# Patient Record
Sex: MALE | Race: WHITE | NOT HISPANIC OR LATINO | Employment: OTHER | URBAN - METROPOLITAN AREA
[De-identification: names, ages, dates, MRNs, and addresses within clinical notes are randomized per-mention and may not be internally consistent; named-entity substitution may affect disease eponyms.]

---

## 2017-04-27 DIAGNOSIS — E03.9 HYPOTHYROIDISM, UNSPECIFIED TYPE: ICD-10-CM

## 2017-04-27 DIAGNOSIS — E11.21 TYPE 2 DIABETES MELLITUS WITH DIABETIC NEPHROPATHY (H): ICD-10-CM

## 2017-04-27 LAB
ALBUMIN SERPL-MCNC: 3.7 G/DL (ref 3.4–5)
ALP SERPL-CCNC: 87 U/L (ref 40–150)
ALT SERPL W P-5'-P-CCNC: 36 U/L (ref 0–70)
ANION GAP SERPL CALCULATED.3IONS-SCNC: 10 MMOL/L (ref 3–14)
AST SERPL W P-5'-P-CCNC: 27 U/L (ref 0–45)
BILIRUB SERPL-MCNC: 0.7 MG/DL (ref 0.2–1.3)
BUN SERPL-MCNC: 18 MG/DL (ref 7–30)
CALCIUM SERPL-MCNC: 9 MG/DL (ref 8.5–10.1)
CHLORIDE SERPL-SCNC: 106 MMOL/L (ref 94–109)
CHOLEST SERPL-MCNC: 134 MG/DL
CO2 SERPL-SCNC: 23 MMOL/L (ref 20–32)
CREAT SERPL-MCNC: 1.06 MG/DL (ref 0.66–1.25)
GFR SERPL CREATININE-BSD FRML MDRD: 68 ML/MIN/1.7M2
GLUCOSE SERPL-MCNC: 192 MG/DL (ref 70–99)
HBA1C MFR BLD: 7.1 % (ref 4.3–6)
HDLC SERPL-MCNC: 60 MG/DL
LDLC SERPL CALC-MCNC: 43 MG/DL
NONHDLC SERPL-MCNC: 74 MG/DL
POTASSIUM SERPL-SCNC: 4.2 MMOL/L (ref 3.4–5.3)
PROT SERPL-MCNC: 7.5 G/DL (ref 6.8–8.8)
SODIUM SERPL-SCNC: 139 MMOL/L (ref 133–144)
TRIGL SERPL-MCNC: 156 MG/DL
TSH SERPL DL<=0.005 MIU/L-ACNC: 2.9 MU/L (ref 0.4–4)

## 2017-04-27 PROCEDURE — 80061 LIPID PANEL: CPT | Performed by: FAMILY MEDICINE

## 2017-04-27 PROCEDURE — 36415 COLL VENOUS BLD VENIPUNCTURE: CPT | Performed by: FAMILY MEDICINE

## 2017-04-27 PROCEDURE — 80053 COMPREHEN METABOLIC PANEL: CPT | Performed by: FAMILY MEDICINE

## 2017-04-27 PROCEDURE — 83036 HEMOGLOBIN GLYCOSYLATED A1C: CPT | Performed by: FAMILY MEDICINE

## 2017-04-27 PROCEDURE — 84443 ASSAY THYROID STIM HORMONE: CPT | Performed by: FAMILY MEDICINE

## 2017-05-10 ENCOUNTER — TELEPHONE (OUTPATIENT)
Dept: INTERNAL MEDICINE | Facility: CLINIC | Age: 77
End: 2017-05-10

## 2017-05-10 NOTE — TELEPHONE ENCOUNTER
Reason for Call:  Request for results:    Name of test or procedure: lab--A1C    Date of test of procedure: 4/27/17    Location of the test or procedure: [facility]    OK to leave the result message on voice mail or with a family member? YES    Phone number Patient can be reached at:  Home number on file [phone] (home)    Additional comments:     Call taken on 5/10/2017 at 11:21 AM by MATTHIAS CUMMINGS

## 2017-05-15 ENCOUNTER — OFFICE VISIT (OUTPATIENT)
Dept: INTERNAL MEDICINE | Facility: CLINIC | Age: 77
End: 2017-05-15
Payer: MEDICARE

## 2017-05-15 VITALS
WEIGHT: 177 LBS | TEMPERATURE: 99 F | HEIGHT: 67 IN | BODY MASS INDEX: 27.78 KG/M2 | HEART RATE: 80 BPM | DIASTOLIC BLOOD PRESSURE: 68 MMHG | SYSTOLIC BLOOD PRESSURE: 124 MMHG | OXYGEN SATURATION: 96 %

## 2017-05-15 DIAGNOSIS — E11.21 TYPE 2 DIABETES MELLITUS WITH DIABETIC NEPHROPATHY, WITHOUT LONG-TERM CURRENT USE OF INSULIN (H): Primary | ICD-10-CM

## 2017-05-15 DIAGNOSIS — M79.672 BILATERAL FOOT PAIN: ICD-10-CM

## 2017-05-15 DIAGNOSIS — E03.9 HYPOTHYROIDISM, UNSPECIFIED TYPE: ICD-10-CM

## 2017-05-15 DIAGNOSIS — H26.9 CATARACT: ICD-10-CM

## 2017-05-15 DIAGNOSIS — M79.671 BILATERAL FOOT PAIN: ICD-10-CM

## 2017-05-15 DIAGNOSIS — K21.9 GASTROESOPHAGEAL REFLUX DISEASE WITHOUT ESOPHAGITIS: ICD-10-CM

## 2017-05-15 DIAGNOSIS — E78.5 HYPERLIPIDEMIA LDL GOAL <100: ICD-10-CM

## 2017-05-15 PROCEDURE — 99214 OFFICE O/P EST MOD 30 MIN: CPT | Performed by: INTERNAL MEDICINE

## 2017-05-15 RX ORDER — GLIMEPIRIDE 2 MG/1
TABLET ORAL
Qty: 225 TABLET | Refills: 3 | Status: SHIPPED | OUTPATIENT
Start: 2017-05-15 | End: 2017-06-22

## 2017-05-15 RX ORDER — LEVOTHYROXINE SODIUM 75 UG/1
75 TABLET ORAL DAILY
Qty: 90 TABLET | Refills: 3 | Status: SHIPPED | OUTPATIENT
Start: 2017-05-15 | End: 2018-04-25

## 2017-05-15 RX ORDER — ROSUVASTATIN CALCIUM 40 MG/1
20 TABLET, COATED ORAL AT BEDTIME
Qty: 45 TABLET | Refills: 3 | Status: SHIPPED | OUTPATIENT
Start: 2017-05-15 | End: 2017-06-22

## 2017-05-15 NOTE — NURSING NOTE
"Chief Complaint   Patient presents with     Diabetes     Recheck Medication       Initial /68 (BP Location: Left arm, Patient Position: Chair, Cuff Size: Adult Regular)  Pulse 80  Temp 99  F (37.2  C) (Oral)  Ht 5' 7\" (1.702 m)  Wt 177 lb (80.3 kg)  SpO2 96%  BMI 27.72 kg/m2 Estimated body mass index is 27.72 kg/(m^2) as calculated from the following:    Height as of this encounter: 5' 7\" (1.702 m).    Weight as of this encounter: 177 lb (80.3 kg).  Medication Reconciliation: complete    "

## 2017-05-15 NOTE — MR AVS SNAPSHOT
After Visit Summary   5/15/2017    Prakash Cramer    MRN: [id number]           Patient Information     Date Of Birth          1940        Visit Information        Provider Department      5/15/2017 1:30 PM Zhou Meraz MD [facility]        Today's Diagnoses     Bilateral foot pain    -  1    Hypothyroidism, unspecified type        Hyperlipidemia LDL goal <100        Type 2 diabetes mellitus with diabetic nephropathy, without long-term current use of insulin (H)        Gastroesophageal reflux disease without esophagitis          Care Instructions    Change the PM Metformin and Glimepiride doses to suppertime  Continue other medications  [facility]. They will call to schedule  Nonfasting blood and urine lab in ear/y/mid November  See me   In the 30 days before  Cataract surgery for preop             Follow-ups after your visit        Additional Services     ORTHOTICS REFERRAL       **This referral order prints off in the [facility]  (Orthotics & Prosthetics) Central Scheduling Office**    The [facility] Central Scheduling Staff will contact the patient to schedule appointments.     Central Scheduling Contact Information: [phone] ([place])    Orthotics: [facility]    Please be aware that coverage of these services is subject to the terms and limitations of your health insurance plan.  Call member services at your health plan with any benefit or coverage questions.      Please bring the following to your appointment:    >>   Any x-rays, CTs or MRIs which have been performed.  Contact the facility where they were done to arrange for  prior to your scheduled appointment.    >>   List of current medications   >>   This referral request   >>   Any documents/labs given to you for this referral                  Future tests that were ordered for you today     Open Future Orders        Priority Expected Expires Ordered     "Hemoglobin A1c Routine 2017 3/11/2018 5/15/2017            Who to contact     If you have questions or need follow up information about today's clinic visit or your schedule please contact [facility] directly at [phone].  Normal or non-critical lab and imaging results will be communicated to you by MyChart, letter or phone within 4 business days after the clinic has received the results. If you do not hear from us within 7 days, please contact the clinic through MyChart or phone. If you have a critical or abnormal lab result, we will notify you by phone as soon as possible.  Submit refill requests through [email or web address] or call your pharmacy and they will forward the refill request to us. Please allow 3 business days for your refill to be completed.          Additional Information About Your Visit        MyChart Information     [email or web address] lets you send messages to your doctor, view your test results, renew your prescriptions, schedule appointments and more. To sign up, go to [email or web address] . Click on \"Log in\" on the left side of the screen, which will take you to the Welcome page. Then click on \"Sign up Now\" on the right side of the page.     You will be asked to enter the access code listed below, as well as some personal information. Please follow the directions to create your username and password.     Your access code is: [email or web address]  Expires: 2017  2:18 PM     Your access code will  in 90 days. If you need help or a new code, please call your [facility] clinic or [phone].        Care EveryWhere ID     This is your Care EveryWhere ID. This could be used by other organizations to access your [facility] medical records  [id number]        Your Vitals Were     Pulse Temperature Height Pulse Oximetry BMI (Body Mass Index)       80 99  F (37.2  C) (Oral) 5' 7\" (1.702 m) 96% 27.72 kg/m2        Blood Pressure from Last 3 Encounters:   05/15/17 124/68   16 130/72 "   06/21/16 126/62    Weight from Last 3 Encounters:   05/15/17 177 lb (80.3 kg)   12/01/16 173 lb (78.5 kg)   06/21/16 171 lb 9.6 oz (77.8 kg)              We Performed the Following     ORTHOTICS REFERRAL          Today's Medication Changes          These changes are accurate as of: 5/15/17  2:18 PM.  If you have any questions, ask your nurse or doctor.               These medicines have changed or have updated prescriptions.        Dose/Directions    glimepiride 2 MG tablet   Commonly known as:  AMARYL   This may have changed:    - how much to take  - how to take this  - when to take this  - additional instructions   Used for:  Type 2 diabetes mellitus with diabetic nephropathy, without long-term current use of insulin (H)   Changed by:  Zhou Meraz MD        1.5 tabs in AM with breakfast and 1 tab in OPM with supper   Quantity:  225 tablet   Refills:  3            Where to get your medicines      These medications were sent to [facility] [address] [place], [place] - [address]  [address] [facility] [address]     Phone:  [phone]     glimepiride 2 MG tablet    levothyroxine 75 MCG tablet    omeprazole 20 MG CR capsule    rosuvastatin 40 MG tablet         Some of these will need a paper prescription and others can be bought over the counter.  Ask your nurse if you have questions.     Bring a paper prescription for each of these medications     sitagliptin 100 MG tablet                Primary Care Provider Office Phone # Fax #    Zhou Meraz [phone]       [facility] [address] [facility] [address]        Thank you!     Thank you for choosing [facility]  for your care. Our goal is always to provide you with excellent care. Hearing back from our patients is one way we can continue to improve our services. Please take a few minutes to complete the written survey that you may receive in the mail after your visit with us.  Thank you!             Your Updated Medication List - Protect others around you: Learn how to safely use, store and throw away your medicines at www.disposemymeds.org.          This list is accurate as of: 5/15/17  2:18 PM.  Always use your most recent med list.                   Brand Name Dispense Instructions for use    amLODIPine 10 MG tablet    NORVASC    90 tablet    Take 1 tablet (10 mg) by mouth daily       * blood glucose monitoring lancets     1 Box    Use to test blood sugar 1 time daily or as directed.       * blood glucose monitoring lancets     1 Box    Use to test blood sugar 1 times daily or as directed.       * blood glucose monitoring meter device kit     1 kit    Use to test blood sugars 1 time daily or as directed.       * Blood Glucose Monitoring Suppl Ami     1 each    1 Device daily Smartview Meter       * blood glucose monitoring test strip    ACCU-CHEK COMPACT DRUM    102 strip    Use to test blood sugars 1 time daily or as directed.       * blood glucose monitoring test strip    ACCU-CHEK SMARTVIEW    100 strip    Use to test blood sugar 1 times daily or as directed.       ferrous gluconate 324 (38 FE) MG tablet    FERGON    90 tablet    Take 1 tablet (324 mg) by mouth daily (with breakfast)       gabapentin 100 MG capsule    NEURONTIN    270 capsule    Take 1 capsule (100 mg) by mouth 3 times daily       glimepiride 2 MG tablet    AMARYL    225 tablet    1.5 tabs in AM with breakfast and 1 tab in OPM with supper       levothyroxine 75 MCG tablet    SYNTHROID/LEVOTHROID    90 tablet    Take 1 tablet (75 mcg) by mouth daily       metFORMIN 1000 MG tablet    GLUCOPHAGE    180 tablet    Take 1 tablet (1,000 mg) by mouth 2 times daily (with meals)       omeprazole 20 MG CR capsule    priLOSEC    90 capsule    Take 1 capsule (20 mg) by mouth every morning TAKE 30'-60' BEFORE 1ST MEAL DAILY       rosuvastatin 40 MG tablet    CRESTOR    45 tablet    Take 0.5 tablets (20 mg) by mouth At Bedtime        sitagliptin 100 MG tablet    JANUVIA    90 tablet    Take 1 tablet (100 mg) by mouth daily       * Notice:  This list has 6 medication(s) that are the same as other medications prescribed for you. Read the directions carefully, and ask your doctor or other care provider to review them with you.

## 2017-05-15 NOTE — PATIENT INSTRUCTIONS
Change the PM Metformin and Glimepiride doses to suppertime  Continue other medications  [facility]erral [facility]. They will call to schedule  Nonfasting blood and urine lab in ear/y/mid November  See me   In the 30 days before  Cataract surgery for preop

## 2017-05-15 NOTE — PROGRESS NOTES
SUBJECTIVE:                                                    Prakash Cramer is a 77 year old male who presents to clinic today for the following health issues:    Diabetes Follow-up    Patient is checking blood sugars: once daily.  Results are as follows:         am - 134-158 fasting. No PM sugars checked    Diabetic concerns: elevated reading, and  Soreness of the bottoms of the feet     Symptoms of hypoglycemia (low blood sugar): none     Paresthesias (numbness or burning in feet) or sores: No, but bottoms of the feet have been sore     Date of last diabetic eye exam: about 2 months ago     Hyperlipidemia Follow-Up      Rate your low fat/cholesterol diet?: not monitoring fat    Taking statin?  Yes, no muscle aches from statin    Other lipid medications/supplements?:  none     Hypothyroidism Follow-up      Since last visit, patient describes the following symptoms: Weight stable, no hair loss, no skin changes, no constipation, no loose stools       Amount of exercise or physical activity: 2-3 days/week for an average of 45-60 minutes    Problems taking medications regularly: No    Medication side effects: none    Diet: regular (no restrictions)    Pt's past medical history, family history, habits, medications and allergies were reviewed with the patient today.  See snap shot for  HCM status. Most recent lab results reviewed with pt. Problem list and histories reviewed & adjusted, as indicated.  Additional history as below:    Now taking Glimepiride 2mg tab, 1.5 tabs in AM and 1 tab  At bedtime. No low sugar episodes. Has been active with exercise. Denies CP, SOB, abdominal pain, polyuria, polydipsia,  extremity numbness/parasthesias or skin problems. Has right cataract and will have surgery in July.  Diet had been worse in [place] but improved now back in [place].  Had been using treadmill. Weight up slightly.  History of foot arch pain that has been well-controlled with use of orthotics. Patient's current  "orthotics are wearing out and needs new ones. Previously custom-made. At risk for foot skin issues without them, especially with DM hx. GERD controlled with PPI. Has flares off of med. Rare caffeine    Lab Results   Component Value Date     04/27/2017     Lab Results   Component Value Date    A1C 7.1 04/27/2017     Lab Results   Component Value Date    CHOL 134 04/27/2017     Lab Results   Component Value Date    LDL 43 04/27/2017     Lab Results   Component Value Date    HDL 60 04/27/2017     Lab Results   Component Value Date    TRIG 156 04/27/2017     Lab Results   Component Value Date    CR 1.06 04/27/2017     Lab Results   Component Value Date    ALT 36 04/27/2017     Lab Results   Component Value Date    AST 27 04/27/2017     Lab Results   Component Value Date    MICROL 38 02/06/2015     Lab Results   Component Value Date    TSH 2.90 04/27/2017            Additional ROS:   Constitutional, HEENT, Cardiovascular, Pulmonary, GI and , Neuro, MSK and Psych review of systems/symptoms are otherwise negative or unchanged from previous, except as noted above.      OBJECTIVE:  /68 (BP Location: Left arm, Patient Position: Chair, Cuff Size: Adult Regular)  Pulse 80  Temp 99  F (37.2  C) (Oral)  Ht 5' 7\" (1.702 m)  Wt 177 lb (80.3 kg)  SpO2 96%  BMI 27.72 kg/m2   Estimated body mass index is 27.72 kg/(m^2) as calculated from the following:    Height as of this encounter: 5' 7\" (1.702 m).    Weight as of this encounter: 177 lb (80.3 kg).  Eye: PERRL, EOMI. Right cataract  HENT: ear canals and TM's normal and nose and mouth without ulcers or lesions   Neck: no adenopathy. Thyroid normal to palpation. No bruits  Pulm: Lungs clear to auscultation   CV: Regular rates and rhythm  GI: Soft, nontender, Normal active bowel sounds, No hepatosplenomegaly or masses palpable  Ext: Peripheral pulses intact. No edema. Higher arches bilaterally. No calluses  Neuro: Normal strength and tone, sensory exam grossly " normal    Assessment/Plan: (See plan discussion below for further details)  1. Type 2 diabetes mellitus with diabetic nephropathy, without long-term current use of insulin (H)  Controlled overall. Continue current meds with timing of PM doses adjusted per plan discussion below. Repeat labs 6 mos  - sitagliptin (JANUVIA) 100 MG tablet; Take 1 tablet (100 mg) by mouth daily  Dispense: 90 tablet; Refill: 3  - glimepiride (AMARYL) 2 MG tablet; 1.5 tabs in AM with breakfast and 1 tab in OPM with supper  Dispense: 225 tablet; Refill: 3  - Hemoglobin A1c; Future    2. Hypothyroidism, unspecified type  controlled  - levothyroxine (SYNTHROID/LEVOTHROID) 75 MCG tablet; Take 1 tablet (75 mcg) by mouth daily  Dispense: 90 tablet; Refill: 3    3. Hyperlipidemia LDL goal <100  controlled  - rosuvastatin (CRESTOR) 40 MG tablet; Take 0.5 tablets (20 mg) by mouth At Bedtime  Dispense: 45 tablet; Refill: 3    4. Bilateral foot pain  Needs new orthotics  - ORTHOTICS REFERRAL    5. Gastroesophageal reflux disease without esophagitis  controlled  - omeprazole (PRILOSEC) 20 MG CR capsule; Take 1 capsule (20 mg) by mouth every morning TAKE 30'-60' BEFORE 1ST MEAL DAILY  Dispense: 90 capsule; Refill: 3    6. Cataract  Future surgery as above    Plan discussion:  Change the PM Metformin and Glimepiride doses to suppertime rather than bedtime  Continue other medications  Referral to  Orthotics. They will call to schedule  Nonfasting blood and urine lab in eary/mid November  See me   in the 30 days before  Cataract surgery for preop       Zhou Meraz MD  Internal Medicine Department  [facility]

## 2017-05-22 ENCOUNTER — MEDICAL CORRESPONDENCE (OUTPATIENT)
Dept: HEALTH INFORMATION MANAGEMENT | Facility: CLINIC | Age: 77
End: 2017-05-22

## 2017-05-23 PROCEDURE — 99207 C MD CERTIFICATION HHA PATIENT: CPT | Performed by: INTERNAL MEDICINE

## 2017-06-17 ENCOUNTER — HEALTH MAINTENANCE LETTER (OUTPATIENT)
Age: 77
End: 2017-06-17

## 2017-06-22 ENCOUNTER — OFFICE VISIT (OUTPATIENT)
Dept: INTERNAL MEDICINE | Facility: CLINIC | Age: 77
End: 2017-06-22
Payer: MEDICARE

## 2017-06-22 VITALS
DIASTOLIC BLOOD PRESSURE: 84 MMHG | TEMPERATURE: 98.8 F | HEART RATE: 85 BPM | WEIGHT: 176 LBS | BODY MASS INDEX: 27.57 KG/M2 | OXYGEN SATURATION: 97 % | SYSTOLIC BLOOD PRESSURE: 128 MMHG

## 2017-06-22 DIAGNOSIS — D64.9 ANEMIA, UNSPECIFIED TYPE: ICD-10-CM

## 2017-06-22 DIAGNOSIS — E11.21 TYPE 2 DIABETES MELLITUS WITH DIABETIC NEPHROPATHY, WITHOUT LONG-TERM CURRENT USE OF INSULIN (H): ICD-10-CM

## 2017-06-22 DIAGNOSIS — H26.9 CATARACT OF RIGHT EYE, UNSPECIFIED CATARACT TYPE: ICD-10-CM

## 2017-06-22 DIAGNOSIS — Z01.818 PREOP GENERAL PHYSICAL EXAM: Primary | ICD-10-CM

## 2017-06-22 DIAGNOSIS — E78.5 HYPERLIPIDEMIA LDL GOAL <100: ICD-10-CM

## 2017-06-22 DIAGNOSIS — R19.7 DIARRHEA, UNSPECIFIED TYPE: ICD-10-CM

## 2017-06-22 PROCEDURE — 99215 OFFICE O/P EST HI 40 MIN: CPT | Performed by: INTERNAL MEDICINE

## 2017-06-22 PROCEDURE — 99207 C FOOT EXAM  NO CHARGE: CPT | Performed by: INTERNAL MEDICINE

## 2017-06-22 RX ORDER — ROSUVASTATIN CALCIUM 40 MG/1
20 TABLET, COATED ORAL AT BEDTIME
Qty: 45 TABLET | Refills: 3 | Status: SHIPPED | OUTPATIENT
Start: 2017-06-22 | End: 2018-07-09

## 2017-06-22 RX ORDER — GLIMEPIRIDE 2 MG/1
TABLET ORAL
Qty: 225 TABLET | Refills: 3 | Status: SHIPPED | OUTPATIENT
Start: 2017-06-22 | End: 2018-04-24

## 2017-06-22 NOTE — NURSING NOTE
"Chief Complaint   Patient presents with     Pre-Op Exam       Initial /84  Pulse 85  Temp 98.8  F (37.1  C) (Oral)  Wt 176 lb (79.8 kg)  SpO2 97%  BMI 27.57 kg/m2 Estimated body mass index is 27.57 kg/(m^2) as calculated from the following:    Height as of 5/15/17: 5' 7\" (1.702 m).    Weight as of this encounter: 176 lb (79.8 kg).  Medication Reconciliation: complete    "

## 2017-06-22 NOTE — PATIENT INSTRUCTIONS
Stop Metformin to see if stools normalize  If stools still loose by Monday, then obtain stool samples and return them to the lab  If stool normalizes off Metformin alone, then OK to skip stool studies and update MD via  nurseline [phone] next week  Nothing to eat/drink after midnight prior to surgery except take the following meds with a small sip water the AM of surgery:  Amlodipine, Levothyroxine, Gabapentin  Resume usual medications later that day when back home and eating

## 2017-06-22 NOTE — MR AVS SNAPSHOT
After Visit Summary   6/22/2017    Prakash Cramer    MRN: [id number]           Patient Information     Date Of Birth          1940        Visit Information        Provider Department      6/22/2017 10:00 AM Zhou Meraz MD [facility]        Today's Diagnoses     Preop general physical exam    -  1    Cataract        Type 2 diabetes mellitus with diabetic nephropathy, without long-term current use of insulin (H)        Hyperlipidemia LDL goal <100        Diarrhea, unspecified type          Care Instructions    Stop Metformin to see if stools normalize  If stools still loose by Monday, then obtain stool samples and return them to the lab  If stool normalizes off Metformin alone, then OK to skip stool studies and update MD via  nurseline [phone] next week  Nothing to eat/drink after midnight prior to surgery except take the following meds with a small sip water the AM of surgery:  Amlodipine, Levothyroxine, Gabapentin  Resume usual medications later that day when back home and eating          Follow-ups after your visit        Future tests that were ordered for you today     Open Future Orders        Priority Expected Expires Ordered    Enteric Bacteria and Virus Panel by CHRA Stool Routine  6/22/2018 6/22/2017    Clostridium difficile toxin B PCR Routine  6/22/2018 6/22/2017            Who to contact     If you have questions or need follow up information about today's clinic visit or your schedule please contact [facility] directly at [phone].  Normal or non-critical lab and imaging results will be communicated to you by MyChart, letter or phone within 4 business days after the clinic has received the results. If you do not hear from us within 7 days, please contact the clinic through Intiohart or phone. If you have a critical or abnormal lab result, we will notify you by phone as soon as possible.  Submit refill requests through Welcut or  "call your pharmacy and they will forward the refill request to us. Please allow 3 business days for your refill to be completed.          Additional Information About Your Visit        MyChart Information     Violethart lets you send messages to your doctor, view your test results, renew your prescriptions, schedule appointments and more. To sign up, go to [email or web address] . Click on \"Log in\" on the left side of the screen, which will take you to the Welcome page. Then click on \"Sign up Now\" on the right side of the page.     You will be asked to enter the access code listed below, as well as some personal information. Please follow the directions to create your username and password.     Your access code is: [email or web address]  Expires: 2017  2:18 PM     Your access code will  in 90 days. If you need help or a new code, please call your [facility] clinic or [phone].        Care EveryWhere ID     This is your Care EveryWhere ID. This could be used by other organizations to access your [facility] medical records  [id number]        Your Vitals Were     Pulse Temperature Pulse Oximetry BMI (Body Mass Index)          85 98.8  F (37.1  C) (Oral) 97% 27.57 kg/m2         Blood Pressure from Last 3 Encounters:   17 128/84   05/15/17 124/68   16 130/72    Weight from Last 3 Encounters:   17 176 lb (79.8 kg)   05/15/17 177 lb (80.3 kg)   16 173 lb (78.5 kg)              We Performed the Following     EYE EXAM (SIMPLE-NONBILLABLE)     FOOT EXAM          Where to get your medicines      These medications were sent to [facility] [address] [place], [place] [address] [facility] [address]  [address] [facility] [address]     Phone:  [phone]     rosuvastatin 40 MG tablet         Some of these will need a paper prescription and others can be bought over the counter.  Ask your nurse if you have questions.     Bring a paper prescription for each of these medications     glimepiride 2 MG " tablet          Primary Care Provider Office Phone # Fax #    Zhou Meraz [phone]       [facility] [address] [facility] [address]        Equal Access to Services     ARASH GRACE : Ubaldo proctor godwino Sonattyali, waaxda luqadaha, qaybta kaalmada adeegyada, ying avalosn annette lopez layanira ovalle. So [facility] [phone].    ATENCIÓN: Si habla español, tiene a stroud disposición servicios gratuitos de asistencia lingüística. Llame al [phone].    We comply with applicable federal civil rights laws and [place] laws. We do not discriminate on the basis of race, color, national origin, age, disability sex, sexual orientation or gender identity.            Thank you!     Thank you for choosing [facility]  for your care. Our goal is always to provide you with excellent care. Hearing back from our patients is one way we can continue to improve our services. Please take a few minutes to complete the written survey that you may receive in the mail after your visit with us. Thank you!             Your Updated Medication List - Protect others around you: Learn how to safely use, store and throw away your medicines at www.disposemymeds.org.          This list is accurate as of: 6/22/17 10:49 AM.  Always use your most recent med list.                   Brand Name Dispense Instructions for use Diagnosis    amLODIPine 10 MG tablet    NORVASC    90 tablet    Take 1 tablet (10 mg) by mouth daily    Essential hypertension       aspirin 81 MG tablet     30 tablet    Take by mouth daily    Type 2 diabetes mellitus with diabetic nephropathy, without long-term current use of insulin (H)       * blood glucose monitoring lancets     1 Box    Use to test blood sugar 1 time daily or as directed.    Type 2 diabetes mellitus with diabetic nephropathy, without long-term current use of insulin (H)       * blood glucose monitoring lancets     1 Box    Use to test blood sugar 1 times  daily or as directed.    Type 2 diabetes mellitus with diabetic nephropathy, without long-term current use of insulin (H)       * blood glucose monitoring meter device kit     1 kit    Use to test blood sugars 1 time daily or as directed.    Type 2 diabetes mellitus with diabetic nephropathy, without long-term current use of insulin (H)       * Blood Glucose Monitoring Suppl Ami     1 each    1 Device daily Smartview Meter    Type 2 diabetes mellitus with diabetic nephropathy, without long-term current use of insulin (H)       * blood glucose monitoring test strip    ACCU-CHEK COMPACT DRUM    102 strip    Use to test blood sugars 1 time daily or as directed.    Type 2 diabetes mellitus with diabetic nephropathy, without long-term current use of insulin (H)       * blood glucose monitoring test strip    ACCU-CHEK SMARTVIEW    100 strip    Use to test blood sugar 1 times daily or as directed.    Type 2 diabetes mellitus with diabetic nephropathy, without long-term current use of insulin (H)       ferrous gluconate 324 (38 FE) MG tablet    FERGON    90 tablet    Take 1 tablet (324 mg) by mouth daily (with breakfast)    Iron deficiency       gabapentin 100 MG capsule    NEURONTIN    270 capsule    Take 1 capsule (100 mg) by mouth 3 times daily    Status post total right knee replacement       glimepiride 2 MG tablet    AMARYL    225 tablet    1.5 tabs in AM with breakfast and 1 tab in OPM with supper    Type 2 diabetes mellitus with diabetic nephropathy, without long-term current use of insulin (H)       levothyroxine 75 MCG tablet    SYNTHROID/LEVOTHROID    90 tablet    Take 1 tablet (75 mcg) by mouth daily    Hypothyroidism, unspecified type       metFORMIN 1000 MG tablet    GLUCOPHAGE    180 tablet    Take 1 tablet (1,000 mg) by mouth 2 times daily (with meals)    Type 2 diabetes mellitus with diabetic nephropathy, without long-term current use of insulin (H)       omeprazole 20 MG CR capsule    priLOSEC    90  capsule    Take 1 capsule (20 mg) by mouth every morning TAKE 30'-60' BEFORE 1ST MEAL DAILY    Gastroesophageal reflux disease without esophagitis       rosuvastatin 40 MG tablet    CRESTOR    45 tablet    Take 0.5 tablets (20 mg) by mouth At Bedtime    Hyperlipidemia LDL goal <100       sitagliptin 100 MG tablet    JANUVIA    90 tablet    Take 1 tablet (100 mg) by mouth daily    Type 2 diabetes mellitus with diabetic nephropathy, without long-term current use of insulin (H)       * Notice:  This list has 6 medication(s) that are the same as other medications prescribed for you. Read the directions carefully, and ask your doctor or other care provider to review them with you.

## 2017-06-22 NOTE — PROGRESS NOTES
[address] [id number] [phone]  Dept: [phone]    PRE-OP EVALUATION:  Today's date: 2017    Prakash Cramer (: 1940) presents for pre-operative evaluation assessment as requested by Dr. Pearson.  He requires evaluation and anesthesia risk assessment prior to undergoing surgery/procedure for treatment of right cataract  Proposed procedure: Removal of Cataract     Date of Surgery/ Procedure: 2017   Time of Surgery/ Procedure: 10 am   Hospital/Surgical Facility: [facility] in [place]  Fax number for surgical facility: [phone]  Primary Physician: Zhou Meraz  Type of Anesthesia Anticipated:  Local    Patient has a Health Care Directive or Living Will:  YES     1. NO - Do you have a history of heart attack, stroke, stent, bypass or surgery on an artery in the head, neck, heart or legs?  2. NO - Do you ever have any pain or discomfort in your chest?  3. NO - Do you have a history of  Heart Failure?  4. NO - Are you troubled by shortness of breath when: walking on the level, up a slight hill or at night?  5. NO - Do you currently have a cold, bronchitis or other respiratory infection?  6. NO - Do you have a cough, shortness of breath or wheezing?  7. NO - Do you sometimes get pains in the calves of your legs when you walk?  8. NO - Do you or anyone in your family have previous history of blood clots?  9. NO - Do you or does anyone in your family have a serious bleeding problem such as prolonged bleeding following surgeries or cuts?  10. NO - Have you ever had problems with anemia or been told to take iron pills?  11. NO - Have you had any abnormal blood loss such as black, tarry or bloody stools, or abnormal vaginal bleeding?  12. NO - Have you ever had a blood transfusion?  13. NO - Have you or any of your relatives ever had problems with anesthesia?  14. NO - Do you have sleep apnea, excessive snoring or daytime  drowsiness?  15. NO - Do you have any prosthetic heart valves?  16. NO - Do you have prosthetic joints?  17. NO - Is there any chance that you may be pregnant?      HPI:                                                      Brief HPI related to upcoming procedure:  Hx clouding vision right side with cataract formation. Left side OK. Presents for clearance to undergo surgical correction. See below for other medical issues. Hx diabetes mellitus. Sugars 150s in AM and  160s in PM. Most recent A1C from April 2017 showed good control with A1C 7.1.  Walking for exercise about 1 hour 5x/week. Occ biking or using rowing machine also. No  chest pain or shortness of breath with those activities         MEDICAL HISTORY:                                                      Patient Active Problem List    Diagnosis Date Noted     Type 2 diabetes mellitus with diabetic nephropathy, without long-term current use of insulin (H) 11/09/2016     Priority: Medium     Status post total right knee replacement 03/14/2016     Priority: Medium     Hypothyroidism 12/19/2015     Priority: Medium     RBBB      Priority: Medium     Proteinuria      Priority: Medium     BPH (benign prostatic hypertrophy)      Priority: Medium     ACP (advance care planning) 02/06/2013     Priority: Medium     Advance Care Planning:   Followup facilitation and documentation of choices:  Prakash Cramer presented for follow-up session to update his present document completed 2010. Patient states this document only appointed health care agents and did not specify his wishes.   Patient states his wife recently passed away after 19 years of total care following a brain injury.  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   Patient states his wife was institutionalized for many years of her past 19 years. .  Previous ACP discussions reviewed and questions answered. At this time he has updated a previous HCD to  reflect current values, goals, and choices. Health Care Agent aware of changes and understands choices. Patient will provide copies for his 3 health care agents.  Documents completed at this visit: HCD. HCD notarized.  Validation form completed and sent with copy of document to be scanned. Confirmed/documented designated decision maker(s). See permanent comments of demographics in clinical tab. Confirmed current code status reflects current choices. View document(s) and details by clicking on code status.    Added by Jami Gonzalez on 3/6/2013    .Patient states has Advance Directive and will bring in a copy to clinic. 2/6/2013            Esophageal reflux 03/11/2005     Priority: Medium     Essential hypertension 03/11/2005     Priority: Medium     Problem list name updated by automated process. Provider to review       Diaphragmatic hernia 03/11/2005     Priority: Medium     Problem list name updated by automated process. Provider to review       Impotence of organic origin 03/11/2005     Priority: Medium     Hyperlipidemia LDL goal <100 03/11/2005     Priority: Medium      Past Medical History:   Diagnosis Date     BPH (benign prostatic hypertrophy)     failed  Flomax     Cellulitis and abscess of leg, except foot 2004     Contact dermatitis and other eczema, due to unspecified cause      Diaphragmatic hernia without mention of obstruction or gangrene     hiatal hernia     Esophageal reflux      Hyperlipidemia LDL goal <100 3/11/2005     Hypothyroidism      Impotence of organic origin      Meningococcal encephalitis      Proteinuria      RBBB      Type 2 diabetes mellitus with diabetic nephropathy  (goal A1C<7) 10/24/2015     Unspecified essential hypertension      Past Surgical History:   Procedure Laterality Date     ARTHROPLASTY KNEE Left 3/18/2015    Procedure: ARTHROPLASTY KNEE;  Surgeon: Abebe Schroeder MD;  Location: [facility] OR     ARTHROPLASTY KNEE Right 3/14/2016    Procedure: ARTHROPLASTY KNEE;  Surgeon:  Abebe Schroeder MD;  Location: [facility] OR     C NONSPECIFIC PROCEDURE  10/02    left knee meniscus tear repair     HC LAPAROSCOPY, SURGICAL; CHOLECYSTECTOMY  1998    Cholecystectomy, Laparoscopic     HC REMOVE TONSILS/ADENOIDS,13+ Y/O  1963    T & A 13+y.o.     Current Outpatient Prescriptions   Medication Sig Dispense Refill     glimepiride (AMARYL) 2 MG tablet 1.5 tabs in AM with breakfast and 1 tab in OPM with supper 225 tablet 3     rosuvastatin (CRESTOR) 40 MG tablet Take 0.5 tablets (20 mg) by mouth At Bedtime 45 tablet 3     aspirin 81 MG tablet Take by mouth daily 30 tablet      levothyroxine (SYNTHROID/LEVOTHROID) 75 MCG tablet Take 1 tablet (75 mcg) by mouth daily 90 tablet 3     sitagliptin (JANUVIA) 100 MG tablet Take 1 tablet (100 mg) by mouth daily 90 tablet 3     omeprazole (PRILOSEC) 20 MG CR capsule Take 1 capsule (20 mg) by mouth every morning TAKE 30'-60' BEFORE 1ST MEAL DAILY 90 capsule 3     blood glucose monitoring (ACCU-CHEK SMARTVIEW) test strip Use to test blood sugar 1 times daily or as directed. 100 strip 3     Blood Glucose Monitoring Suppl MAGDALENO 1 Device daily Smartview Meter 1 each 0     blood glucose monitoring (ACCU-CHEK FASTCLIX) lancets Use to test blood sugar 1 times daily or as directed. 1 Box 11     blood glucose monitoring (ACCU-CHEK COMPACT CARE KIT) meter device kit Use to test blood sugars 1 time daily or as directed. 1 kit 0     blood glucose monitoring (ACCU-CHEK COMPACT DRUM) test strip Use to test blood sugars 1 time daily or as directed. 102 strip 11     blood glucose monitoring (ACCU-CHEK MULTICLIX) lancets Use to test blood sugar 1 time daily or as directed. 1 Box 11     amLODIPine (NORVASC) 10 MG tablet Take 1 tablet (10 mg) by mouth daily 90 tablet 3     ferrous gluconate (FERGON) 324 (38 FE) MG tablet Take 1 tablet (324 mg) by mouth daily (with breakfast) 90 tablet 3     gabapentin (NEURONTIN) 100 MG capsule Take 1 capsule (100 mg) by mouth 3 times daily 270 capsule 3      metFORMIN (GLUCOPHAGE) 1000 MG tablet Take 1 tablet (1,000 mg) by mouth 2 times daily (with meals) 180 tablet 3     [DISCONTINUED] rosuvastatin (CRESTOR) 40 MG tablet Take 0.5 tablets (20 mg) by mouth At Bedtime 45 tablet 3     [DISCONTINUED] glimepiride (AMARYL) 2 MG tablet 1.5 tabs in AM with breakfast and 1 tab in OPM with supper 225 tablet 3     OTC products: None, except as noted above    Allergies   Allergen Reactions     Atorvastatin Calcium      myalgias     Diovan [Valsartan]      increase k+     Mold Other (See Comments)     Eyes itchy,red and dry     Penicillins      Seasonal Allergies      Eyes itch      Latex Allergy: NO    Social History   Substance Use Topics     Smoking status: Never Smoker     Smokeless tobacco: Never Used     Alcohol use Yes      Comment: beer 4-5 a month     History   Drug Use No       REVIEW OF SYSTEMS:                                                    C: NEGATIVE for fever, chills, change in weight  I: NEGATIVE for worrisome rashes, moles or lesions  E:  See HPI  re: right eye  E/M: NEGATIVE for ear, mouth and throat problems  R: NEGATIVE for significant cough or SOB  CV: NEGATIVE for chest pain, palpitations or peripheral edema  GI: NEGATIVE for nausea, abdominal pain, heartburn (with PPI). Having loose stools for 6-8 weeks. No change diet.  Had been in [place] for the winter. No other travel.  Had  Unknown abx 1 dose 6 weeks ago when had dental cleaning. No blood.  HAs 2-4 BMs/day and using Immodium with some benefit  : NEGATIVE for frequency, dysuria, or hematuria  M: NEGATIVE for significant arthralgias or myalgia. Hx previous TKAs  N: NEGATIVE for weakness, dizziness or paresthesias  E:  DM well controlled overall. ON statin and thyroid replacement. Recent labs at goal  H: NEGATIVE for bleeding problems  P: NEGATIVE for changes in mood or affect    EXAM:                                                    /84  Pulse 85  Temp 98.8  F (37.1  C) (Oral)  Wt 176  lb (79.8 kg)  SpO2 97%  BMI 27.57 kg/m2  Eye: PERRL, EOMI. Right cataract  HENT: ear canals and TM's normal and nose and mouth without ulcers or lesions   Neck: no adenopathy. Thyroid normal to palpation. No bruits  Pulm: Lungs clear to auscultation   CV: Regular rates and rhythm  GI: Soft, nontender, Normal active bowel sounds, No hepatosplenomegaly or masses palpable  Ext: Peripheral pulses intact. No edema.  Neuro: Normal strength and tone, sensory exam grossly normal      DIAGNOSTICS:                                                    No labs or EKG required for low risk surgery (cataract)    Recent Labs   Lab Test  04/27/17   0853  11/23/16   0909  03/17/16   0736  03/16/16   0617  03/15/16   0636  03/14/16   0850   02/09/16   0909   03/18/15   0557   HGB   --    --    --   11.7*  11.5*  13.4   < >   --    < >  14.6   PLT   --    --   222   --    --   248   < >   --    < >  225   INR   --    --    --    --    --   0.92   --    --    --   0.88   NA  139   --    --    --    --    --    --   138   < >  139   POTASSIUM  4.2   --   4.6   --    --   4.4   --   4.5   < >  4.2   CR  1.06   --   1.15   --    --   1.11   --   1.12   < >  1.02   A1C  7.1*  7.3*   --    --    --    --    --   7.4*   < >   --     < > = values in this interval not displayed.        IMPRESSION:                                                    Reason for surgery/procedure: Right cataract  Diagnosis/reason for consult:  Diabetes (coontrolled), recent loose stools (etiology undetermined at this time),  Hyperlipidemia (controlled), hypertension (controlled),  Hypothyroidism (normal TSH April 2017), hx anemia 2016 after  TKA - no lab follow-up ever done to assess resolution    The proposed surgical procedure is considered LOW risk.    REVISED CARDIAC RISK INDEX  The patient has the following serious cardiovascular risks for perioperative complications such as (MI, PE, VFib and 3  AV Block):  No serious cardiac risks  INTERPRETATION: 0 risks:  Class I (very low risk - 0.4% complication rate)    The patient has the following additional risks for perioperative complications:  No identified additional risks      RECOMMENDATIONS:                                                      APPROVAL GIVEN to proceed with proposed procedure, without further diagnostic evaluation    Stop Metformin to see if stools normalize  If stools still loose by Monday, then obtain stool samples and return them to the lab  If stool normalizes off Metformin alone, then OK to skip stool studies and update MD via  nurseline [phone] next week  Nothing to eat/drink after midnight prior to surgery except take the following meds with a small sip water the AM of surgery:  Amlodipine, Levothyroxine, Gabapentin  Resume usual medications later that day when back home and eating  Labs as ordered    Signed Electronically by: Zhou Meraz MD    Copy of this evaluation report is provided to requesting physician.    [facility] Preop Guidelines

## 2017-06-25 PROBLEM — D64.9 ANEMIA, UNSPECIFIED TYPE: Status: ACTIVE | Noted: 2017-06-25

## 2017-06-26 ENCOUNTER — TELEPHONE (OUTPATIENT)
Dept: INTERNAL MEDICINE | Facility: CLINIC | Age: 77
End: 2017-06-26

## 2017-06-26 DIAGNOSIS — E11.21 TYPE 2 DIABETES MELLITUS WITH DIABETIC NEPHROPATHY, WITHOUT LONG-TERM CURRENT USE OF INSULIN (H): ICD-10-CM

## 2017-06-26 NOTE — TELEPHONE ENCOUNTER
Glad loose stools better. No need for stool labs.  Only generic option would be to increase Glimepiride dosage  pt currently taking. Would first like to see though if pt can tolerate lower dose of Metformin without loose stools. Pt to do the followin. Remain off of Metformin completely between  and [facility] surgery mid July.  2. After surgery, pt to start Metformin 1000mg tab, 1/2 tab at supper once a day for 2 weeks. Then if no recurrent loose stools, increase Metformin  Dose to 1/2 tab twice a day (breakfast and supper.  3. Pt to update clinic nurseline in mid August if stools doing OK with Metformn 500mg twice a day or earlier if not tolerating restart of Metformin and restarting to have loose stools with Metformin use

## 2017-06-26 NOTE — LETTER
[facility]  [address] [address]  [phone]      June 26, 2017      Prakash Cramer  [address] [address] [place] [address]        Dear Prakash,    Below are the instructions that Dr. Meraz wants you to follow regarding the Metformin:    1. Remain off of Metformin completely between nw and cataarct surgery mid July.  2. After surgery, start Metformin 1000mg tab, 1/2 tab at supper once a day for 2 weeks. Then if no recurrent loose stools, increase Metformin  Dose to 1/2 tab twice a day (breakfast and supper.)  3. Pt to update clinic nurseline in mid August if stools doing OK with Metformin 500mg twice a day. Earlier if not tolerating restart of Metformin and restarting to have loose stools with Metformin use    Sincerely,    BELEN Rashid MD

## 2017-06-26 NOTE — TELEPHONE ENCOUNTER
Pt was seen 6/22 and taken off metformin due to loose stools. He is calling today to inform Dr. Meraz that he is no longer having diarrhea. Stools are normal. He is wondering if you would like to start him back on metformin? If want to start a new RX? If so, he would like it to be generic, and not a needle.

## 2017-06-26 NOTE — TELEPHONE ENCOUNTER
The patient has been notified of this information and all questions answered. Letter sent as reference.  Dora Renner CMA

## 2017-08-09 ENCOUNTER — TELEPHONE (OUTPATIENT)
Dept: INTERNAL MEDICINE | Facility: CLINIC | Age: 77
End: 2017-08-09

## 2017-08-09 NOTE — TELEPHONE ENCOUNTER
"\"hip discomfort\" issue not discussed at recent appts. Will not consider sleep medication without further evaluation of sleep patterns, why having the problem, etc. Also if hip issues are potentially the underlying cause, then seems more appropriate to address the hip discomfort issue first. Pt to be seen in clinic  "

## 2017-08-09 NOTE — TELEPHONE ENCOUNTER
Reason for Call:  Other call back and prescription    Detailed comments: wants to know if he can get a mild sleeping medication due to his hip discomfort    Phone Number Patient can be reached at: Home number on file [phone] (home)    Best Time: any    Can we leave a detailed message on this number? YES    Call taken on 8/9/2017 at 1:13 PM by Joe Crouch

## 2017-08-10 NOTE — TELEPHONE ENCOUNTER
Call placed to patient, he states he went into [facility] Monday, and received an injection into left hip for bursitis which has given some relief. States he is still having difficulty sleeping, and unsure when he will be able to come in to see MD. States he will be leaving town tomorrow night.  He also wanted to let MD know that with the change in his dosage of Metformin, his loose stools resolved. His BS have increased with ranges of 175-180.  States he is still currently taking Metformin 500mg in AM and 500mg in PM.

## 2017-11-09 DIAGNOSIS — E11.21 TYPE 2 DIABETES MELLITUS WITH DIABETIC NEPHROPATHY, WITHOUT LONG-TERM CURRENT USE OF INSULIN (H): ICD-10-CM

## 2017-11-09 NOTE — TELEPHONE ENCOUNTER
Prescription approved per [facility] Refill Protocol.  Pt has upcoming appt for lab and ov

## 2017-11-13 DIAGNOSIS — D64.9 ANEMIA, UNSPECIFIED TYPE: ICD-10-CM

## 2017-11-13 DIAGNOSIS — E11.21 TYPE 2 DIABETES MELLITUS WITH DIABETIC NEPHROPATHY (H): ICD-10-CM

## 2017-11-13 DIAGNOSIS — E11.21 TYPE 2 DIABETES MELLITUS WITH DIABETIC NEPHROPATHY, WITHOUT LONG-TERM CURRENT USE OF INSULIN (H): ICD-10-CM

## 2017-11-13 DIAGNOSIS — E03.9 HYPOTHYROIDISM, UNSPECIFIED TYPE: ICD-10-CM

## 2017-11-13 LAB
CREAT UR-MCNC: 132 MG/DL
ERYTHROCYTE [DISTWIDTH] IN BLOOD BY AUTOMATED COUNT: 15.2 % (ref 10–15)
HBA1C MFR BLD: 7.5 % (ref 4.3–6)
HCT VFR BLD AUTO: 41.8 % (ref 40–53)
HGB BLD-MCNC: 13.8 G/DL (ref 13.3–17.7)
MCH RBC QN AUTO: 29.7 PG (ref 26.5–33)
MCHC RBC AUTO-ENTMCNC: 33 G/DL (ref 31.5–36.5)
MCV RBC AUTO: 90 FL (ref 78–100)
MICROALBUMIN UR-MCNC: 27 MG/L
MICROALBUMIN/CREAT UR: 20.53 MG/G CR (ref 0–17)
PLATELET # BLD AUTO: 239 10E9/L (ref 150–450)
RBC # BLD AUTO: 4.65 10E12/L (ref 4.4–5.9)
WBC # BLD AUTO: 7.2 10E9/L (ref 4–11)

## 2017-11-13 PROCEDURE — 82043 UR ALBUMIN QUANTITATIVE: CPT | Performed by: INTERNAL MEDICINE

## 2017-11-13 PROCEDURE — 36415 COLL VENOUS BLD VENIPUNCTURE: CPT | Performed by: INTERNAL MEDICINE

## 2017-11-13 PROCEDURE — 85027 COMPLETE CBC AUTOMATED: CPT | Performed by: INTERNAL MEDICINE

## 2017-11-13 PROCEDURE — 83036 HEMOGLOBIN GLYCOSYLATED A1C: CPT | Performed by: INTERNAL MEDICINE

## 2017-12-04 ENCOUNTER — OFFICE VISIT (OUTPATIENT)
Dept: INTERNAL MEDICINE | Facility: CLINIC | Age: 77
End: 2017-12-04
Payer: MEDICARE

## 2017-12-04 VITALS
TEMPERATURE: 98.8 F | WEIGHT: 170 LBS | DIASTOLIC BLOOD PRESSURE: 68 MMHG | BODY MASS INDEX: 26.63 KG/M2 | SYSTOLIC BLOOD PRESSURE: 124 MMHG | HEART RATE: 90 BPM | OXYGEN SATURATION: 96 %

## 2017-12-04 DIAGNOSIS — E11.21 TYPE 2 DIABETES MELLITUS WITH DIABETIC NEPHROPATHY, WITHOUT LONG-TERM CURRENT USE OF INSULIN (H): ICD-10-CM

## 2017-12-04 DIAGNOSIS — Z12.11 SPECIAL SCREENING FOR MALIGNANT NEOPLASMS, COLON: ICD-10-CM

## 2017-12-04 DIAGNOSIS — Z00.00 MEDICARE ANNUAL WELLNESS VISIT, INITIAL: Primary | ICD-10-CM

## 2017-12-04 PROCEDURE — G0439 PPPS, SUBSEQ VISIT: HCPCS | Performed by: INTERNAL MEDICINE

## 2017-12-04 RX ORDER — LANCETS
EACH MISCELLANEOUS
Qty: 102 EACH | Refills: 3 | Status: SHIPPED | OUTPATIENT
Start: 2017-12-04 | End: 2018-04-30

## 2017-12-04 NOTE — PROGRESS NOTES
SUBJECTIVE:   Prakash Cramer is a 77 year old male who presents for Preventive Visit.      Are you in the first 12 months of your Medicare Part B coverage?  No    Healthy Habits:    Do you get at least three servings of calcium containing foods daily (dairy, green leafy vegetables, etc.)? yes    Amount of exercise or daily activities, outside of work: 4 to 5 day(s) per week    Problems taking medications regularly No    Medication side effects: No    Have you had an eye exam in the past two years? yes    Do you see a dentist twice per year? yes    Do you have sleep apnea, excessive snoring or daytime drowsiness?yes    COGNITIVE SCREEN  1) Repeat 3 items (Banana, Sunrise, Chair)    2) Clock draw: ABNORMAL  No numbers on clock but hands correct  3) 3 item recall: Recalls 3 objects  Results: ABNORMAL clock (mild), 3 items recalled: PROBABLE COGNITIVE IMPAIRMENT, **INFORM PROVIDER**    Mini-CogTM Copyright S Edgar. Licensed by the author for use in [facility]; reprinted with permission (leonides@[email or web address]). All rights reserved.        Reviewed and updated as needed this visit by clinical staff         Reviewed and updated as needed this visit by Provider      Social History   Substance Use Topics     Smoking status: Never Smoker     Smokeless tobacco: Never Used     Alcohol use Yes      Comment: beer 4-5 a month       The patient does not drink >3 drinks per day nor >7 drinks per week.     Today's PHQ-2 Score:   PHQ-2 ( 1999 [employer]) 6/22/2017 5/15/2017   Q1: Little interest or pleasure in doing things 0 0   Q2: Feeling down, depressed or hopeless 0 0   PHQ-2 Score 0 0         Do you feel safe in your environment - Yes    Do you have a Health Care Directive?: Yes: Advance Directive has been received and scanned.    Current providers sharing in care for this patient include: Patient Care Team:  Zhou Meraz MD as PCP - General (Internal Medicine)      Hearing impairment: No    Ability to successfully perform  activities of daily living: Yes, no assistance needed     Fall risk:  Fallen 2 or more times in the past year?: No  Any fall with injury in the past year?: No      Home safety:  none identified      The following health maintenance items are reviewed in Epic and correct as of today:Health Maintenance   Topic Date Due     COLONOSCOPY Q5 YR  08/19/2011     MEDICARE ANNUAL WELLNESS VISIT  02/06/2014     INFLUENZA VACCINE (SYSTEM ASSIGNED)  09/01/2017     ADVANCE DIRECTIVE PLANNING Q5 YRS  03/06/2018     TSH Q1 YEAR  04/27/2018     ALT Q1 YR  04/27/2018     CREATININE Q1 YEAR  04/27/2018     LIPID MONITORING Q1 YEAR  04/27/2018     A1C Q6 MO  05/13/2018     FOOT EXAM Q1 YEAR  06/22/2018     EYE EXAM Q1 YEAR  06/22/2018     FALL RISK ASSESSMENT  06/22/2018     MICROALBUMIN Q1 YEAR  11/13/2018     TSH W/ FREE T4 REFLEX Q2 YEAR  04/27/2019     TETANUS IMMUNIZATION (SYSTEM ASSIGNED)  02/06/2023     PNEUMOCOCCAL  Completed     Labs reviewed in EPIC        ROS:  C: NEGATIVE for fever, chills. Weight down 6 pounds  I: NEGATIVE for worrisome rashes, moles or lesions  E: NEGATIVE for vision changes or irritation. Eye exam September 2017  E/M: NEGATIVE for ear, mouth and throat problems  R: NEGATIVE for significant cough or SOB  CV: NEGATIVE for chest pain, palpitations or peripheral edema  GI: NEGATIVE for nausea, abdominal pain,  or change in bowel habits. Prior diarrhea with Metformin resolved and back to taking full 1000mg BID. Rare GERD that responds to TUMS. Also on  Prilosec. 2 cup tea/day   : NEGATIVE for frequency, dysuria, or hematuria. Nocturia x2 and right back to sleep.  Urine protein elevated but improved. ED not bothersome  M: NEGATIVE for significant arthralgias or myalgia that limit activity  N: NEGATIVE for weakness, dizziness or paresthesias  E: NEGATIVE for temperature intolerance. Hx DM. A1C up some to 7.5. Not checking sugars at home  H: NEGATIVE for bleeding problems  P: NEGATIVE for changes in mood or  "affect    OBJECTIVE:   /68  Pulse 90  Temp 98.8  F (37.1  C) (Oral)  Wt 170 lb (77.1 kg)  SpO2 96%  BMI 26.63 kg/m2 Estimated body mass index is 27.57 kg/(m^2) as calculated from the following:    Height as of 5/15/17: 5' 7\" (1.702 m).    Weight as of 6/22/17: 176 lb (79.8 kg).  EXAM:   General appearance - healthy, alert, no distress  Skin - No rashes or lesions.  Head - normocephalic, atraumatic  Eyes - BATSHEVA, EOMI, fundi exam with nondilated pupils negative.  Ears - External ears normal. Canals clear. TM's normal.  Nose/Sinuses - Nares normal. Septum midline. Mucosa normal. No drainage or sinus tenderness.  Oropharynx - No erythema, no adenopathy, no exudates.  Neck - Supple without adenopathy or thyromegaly. No bruits.  Lungs - Clear to auscultation without wheezes/rhonchi.  Heart - Regular rate and rhythm without murmurs, clicks, or gallops.  Nodes - No supraclavicular, axillary, or inguinal adenopathy palpable.  Abdomen - Abdomen soft, non-tender. BS normal. No masses or hepatosplenomegaly palpable. No bruits.  Extremities -No cyanosis, clubbing or edema.    Musculoskeletal - Spine ROM normal. Muscular strength intact.   Peripheral pulses - radial=4/4, femoral=4/4, posterior tibial=4/4, dorsalis pedis=4/4,  Neuro - Gait normal. Reflexes normal and symmetric. Sensation grossly WNL.  Genital - Normal-appearing male external genitalia. No scrotal masses or inguinal hernia palpable.   Rectal - Guaic negative stool. Normal tone. Prostate 1 plus in size to palpation. No rectal masses or prostate nodularity palpable      ASSESSMENT / PLAN:   1. Medicare annual wellness visit, initial   Pt declines colonoscopy. Other HCM UTD. Will check FIT test as below.  Memory status OK    2. Type 2 diabetes mellitus with diabetic nephropathy, without long-term current use of insulin (H)  A1C mildly worsened. Will improve diet and continue same meds. Repeat labs in Spring 2018. If still elevated, will adjusty Glimepiride " "dosing  - metFORMIN (GLUCOPHAGE) 1000 MG tablet; 1 tab twice a day  Dispense: 180 tablet; Refill: 3  - blood glucose monitoring (ACCU-CHEK SMARTVIEW) test strip; Use to test blood sugar 1 times daily or as directed.  Dispense: 100 strip; Refill: 3  - blood glucose monitoring (ACCU-CHEK FASTCLIX) lancets; Use to test blood sugar 1 times daily or as directed.  Dispense: 102 each; Refill: 3  - sitagliptin (JANUVIA) 100 MG tablet; Take 1 tablet (100 mg) by mouth daily  Dispense: 90 tablet; Refill: 3  - Hemoglobin A1c; Future  - Basic metabolic panel; Future    3. Special screening for malignant neoplasms, colon  - Fecal colorectal cancer screen (FIT); Future      End of Life Planning:  Patient currently has an advanced directive: Yes.  Practitioner is supportive of decision.    COUNSELING:  Reviewed preventive health counseling, as reflected in patient instructions        Estimated body mass index is 27.57 kg/(m^2) as calculated from the following:    Height as of 5/15/17: 5' 7\" (1.702 m).    Weight as of 6/22/17: 176 lb (79.8 kg).     reports that he has never smoked. He has never used smokeless tobacco.        Appropriate preventive services were discussed with this patient, including applicable screening as appropriate for cardiovascular disease, diabetes, osteopenia/osteoporosis, and glaucoma.  As appropriate for age/gender, discussed screening for colorectal cancer, prostate cancer, breast cancer, and cervical cancer. Checklist reviewing preventive services available has been given to the patient.    Reviewed patients plan of care and provided an AVS. The Basic Care Plan (routine screening as documented in Health Maintenance) for Prakash meets the Care Plan requirement. This Care Plan has been established and reviewed with the Patient.    Counseling Resources:  ATP IV Guidelines  Pooled Cohorts Equation Calculator  Breast Cancer Risk Calculator  FRAX Risk Assessment  ICSI Preventive Guidelines  Dietary Guidelines for " Americans, 2010  USDA's MyPlate  ASA Prophylaxis  Lung CA Screening    PLAN:  Continue current meds  Call  [phone] or use [facility] to schedule a future lab appointment  non-fasting in April when back in [place] (blood and kit for stool test)  See me 1 week after labs in April. Check blood sugars twice a day (before breakfast and bedtime) for 4 days prior to the appointment with me and bring the results to the appointment.  Reduce caffeine in diet to help with heartburn           Zhou Meraz MD  [facility]

## 2017-12-04 NOTE — PATIENT INSTRUCTIONS
Continue current meds  Call  [phone] or use [employer] to schedule a future lab appointment  non-fasting in April when back in [place] (blood and kit for stool test)  See me 1 week after labs in April. Check blood sugars twice a day (before breakfast and bedtime) for 4 days prior to the appointment with me and bring the results to the appointment.  Reduce caffeine in diet to help with heartburn

## 2017-12-04 NOTE — NURSING NOTE
"Chief Complaint   Patient presents with     Physical       Initial /84  Pulse 90  Temp 98.8  F (37.1  C) (Oral)  Wt 170 lb (77.1 kg)  SpO2 96%  BMI 26.63 kg/m2 Estimated body mass index is 26.63 kg/(m^2) as calculated from the following:    Height as of 5/15/17: 5' 7\" (1.702 m).    Weight as of this encounter: 170 lb (77.1 kg).  Medication Reconciliation: complete  "

## 2017-12-04 NOTE — MR AVS SNAPSHOT
After Visit Summary   12/4/2017    Prakash Cramer    MRN: [id number]           Patient Information     Date Of Birth          1940        Visit Information        Provider Department      12/4/2017 9:30 AM Zhou Meraz MD [facility]        Today's Diagnoses     Medicare annual wellness visit, initial    -  1    Type 2 diabetes mellitus with diabetic nephropathy, without long-term current use of insulin (H)        Special screening for malignant neoplasms, colon          Care Instructions    Continue current meds  Call  [phone] or use [employer] to schedule a future lab appointment  non-fasting in April when back in [place] (blood and kit for stool test)  See me 1 week after labs in April. Check blood sugars twice a day (before breakfast and bedtime) for 4 days prior to the appointment with me and bring the results to the appointment.          Follow-ups after your visit        Future tests that were ordered for you today     Open Future Orders        Priority Expected Expires Ordered    Fecal colorectal cancer screen (FIT) Routine 3/4/2018 12/4/2018 12/4/2017    Hemoglobin A1c Routine 3/4/2018 12/4/2018 12/4/2017    Basic metabolic panel Routine 3/4/2018 12/4/2018 12/4/2017            Who to contact     If you have questions or need follow up information about today's clinic visit or your schedule please contact [facility] directly at [phone].  Normal or non-critical lab and imaging results will be communicated to you by MyChart, letter or phone within 4 business days after the clinic has received the results. If you do not hear from us within 7 days, please contact the clinic through MyChart or phone. If you have a critical or abnormal lab result, we will notify you by phone as soon as possible.  Submit refill requests through [employer] or call your pharmacy and they will forward the refill request to us. Please allow 3 business days for your  "refill to be completed.          Additional Information About Your Visit        [employer] Information     [employer] lets you send messages to your doctor, view your test results, renew your prescriptions, schedule appointments and more. To sign up, go to [email or web address] . Click on \"Log in\" on the left side of the screen, which will take you to the Welcome page. Then click on \"Sign up Now\" on the right side of the page.     You will be asked to enter the access code listed below, as well as some personal information. Please follow the directions to create your username and password.     Your access code is: [email or web address]  Expires: 3/4/2018 10:14 AM     Your access code will  in 90 days. If you need help or a new code, please call your [place] clinic or [phone].        Care EveryWhere ID     This is your Care EveryWhere ID. This could be used by other organizations to access your [place] medical records  [id number]        Your Vitals Were     Pulse Temperature Pulse Oximetry BMI (Body Mass Index)          90 98.8  F (37.1  C) (Oral) 96% 26.63 kg/m2         Blood Pressure from Last 3 Encounters:   17 124/68   17 128/84   05/15/17 124/68    Weight from Last 3 Encounters:   17 170 lb (77.1 kg)   17 176 lb (79.8 kg)   05/15/17 177 lb (80.3 kg)                 Today's Medication Changes          These changes are accurate as of: 17 10:14 AM.  If you have any questions, ask your nurse or doctor.               These medicines have changed or have updated prescriptions.        Dose/Directions    Blood Glucose Monitoring Suppl Ami   This may have changed:  Another medication with the same name was removed. Continue taking this medication, and follow the directions you see here.   Used for:  Type 2 diabetes mellitus with diabetic nephropathy, without long-term current use of insulin (H)   Changed by:  Zhou Meraz MD        Dose:  1 Device   1 Device daily Smartview Meter "   Quantity:  1 each   Refills:  0       blood glucose monitoring test strip   Commonly known as:  ACCU-CHEK SMARTVIEW   This may have changed:  Another medication with the same name was removed. Continue taking this medication, and follow the directions you see here.   Used for:  Type 2 diabetes mellitus with diabetic nephropathy, without long-term current use of insulin (H)   Changed by:  Zhou Meraz MD        Use to test blood sugar 1 times daily or as directed.   Quantity:  100 strip   Refills:  3       metFORMIN 1000 MG tablet   Commonly known as:  GLUCOPHAGE   This may have changed:  additional instructions   Used for:  Type 2 diabetes mellitus with diabetic nephropathy, without long-term current use of insulin (H)   Changed by:  Zhou Meraz MD        1 tab twice a day   Quantity:  180 tablet   Refills:  3            Where to get your medicines      These medications were sent to [facility] [address] [place] [address] [address]  [address] [facility] [address]     Phone:  [phone]     blood glucose monitoring lancets    blood glucose monitoring test strip    metFORMIN 1000 MG tablet         Some of these will need a paper prescription and others can be bought over the counter.  Ask your nurse if you have questions.     Bring a paper prescription for each of these medications     sitagliptin 100 MG tablet                Primary Care Provider Office Phone # Fax #    Zhou Meraz [phone]       [address] [facility] [address]        Equal Access to Services     ARASH GRACE AH: Hadashley persaud Sofara, waaxda luqadaha, qaybta kaalmada bryan, ying bartlett . So [facility] [phone].    ATENCIÓN: Si habla español, tiene a stroud disposición servicios gratuitos de asistencia lingüística. Llame al [phone].    We comply with applicable federal civil rights laws and [place] laws. We do not discriminate on the basis of race,  color, national origin, age, disability, sex, sexual orientation, or gender identity.            Thank you!     Thank you for choosing [facility]  for your care. Our goal is always to provide you with excellent care. Hearing back from our patients is one way we can continue to improve our services. Please take a few minutes to complete the written survey that you may receive in the mail after your visit with us. Thank you!             Your Updated Medication List - Protect others around you: Learn how to safely use, store and throw away your medicines at www.disposemymeds.org.          This list is accurate as of: 12/4/17 10:14 AM.  Always use your most recent med list.                   Brand Name Dispense Instructions for use Diagnosis    amLODIPine 10 MG tablet    NORVASC    90 tablet    Take 1 tablet (10 mg) by mouth daily    Essential hypertension       aspirin 81 MG tablet     30 tablet    Take by mouth daily    Type 2 diabetes mellitus with diabetic nephropathy, without long-term current use of insulin (H)       * blood glucose monitoring lancets     1 Box    Use to test blood sugar 1 time daily or as directed.    Type 2 diabetes mellitus with diabetic nephropathy, without long-term current use of insulin (H)       * blood glucose monitoring lancets     102 each    Use to test blood sugar 1 times daily or as directed.    Type 2 diabetes mellitus with diabetic nephropathy, without long-term current use of insulin (H)       Blood Glucose Monitoring Suppl Ami     1 each    1 Device daily Smartview Meter    Type 2 diabetes mellitus with diabetic nephropathy, without long-term current use of insulin (H)       blood glucose monitoring test strip    ACCU-CHEK SMARTVIEW    100 strip    Use to test blood sugar 1 times daily or as directed.    Type 2 diabetes mellitus with diabetic nephropathy, without long-term current use of insulin (H)       glimepiride 2 MG tablet    AMARYL    225 tablet     1.5 tabs in AM with breakfast and 1 tab in OPM with supper    Type 2 diabetes mellitus with diabetic nephropathy, without long-term current use of insulin (H)       levothyroxine 75 MCG tablet    SYNTHROID/LEVOTHROID    90 tablet    Take 1 tablet (75 mcg) by mouth daily    Hypothyroidism, unspecified type       metFORMIN 1000 MG tablet    GLUCOPHAGE    180 tablet    1 tab twice a day    Type 2 diabetes mellitus with diabetic nephropathy, without long-term current use of insulin (H)       omeprazole 20 MG CR capsule    priLOSEC    90 capsule    Take 1 capsule (20 mg) by mouth every morning TAKE 30'-60' BEFORE 1ST MEAL DAILY    Gastroesophageal reflux disease without esophagitis       rosuvastatin 40 MG tablet    CRESTOR    45 tablet    Take 0.5 tablets (20 mg) by mouth At Bedtime    Hyperlipidemia LDL goal <100       sitagliptin 100 MG tablet    JANUVIA    90 tablet    Take 1 tablet (100 mg) by mouth daily    Type 2 diabetes mellitus with diabetic nephropathy, without long-term current use of insulin (H)       * Notice:  This list has 2 medication(s) that are the same as other medications prescribed for you. Read the directions carefully, and ask your doctor or other care provider to review them with you.

## 2018-01-26 DIAGNOSIS — I10 ESSENTIAL HYPERTENSION: ICD-10-CM

## 2018-01-26 NOTE — TELEPHONE ENCOUNTER
Requested Prescriptions   Pending Prescriptions Disp Refills     amLODIPine (NORVASC) 10 MG tablet 90 tablet 3     Sig: Take 1 tablet (10 mg) by mouth daily    There is no refill protocol information for this order      Last Written Prescription Date:  12/01/16  Last Fill Quantity: 90,  # refills: 3   Last Office Visit with [facility] provider:  12/04/17   Future Office Visit:   0

## 2018-01-30 RX ORDER — AMLODIPINE BESYLATE 10 MG/1
10 TABLET ORAL DAILY
Qty: 90 TABLET | Refills: 3 | Status: SHIPPED | OUTPATIENT
Start: 2018-01-30 | End: 2018-12-19

## 2018-03-26 ENCOUNTER — TRANSFERRED RECORDS (OUTPATIENT)
Dept: HEALTH INFORMATION MANAGEMENT | Facility: CLINIC | Age: 78
End: 2018-03-26

## 2018-03-27 ENCOUNTER — TRANSFERRED RECORDS (OUTPATIENT)
Dept: HEALTH INFORMATION MANAGEMENT | Facility: CLINIC | Age: 78
End: 2018-03-27

## 2018-03-28 ENCOUNTER — TRANSFERRED RECORDS (OUTPATIENT)
Dept: HEALTH INFORMATION MANAGEMENT | Facility: CLINIC | Age: 78
End: 2018-03-28

## 2018-03-30 ENCOUNTER — TELEPHONE (OUTPATIENT)
Dept: INTERNAL MEDICINE | Facility: CLINIC | Age: 78
End: 2018-03-30

## 2018-03-30 NOTE — TELEPHONE ENCOUNTER
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 Pt had low plt, hgb , elevated wbc . See documentation from hospital stay . Pt had a bone marrow biopsy and peripheral blood smear and these were negative . Thought may have been a viral reason for abnormal labs but bone marrow biopsy was negative no acute janny load . Pt was not feeling well for 1 week and this brought him to ER . 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 Received Lupron.Abi Mack RN  Multiple records incoming from [place] [facility] and [facility] .

## 2018-04-01 PROBLEM — D61.9 APLASTIC ANEMIA (H): Status: ACTIVE | Noted: 2018-03-26

## 2018-04-01 NOTE — TELEPHONE ENCOUNTER
Noted, ER, H&P and initial Hem Onc consult note received. No discharge summary. No f/u appt made so assume pt still in [place]. Will await further info with discharge summary, additional Hem Onc notes/plan

## 2018-04-04 ENCOUNTER — TRANSFERRED RECORDS (OUTPATIENT)
Dept: HEALTH INFORMATION MANAGEMENT | Facility: CLINIC | Age: 78
End: 2018-04-04

## 2018-04-05 ENCOUNTER — TRANSFERRED RECORDS (OUTPATIENT)
Dept: HEALTH INFORMATION MANAGEMENT | Facility: CLINIC | Age: 78
End: 2018-04-05

## 2018-04-13 ENCOUNTER — TRANSFERRED RECORDS (OUTPATIENT)
Dept: HEALTH INFORMATION MANAGEMENT | Facility: CLINIC | Age: 78
End: 2018-04-13

## 2018-04-16 ENCOUNTER — TRANSFERRED RECORDS (OUTPATIENT)
Dept: HEALTH INFORMATION MANAGEMENT | Facility: CLINIC | Age: 78
End: 2018-04-16

## 2018-04-18 ENCOUNTER — ONCOLOGY VISIT (OUTPATIENT)
Dept: ONCOLOGY | Facility: CLINIC | Age: 78
End: 2018-04-18
Payer: MEDICARE

## 2018-04-18 ENCOUNTER — CARE COORDINATION (OUTPATIENT)
Dept: ONCOLOGY | Facility: CLINIC | Age: 78
End: 2018-04-18

## 2018-04-18 VITALS
TEMPERATURE: 98 F | BODY MASS INDEX: 26.06 KG/M2 | WEIGHT: 166 LBS | HEART RATE: 102 BPM | HEIGHT: 67 IN | SYSTOLIC BLOOD PRESSURE: 130 MMHG | OXYGEN SATURATION: 98 % | DIASTOLIC BLOOD PRESSURE: 77 MMHG

## 2018-04-18 DIAGNOSIS — E53.8 VITAMIN B12 DEFICIENCY (NON ANEMIC): ICD-10-CM

## 2018-04-18 DIAGNOSIS — D61.818 PANCYTOPENIA (H): Primary | ICD-10-CM

## 2018-04-18 LAB
ALBUMIN SERPL-MCNC: 3.6 G/DL (ref 3.4–5)
ALP SERPL-CCNC: 101 U/L (ref 40–150)
ALT SERPL W P-5'-P-CCNC: 34 U/L (ref 0–70)
ANION GAP SERPL CALCULATED.3IONS-SCNC: 8 MMOL/L (ref 3–14)
ANISOCYTOSIS BLD QL SMEAR: ABNORMAL
AST SERPL W P-5'-P-CCNC: 21 U/L (ref 0–45)
BILIRUB SERPL-MCNC: 0.8 MG/DL (ref 0.2–1.3)
BUN SERPL-MCNC: 22 MG/DL (ref 7–30)
CALCIUM SERPL-MCNC: 8.7 MG/DL (ref 8.5–10.1)
CHLORIDE SERPL-SCNC: 101 MMOL/L (ref 94–109)
CO2 SERPL-SCNC: 27 MMOL/L (ref 20–32)
CREAT SERPL-MCNC: 1.09 MG/DL (ref 0.66–1.25)
DAT POLY-SP REAG RBC QL: NORMAL
DIFFERENTIAL METHOD BLD: ABNORMAL
EOSINOPHIL # BLD AUTO: 0 10E9/L (ref 0–0.7)
EOSINOPHIL NFR BLD AUTO: 1 %
ERYTHROCYTE [DISTWIDTH] IN BLOOD BY AUTOMATED COUNT: 18.8 % (ref 10–15)
GFR SERPL CREATININE-BSD FRML MDRD: 66 ML/MIN/1.7M2
GLUCOSE SERPL-MCNC: 215 MG/DL (ref 70–99)
HCT VFR BLD AUTO: 30 % (ref 40–53)
HGB BLD-MCNC: 10.1 G/DL (ref 13.3–17.7)
LDH SERPL L TO P-CCNC: 182 U/L (ref 85–227)
LYMPHOCYTES # BLD AUTO: 1.6 10E9/L (ref 0.8–5.3)
LYMPHOCYTES NFR BLD AUTO: 60 %
MCH RBC QN AUTO: 32.5 PG (ref 26.5–33)
MCHC RBC AUTO-ENTMCNC: 33.7 G/DL (ref 31.5–36.5)
MCV RBC AUTO: 97 FL (ref 78–100)
MONOCYTES # BLD AUTO: 0.3 10E9/L (ref 0–1.3)
MONOCYTES NFR BLD AUTO: 11 %
NEUTROPHILS # BLD AUTO: 0.8 10E9/L (ref 1.6–8.3)
NEUTROPHILS NFR BLD AUTO: 28 %
PLATELET # BLD AUTO: 21 10E9/L (ref 150–450)
PLATELET # BLD EST: ABNORMAL 10*3/UL
POLYCHROMASIA BLD QL SMEAR: ABNORMAL
POTASSIUM SERPL-SCNC: 4.5 MMOL/L (ref 3.4–5.3)
PROT SERPL-MCNC: 7.7 G/DL (ref 6.8–8.8)
RBC # BLD AUTO: 3.11 10E12/L (ref 4.4–5.9)
RETICS # AUTO: 81.2 10E9/L (ref 25–95)
RETICS/RBC NFR AUTO: 2.6 % (ref 0.5–2)
SODIUM SERPL-SCNC: 136 MMOL/L (ref 133–144)
VIT B12 SERPL-MCNC: 443 PG/ML (ref 193–986)
WBC # BLD AUTO: 2.7 10E9/L (ref 4–11)

## 2018-04-18 PROCEDURE — 85025 COMPLETE CBC W/AUTO DIFF WBC: CPT | Performed by: INTERNAL MEDICINE

## 2018-04-18 PROCEDURE — 80053 COMPREHEN METABOLIC PANEL: CPT | Performed by: INTERNAL MEDICINE

## 2018-04-18 PROCEDURE — 83010 ASSAY OF HAPTOGLOBIN QUANT: CPT | Performed by: INTERNAL MEDICINE

## 2018-04-18 PROCEDURE — 88185 FLOWCYTOMETRY/TC ADD-ON: CPT | Performed by: INTERNAL MEDICINE

## 2018-04-18 PROCEDURE — 99000 SPECIMEN HANDLING OFFICE-LAB: CPT | Performed by: INTERNAL MEDICINE

## 2018-04-18 PROCEDURE — 40001003 ZZHCL STATISTIC FLOW INT 2-8 ABY TC 88187: Performed by: INTERNAL MEDICINE

## 2018-04-18 PROCEDURE — 83615 LACTATE (LD) (LDH) ENZYME: CPT | Performed by: INTERNAL MEDICINE

## 2018-04-18 PROCEDURE — 82668 ASSAY OF ERYTHROPOIETIN: CPT | Mod: 90 | Performed by: INTERNAL MEDICINE

## 2018-04-18 PROCEDURE — 82607 VITAMIN B-12: CPT | Performed by: INTERNAL MEDICINE

## 2018-04-18 PROCEDURE — 88184 FLOWCYTOMETRY/ TC 1 MARKER: CPT | Performed by: INTERNAL MEDICINE

## 2018-04-18 PROCEDURE — 86880 COOMBS TEST DIRECT: CPT | Performed by: INTERNAL MEDICINE

## 2018-04-18 PROCEDURE — 85045 AUTOMATED RETICULOCYTE COUNT: CPT | Performed by: INTERNAL MEDICINE

## 2018-04-18 PROCEDURE — 99205 OFFICE O/P NEW HI 60 MIN: CPT | Performed by: INTERNAL MEDICINE

## 2018-04-18 PROCEDURE — 36415 COLL VENOUS BLD VENIPUNCTURE: CPT | Performed by: INTERNAL MEDICINE

## 2018-04-18 ASSESSMENT — PAIN SCALES - GENERAL: PAINLEVEL: NO PAIN (0)

## 2018-04-18 NOTE — LETTER
4/18/2018         RE: Prakash Cramer  [address]  [facility] [address]        Dear Colleague,    Thank you for referring your patient, Prakash Cramer, to the [facility]. Please see a copy of my visit note below.    Hematology initial visit:  Date on this visit: 4/18/2018    Prakash Cramer  is referred by Dr.Gamini GUILLERMO Porter for a hematology consultation. He requires evaluation for new diagnosis of Pancytopenia    Primary Physician: Zhou Meraz     History Of Present Illness:  Mr. Cramer is a 78 year old male who was found to have pancytopenia around the end of March 2018 when he presented with a white blood cell count of 1.5 and hemoglobin of 7.8 and platelets of 4. He was admitted to the hospital in [place]. There was no bruising or petechiae or evidence of bleeding. He was feeling fatigued and at that point he was also noticed to have C diff infection and was started on p.o. vancomycin. He was given Neupogen ×1 and packed red distant transfusion ×2 and platelet transfusion ×1. He was also given vitamin B12 injections and I believe it was 3 injections which she was given during the hospitalization. He had a bone marrow biopsy done as part of his workup which showed mildly hypocellular bone marrow with 10-40% cellularity with erythroid hyperplasia and mild dyserythropoiesis. Reactive plasmacytosis and maturing trilineage hematopoiesis. There was no significant dysplasia in the granulocytes or megakaryocytes. Parvovirus immunohistochemistry was negative. These findings were deemed consistent with either evolving aplastic anemia or drug/toxin effect or vitamin/nutritional deficiency or infectious or viral and hypocellular MDS. I believe patient for MDS was done but I do not see the results of that. Flow cytometry was negative. Comprehensive metabolic panel was unremarkable.   His vitamin B12 level was less than 159. Antiparietal cell antibody and anti-intrinsic factor antibody were  negative. Iron studies were unremarkable. CMV IgM was negative. WENDY screen was negative. Rheumatoid factor and anti-CCP were negative. SPEP was negative.  After thorough investigation it was found that potentially glimepiride (sulfonylurea) which he was taking could be the culprit for pancytopenia so he was taken off it.  Upon discharge from the hospital on 3/31/2018 his white blood cell count was 2.4 and hemoglobin was 7.8 and platelet 12. On 4/5/2018 the WBC count was 2.9 with ANC of 1.2 and hemoglobin 7.9 and platelets 75.  On 4/13/2018 white blood cell count was 3.3, ANC 1.3, hemoglobin 7 and platelets 13. At that point he was given 2 units of packed red blood cells and 1 unit of platelets  Most recently on 4/16/2018 white blood cell count was 3.2, ANC 1.1, hemoglobin 10 and platelets 20.    He tells me that prior to start of all of this he was feeling very tired and had some diarrhea. The tiredness was going on for 3 weeks or so. Now he has completed the oral vancomycin course. His diarrhea has resolved. His energy is better although still not back to his baseline. He denies any other infections or fevers. He denies any bleeding but tells me that he was noticing some petechiae prior to getting diagnosed. He denied any other amount of gum bleeding or melena or hematochezia hematuria or any other bleeding. He tells me that he noticed easy bruising though. Denies B symptoms. No new swellings. No trouble breathing. No nausea or vomiting. Otherwise is able to eat well. Denies any neuropathy. He is able to walk normally. He tells me that he has not been eating red meat for a long time but he eats white meet including chicken and fish and eats lots of fruits and vegetables.        ROS:  A comprehensive ROS was otherwise neg      Past Medical/Surgical History:  Past Medical History:   Diagnosis Date     Aplastic anemia (H) 03/26/2018    thought secondary to reaction to Septra     BPH (benign prostatic hypertrophy)      failed  Flomax     C. difficile diarrhea 03/26/2018    treated with Flagyl     Cellulitis and abscess of leg, except foot 2004     Contact dermatitis and other eczema, due to unspecified cause      Diaphragmatic hernia without mention of obstruction or gangrene     hiatal hernia     Esophageal reflux      Hyperlipidemia LDL goal <100 3/11/2005     Hypothyroidism      Impotence of organic origin      Meningococcal encephalitis      Proteinuria      RBBB      Type 2 diabetes mellitus with diabetic nephropathy  (goal A1C<7) 10/24/2015     Unspecified essential hypertension      Past Surgical History:   Procedure Laterality Date     ARTHROPLASTY KNEE Left 3/18/2015    Procedure: ARTHROPLASTY KNEE;  Surgeon: Abebe Schroeder MD;  Location: [facility] OR     ARTHROPLASTY KNEE Right 3/14/2016    Procedure: ARTHROPLASTY KNEE;  Surgeon: Abebe Schroeder MD;  Location: [facility] OR     C NONSPECIFIC PROCEDURE  10/02    left knee meniscus tear repair     HC LAPAROSCOPY, SURGICAL; CHOLECYSTECTOMY  1998    Cholecystectomy, Laparoscopic     HC REMOVE TONSILS/ADENOIDS,13+ Y/O  1963    T & A 13+y.o.     Allergies:  Allergies as of 04/18/2018 - Review Complete 04/18/2018   Allergen Reaction Noted     Atorvastatin calcium  01/30/2008     Diovan [valsartan]  01/24/2011     Mold Other (See Comments) 07/14/2011     Penicillins  09/07/2011     Seasonal allergies  06/25/2007    glimepiride possibly causing pancytopenia  Current Medications:  Current Outpatient Prescriptions   Medication Sig Dispense Refill     amLODIPine (NORVASC) 10 MG tablet Take 1 tablet (10 mg) by mouth daily 90 tablet 3     aspirin 81 MG tablet Take by mouth daily 30 tablet      blood glucose monitoring (ACCU-CHEK FASTCLIX) lancets Use to test blood sugar 1 times daily or as directed. 102 each 3     blood glucose monitoring (ACCU-CHEK MULTICLIX) lancets Use to test blood sugar 1 time daily or as directed. 1 Box 11     blood glucose monitoring (ACCU-CHEK SMARTVIEW) test strip Use  "to test blood sugar 1 times daily or as directed. 100 strip 3     Blood Glucose Monitoring Suppl MAGDALENO 1 Device daily Smartview Meter 1 each 0     Cyanocobalamin (B-12) 1000 MCG TBCR Take 1,000 mcg by mouth daily 100 tablet 11     levothyroxine (SYNTHROID/LEVOTHROID) 75 MCG tablet Take 1 tablet (75 mcg) by mouth daily 90 tablet 3     metFORMIN (GLUCOPHAGE) 1000 MG tablet 1 tab twice a day 180 tablet 3     omeprazole (PRILOSEC) 20 MG CR capsule Take 1 capsule (20 mg) by mouth every morning TAKE 30'-60' BEFORE 1ST MEAL DAILY 90 capsule 3     rosuvastatin (CRESTOR) 40 MG tablet Take 0.5 tablets (20 mg) by mouth At Bedtime 45 tablet 3     sitagliptin (JANUVIA) 100 MG tablet Take 1 tablet (100 mg) by mouth daily 90 tablet 3     glimepiride (AMARYL) 2 MG tablet 1.5 tabs in AM with breakfast and 1 tab in OPM with supper (Patient not taking: Reported on 4/18/2018) 225 tablet 3      Family History:  Family History   Problem Relation Age of Onset     Family History Negative Mother      d:age 89 of \"old age\"     GASTROINTESTINAL DISEASE Father      d:age 79 liver failure after taking excessive amounts of Tylenol over 5-6 yrs     Neurologic Disorder Brother      b:1932  hx cerebral aneurysm age 59     No family history of bleeding or clotting disorder. Maternal uncle had throat cancer.  Social History:  Social History     Social History     Marital status:      Spouse name: N/A     Number of children: 2     Years of education: N/A     Occupational History     teacher [employer]     Social History Main Topics     Smoking status: Never Smoker     Smokeless tobacco: Never Used     Alcohol use Yes      Comment: beer 4-5 a month     Drug use: No     Sexual activity: Yes     Partners: Female     Other Topics Concern     Special Diet Yes     diabetic diet      Exercise No     nothing regular since bilateral knee surgeries      Social History Narrative    denies any smoking. No alcohol. He usually lives in [place] and " "spends winter in [place]. He lives alone.  Physical Exam:  /77  Pulse 102  Temp 98  F (36.7  C)  Ht 1.702 m (5' 7\")  Wt 75.3 kg (166 lb)  SpO2 98%  BMI 26 kg/m2  CONSTITUTIONAL: no acute distress  EYES: PERRLA, mild palor no icterus.   ENT/MOUTH: no mouth lesions. Oropharynx normal  CVS: s1s2 no m r g .   RESPIRATORY: clear to auscultation b/l  GI: soft non tender no hepatosplenomegaly  NEURO: AAOX3  Grossly non focal neuro exam  INTEGUMENT: no obvious rashes  LYMPHATIC: no palpable cervical, supraclavicular, axillary or inguinal LAD  MUSCULOSKELETAL: Unremarkable. No bony tenderness.   EXTREMITIES: no edema  PSYCH: Mentation, mood and affect are normal. Decision making capacity is intact      Laboratory/Imaging Studies    I reviewed the outside records as well    ASSESSMENT/PLAN:      Pancytopenia. At this time the cause is not entirely known but it could be possibly toxic reaction to the drug glimepiride. Otherwise his bone marrow biopsy was nondiagnostic. Peripheral blood and bone marrow Flow cytometry were negative as per report although I was not able to see the report myself. Fish for MDS panel was pending but he did not have morphologic features to diagnose MDS.    At this time I would like his outside pathology to be reviewed here at the [facility] and we would also get more records in terms of FISH analysis and flow cytometry.  I would repeat blood work today and will check KIMBERLEE, flow cytometry for [facility]. Check LDH and repeat comprehensive metabolic panel.    He has vitamin B12 deficiency but antiparietal cell antibody and anti-and expect antibody were negative. He was given vitamin B12 shots in the hospital. I will repeat vitamin B12 levels and I told him to start taking oral vitamin B12 1000  g daily and I gave him prescription for that. If he is interested vitamin B12 deficient then I will give him vitamin B12 injections every week.    I will check labs every 2 weeks. I will " transfuse him for hemoglobin less than 7 or platelets less than 10 if there is no bleeding. We discussed that with time if his numbers do not continue to show improvement then we will need to repeat a bone marrow biopsy    I told him to continue holding aspirin. Once his platelets are in a safe range and he can resume that    We discussed that if he develops infections or if he starts to have any abnormal bleeding then he should let us know right away.  .  I dori see him back in a couple of weeks    All questions answered and he is agreeable and comfortable with the plan     Terra Salazar      Again, thank you for allowing me to participate in the care of your patient.        Sincerely,        Terra Salazar MD

## 2018-04-18 NOTE — NURSING NOTE
"Oncology Rooming Note    April 18, 2018 10:39 AM   Prakash Cramer is a 78 year old male who presents for:    Chief Complaint   Patient presents with     Oncology Clinic Visit     aplastic anemia     Initial Vitals: /77  Pulse 102  Temp 98  F (36.7  C)  Ht 1.702 m (5' 7\")  Wt 75.3 kg (166 lb)  SpO2 98%  BMI 26 kg/m2 Estimated body mass index is 26 kg/(m^2) as calculated from the following:    Height as of this encounter: 1.702 m (5' 7\").    Weight as of this encounter: 75.3 kg (166 lb). Body surface area is 1.89 meters squared.  No Pain (0) Comment: Data Unavailable   No LMP for male patient.  Allergies reviewed: Yes  Medications reviewed: Yes    Medications: Medication refills not needed today.  Pharmacy name entered into [facility]:    [facility] - [place], [place] - [address]  [facility] - [place], [place] - [address]        5 minutes for nursing intake (face to face time)     Maisha Guadarrama LPN              "

## 2018-04-18 NOTE — MR AVS SNAPSHOT
After Visit Summary   4/18/2018    Prakash Cramer    MRN: [id number]           Patient Information     Date Of Birth          1940        Visit Information        Provider Department      4/18/2018 10:15 AM Terra Salazar MD [facility]        Today's Diagnoses     Pancytopenia (H)    -  1    Vitamin B12 deficiency (non anemic)          Care Instructions    Labs today and then twice weekly    We will transfuse for Hg <7 and platelets <10 if there is no bleeding    Start B12 daily as prescribed    Hold aspirin for now    See me back in 2-3 weeks              Follow-ups after your visit        Your next 10 appointments already scheduled     Apr 23, 2018 10:20 AM CDT   LAB with LAB FIRST FLOOR [facility])    [address] [place] [id number] [phone]           Please do not eat 10-12 hours before your appointment if you are coming in fasting for labs on lipids, cholesterol, or glucose (sugar). This does not apply to pregnant women. Water, hot tea and black coffee (with nothing added) are okay. Do not drink other fluids, diet soda or chew gum.            Apr 26, 2018 10:10 AM CDT   LAB with LAB FIRST FLOOR [facility] ([facility])    [address] [place] [id number] [phone]           Please do not eat 10-12 hours before your appointment if you are coming in fasting for labs on lipids, cholesterol, or glucose (sugar). This does not apply to pregnant women. Water, hot tea and black coffee (with nothing added) are okay. Do not drink other fluids, diet soda or chew gum.            Apr 30, 2018 10:20 AM CDT   LAB with LAB FIRST FLOOR [facility] ([facility])    [address] [address] [phone]           Please do not eat 10-12 hours before your appointment if you are coming in fasting  for labs on lipids, cholesterol, or glucose (sugar). This does not apply to pregnant women. Water, hot tea and black coffee (with nothing added) are okay. Do not drink other fluids, diet soda or chew gum.            May 03, 2018 10:00 AM CDT   LAB with LAB FIRST FLOOR [facility] ([facility])    [address] [place] [address]   [phone]           Please do not eat 10-12 hours before your appointment if you are coming in fasting for labs on lipids, cholesterol, or glucose (sugar). This does not apply to pregnant women. Water, hot tea and black coffee (with nothing added) are okay. Do not drink other fluids, diet soda or chew gum.            May 08, 2018  1:30 PM CDT   LAB with LAB ONC [facility] ([facility])    [address] [place] [address]   [phone]           Please do not eat 10-12 hours before your appointment if you are coming in fasting for labs on lipids, cholesterol, or glucose (sugar). This does not apply to pregnant women. Water, hot tea and black coffee (with nothing added) are okay. Do not drink other fluids, diet soda or chew gum.            May 08, 2018  2:15 PM CDT   [facility] Visit with Terra Slaazar MD   [facility]    [address] [place] [address]   [phone]              Who to contact     If you have questions or need follow up information about today's clinic visit or your schedule please contact [facility] directly at [phone].  Normal or non-critical lab and imaging results will be communicated to you by MyChart, letter or phone within 4 business days after the clinic has received the results. If you do not hear from us within 7 days, please contact the clinic through MyChart or phone. If you have a critical or abnormal lab result, we will notify you by phone as soon as  "possible.  Submit refill requests through [employer] or call your pharmacy and they will forward the refill request to us. Please allow 3 business days for your refill to be completed.          Additional Information About Your Visit        [employer] Information     [employer] is an electronic gateway that provides easy, online access to your medical records. With [employer], you can request a clinic appointment, read your test results, renew a prescription or communicate with your care team.     To sign up for [employer] visit the website at [email or web address]   You will be asked to enter the access code listed below, as well as some personal information. Please follow the directions to create your username and password.     Your access code is: [email or web address]  Expires: 2018 12:07 PM     Your access code will  in 90 days. If you need help or a new code, please contact your [facility] or call [phone] for assistance.        Care EveryWhere ID     This is your Care EveryWhere ID. This could be used by other organizations to access your [place] medical records  [id number]        Your Vitals Were     Pulse Temperature Height Pulse Oximetry BMI (Body Mass Index)       102 98  F (36.7  C) 1.702 m (5' 7\") 98% 26 kg/m2        Blood Pressure from Last 3 Encounters:   18 130/77   17 124/68   17 128/84    Weight from Last 3 Encounters:   18 75.3 kg (166 lb)   17 77.1 kg (170 lb)   17 79.8 kg (176 lb)              We Performed the Following     *CBC with platelets differential     Comprehensive metabolic panel     Direct antiglobulin test     Erythropoietin     Haptoglobin     Lactate Dehydrogenase     Paroxysmal nocturnal hemoglobinuria     Reticulocyte count     Vitamin B12          Today's Medication Changes          These changes are accurate as of 18 12:07 PM.  If you have any questions, ask your nurse or doctor.               Start taking " these medicines.        Dose/Directions    B-12 1000 MCG Tbcr   Used for:  Vitamin B12 deficiency (non anemic), Pancytopenia (H)   Started by:  Terra Salazar MD        Dose:  1000 mcg   Take 1,000 mcg by mouth daily   Quantity:  100 tablet   Refills:  11            Where to get your medicines      These medications were sent to [facility] [place], [place] - [address]  [address] [facility] [address]     Phone:  [phone]     B-12 1000 MCG Tbcr                Primary Care Provider Office Phone # Fax #    Zhou Meraz [phone]       [address] [place] [address]        Equal Access to Services     ARASH GRACE : Hadii aad ku hadasho Soomaali, waaxda luqadaha, qaybta kaalmada adeegyada, waxelio ovalle. So [facility] [phone].    ATENCIÓN: Si habla español, tiene a stroud disposición servicios gratuitos de asistencia lingüística. [facility] [phone].    We comply with applicable federal civil rights laws and [place] laws. We do not discriminate on the basis of race, color, national origin, age, disability, sex, sexual orientation, or gender identity.            Thank you!     Thank you for choosing [facility]  for your care. Our goal is always to provide you with excellent care. Hearing back from our patients is one way we can continue to improve our services. Please take a few minutes to complete the written survey that you may receive in the mail after your visit with us. Thank you!             Your Updated Medication List - Protect others around you: Learn how to safely use, store and throw away your medicines at www.disposemymeds.org.          This list is accurate as of 4/18/18 12:07 PM.  Always use your most recent med list.                   Brand Name Dispense Instructions for use Diagnosis    amLODIPine 10 MG tablet    NORVASC    90 tablet    Take 1 tablet (10 mg) by mouth daily    Essential hypertension        aspirin 81 MG tablet     30 tablet    Take by mouth daily    Type 2 diabetes mellitus with diabetic nephropathy, without long-term current use of insulin (H)       B-12 1000 MCG Tbcr     100 tablet    Take 1,000 mcg by mouth daily    Vitamin B12 deficiency (non anemic), Pancytopenia (H)       * blood glucose monitoring lancets     1 Box    Use to test blood sugar 1 time daily or as directed.    Type 2 diabetes mellitus with diabetic nephropathy, without long-term current use of insulin (H)       * blood glucose monitoring lancets     102 each    Use to test blood sugar 1 times daily or as directed.    Type 2 diabetes mellitus with diabetic nephropathy, without long-term current use of insulin (H)       Blood Glucose Monitoring Suppl Ami     1 each    1 Device daily Smartview Meter    Type 2 diabetes mellitus with diabetic nephropathy, without long-term current use of insulin (H)       blood glucose monitoring test strip    ACCU-CHEK SMARTVIEW    100 strip    Use to test blood sugar 1 times daily or as directed.    Type 2 diabetes mellitus with diabetic nephropathy, without long-term current use of insulin (H)       glimepiride 2 MG tablet    AMARYL    225 tablet    1.5 tabs in AM with breakfast and 1 tab in OPM with supper    Type 2 diabetes mellitus with diabetic nephropathy, without long-term current use of insulin (H)       levothyroxine 75 MCG tablet    SYNTHROID/LEVOTHROID    90 tablet    Take 1 tablet (75 mcg) by mouth daily    Hypothyroidism, unspecified type       metFORMIN 1000 MG tablet    GLUCOPHAGE    180 tablet    1 tab twice a day    Type 2 diabetes mellitus with diabetic nephropathy, without long-term current use of insulin (H)       omeprazole 20 MG CR capsule    priLOSEC    90 capsule    Take 1 capsule (20 mg) by mouth every morning TAKE 30'-60' BEFORE 1ST MEAL DAILY    Gastroesophageal reflux disease without esophagitis       rosuvastatin 40 MG tablet    CRESTOR    45 tablet    Take 0.5  tablets (20 mg) by mouth At Bedtime    Hyperlipidemia LDL goal <100       sitagliptin 100 MG tablet    JANUVIA    90 tablet    Take 1 tablet (100 mg) by mouth daily    Type 2 diabetes mellitus with diabetic nephropathy, without long-term current use of insulin (H)       * Notice:  This list has 2 medication(s) that are the same as other medications prescribed for you. Read the directions carefully, and ask your doctor or other care provider to review them with you.

## 2018-04-18 NOTE — PATIENT INSTRUCTIONS
Labs today and then twice weekly    We will transfuse for Hg <7 and platelets <10 if there is no bleeding    Start B12 daily as prescribed    Hold aspirin for now    See me back in 2-3 weeks

## 2018-04-18 NOTE — PROGRESS NOTES
Met with patient after his consult visit with Dr. Salazar.  He comes in today after spending the winter in [place], [place] with his 2 sons.  Provided patient with my contact information and also Dr. Schuster.  Discussed that patient will need to have labs checked twice a week and base on results, he may need a transfusion.  He has had both platelet and RBC transfusions while in [place].

## 2018-04-18 NOTE — LETTER
4/18/2018        RE: Prakash Cramer  [address]  [place] [address]        Hematology initial visit:  Date on this visit: 4/18/2018    Prakash Cramer  is referred by Dr.Gamini GUILLERMO Porter for a hematology consultation. He requires evaluation for new diagnosis of Pancytopenia    Primary Physician: Zhou Meraz     History Of Present Illness:  Mr. Cramer is a 78 year old male who was found to have pancytopenia around the end of March 2018 when he presented with a white blood cell count of 1.5 and hemoglobin of 7.8 and platelets of 4. He was admitted to the hospital in [place]. There was no bruising or petechiae or evidence of bleeding. He was feeling fatigued and at that point he was also noticed to have C diff infection and was started on p.o. vancomycin. He was given Neupogen ×1 and packed red distant transfusion ×2 and platelet transfusion ×1. He was also given vitamin B12 injections and I believe it was 3 injections which she was given during the hospitalization. He had a bone marrow biopsy done as part of his workup which showed mildly hypocellular bone marrow with 10-40% cellularity with erythroid hyperplasia and mild dyserythropoiesis. Reactive plasmacytosis and maturing trilineage hematopoiesis. There was no significant dysplasia in the granulocytes or megakaryocytes. Parvovirus immunohistochemistry was negative. These findings were deemed consistent with either evolving aplastic anemia or drug/toxin effect or vitamin/nutritional deficiency or infectious or viral and hypocellular MDS. I believe patient for MDS was done but I do not see the results of that. Flow cytometry was negative. Comprehensive metabolic panel was unremarkable.   His vitamin B12 level was less than 159. Antiparietal cell antibody and anti-intrinsic factor antibody were negative. Iron studies were unremarkable. CMV IgM was negative. WENDY screen was negative. Rheumatoid factor and anti-CCP were negative. SPEP was negative.  After  thorough investigation it was found that potentially glimepiride (sulfonylurea) which he was taking could be the culprit for pancytopenia so he was taken off it.  Upon discharge from the hospital on 3/31/2018 his white blood cell count was 2.4 and hemoglobin was 7.8 and platelet 12. On 4/5/2018 the WBC count was 2.9 with ANC of 1.2 and hemoglobin 7.9 and platelets 75.  On 4/13/2018 white blood cell count was 3.3, ANC 1.3, hemoglobin 7 and platelets 13. At that point he was given 2 units of packed red blood cells and 1 unit of platelets  Most recently on 4/16/2018 white blood cell count was 3.2, ANC 1.1, hemoglobin 10 and platelets 20.    He tells me that prior to start of all of this he was feeling very tired and had some diarrhea. The tiredness was going on for 3 weeks or so. Now he has completed the oral vancomycin course. His diarrhea has resolved. His energy is better although still not back to his baseline. He denies any other infections or fevers. He denies any bleeding but tells me that he was noticing some petechiae prior to getting diagnosed. He denied any other amount of gum bleeding or melena or hematochezia hematuria or any other bleeding. He tells me that he noticed easy bruising though. Denies B symptoms. No new swellings. No trouble breathing. No nausea or vomiting. Otherwise is able to eat well. Denies any neuropathy. He is able to walk normally. He tells me that he has not been eating red meat for a long time but he eats white meet including chicken and fish and eats lots of fruits and vegetables.        ROS:  A comprehensive ROS was otherwise neg      Past Medical/Surgical History:  Past Medical History:   Diagnosis Date     Aplastic anemia (H) 03/26/2018    thought secondary to reaction to Septra     BPH (benign prostatic hypertrophy)     failed  Flomax     C. difficile diarrhea 03/26/2018    treated with Flagyl     Cellulitis and abscess of leg, except foot 2004     Contact dermatitis and other  eczema, due to unspecified cause      Diaphragmatic hernia without mention of obstruction or gangrene     hiatal hernia     Esophageal reflux      Hyperlipidemia LDL goal <100 3/11/2005     Hypothyroidism      Impotence of organic origin      Meningococcal encephalitis      Proteinuria      RBBB      Type 2 diabetes mellitus with diabetic nephropathy  (goal A1C<7) 10/24/2015     Unspecified essential hypertension      Past Surgical History:   Procedure Laterality Date     ARTHROPLASTY KNEE Left 3/18/2015    Procedure: ARTHROPLASTY KNEE;  Surgeon: Abebe Schroeder MD;  Location: [facility]     ARTHROPLASTY KNEE Right 3/14/2016    Procedure: ARTHROPLASTY KNEE;  Surgeon: Abebe Schroeder MD;  Location: [facility] OR     C NONSPECIFIC PROCEDURE  10/02    left knee meniscus tear repair     HC LAPAROSCOPY, SURGICAL; CHOLECYSTECTOMY  1998    Cholecystectomy, Laparoscopic     HC REMOVE TONSILS/ADENOIDS,12+ Y/O  1963    T & A 13+y.o.     Allergies:  Allergies as of 04/18/2018 - Review Complete 04/18/2018   Allergen Reaction Noted     Atorvastatin calcium  01/30/2008     Diovan [valsartan]  01/24/2011     Mold Other (See Comments) 07/14/2011     Penicillins  09/07/2011     Seasonal allergies  06/25/2007    glimepiride possibly causing pancytopenia  Current Medications:  Current Outpatient Prescriptions   Medication Sig Dispense Refill     amLODIPine (NORVASC) 10 MG tablet Take 1 tablet (10 mg) by mouth daily 90 tablet 3     aspirin 81 MG tablet Take by mouth daily 30 tablet      blood glucose monitoring (ACCU-CHEK FASTCLIX) lancets Use to test blood sugar 1 times daily or as directed. 102 each 3     blood glucose monitoring (ACCU-CHEK MULTICLIX) lancets Use to test blood sugar 1 time daily or as directed. 1 Box 11     blood glucose monitoring (ACCU-CHEK SMARTVIEW) test strip Use to test blood sugar 1 times daily or as directed. 100 strip 3     Blood Glucose Monitoring Suppl MAGDALENO 1 Device daily Smartview Meter 1 each 0     Cyanocobalamin  "(B-12) 1000 MCG TBCR Take 1,000 mcg by mouth daily 100 tablet 11     levothyroxine (SYNTHROID/LEVOTHROID) 75 MCG tablet Take 1 tablet (75 mcg) by mouth daily 90 tablet 3     metFORMIN (GLUCOPHAGE) 1000 MG tablet 1 tab twice a day 180 tablet 3     omeprazole (PRILOSEC) 20 MG CR capsule Take 1 capsule (20 mg) by mouth every morning TAKE 30'-60' BEFORE 1ST MEAL DAILY 90 capsule 3     rosuvastatin (CRESTOR) 40 MG tablet Take 0.5 tablets (20 mg) by mouth At Bedtime 45 tablet 3     sitagliptin (JANUVIA) 100 MG tablet Take 1 tablet (100 mg) by mouth daily 90 tablet 3     glimepiride (AMARYL) 2 MG tablet 1.5 tabs in AM with breakfast and 1 tab in OPM with supper (Patient not taking: Reported on 4/18/2018) 225 tablet 3      Family History:  Family History   Problem Relation Age of Onset     Family History Negative Mother      d:age 89 of \"old age\"     GASTROINTESTINAL DISEASE Father      d:age 79 liver failure after taking excessive amounts of Tylenol over 5-6 yrs     Neurologic Disorder Brother      b:1932  hx cerebral aneurysm age 59     No family history of bleeding or clotting disorder. Maternal uncle had throat cancer.  Social History:  Social History     Social History     Marital status:      Spouse name: N/A     Number of children: 2     Years of education: N/A     Occupational History     teacher [employer]     Social History Main Topics     Smoking status: Never Smoker     Smokeless tobacco: Never Used     Alcohol use Yes      Comment: beer 4-5 a month     Drug use: No     Sexual activity: Yes     Partners: Female     Other Topics Concern     Special Diet Yes     diabetic diet      Exercise No     nothing regular since bilateral knee surgeries      Social History Narrative    denies any smoking. No alcohol. He usually lives in [place] and spends winter in [place]. He lives alone.  Physical Exam:  /77  Pulse 102  Temp 98  F (36.7  C)  Ht 1.702 m (5' 7\")  Wt 75.3 kg (166 lb)  SpO2 98%  " BMI 26 kg/m2  CONSTITUTIONAL: no acute distress  EYES: PERRLA, mild palor no icterus.   ENT/MOUTH: no mouth lesions. Oropharynx normal  CVS: s1s2 no m r g .   RESPIRATORY: clear to auscultation b/l  GI: soft non tender no hepatosplenomegaly  NEURO: AAOX3  Grossly non focal neuro exam  INTEGUMENT: no obvious rashes  LYMPHATIC: no palpable cervical, supraclavicular, axillary or inguinal LAD  MUSCULOSKELETAL: Unremarkable. No bony tenderness.   EXTREMITIES: no edema  PSYCH: Mentation, mood and affect are normal. Decision making capacity is intact      Laboratory/Imaging Studies    I reviewed the outside records as well    ASSESSMENT/PLAN:      Pancytopenia. At this time the cause is not entirely known but it could be possibly toxic reaction to the drug glimepiride. Otherwise his bone marrow biopsy was nondiagnostic. Peripheral blood and bone marrow Flow cytometry were negative as per report although I was not able to see the report myself. Fish for MDS panel was pending but he did not have morphologic features to diagnose MDS.    At this time I would like his outside pathology to be reviewed here at the [facility] [place] [facility] and we would also get more records in terms of FISH analysis and flow cytometry.  I would repeat blood work today and will check KIMBERLEE, flow cytometry for [facility]. Check LDH and repeat comprehensive metabolic panel.    He has vitamin B12 deficiency but antiparietal cell antibody and anti-and expect antibody were negative. He was given vitamin B12 shots in the hospital. I will repeat vitamin B12 levels and I told him to start taking oral vitamin B12 1000  g daily and I gave him prescription for that. If he is interested vitamin B12 deficient then I will give him vitamin B12 injections every week.    I will check labs every 2 weeks. I will transfuse him for hemoglobin less than 7 or platelets less than 10 if there is no bleeding. We discussed that with time if his numbers do not continue to show  improvement then we will need to repeat a bone marrow biopsy    I told him to continue holding aspirin. Once his platelets are in a safe range and he can resume that    We discussed that if he develops infections or if he starts to have any abnormal bleeding then he should let us know right away.  .  I dori see him back in a couple of weeks    All questions answered and he is agreeable and comfortable with the plan     Terra Salazar        Sincerely,        Terra Salazar MD

## 2018-04-18 NOTE — LETTER
4/18/2018         RE: Prakash Carmer  [address]  [facility] [address]        Dear Colleague,    Thank you for referring your patient, Prakash Cramer, to the [facility]. Please see a copy of my visit note below.    Hematology initial visit:  Date on this visit: 4/18/2018    Prakash Cramer  is referred by Dr.Gamini GUILLERMO Porter for a hematology consultation. He requires evaluation for new diagnosis of Pancytopenia    Primary Physician: Zhou Meraz     History Of Present Illness:  Mr. Cramer is a 78 year old male who was found to have pancytopenia around the end of March 2018 when he presented with a white blood cell count of 1.5 and hemoglobin of 7.8 and platelets of 4. He was admitted to the hospital in [place]. There was no bruising or petechiae or evidence of bleeding. He was feeling fatigued and at that point he was also noticed to have C diff infection and was started on p.o. vancomycin. He was given Neupogen ×1 and packed red distant transfusion ×2 and platelet transfusion ×1. He was also given vitamin B12 injections and I believe it was 3 injections which she was given during the hospitalization. He had a bone marrow biopsy done as part of his workup which showed mildly hypocellular bone marrow with 10-40% cellularity with erythroid hyperplasia and mild dyserythropoiesis. Reactive plasmacytosis and maturing trilineage hematopoiesis. There was no significant dysplasia in the granulocytes or megakaryocytes. Parvovirus immunohistochemistry was negative. These findings were deemed consistent with either evolving aplastic anemia or drug/toxin effect or vitamin/nutritional deficiency or infectious or viral and hypocellular MDS. I believe patient for MDS was done but I do not see the results of that. Flow cytometry was negative. Comprehensive metabolic panel was unremarkable.   His vitamin B12 level was less than 159. Antiparietal cell antibody and anti-intrinsic factor antibody were  negative. Iron studies were unremarkable. CMV IgM was negative. WENDY screen was negative. Rheumatoid factor and anti-CCP were negative. SPEP was negative.  After thorough investigation it was found that potentially glimepiride (sulfonylurea) which he was taking could be the culprit for pancytopenia so he was taken off it.  Upon discharge from the hospital on 3/31/2018 his white blood cell count was 2.4 and hemoglobin was 7.8 and platelet 12. On 4/5/2018 the WBC count was 2.9 with ANC of 1.2 and hemoglobin 7.9 and platelets 75.  On 4/13/2018 white blood cell count was 3.3, ANC 1.3, hemoglobin 7 and platelets 13. At that point he was given 2 units of packed red blood cells and 1 unit of platelets  Most recently on 4/16/2018 white blood cell count was 3.2, ANC 1.1, hemoglobin 10 and platelets 20.    He tells me that prior to start of all of this he was feeling very tired and had some diarrhea. The tiredness was going on for 3 weeks or so. Now he has completed the oral vancomycin course. His diarrhea has resolved. His energy is better although still not back to his baseline. He denies any other infections or fevers. He denies any bleeding but tells me that he was noticing some petechiae prior to getting diagnosed. He denied any other amount of gum bleeding or melena or hematochezia hematuria or any other bleeding. He tells me that he noticed easy bruising though. Denies B symptoms. No new swellings. No trouble breathing. No nausea or vomiting. Otherwise is able to eat well. Denies any neuropathy. He is able to walk normally. He tells me that he has not been eating red meat for a long time but he eats white meet including chicken and fish and eats lots of fruits and vegetables.        ROS:  A comprehensive ROS was otherwise neg      Past Medical/Surgical History:  Past Medical History:   Diagnosis Date     Aplastic anemia (H) 03/26/2018    thought secondary to reaction to Septra     BPH (benign prostatic hypertrophy)      failed  Flomax     C. difficile diarrhea 03/26/2018    treated with Flagyl     Cellulitis and abscess of leg, except foot 2004     Contact dermatitis and other eczema, due to unspecified cause      Diaphragmatic hernia without mention of obstruction or gangrene     hiatal hernia     Esophageal reflux      Hyperlipidemia LDL goal <100 3/11/2005     Hypothyroidism      Impotence of organic origin      Meningococcal encephalitis      Proteinuria      RBBB      Type 2 diabetes mellitus with diabetic nephropathy  (goal A1C<7) 10/24/2015     Unspecified essential hypertension      Past Surgical History:   Procedure Laterality Date     ARTHROPLASTY KNEE Left 3/18/2015    Procedure: ARTHROPLASTY KNEE;  Surgeon: Abebe Schroeder MD;  Location: [facility]     ARTHROPLASTY KNEE Right 3/14/2016    Procedure: ARTHROPLASTY KNEE;  Surgeon: Abebe Schroeder MD;  Location: [facility]     C NONSPECIFIC PROCEDURE  10/02    left knee meniscus tear repair     HC LAPAROSCOPY, SURGICAL; CHOLECYSTECTOMY  1998    Cholecystectomy, Laparoscopic     HC REMOVE TONSILS/ADENOIDS,12+ Y/O  1963    T & A 13+y.o.     Allergies:  Allergies as of 04/18/2018 - Review Complete 04/18/2018   Allergen Reaction Noted     Atorvastatin calcium  01/30/2008     Diovan [valsartan]  01/24/2011     Mold Other (See Comments) 07/14/2011     Penicillins  09/07/2011     Seasonal allergies  06/25/2007    glimepiride possibly causing pancytopenia  Current Medications:  Current Outpatient Prescriptions   Medication Sig Dispense Refill     amLODIPine (NORVASC) 10 MG tablet Take 1 tablet (10 mg) by mouth daily 90 tablet 3     aspirin 81 MG tablet Take by mouth daily 30 tablet      blood glucose monitoring (ACCU-CHEK FASTCLIX) lancets Use to test blood sugar 1 times daily or as directed. 102 each 3     blood glucose monitoring (ACCU-CHEK MULTICLIX) lancets Use to test blood sugar 1 time daily or as directed. 1 Box 11     blood glucose monitoring (ACCU-CHEK SMARTVIEW) test strip Use  "to test blood sugar 1 times daily or as directed. 100 strip 3     Blood Glucose Monitoring Suppl MAGDALENO 1 Device daily Smartview Meter 1 each 0     Cyanocobalamin (B-12) 1000 MCG TBCR Take 1,000 mcg by mouth daily 100 tablet 11     levothyroxine (SYNTHROID/LEVOTHROID) 75 MCG tablet Take 1 tablet (75 mcg) by mouth daily 90 tablet 3     metFORMIN (GLUCOPHAGE) 1000 MG tablet 1 tab twice a day 180 tablet 3     omeprazole (PRILOSEC) 20 MG CR capsule Take 1 capsule (20 mg) by mouth every morning TAKE 30'-60' BEFORE 1ST MEAL DAILY 90 capsule 3     rosuvastatin (CRESTOR) 40 MG tablet Take 0.5 tablets (20 mg) by mouth At Bedtime 45 tablet 3     sitagliptin (JANUVIA) 100 MG tablet Take 1 tablet (100 mg) by mouth daily 90 tablet 3     glimepiride (AMARYL) 2 MG tablet 1.5 tabs in AM with breakfast and 1 tab in OPM with supper (Patient not taking: Reported on 4/18/2018) 225 tablet 3      Family History:  Family History   Problem Relation Age of Onset     Family History Negative Mother      d:age 89 of \"old age\"     GASTROINTESTINAL DISEASE Father      d:age 79 liver failure after taking excessive amounts of Tylenol over 5-6 yrs     Neurologic Disorder Brother      b:1932  hx cerebral aneurysm age 59     No family history of bleeding or clotting disorder. Maternal uncle had throat cancer.  Social History:  Social History     Social History     Marital status:      Spouse name: N/A     Number of children: 2     Years of education: N/A     Occupational History     teacher [employer]     Social History Main Topics     Smoking status: Never Smoker     Smokeless tobacco: Never Used     Alcohol use Yes      Comment: beer 4-5 a month     Drug use: No     Sexual activity: Yes     Partners: Female     Other Topics Concern     Special Diet Yes     diabetic diet      Exercise No     nothing regular since bilateral knee surgeries      Social History Narrative    denies any smoking. No alcohol. He usually lives in [place] and " "spends winter in [place]. He lives alone.  Physical Exam:  /77  Pulse 102  Temp 98  F (36.7  C)  Ht 1.702 m (5' 7\")  Wt 75.3 kg (166 lb)  SpO2 98%  BMI 26 kg/m2  CONSTITUTIONAL: no acute distress  EYES: PERRLA, mild palor no icterus.   ENT/MOUTH: no mouth lesions. Oropharynx normal  CVS: s1s2 no m r g .   RESPIRATORY: clear to auscultation b/l  GI: soft non tender no hepatosplenomegaly  NEURO: AAOX3  Grossly non focal neuro exam  INTEGUMENT: no obvious rashes  LYMPHATIC: no palpable cervical, supraclavicular, axillary or inguinal LAD  MUSCULOSKELETAL: Unremarkable. No bony tenderness.   EXTREMITIES: no edema  PSYCH: Mentation, mood and affect are normal. Decision making capacity is intact      Laboratory/Imaging Studies    I reviewed the outside records as well    ASSESSMENT/PLAN:      Pancytopenia. At this time the cause is not entirely known but it could be possibly toxic reaction to the drug glimepiride. Otherwise his bone marrow biopsy was nondiagnostic. Peripheral blood and bone marrow Flow cytometry were negative as per report although I was not able to see the report myself. Fish for MDS panel was pending but he did not have morphologic features to diagnose MDS.    At this time I would like his outside pathology to be reviewed here at the [facility] and we would also get more records in terms of FISH analysis and flow cytometry.  I would repeat blood work today and will check KIMBERLEE, flow cytometry for [facility]. Check LDH and repeat comprehensive metabolic panel.    He has vitamin B12 deficiency but antiparietal cell antibody and anti-and expect antibody were negative. He was given vitamin B12 shots in the hospital. I will repeat vitamin B12 levels and I told him to start taking oral vitamin B12 1000  g daily and I gave him prescription for that. If he is interested vitamin B12 deficient then I will give him vitamin B12 injections every week.    I will check labs every 2 weeks. I will " transfuse him for hemoglobin less than 7 or platelets less than 10 if there is no bleeding. We discussed that with time if his numbers do not continue to show improvement then we will need to repeat a bone marrow biopsy    I told him to continue holding aspirin. Once his platelets are in a safe range and he can resume that    We discussed that if he develops infections or if he starts to have any abnormal bleeding then he should let us know right away.  .  I dori see him back in a couple of weeks    All questions answered and he is agreeable and comfortable with the plan     Terra Salazar      Again, thank you for allowing me to participate in the care of your patient.        Sincerely,        Terra Salazar MD

## 2018-04-18 NOTE — PROGRESS NOTES
Hematology initial visit:  Date on this visit: 4/18/2018    Prakash Cramer  is referred by Dr.Gamini GUILLERMO Porter for a hematology consultation. He requires evaluation for new diagnosis of Pancytopenia    Primary Physician: Zhou Meraz     History Of Present Illness:  Mr. Cramer is a 78 year old male who was found to have pancytopenia around the end of March 2018 when he presented with a white blood cell count of 1.5 and hemoglobin of 7.8 and platelets of 4. He was admitted to the hospital in [place]. There was no bruising or petechiae or evidence of bleeding. He was feeling fatigued and at that point he was also noticed to have C diff infection and was started on p.o. vancomycin. He was given Neupogen ×1 and packed red distant transfusion ×2 and platelet transfusion ×1. He was also given vitamin B12 injections and I believe it was 3 injections which she was given during the hospitalization. He had a bone marrow biopsy done as part of his workup which showed mildly hypocellular bone marrow with 10-40% cellularity with erythroid hyperplasia and mild dyserythropoiesis. Reactive plasmacytosis and maturing trilineage hematopoiesis. There was no significant dysplasia in the granulocytes or megakaryocytes. Parvovirus immunohistochemistry was negative. These findings were deemed consistent with either evolving aplastic anemia or drug/toxin effect or vitamin/nutritional deficiency or infectious or viral and hypocellular MDS. I believe patient for MDS was done but I do not see the results of that. Flow cytometry was negative. Comprehensive metabolic panel was unremarkable.   His vitamin B12 level was less than 159. Antiparietal cell antibody and anti-intrinsic factor antibody were negative. Iron studies were unremarkable. CMV IgM was negative. WENDY screen was negative. Rheumatoid factor and anti-CCP were negative. SPEP was negative.  After thorough investigation it was found that potentially glimepiride (sulfonylurea) which he  was taking could be the culprit for pancytopenia so he was taken off it.  Upon discharge from the hospital on 3/31/2018 his white blood cell count was 2.4 and hemoglobin was 7.8 and platelet 12. On 4/5/2018 the WBC count was 2.9 with ANC of 1.2 and hemoglobin 7.9 and platelets 75.  On 4/13/2018 white blood cell count was 3.3, ANC 1.3, hemoglobin 7 and platelets 13. At that point he was given 2 units of packed red blood cells and 1 unit of platelets  Most recently on 4/16/2018 white blood cell count was 3.2, ANC 1.1, hemoglobin 10 and platelets 20.    He tells me that prior to start of all of this he was feeling very tired and had some diarrhea. The tiredness was going on for 3 weeks or so. Now he has completed the oral vancomycin course. His diarrhea has resolved. His energy is better although still not back to his baseline. He denies any other infections or fevers. He denies any bleeding but tells me that he was noticing some petechiae prior to getting diagnosed. He denied any other amount of gum bleeding or melena or hematochezia hematuria or any other bleeding. He tells me that he noticed easy bruising though. Denies B symptoms. No new swellings. No trouble breathing. No nausea or vomiting. Otherwise is able to eat well. Denies any neuropathy. He is able to walk normally. He tells me that he has not been eating red meat for a long time but he eats white meet including chicken and fish and eats lots of fruits and vegetables.        ROS:  A comprehensive ROS was otherwise neg      Past Medical/Surgical History:  Past Medical History:   Diagnosis Date     Aplastic anemia (H) 03/26/2018    thought secondary to reaction to Septra     BPH (benign prostatic hypertrophy)     failed  Flomax     C. difficile diarrhea 03/26/2018    treated with Flagyl     Cellulitis and abscess of leg, except foot 2004     Contact dermatitis and other eczema, due to unspecified cause      Diaphragmatic hernia without mention of obstruction  or gangrene     hiatal hernia     Esophageal reflux      Hyperlipidemia LDL goal <100 3/11/2005     Hypothyroidism      Impotence of organic origin      Meningococcal encephalitis      Proteinuria      RBBB      Type 2 diabetes mellitus with diabetic nephropathy  (goal A1C<7) 10/24/2015     Unspecified essential hypertension      Past Surgical History:   Procedure Laterality Date     ARTHROPLASTY KNEE Left 3/18/2015    Procedure: ARTHROPLASTY KNEE;  Surgeon: Abebe Schreoder MD;  Location: [facility] OR     ARTHROPLASTY KNEE Right 3/14/2016    Procedure: ARTHROPLASTY KNEE;  Surgeon: Abebe Schroeder MD;  Location: [facility] OR     C NONSPECIFIC PROCEDURE  10/02    left knee meniscus tear repair     HC LAPAROSCOPY, SURGICAL; CHOLECYSTECTOMY  1998    Cholecystectomy, Laparoscopic     HC REMOVE TONSILS/ADENOIDS,13+ Y/O  1963    T & A 13+y.o.     Allergies:  Allergies as of 04/18/2018 - Review Complete 04/18/2018   Allergen Reaction Noted     Atorvastatin calcium  01/30/2008     Diovan [valsartan]  01/24/2011     Mold Other (See Comments) 07/14/2011     Penicillins  09/07/2011     Seasonal allergies  06/25/2007    glimepiride possibly causing pancytopenia  Current Medications:  Current Outpatient Prescriptions   Medication Sig Dispense Refill     amLODIPine (NORVASC) 10 MG tablet Take 1 tablet (10 mg) by mouth daily 90 tablet 3     aspirin 81 MG tablet Take by mouth daily 30 tablet      blood glucose monitoring (ACCU-CHEK FASTCLIX) lancets Use to test blood sugar 1 times daily or as directed. 102 each 3     blood glucose monitoring (ACCU-CHEK MULTICLIX) lancets Use to test blood sugar 1 time daily or as directed. 1 Box 11     blood glucose monitoring (ACCU-CHEK SMARTVIEW) test strip Use to test blood sugar 1 times daily or as directed. 100 strip 3     Blood Glucose Monitoring Suppl MAGDALENO 1 Device daily Smartview Meter 1 each 0     Cyanocobalamin (B-12) 1000 MCG TBCR Take 1,000 mcg by mouth daily 100 tablet 11     levothyroxine  "(SYNTHROID/LEVOTHROID) 75 MCG tablet Take 1 tablet (75 mcg) by mouth daily 90 tablet 3     metFORMIN (GLUCOPHAGE) 1000 MG tablet 1 tab twice a day 180 tablet 3     omeprazole (PRILOSEC) 20 MG CR capsule Take 1 capsule (20 mg) by mouth every morning TAKE 30'-60' BEFORE 1ST MEAL DAILY 90 capsule 3     rosuvastatin (CRESTOR) 40 MG tablet Take 0.5 tablets (20 mg) by mouth At Bedtime 45 tablet 3     sitagliptin (JANUVIA) 100 MG tablet Take 1 tablet (100 mg) by mouth daily 90 tablet 3     glimepiride (AMARYL) 2 MG tablet 1.5 tabs in AM with breakfast and 1 tab in OPM with supper (Patient not taking: Reported on 4/18/2018) 225 tablet 3      Family History:  Family History   Problem Relation Age of Onset     Family History Negative Mother      d:age 89 of \"old age\"     GASTROINTESTINAL DISEASE Father      d:age 79 liver failure after taking excessive amounts of Tylenol over 5-6 yrs     Neurologic Disorder Brother      b:1932  hx cerebral aneurysm age 59     No family history of bleeding or clotting disorder. Maternal uncle had throat cancer.  Social History:  Social History     Social History     Marital status:      Spouse name: N/A     Number of children: 2     Years of education: N/A     Occupational History     teacher [employer]     Social History Main Topics     Smoking status: Never Smoker     Smokeless tobacco: Never Used     Alcohol use Yes      Comment: beer 4-5 a month     Drug use: No     Sexual activity: Yes     Partners: Female     Other Topics Concern     Special Diet Yes     diabetic diet      Exercise No     nothing regular since bilateral knee surgeries      Social History Narrative    denies any smoking. No alcohol. He usually lives in [place] and spends winter in [place]. He lives alone.  Physical Exam:  /77  Pulse 102  Temp 98  F (36.7  C)  Ht 1.702 m (5' 7\")  Wt 75.3 kg (166 lb)  SpO2 98%  BMI 26 kg/m2  CONSTITUTIONAL: no acute distress  EYES: PERRLA, mild palor no " icterus.   ENT/MOUTH: no mouth lesions. Oropharynx normal  CVS: s1s2 no m r g .   RESPIRATORY: clear to auscultation b/l  GI: soft non tender no hepatosplenomegaly  NEURO: AAOX3  Grossly non focal neuro exam  INTEGUMENT: no obvious rashes  LYMPHATIC: no palpable cervical, supraclavicular, axillary or inguinal LAD  MUSCULOSKELETAL: Unremarkable. No bony tenderness.   EXTREMITIES: no edema  PSYCH: Mentation, mood and affect are normal. Decision making capacity is intact      Laboratory/Imaging Studies    I reviewed the outside records as well    ASSESSMENT/PLAN:      Pancytopenia. At this time the cause is not entirely known but it could be possibly toxic reaction to the drug glimepiride. Otherwise his bone marrow biopsy was nondiagnostic. Peripheral blood and bone marrow Flow cytometry were negative as per report although I was not able to see the report myself. Fish for MDS panel was pending but he did not have morphologic features to diagnose MDS.    At this time I would like his outside pathology to be reviewed here at the [facility] and we would also get more records in terms of FISH analysis and flow cytometry.  I would repeat blood work today and will check KIMBERLEE, flow cytometry for [facility]. Check LDH and repeat comprehensive metabolic panel.    He has vitamin B12 deficiency but antiparietal cell antibody and anti-and expect antibody were negative. He was given vitamin B12 shots in the hospital. I will repeat vitamin B12 levels and I told him to start taking oral vitamin B12 1000  g daily and I gave him prescription for that. If he is interested vitamin B12 deficient then I will give him vitamin B12 injections every week.    I will check labs every 2 weeks. I will transfuse him for hemoglobin less than 7 or platelets less than 10 if there is no bleeding. We discussed that with time if his numbers do not continue to show improvement then we will need to repeat a bone marrow biopsy    I told him to  continue holding aspirin. Once his platelets are in a safe range and he can resume that    We discussed that if he develops infections or if he starts to have any abnormal bleeding then he should let us know right away.  .  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 This finding is most probably constitutional in origin and not acquired; however it it is medically warranted a peripheral blood chromosome analysis couldn't be performed to rule out an acquired abnormality. If it is constitutional genetic counseling would be medically indicated

## 2018-04-19 ENCOUNTER — TELEPHONE (OUTPATIENT)
Dept: INTERNAL MEDICINE | Facility: CLINIC | Age: 78
End: 2018-04-19

## 2018-04-19 LAB — HAPTOGLOB SERPL-MCNC: 80 MG/DL (ref 35–175)

## 2018-04-19 NOTE — TELEPHONE ENCOUNTER
Patient called has questions about his medication he does have appt on 5/7/18 at 4:30pm is there a way he could be seen sooner please call pt asap regarding phone [phone]

## 2018-04-20 LAB
COPATH REPORT: NORMAL
EPO SERPL-ACNC: 241 MU/ML (ref 4–27)

## 2018-04-20 NOTE — TELEPHONE ENCOUNTER
Patient calling back to see if MD will fit him in sooner . Pt was hospitalized in [place] re: low blood counts due to diabetic medication . See notes from [place] that were received. Pt currently seeing Dr. Lane at the [facility] . Pt is receiving blood transfusions to try and bring blood counts back to normal per pt macular degeneration can you work in sooner than May 7 2018 .Abi Mack RN

## 2018-04-23 DIAGNOSIS — D61.818 PANCYTOPENIA (H): ICD-10-CM

## 2018-04-23 DIAGNOSIS — E53.8 VITAMIN B12 DEFICIENCY (NON ANEMIC): ICD-10-CM

## 2018-04-23 DIAGNOSIS — Z12.11 SPECIAL SCREENING FOR MALIGNANT NEOPLASMS, COLON: ICD-10-CM

## 2018-04-23 DIAGNOSIS — E11.21 TYPE 2 DIABETES MELLITUS WITH DIABETIC NEPHROPATHY, WITHOUT LONG-TERM CURRENT USE OF INSULIN (H): ICD-10-CM

## 2018-04-23 LAB
ANION GAP SERPL CALCULATED.3IONS-SCNC: 7 MMOL/L (ref 3–14)
ANISOCYTOSIS BLD QL SMEAR: SLIGHT
BUN SERPL-MCNC: 19 MG/DL (ref 7–30)
CALCIUM SERPL-MCNC: 8.5 MG/DL (ref 8.5–10.1)
CHLORIDE SERPL-SCNC: 102 MMOL/L (ref 94–109)
CO2 SERPL-SCNC: 28 MMOL/L (ref 20–32)
CREAT SERPL-MCNC: 1.01 MG/DL (ref 0.66–1.25)
DIFFERENTIAL METHOD BLD: ABNORMAL
ERYTHROCYTE [DISTWIDTH] IN BLOOD BY AUTOMATED COUNT: 19.5 % (ref 10–15)
GFR SERPL CREATININE-BSD FRML MDRD: 72 ML/MIN/1.7M2
GLUCOSE SERPL-MCNC: 175 MG/DL (ref 70–99)
HBA1C MFR BLD: 6.9 % (ref 0–5.6)
HCT VFR BLD AUTO: 28.1 % (ref 40–53)
HGB BLD-MCNC: 9.5 G/DL (ref 13.3–17.7)
LYMPHOCYTES # BLD AUTO: 1.9 10E9/L (ref 0.8–5.3)
LYMPHOCYTES NFR BLD AUTO: 64 %
MACROCYTES BLD QL SMEAR: PRESENT
MCH RBC QN AUTO: 33.1 PG (ref 26.5–33)
MCHC RBC AUTO-ENTMCNC: 33.8 G/DL (ref 31.5–36.5)
MCV RBC AUTO: 98 FL (ref 78–100)
MONOCYTES # BLD AUTO: 0.2 10E9/L (ref 0–1.3)
MONOCYTES NFR BLD AUTO: 7 %
NEUTROPHILS # BLD AUTO: 0.9 10E9/L (ref 1.6–8.3)
NEUTROPHILS NFR BLD AUTO: 29 %
PLATELET # BLD AUTO: 23 10E9/L (ref 150–450)
PLATELET # BLD EST: ABNORMAL 10*3/UL
POTASSIUM SERPL-SCNC: 4.3 MMOL/L (ref 3.4–5.3)
RBC # BLD AUTO: 2.87 10E12/L (ref 4.4–5.9)
RETICS # AUTO: 78.1 10E9/L (ref 25–95)
RETICS/RBC NFR AUTO: 2.7 % (ref 0.5–2)
SODIUM SERPL-SCNC: 137 MMOL/L (ref 133–144)
WBC # BLD AUTO: 3 10E9/L (ref 4–11)

## 2018-04-23 PROCEDURE — 80048 BASIC METABOLIC PNL TOTAL CA: CPT | Performed by: INTERNAL MEDICINE

## 2018-04-23 PROCEDURE — 83036 HEMOGLOBIN GLYCOSYLATED A1C: CPT | Performed by: INTERNAL MEDICINE

## 2018-04-23 PROCEDURE — 85025 COMPLETE CBC W/AUTO DIFF WBC: CPT | Performed by: INTERNAL MEDICINE

## 2018-04-23 PROCEDURE — 85045 AUTOMATED RETICULOCYTE COUNT: CPT | Performed by: INTERNAL MEDICINE

## 2018-04-23 PROCEDURE — 36415 COLL VENOUS BLD VENIPUNCTURE: CPT | Performed by: INTERNAL MEDICINE

## 2018-04-25 DIAGNOSIS — E03.9 HYPOTHYROIDISM, UNSPECIFIED TYPE: ICD-10-CM

## 2018-04-25 NOTE — TELEPHONE ENCOUNTER
MD out of the clinic the rest of the week. Pancytopenia being managed bu hematology with last visit 1 week ago per chart. Pt to continue instructions per hematology recommendations regarding the low hemoglobin, platelets, white blood cell count  Pt now scheduled to see me 5/7/18. May move up appt to see me in the one of the hospital f/u slots open earlier next week.  [place] notes reviewed and working dx of pancytopenia  questionably being due to use of Glimepiride/ Amaryl.  Reported incidence of that reported side effect is < 1/10,000 patient per package inserts reviewed for that med and pt has been on that medication dating back at least to 2010 with normal CBC  Nov 2017 so would be very atypical but, since not all of hematology work-up completed yet looking to more clearly define cause of this episode, pt to stay off of Glimepiride. Check blood sugars twice a day (before breakfast and bedtime) for 4 days prior to the appointment with me and bring the results to the appointment.  Assist pt in scheduling appt

## 2018-04-26 DIAGNOSIS — E53.8 VITAMIN B12 DEFICIENCY (NON ANEMIC): ICD-10-CM

## 2018-04-26 DIAGNOSIS — E03.9 HYPOTHYROIDISM, UNSPECIFIED TYPE: ICD-10-CM

## 2018-04-26 DIAGNOSIS — D61.818 PANCYTOPENIA (H): ICD-10-CM

## 2018-04-26 LAB
BASOPHILS # BLD AUTO: 0 10E9/L (ref 0–0.2)
BASOPHILS NFR BLD AUTO: 0.3 %
DIFFERENTIAL METHOD BLD: ABNORMAL
EOSINOPHIL # BLD AUTO: 0 10E9/L (ref 0–0.7)
EOSINOPHIL NFR BLD AUTO: 0.8 %
ERYTHROCYTE [DISTWIDTH] IN BLOOD BY AUTOMATED COUNT: 20.4 % (ref 10–15)
HCT VFR BLD AUTO: 27.4 % (ref 40–53)
HGB BLD-MCNC: 9.2 G/DL (ref 13.3–17.7)
IMM GRANULOCYTES # BLD: 0 10E9/L (ref 0–0.4)
IMM GRANULOCYTES NFR BLD: 0.3 %
LYMPHOCYTES # BLD AUTO: 1.5 10E9/L (ref 0.8–5.3)
LYMPHOCYTES NFR BLD AUTO: 40.3 %
MCH RBC QN AUTO: 33.3 PG (ref 26.5–33)
MCHC RBC AUTO-ENTMCNC: 33.6 G/DL (ref 31.5–36.5)
MCV RBC AUTO: 99 FL (ref 78–100)
MONOCYTES # BLD AUTO: 0.3 10E9/L (ref 0–1.3)
MONOCYTES NFR BLD AUTO: 6.8 %
NEUTROPHILS # BLD AUTO: 2 10E9/L (ref 1.6–8.3)
NEUTROPHILS NFR BLD AUTO: 51.5 %
PLATELET # BLD AUTO: 27 10E9/L (ref 150–450)
RBC # BLD AUTO: 2.76 10E12/L (ref 4.4–5.9)
RETICS # AUTO: 82.8 10E9/L (ref 25–95)
RETICS/RBC NFR AUTO: 3 % (ref 0.5–2)
TSH SERPL DL<=0.005 MIU/L-ACNC: 1.82 MU/L (ref 0.4–4)
WBC # BLD AUTO: 3.8 10E9/L (ref 4–11)

## 2018-04-26 PROCEDURE — 82274 ASSAY TEST FOR BLOOD FECAL: CPT | Performed by: INTERNAL MEDICINE

## 2018-04-26 PROCEDURE — 84443 ASSAY THYROID STIM HORMONE: CPT | Performed by: INTERNAL MEDICINE

## 2018-04-26 PROCEDURE — 85045 AUTOMATED RETICULOCYTE COUNT: CPT | Performed by: INTERNAL MEDICINE

## 2018-04-26 PROCEDURE — 36415 COLL VENOUS BLD VENIPUNCTURE: CPT | Performed by: INTERNAL MEDICINE

## 2018-04-26 PROCEDURE — 85025 COMPLETE CBC W/AUTO DIFF WBC: CPT | Performed by: INTERNAL MEDICINE

## 2018-04-26 RX ORDER — LEVOTHYROXINE SODIUM 75 UG/1
TABLET ORAL
Qty: 30 TABLET | Refills: 0 | Status: SHIPPED | OUTPATIENT
Start: 2018-04-26 | End: 2018-06-01

## 2018-04-26 NOTE — TELEPHONE ENCOUNTER
"Requested Prescriptions   Pending Prescriptions Disp Refills     levothyroxine (SYNTHROID/LEVOTHROID) 75 MCG tablet [Pharmacy Med Name: LEVOTHYROXIN 75MCG  TAB]  Last Written Prescription Date:  5/15/2017  Last Fill Quantity: 90,  # refills: 3   Last office visit: 12/4/2017 with prescribing provider:  12/04/2017   Future Office Visit:   Next 5 appointments (look out 90 days)     Apr 30, 2018  2:00 PM CDT   Office Visit with Zhou Meraz MD   [facility] ([facility])    [address] [place] [id number]   [phone]            May 07, 2018  4:30 PM CDT   Office Visit with Zhou Meraz MD   [facility] ([facility])    [address] [place] [id number]   [phone]            May 08, 2018  2:15 PM CDT   Return Visit with Terra Salazar MD   [facility] ([facility])    [address] [place] [id number] [phone]                  90 tablet 3     Sig: TAKE ONE TABLET BY MOUTH ONCE DAILY    Thyroid Protocol Passed    4/25/2018  8:29 PM       Passed - Patient is 12 years or older       Passed - Recent (12 mo) or future (30 days) visit within the authorizing provider's specialty    Patient had office visit in the last 12 months or has a visit in the next 30 days with authorizing provider or within the authorizing provider's specialty.  See \"Patient Info\" tab in inbasket, or \"Choose Columns\" in Meds & Orders section of the refill encounter.           Passed - Normal TSH on file in past 12 months    Recent Labs   Lab Test  04/27/17   0853   TSH  2.90                "

## 2018-04-30 ENCOUNTER — CARE COORDINATION (OUTPATIENT)
Dept: ONCOLOGY | Facility: CLINIC | Age: 78
End: 2018-04-30

## 2018-04-30 ENCOUNTER — OFFICE VISIT (OUTPATIENT)
Dept: INTERNAL MEDICINE | Facility: CLINIC | Age: 78
End: 2018-04-30
Payer: MEDICARE

## 2018-04-30 VITALS
RESPIRATION RATE: 16 BRPM | TEMPERATURE: 98.6 F | HEART RATE: 90 BPM | WEIGHT: 168 LBS | SYSTOLIC BLOOD PRESSURE: 118 MMHG | BODY MASS INDEX: 26.31 KG/M2 | DIASTOLIC BLOOD PRESSURE: 66 MMHG

## 2018-04-30 DIAGNOSIS — E11.21 TYPE 2 DIABETES MELLITUS WITH DIABETIC NEPHROPATHY, WITHOUT LONG-TERM CURRENT USE OF INSULIN (H): ICD-10-CM

## 2018-04-30 DIAGNOSIS — D61.818 PANCYTOPENIA (H): ICD-10-CM

## 2018-04-30 DIAGNOSIS — A04.72 C. DIFFICILE DIARRHEA: ICD-10-CM

## 2018-04-30 DIAGNOSIS — E53.8 VITAMIN B12 DEFICIENCY (NON ANEMIC): ICD-10-CM

## 2018-04-30 DIAGNOSIS — D61.818 PANCYTOPENIA (H): Primary | ICD-10-CM

## 2018-04-30 LAB
BASOPHILS # BLD AUTO: 0 10E9/L (ref 0–0.2)
BASOPHILS NFR BLD AUTO: 0.3 %
DIFFERENTIAL METHOD BLD: ABNORMAL
EOSINOPHIL # BLD AUTO: 0.1 10E9/L (ref 0–0.7)
EOSINOPHIL NFR BLD AUTO: 1.3 %
ERYTHROCYTE [DISTWIDTH] IN BLOOD BY AUTOMATED COUNT: 21.5 % (ref 10–15)
HCT VFR BLD AUTO: 27 % (ref 40–53)
HGB BLD-MCNC: 9.1 G/DL (ref 13.3–17.7)
IMM GRANULOCYTES # BLD: 0 10E9/L (ref 0–0.4)
IMM GRANULOCYTES NFR BLD: 0.3 %
LYMPHOCYTES # BLD AUTO: 1.4 10E9/L (ref 0.8–5.3)
LYMPHOCYTES NFR BLD AUTO: 37.2 %
MCH RBC QN AUTO: 33.8 PG (ref 26.5–33)
MCHC RBC AUTO-ENTMCNC: 33.7 G/DL (ref 31.5–36.5)
MCV RBC AUTO: 100 FL (ref 78–100)
MONOCYTES # BLD AUTO: 0.3 10E9/L (ref 0–1.3)
MONOCYTES NFR BLD AUTO: 7.3 %
NEUTROPHILS # BLD AUTO: 2.1 10E9/L (ref 1.6–8.3)
NEUTROPHILS NFR BLD AUTO: 53.6 %
PLATELET # BLD AUTO: 33 10E9/L (ref 150–450)
RBC # BLD AUTO: 2.69 10E12/L (ref 4.4–5.9)
RETICS # AUTO: 98.5 10E9/L (ref 25–95)
RETICS/RBC NFR AUTO: 3.7 % (ref 0.5–2)
WBC # BLD AUTO: 3.8 10E9/L (ref 4–11)

## 2018-04-30 PROCEDURE — 85025 COMPLETE CBC W/AUTO DIFF WBC: CPT | Performed by: INTERNAL MEDICINE

## 2018-04-30 PROCEDURE — 00000345 ZZHCL STATISTIC REV BONE MARROW OUTSIDE SLIDES TC 88321: Performed by: INTERNAL MEDICINE

## 2018-04-30 PROCEDURE — 85045 AUTOMATED RETICULOCYTE COUNT: CPT | Performed by: INTERNAL MEDICINE

## 2018-04-30 PROCEDURE — 36415 COLL VENOUS BLD VENIPUNCTURE: CPT | Performed by: INTERNAL MEDICINE

## 2018-04-30 PROCEDURE — 99214 OFFICE O/P EST MOD 30 MIN: CPT | Performed by: INTERNAL MEDICINE

## 2018-04-30 RX ORDER — LANCETS
EACH MISCELLANEOUS
Qty: 102 EACH | Refills: 11 | Status: SHIPPED | OUTPATIENT
Start: 2018-04-30 | End: 2020-12-03

## 2018-04-30 ASSESSMENT — PAIN SCALES - GENERAL: PAINLEVEL: NO PAIN (0)

## 2018-04-30 NOTE — PROGRESS NOTES
SUBJECTIVE:   Prakash Cramer is a 78 year old male who presents to clinic today for the following health issues:    Chief Complaint   Patient presents with     Follow Up For     Diabetes and pancytopenia       Pt's past medical history, family history, habits, medications and allergies were reviewed with the patient today.  See snap shot for  HCM status. Most recent lab results reviewed with pt. Problem list and histories reviewed & adjusted, as indicated.  Additional history as below:     Off Glimepiride. Still on metformin and Januvia. Sugars 150 190 in AM and 200-320 in PM  Denies CP,  abdominal pain, polyuria, polydipsia, vision changes, extremity numbness/parasthesias or skin problems.  Mild SOB with anemia. No cough.  Admitted to hospital in March in [place] with pancytopenia. Working dx for now is drug reaction to Glimepiride.   See Hem/Onc note from 4/18/18 for details. CBC being monitored and has f/u with H/O next week. Denies bruising issues. No F/C.  Last colonoscopy 2006. Had discussed option of colonoscopy vs doing FIT given age last Fall prior to pt going to [place] for the winter. Pt has FIT test sample with him today and will turn it into the lab. Denies seeing blood in stools   Had C diff when in [place] also and was treated with Vanco for 2 weeks. No recurrence of diarrhea and stools are formed now      Lab Results   Component Value Date     04/23/2018     Lab Results   Component Value Date    A1C 6.9 04/23/2018     Lab Results   Component Value Date    CHOL 134 04/27/2017     Lab Results   Component Value Date    LDL 43 04/27/2017     Lab Results   Component Value Date    HDL 60 04/27/2017     Lab Results   Component Value Date    TRIG 156 04/27/2017     Lab Results   Component Value Date    CR 1.01 04/23/2018     Lab Results   Component Value Date    ALT 34 04/18/2018     Lab Results   Component Value Date    AST 21 04/18/2018     Lab Results   Component Value Date    MICROL 27  "11/13/2017     Lab Results   Component Value Date    TSH 1.82 04/26/2018       Additional ROS:   Constitutional, HEENT, Cardiovascular, Pulmonary, GI and , Neuro, MSK and Psych review of systems/symptoms are otherwise negative or unchanged from previous, except as noted above.      OBJECTIVE:  /66  Pulse 90  Temp 98.6  F (37  C) (Oral)  Resp 16  Wt 168 lb (76.2 kg)  BMI 26.31 kg/m2   Estimated body mass index is 26.31 kg/(m^2) as calculated from the following:    Height as of 4/18/18: 5' 7\" (1.702 m).    Weight as of this encounter: 168 lb (76.2 kg).  Eye: PERRL, EOMI  HENT: ear canals and TM's normal and nose and mouth without ulcers or lesions. No nose bleed   Neck: no adenopathy. Thyroid normal to palpation. No bruits  Pulm: Lungs clear to auscultation   CV: Regular rates and rhythm  GI: Soft, nontender, Normal active bowel sounds, No hepatosplenomegaly or masses palpable  Ext: Peripheral pulses intact. No edema.  Neuro: Normal strength and tone, sensory exam grossly normal    Assessment/Plan: (See plan discussion below for further details)  1. Type 2 diabetes mellitus with diabetic nephropathy, without long-term current use of insulin (H)  See plan discussion below. Blood sugars elevated off of Glimepiride  - blood glucose monitoring (ACCU-CHEK FASTCLIX) lancets; Use to test blood sugar 1 times daily or as directed.  Dispense: 102 each; Refill: 11  - blood glucose monitoring (ACCU-CHEK SMARTVIEW) test strip; Use to test blood sugar 1 times daily or as directed.  Dispense: 100 strip; Refill: 11  - canagliflozin (INVOKANA) 100 MG tablet; Take 1 tablet (100 mg) by mouth every morning (before breakfast)  Dispense: 30 tablet; Refill: 11  - Hemoglobin A1c; Future  - Basic metabolic panel; Future  - Lipid panel reflex to direct LDL Fasting; Future  - Albumin Random Urine Quantitative with Creat Ratio; Future    2. Pancytopenia (H)  Management per H/O. Off of Sulfonylureas now    3. C. difficile " diarrhea  S/p  Vanco 2 weeks. Stools formed now. Add good bacteria back to GI tract      Plan discussion:  Invokana 100mg tab, 1 tab daily  In AM for diabetes  Nonfasting BMP lab in 3 weeks. Call earlier to [facility] [phone] if increased frequency of urination. Otherwise give update in 4 weeks before running out of medication how sugars are doing. Check AM odd days and bedtime on even days for the 6 days prior to calling  Follow-up with Oncology/hematology  Increased bacteria in colon with yogurt that has active cultures (Greek yogurt, Yoplait)  Call if recurrent diarrhea   Turn in FIT stool test  Repeat fasting diabetic labs in 3 months            Zhou Meraz MD  Internal Medicine Department  [facility]

## 2018-04-30 NOTE — PROGRESS NOTES
Patient stopped into clinic and wanted results of lab results from today and copies from last weeks results.  Discussed platelet count today of 33,000 and Hgb 9.1.  The platelets are going up which is good, hemoglobin slightly down.  He feels lack of energy; he knows he probably would not receive a transfusion unless his Hgb dropped below 8.  He will return on Thursday for a lab draw.

## 2018-04-30 NOTE — PATIENT INSTRUCTIONS
Invokana 100mg tab, 1 tab daily  In AM for diabetes  Nonfasting BMP lab in 3 weeks. Call earlier to [facility] [phone] if increased frequency of urination. Otherwise give update in 4 weeks before running out of medication how sugars are doing. Check AM odd days and bedtime on even days for the 6 days prior to calling  Follow-up with Oncology/hematology  Increased bacteria in colon with yogurt that has active cultures (Greek yogurt, Oralplait)  Call if recurrent diarrhea   Turn in FIT stool test  Repeat fasting diabetic labs in 3 months

## 2018-05-01 DIAGNOSIS — Z12.11 SPECIAL SCREENING FOR MALIGNANT NEOPLASMS, COLON: ICD-10-CM

## 2018-05-01 LAB — HEMOCCULT STL QL IA: POSITIVE

## 2018-05-03 ENCOUNTER — CARE COORDINATION (OUTPATIENT)
Dept: ONCOLOGY | Facility: CLINIC | Age: 78
End: 2018-05-03

## 2018-05-03 DIAGNOSIS — E53.8 VITAMIN B12 DEFICIENCY (NON ANEMIC): ICD-10-CM

## 2018-05-03 DIAGNOSIS — D61.818 PANCYTOPENIA (H): ICD-10-CM

## 2018-05-03 LAB
BASOPHILS # BLD AUTO: 0 10E9/L (ref 0–0.2)
BASOPHILS NFR BLD AUTO: 0.4 %
DIFFERENTIAL METHOD BLD: ABNORMAL
EOSINOPHIL # BLD AUTO: 0 10E9/L (ref 0–0.7)
EOSINOPHIL NFR BLD AUTO: 0.6 %
ERYTHROCYTE [DISTWIDTH] IN BLOOD BY AUTOMATED COUNT: 22.9 % (ref 10–15)
HCT VFR BLD AUTO: 27.2 % (ref 40–53)
HGB BLD-MCNC: 9.2 G/DL (ref 13.3–17.7)
IMM GRANULOCYTES # BLD: 0 10E9/L (ref 0–0.4)
IMM GRANULOCYTES NFR BLD: 0.2 %
LYMPHOCYTES # BLD AUTO: 1.8 10E9/L (ref 0.8–5.3)
LYMPHOCYTES NFR BLD AUTO: 37.1 %
MCH RBC QN AUTO: 34.6 PG (ref 26.5–33)
MCHC RBC AUTO-ENTMCNC: 33.8 G/DL (ref 31.5–36.5)
MCV RBC AUTO: 102 FL (ref 78–100)
MONOCYTES # BLD AUTO: 0.3 10E9/L (ref 0–1.3)
MONOCYTES NFR BLD AUTO: 6.3 %
NEUTROPHILS # BLD AUTO: 2.7 10E9/L (ref 1.6–8.3)
NEUTROPHILS NFR BLD AUTO: 55.4 %
PLATELET # BLD AUTO: 39 10E9/L (ref 150–450)
RBC # BLD AUTO: 2.66 10E12/L (ref 4.4–5.9)
RETICS # AUTO: 109.1 10E9/L (ref 25–95)
RETICS/RBC NFR AUTO: 4.1 % (ref 0.5–2)
WBC # BLD AUTO: 4.8 10E9/L (ref 4–11)

## 2018-05-03 PROCEDURE — 85045 AUTOMATED RETICULOCYTE COUNT: CPT | Performed by: INTERNAL MEDICINE

## 2018-05-03 PROCEDURE — 36415 COLL VENOUS BLD VENIPUNCTURE: CPT | Performed by: INTERNAL MEDICINE

## 2018-05-03 PROCEDURE — 85025 COMPLETE CBC W/AUTO DIFF WBC: CPT | Performed by: INTERNAL MEDICINE

## 2018-05-03 NOTE — PROGRESS NOTES
Per patient request, reviewed recent lab results from today and given copies of recent reports. Platelets are improving.   Patient has follow-up with  next week.  His Hemoglobin is stable at 9.2; he is asking why this is not increasing more.  Advised that this would be best discussed with provider visit.  He agrees.

## 2018-05-04 LAB — COPATH REPORT: NORMAL

## 2018-05-08 ENCOUNTER — ONCOLOGY VISIT (OUTPATIENT)
Dept: ONCOLOGY | Facility: CLINIC | Age: 78
End: 2018-05-08
Payer: MEDICARE

## 2018-05-08 VITALS
SYSTOLIC BLOOD PRESSURE: 118 MMHG | DIASTOLIC BLOOD PRESSURE: 71 MMHG | OXYGEN SATURATION: 98 % | HEART RATE: 90 BPM | BODY MASS INDEX: 25.9 KG/M2 | HEIGHT: 67 IN | TEMPERATURE: 98.9 F | WEIGHT: 165 LBS

## 2018-05-08 DIAGNOSIS — E53.8 VITAMIN B12 DEFICIENCY (NON ANEMIC): ICD-10-CM

## 2018-05-08 DIAGNOSIS — D61.818 PANCYTOPENIA (H): Primary | ICD-10-CM

## 2018-05-08 DIAGNOSIS — Q99.9 CHROMOSOMAL ABNORMALITY: ICD-10-CM

## 2018-05-08 DIAGNOSIS — D61.818 PANCYTOPENIA (H): ICD-10-CM

## 2018-05-08 LAB
BASOPHILS # BLD AUTO: 0 10E9/L (ref 0–0.2)
BASOPHILS NFR BLD AUTO: 0.2 %
DIFFERENTIAL METHOD BLD: ABNORMAL
EOSINOPHIL # BLD AUTO: 0 10E9/L (ref 0–0.7)
EOSINOPHIL NFR BLD AUTO: 0.6 %
ERYTHROCYTE [DISTWIDTH] IN BLOOD BY AUTOMATED COUNT: 23.3 % (ref 10–15)
HCT VFR BLD AUTO: 27.8 % (ref 40–53)
HGB BLD-MCNC: 9.3 G/DL (ref 13.3–17.7)
IMM GRANULOCYTES # BLD: 0 10E9/L (ref 0–0.4)
IMM GRANULOCYTES NFR BLD: 0.2 %
LYMPHOCYTES # BLD AUTO: 2 10E9/L (ref 0.8–5.3)
LYMPHOCYTES NFR BLD AUTO: 39.1 %
MCH RBC QN AUTO: 34.8 PG (ref 26.5–33)
MCHC RBC AUTO-ENTMCNC: 33.5 G/DL (ref 31.5–36.5)
MCV RBC AUTO: 104 FL (ref 78–100)
MONOCYTES # BLD AUTO: 0.3 10E9/L (ref 0–1.3)
MONOCYTES NFR BLD AUTO: 6.1 %
NEUTROPHILS # BLD AUTO: 2.8 10E9/L (ref 1.6–8.3)
NEUTROPHILS NFR BLD AUTO: 53.8 %
PLATELET # BLD AUTO: 49 10E9/L (ref 150–450)
RBC # BLD AUTO: 2.67 10E12/L (ref 4.4–5.9)
RETICS # AUTO: 130 10E9/L (ref 25–95)
RETICS/RBC NFR AUTO: 4.9 % (ref 0.5–2)
WBC # BLD AUTO: 5.1 10E9/L (ref 4–11)

## 2018-05-08 PROCEDURE — 36415 COLL VENOUS BLD VENIPUNCTURE: CPT | Performed by: INTERNAL MEDICINE

## 2018-05-08 PROCEDURE — 99214 OFFICE O/P EST MOD 30 MIN: CPT | Performed by: INTERNAL MEDICINE

## 2018-05-08 PROCEDURE — 85045 AUTOMATED RETICULOCYTE COUNT: CPT | Performed by: INTERNAL MEDICINE

## 2018-05-08 PROCEDURE — 85025 COMPLETE CBC W/AUTO DIFF WBC: CPT | Performed by: INTERNAL MEDICINE

## 2018-05-08 ASSESSMENT — PAIN SCALES - GENERAL: PAINLEVEL: NO PAIN (0)

## 2018-05-08 NOTE — PATIENT INSTRUCTIONS
Labs every 2 weeks for now    Cont B12    Start Folic acid 1mg daily    See me back in 2 months with labs prior

## 2018-05-08 NOTE — LETTER
5/8/2018         RE: Prakash Cramer  [id number]  [facility] [id number]        Dear Colleague,    Thank you for referring your patient, Prakash Cramer, to the [facility]. Please see a copy of my visit note below.    Hematology follow up visit:  Date on this visit: 5/8/2018     CC   Pancytopenia    Primary Physician: Zhou Meraz     History Of Present Illness:    Please see previous note for details. I have copied and updated from prior note.  Mr. Cramer is a 78 year old male who was found to have pancytopenia around the end of March 2018 when he presented with a white blood cell count of 1.5 and hemoglobin of 7.8 and platelets of 4. He was admitted to the hospital in [place]. There was no bruising or petechiae or evidence of bleeding. He was feeling fatigued and at that point he was also noticed to have C diff infection and was started on p.o. vancomycin. He was given Neupogen ×1 and packed red distant transfusion ×2 and platelet transfusion ×1. He was also given vitamin B12 injections and I believe it was 3 injections which she was given during the hospitalization. He had a bone marrow biopsy done as part of his workup which showed mildly hypocellular bone marrow with 10-40% cellularity with erythroid hyperplasia and mild dyserythropoiesis (5% cellularity, moderately decreased for age as per pathology review at the [facility]). Reactive plasmacytosis and maturing trilineage hematopoiesis. There was no significant dysplasia in the granulocytes or megakaryocytes. Parvovirus immunohistochemistry was negative. These findings were deemed consistent with either evolving aplastic anemia or drug/toxin effect or vitamin/nutritional deficiency or infectious or viral and hypocellular MDS.   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 This finding is most probably constitutional in origin and not acquired; however it it is medically warranted a peripheral blood chromosome analysis couldn't be performed to rule out an acquired abnormality. If it is constitutional genetic counseling would be medically indicated    Comprehensive metabolic panel was unremarkable.   His vitamin B12 level was less than 159. Antiparietal cell antibody and anti-intrinsic factor antibody were negative. Iron studies were unremarkable. CMV IgM was negative. WENDY screen was negative. Rheumatoid factor and anti-CCP were negative. SPEP was negative.  After thorough investigation it was found that potentially glimepiride (sulfonylurea) which he was taking could be the culprit for pancytopenia so he was taken off it.  Upon discharge from the hospital on 3/31/2018 his white blood cell count was 2.4 and hemoglobin was 7.8 and platelet 12. On 4/5/2018 the WBC count was 2.9 with ANC of 1.2 and hemoglobin 7.9 and platelets 75.  On 4/13/2018 white blood cell count was 3.3, ANC 1.3, hemoglobin 7 and platelets 13. At that point he was given 2 units of packed red blood cells and 1 unit of platelets  Most recently on 4/16/2018 white blood cell count was 3.2, ANC 1.1, hemoglobin 10 and platelets 20.    When he saw me at it further testing and KIMBERLEE was negative.  Peripheral blood flow cytometry for PNH reveals a very small percentage of red cells and a small percentage (less than 10%) of neutrophils and monocytes with a PNH immunophenotype, the significance of which is not known.  A repeat vitamin B12 level was 443. I told him to start taking vitamin B12 1000  g daily.  We kept him on observation.    He is doing well. He feels more energetic. Denies any bleeding. No infections. He still has some looser stools on average twice a day. Occasionally they are watery. No abdominal pain. No fevers. Denies any difficulty breathing. No pain. No B symptoms. He is  able to eat and drink well. He is taking vitamin B12 supplements without problems.      ROS:  Rest of the comprehensive review of the system was essentially unremarkable.    I reviewed other history in EPIC as below.    Past Medical/Surgical History:  Past Medical History:   Diagnosis Date     Aplastic anemia (H) 03/26/2018    thought secondary to reaction to Septra     BPH (benign prostatic hypertrophy)     failed  Flomax     C. difficile diarrhea 03/26/2018    treated with Flagyl     Cellulitis and abscess of leg, except foot 2004     Contact dermatitis and other eczema, due to unspecified cause      Diaphragmatic hernia without mention of obstruction or gangrene     hiatal hernia     Esophageal reflux      Hyperlipidemia LDL goal <100 3/11/2005     Hypothyroidism      Impotence of organic origin      Meningococcal encephalitis      Proteinuria      RBBB      Type 2 diabetes mellitus with diabetic nephropathy  (goal A1C<7) 10/24/2015     Unspecified essential hypertension      Past Surgical History:   Procedure Laterality Date     ARTHROPLASTY KNEE Left 3/18/2015    Procedure: ARTHROPLASTY KNEE;  Surgeon: Abebe Schroeder MD;  Location: [facility] OR     ARTHROPLASTY KNEE Right 3/14/2016    Procedure: ARTHROPLASTY KNEE;  Surgeon: Abebe Schroeder MD;  Location: [facility] OR     C NONSPECIFIC PROCEDURE  10/02    left knee meniscus tear repair     HC LAPAROSCOPY, SURGICAL; CHOLECYSTECTOMY  1998    Cholecystectomy, Laparoscopic     HC REMOVE TONSILS/ADENOIDS,13+ Y/O  1963    T & A 13+y.o.     Allergies:  Allergies as of 05/08/2018 - Review Complete 05/08/2018   Allergen Reaction Noted     Atorvastatin calcium  01/30/2008     Diovan [valsartan]  01/24/2011     Mold Other (See Comments) 07/14/2011     Penicillins  09/07/2011     Seasonal allergies  06/25/2007    glimepiride possibly causing pancytopenia  Current Medications:  Current Outpatient Prescriptions   Medication Sig Dispense Refill     amLODIPine (NORVASC) 10 MG tablet  "Take 1 tablet (10 mg) by mouth daily 90 tablet 3     aspirin 81 MG tablet Take by mouth daily 30 tablet      blood glucose monitoring (ACCU-CHEK FASTCLIX) lancets Use to test blood sugar 1 times daily or as directed. 102 each 11     blood glucose monitoring (ACCU-CHEK MULTICLIX) lancets Use to test blood sugar 1 time daily or as directed. 1 Box 11     blood glucose monitoring (ACCU-CHEK SMARTVIEW) test strip Use to test blood sugar 1 times daily or as directed. 100 strip 11     Blood Glucose Monitoring Suppl MAGDALENO 1 Device daily Smartview Meter 1 each 0     canagliflozin (INVOKANA) 100 MG tablet Take 1 tablet (100 mg) by mouth every morning (before breakfast) 30 tablet 11     Cyanocobalamin (B-12) 1000 MCG TBCR Take 1,000 mcg by mouth daily 100 tablet 11     levothyroxine (SYNTHROID/LEVOTHROID) 75 MCG tablet TAKE ONE TABLET BY MOUTH ONCE DAILY 30 tablet 0     metFORMIN (GLUCOPHAGE) 1000 MG tablet 1 tab twice a day 180 tablet 3     omeprazole (PRILOSEC) 20 MG CR capsule Take 1 capsule (20 mg) by mouth every morning TAKE 30'-60' BEFORE 1ST MEAL DAILY 90 capsule 3     rosuvastatin (CRESTOR) 40 MG tablet Take 0.5 tablets (20 mg) by mouth At Bedtime 45 tablet 3     sitagliptin (JANUVIA) 100 MG tablet Take 1 tablet (100 mg) by mouth daily 90 tablet 3      Family History:  Family History   Problem Relation Age of Onset     Family History Negative Mother      d:age 89 of \"old age\"     GASTROINTESTINAL DISEASE Father      d:age 79 liver failure after taking excessive amounts of Tylenol over 5-6 yrs     Neurologic Disorder Brother      b:1932  hx cerebral aneurysm age 59     No family history of bleeding or clotting disorder. Maternal uncle had throat cancer.  Social History:  Social History     Social History     Marital status:      Spouse name: N/A     Number of children: 2     Years of education: N/A     Occupational History     teacher [employer]     Social History Main Topics     Smoking status: Never " "Smoker     Smokeless tobacco: Never Used     Alcohol use Yes      Comment: beer 4-5 a month     Drug use: No     Sexual activity: Yes     Partners: Female     Other Topics Concern     Special Diet Yes     diabetic diet      Exercise No     nothing regular since bilateral knee surgeries      Social History Narrative    denies any smoking. No alcohol. He usually lives in [place] and spends winter in [place]. He lives alone.  Physical Exam:  /71  Pulse 90  Temp 98.9  F (37.2  C)  Ht 1.702 m (5' 7\")  Wt 74.8 kg (165 lb)  SpO2 98%  BMI 25.84 kg/m2  CONSTITUTIONAL: No apparent distress  EYES: PERRLA, without pallor or jaundice  ENT/MOUTH: Ears unremarkable. No oral lesions  CVS: s1s2 normal  RESPIRATORY: Chest is clear  GI: Abdomen is benign  NEURO: He is alert and oriented ×3  INTEGUMENT: no concerning his skin rashes   LYMPHATIC: no palpable lymphadenopathy  MUSCULOSKELETAL: Unremarkable. No bony tenderness.   EXTREMITIES: no pedal edema  PSYCH: Mentation, mood and affect are appropriate        Laboratory/Imaging Studies    Results for NEISHA FRANCOIS PRAMOD (MRN [id number]) as of 5/8/2018 14:02   Ref. Range 4/26/2018 09:53 4/30/2018 10:23 5/3/2018 09:45 5/8/2018 13:26   WBC Latest Ref Range: 4.0 - 11.0 10e9/L 3.8 (L) 3.8 (L) 4.8 5.1   Hemoglobin Latest Ref Range: 13.3 - 17.7 g/dL 9.2 (L) 9.1 (L) 9.2 (L) 9.3 (L)   Hematocrit Latest Ref Range: 40.0 - 53.0 % 27.4 (L) 27.0 (L) 27.2 (L) 27.8 (L)   Platelet Count Latest Ref Range: 150 - 450 10e9/L 27 (LL) 33 (LL) 39 (LL) 49 (LL)   RBC Count Latest Ref Range: 4.4 - 5.9 10e12/L 2.76 (L) 2.69 (L) 2.66 (L) 2.67 (L)   MCV Latest Ref Range: 78 - 100 fl 99 100 102 (H) 104 (H)   MCH Latest Ref Range: 26.5 - 33.0 pg 33.3 (H) 33.8 (H) 34.6 (H) 34.8 (H)   MCHC Latest Ref Range: 31.5 - 36.5 g/dL 33.6 33.7 33.8 33.5   RDW Latest Ref Range: 10.0 - 15.0 % 20.4 (H) 21.5 (H) 22.9 (H) 23.3 (H)   Diff Method Unknown Automated Method Automated Method Automated Method Automated Method "   % Neutrophils Latest Units: % 51.5 53.6 55.4 53.8   % Lymphocytes Latest Units: % 40.3 37.2 37.1 39.1   % Monocytes Latest Units: % 6.8 7.3 6.3 6.1   % Eosinophils Latest Units: % 0.8 1.3 0.6 0.6   % Basophils Latest Units: % 0.3 0.3 0.4 0.2   % Immature Granulocytes Latest Units: % 0.3 0.3 0.2 0.2   Absolute Neutrophil Latest Ref Range: 1.6 - 8.3 10e9/L 2.0 2.1 2.7 2.8   Absolute Lymphocytes Latest Ref Range: 0.8 - 5.3 10e9/L 1.5 1.4 1.8 2.0   Absolute Monocytes Latest Ref Range: 0.0 - 1.3 10e9/L 0.3 0.3 0.3 0.3   Absolute Eosinophils Latest Ref Range: 0.0 - 0.7 10e9/L 0.0 0.1 0.0 0.0   Absolute Basophils Latest Ref Range: 0.0 - 0.2 10e9/L 0.0 0.0 0.0 0.0   Abs Immature Granulocytes Latest Ref Range: 0 - 0.4 10e9/L 0.0 0.0 0.0 0.0   % Retic Latest Ref Range: 0.5 - 2.0 % 3.0 (H) 3.7 (H) 4.1 (H) 4.9 (H)   Absolute Retic Latest Ref Range: 25 - 95 10e9/L 82.8 98.5 (H) 109.1 (H) 130.0 (H)       I reviewed the outside records as well  Received from [facility], [place], [place] are 16 stained   slides labeled [id number], collected   03-28-18 now designated [facility]-.  Also received is a copy of the   referring pathologist's report with   patient identifying information.     FINAL DIAGNOSIS:   Bone marrow, posterior iliac crest, decalcified trephine biopsy, aspirate   clot, touch imprints, and aspirate   smears ([id number], 03/28/2018):        - Marrow cellularity of 5% (moderately hypocellular for age) in   trephine core sections provided for   review        - Good evidence of trilineage hematopoietic maturation in core and   clot sections provided for review        - Benign appearing lymphoid aggregates in core and clot sections   provided for review        - Negative immunohistochemical stain for Parvovirus in clot section        - Marrow aspirate smears and touch imprints showing trilineage   hematopoietic maturation with no increase   in blasts        - Increased marrow iron stores with 5% sideroblasts  "and no ringed   sideroblasts.        - Flow cytometric immunophenotyping performed on marrow aspirate   reported by [facility] as   showing, \"No immunophenotypic evidence of acute leukemia or a T-cell   B-cell or Plasma cell neoplasm.\"        - Cytogenetic analysis performed on marrow aspirate reported by   [facility] as showing,   \"46,XY,gonzales(13;14)(q10;q19)?c[20],\" \"most probably constitutional\"        - FISH study performed on marrow aspirate by [facility]   reported as showing, \" Negative FISH   result for cytogenetic abnormalities commonly observed in myeloid   disorders\"        - Blood smear and peripheral blood counts not provided for review        - See Comment     COMMENT:   By separate report ([id number]; 4/18/18), flow cytometric immunophenotyping    performed on blood for this patient   at [facility] laboratory shows a very small percentage of red cells and a   small percentage (less than 10%) of   neutrophils and monocytes with a PNH immunophenotype.       Peripheral blood flow cytometry  SPECIMEN(S):   Blood     INTERPRETATION:   Blood:        Cells with a paroxysmal nocturnal hemoglobinuria (PNH)-type   immunophenotype represent approximately 0.3%   of erythrocytes, 3.4% of monocytes, and 4.4% of neutrophils, see comment.     COMMENT:   Classic paroxysmal nocturnal hemoglobinuria (PNH) is associated with large    PNH-type clones. PNH in the setting   of another bone marrow failure syndrome usually has small PNH-type clones,    less than 10%. Subclinical PNH   usually has rare PNH-type clones, less than 1%. Detection of a small clone    is not equivalent to a diagnosis of   hemolytic PNH.  Final diagnosis requires correlation with clinical,   morphologic, and ancillary findings.   (Fletcher, et al. Blood, 106 (12): 2742-0473, 2005. Fletcher, Hematology,   21-29, 2011.)     RESULTS:   About 0.33% of erythrocytes have decreased expression of CD59 (0.32% type   II, 0.01% type III). " "  About 3.45% of monocytes have decreased expression of CD14 and binding of   fluorescent aerolysin (FLAER).About   4.44% of neutrophils have decreased expression of CD24 and binding of   FLAER.     ASSESSMENT/PLAN:      Pancytopenia. At this time the cause is not entirely known but it could be possibly toxic reaction to the drug glimepiride. Otherwise his bone marrow biopsy was nondiagnostic. Peripheral blood and bone marrow Flow cytometry were negative.  Cytogenetic analysis performed on marrow aspirate showed 46,XY,gonzales(13;14)(q10;q19)?c[20], \"most probably constitutional\"        - FISH study performed on marrow aspirate showed \" Negative FISH result for cytogenetic abnormalities commonly observed in myeloid disorders\"     With time his counts have improved with normalization of the WBC count and platelets have also improved. Hemoglobin is 9.3 without any transfusion for over three and a half weeks.  Reticulocyte is up.    I have also asked him to take folic acid to help build young blood cells    I told him not to shows glimepiride in the future as this is a potential culprit causing his hematological problems and I do not want to risk this again. He completely understands that.    Small PNH clone. Peripheral blood flow cytometry for PNH reveals a very small percentage of red cells and a small percentage (less than 10%) of neutrophils and monocytes with a PNH immunophenotype, the significance of which is not known. Will consider repeating it in the future.     Thrombocytopenia- improved. Denies any significant bleeding. Continue serial CBC checks    He has vitamin B12 deficiency but antiparietal cell antibody and anti-intrinsic factor antibody were negative. He was given vitamin B12 shots in the hospital. Repeat B12 is 443. 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 This is likely constitutional. " I will check peripheral blood chromosome analysis to confirm that. If he is found to have constitutional abnormality then I will refer him to genetics. At this time I do not know the significance of this.    For now I will check his labs every 2 weeks and see him back in a couple of months      All questions answered and he is agreeable and comfortable with the plan     Terra Salazar        Again, thank you for allowing me to participate in the care of your patient.        Sincerely,        Terra Salazar MD

## 2018-05-08 NOTE — MR AVS SNAPSHOT
After Visit Summary   5/8/2018    Prakash Cramer    MRN: [id number]           Patient Information     Date Of Birth          1940        Visit Information        Provider Department      5/8/2018 2:15 PM Terra Salazar MD [facility]        Today's Diagnoses     Pancytopenia (H)    -  1    Chromosomal abnormality           Care Instructions    Labs every 2 weeks for now    Cont B12    Start Folic acid 1mg daily    See me back in 2 months with labs prior              Follow-ups after your visit        Your next 10 appointments already scheduled     May 22, 2018 10:00 AM CDT   LAB with LAB ONC [facility] ([facility])    [address] [place] [id number]   [phone]           Please do not eat 10-12 hours before your appointment if you are coming in fasting for labs on lipids, cholesterol, or glucose (sugar). This does not apply to pregnant women. Water, hot tea and black coffee (with nothing added) are okay. Do not drink other fluids, diet soda or chew gum.            Jun 05, 2018 10:00 AM CDT   LAB with LAB ONC [facility] ([facility])    [address] [place] [id number]   [phone]           Please do not eat 10-12 hours before your appointment if you are coming in fasting for labs on lipids, cholesterol, or glucose (sugar). This does not apply to pregnant women. Water, hot tea and black coffee (with nothing added) are okay. Do not drink other fluids, diet soda or chew gum.            Jun 19, 2018  9:45 AM CDT   LAB with LAB ONC [facility] ([facility])    [address] [place] [id number]   [phone]           Please do not eat 10-12 hours before your appointment if you are coming in fasting for labs on lipids, cholesterol, or glucose (sugar). This does not apply to pregnant women. Water, hot tea and black  coffee (with nothing added) are okay. Do not drink other fluids, diet soda or chew gum.            Jul 03, 2018 10:00 AM CDT   LAB with LAB ONC [facility] ([facility])    [address] [place] [id number]   [phone]           Please do not eat 10-12 hours before your appointment if you are coming in fasting for labs on lipids, cholesterol, or glucose (sugar). This does not apply to pregnant women. Water, hot tea and black coffee (with nothing added) are okay. Do not drink other fluids, diet soda or chew gum.            Jul 17, 2018  2:30 PM CDT   LAB with LAB ONC [facility] ([facility])    [address] [place] [phone]   [phone]           Please do not eat 10-12 hours before your appointment if you are coming in fasting for labs on lipids, cholesterol, or glucose (sugar). This does not apply to pregnant women. Water, hot tea and black coffee (with nothing added) are okay. Do not drink other fluids, diet soda or chew gum.            Jul 17, 2018  3:15 PM CDT   Return Visit with Terra Salazar MD   [facility] ([facility])    [address] [place] [id number] [phone]              Future tests that were ordered for you today     Open Standing Orders        Priority Remaining Interval Expires Ordered    *CBC with platelets differential Routine 4/4 2 weeks 5/8/2019 5/8/2018    Reticulocyte count Routine 4/4 2 weeks 5/8/2019 5/8/2018    Comprehensive metabolic panel Routine 4/4 2 weeks 5/8/2019 5/8/2018    Lactate Dehydrogenase Routine 4/4 2 weeks 5/8/2019 5/8/2018            Who to contact     If you have questions or need follow up information about today's clinic visit or your schedule please contact [facility] directly at [phone].  Normal or non-critical lab and imaging results will be communicated to you by [facility]  "letter or phone within 4 business days after the clinic has received the results. If you do not hear from us within 7 days, please contact the clinic through [facility] or phone. If you have a critical or abnormal lab result, we will notify you by phone as soon as possible.  Submit refill requests through [facility] or call your pharmacy and they will forward the refill request to us. Please allow 3 business days for your refill to be completed.          Additional Information About Your Visit        [facility] Information     [facility] is an electronic gateway that provides easy, online access to your medical records. With [facility], you can request a clinic appointment, read your test results, renew a prescription or communicate with your care team.     To sign up for [facility] visit the website at [email or web address]   You will be asked to enter the access code listed below, as well as some personal information. Please follow the directions to create your username and password.     Your access code is: [email or web address]  Expires: 2018 12:07 PM     Your access code will  in 90 days. If you need help or a new code, please contact your [facility] or call [phone] for assistance.        Care EveryWhere ID     This is your Care EveryWhere ID. This could be used by other organizations to access your [facility] medical records  [id number]        Your Vitals Were     Pulse Temperature Height Pulse Oximetry BMI (Body Mass Index)       90 98.9  F (37.2  C) 1.702 m (5' 7\") 98% 25.84 kg/m2        Blood Pressure from Last 3 Encounters:   18 118/71   18 118/66   18 130/77    Weight from Last 3 Encounters:   18 74.8 kg (165 lb)   18 76.2 kg (168 lb)   18 75.3 kg (166 lb)              We Performed the Following     Limited G-band Chromosome Analysis        Primary Care Provider Office Phone # Fax #    Zhou Meraz [phone]       [address] " [facility] [address]        Equal Access to Services     ARASH GRACE : Hadii aad ku hadnemofabienne Sofara, watristanda luqadaha, qaybta kaalmaying saleem. So [facility] [phone].    ATENCIÓN: Si habla español, tiene a stroud disposición servicios gratuitos de asistencia lingüística. Sebastianame al [phone].    We comply with applicable federal civil rights laws and [place] laws. We do not discriminate on the basis of race, color, national origin, age, disability, sex, sexual orientation, or gender identity.            Thank you!     Thank you for choosing [facility]  for your care. Our goal is always to provide you with excellent care. Hearing back from our patients is one way we can continue to improve our services. Please take a few minutes to complete the written survey that you may receive in the mail after your visit with us. Thank you!             Your Updated Medication List - Protect others around you: Learn how to safely use, store and throw away your medicines at www.disposemymeds.org.          This list is accurate as of 5/8/18  2:54 PM.  Always use your most recent med list.                   Brand Name Dispense Instructions for use Diagnosis    amLODIPine 10 MG tablet    NORVASC    90 tablet    Take 1 tablet (10 mg) by mouth daily    Essential hypertension       aspirin 81 MG tablet     30 tablet    Take by mouth daily    Type 2 diabetes mellitus with diabetic nephropathy, without long-term current use of insulin (H)       B-12 1000 MCG Tbcr     100 tablet    Take 1,000 mcg by mouth daily    Vitamin B12 deficiency (non anemic), Pancytopenia (H)       * blood glucose monitoring lancets     1 Box    Use to test blood sugar 1 time daily or as directed.    Type 2 diabetes mellitus with diabetic nephropathy, without long-term current use of insulin (H)       * blood glucose monitoring lancets     102 each    Use to test blood sugar 1 times daily or as  directed.    Type 2 diabetes mellitus with diabetic nephropathy, without long-term current use of insulin (H)       Blood Glucose Monitoring Suppl Ami     1 each    1 Device daily Smartview Meter    Type 2 diabetes mellitus with diabetic nephropathy, without long-term current use of insulin (H)       blood glucose monitoring test strip    ACCU-CHEK SMARTVIEW    100 strip    Use to test blood sugar 1 times daily or as directed.    Type 2 diabetes mellitus with diabetic nephropathy, without long-term current use of insulin (H)       canagliflozin 100 MG tablet    INVOKANA    30 tablet    Take 1 tablet (100 mg) by mouth every morning (before breakfast)    Type 2 diabetes mellitus with diabetic nephropathy, without long-term current use of insulin (H)       levothyroxine 75 MCG tablet    SYNTHROID/LEVOTHROID    30 tablet    TAKE ONE TABLET BY MOUTH ONCE DAILY    Hypothyroidism, unspecified type       metFORMIN 1000 MG tablet    GLUCOPHAGE    180 tablet    1 tab twice a day    Type 2 diabetes mellitus with diabetic nephropathy, without long-term current use of insulin (H)       omeprazole 20 MG CR capsule    priLOSEC    90 capsule    Take 1 capsule (20 mg) by mouth every morning TAKE 30'-60' BEFORE 1ST MEAL DAILY    Gastroesophageal reflux disease without esophagitis       rosuvastatin 40 MG tablet    CRESTOR    45 tablet    Take 0.5 tablets (20 mg) by mouth At Bedtime    Hyperlipidemia LDL goal <100       sitagliptin 100 MG tablet    JANUVIA    90 tablet    Take 1 tablet (100 mg) by mouth daily    Type 2 diabetes mellitus with diabetic nephropathy, without long-term current use of insulin (H)       * Notice:  This list has 2 medication(s) that are the same as other medications prescribed for you. Read the directions carefully, and ask your doctor or other care provider to review them with you.

## 2018-05-08 NOTE — NURSING NOTE
"Oncology Rooming Note    May 8, 2018 2:07 PM   Prakash Cramer is a 78 year old male who presents for:    Chief Complaint   Patient presents with     Oncology Clinic Visit     follow up     Initial Vitals: /71  Pulse 90  Temp 98.9  F (37.2  C)  Ht 1.702 m (5' 7\")  Wt 74.8 kg (165 lb)  SpO2 98%  BMI 25.84 kg/m2 Estimated body mass index is 25.84 kg/(m^2) as calculated from the following:    Height as of this encounter: 1.702 m (5' 7\").    Weight as of this encounter: 74.8 kg (165 lb). Body surface area is 1.88 meters squared.  No Pain (0) Comment: Data Unavailable   No LMP for male patient.  Allergies reviewed: Yes  Medications reviewed: Yes    Medications: Medication refills not needed today.  Pharmacy name entered into [employer]:    [facility] - [place], [place] - [address].  [facility] - [place], [place] - [address]        5 minutes for nursing intake (face to face time)     Maisha Guadarrama LPN              "

## 2018-05-08 NOTE — PROGRESS NOTES
Hematology follow up visit:  Date on this visit: 5/8/2018     CC   Pancytopenia    Primary Physician: Zhou Meraz     History Of Present Illness:    Please see previous note for details. I have copied and updated from prior note.  Mr. Cramer is a 78 year old male who was found to have pancytopenia around the end of March 2018 when he presented with a white blood cell count of 1.5 and hemoglobin of 7.8 and platelets of 4. He was admitted to the hospital in [place]. There was no bruising or petechiae or evidence of bleeding. He was feeling fatigued and at that point he was also noticed to have C diff infection and was started on p.o. vancomycin. He was given Neupogen ×1 and packed red distant transfusion ×2 and platelet transfusion ×1. He was also given vitamin B12 injections and I believe it was 3 injections which she was given during the hospitalization. He had a bone marrow biopsy done as part of his workup which showed mildly hypocellular bone marrow with 10-40% cellularity with erythroid hyperplasia and mild dyserythropoiesis (5% cellularity, moderately decreased for age as per pathology review at the [facility]). Reactive plasmacytosis and maturing trilineage hematopoiesis. There was no significant dysplasia in the granulocytes or megakaryocytes. Parvovirus immunohistochemistry was negative. These findings were deemed consistent with either evolving aplastic anemia or drug/toxin effect or vitamin/nutritional deficiency or infectious or viral and hypocellular MDS.   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 This finding is most probably constitutional in origin and not acquired; however it it is medically warranted a peripheral blood chromosome analysis couldn't be performed to rule out an acquired  abnormality. If it is constitutional genetic counseling would be medically indicated    Comprehensive metabolic panel was unremarkable.   His vitamin B12 level was less than 159. Antiparietal cell antibody and anti-intrinsic factor antibody were negative. Iron studies were unremarkable. CMV IgM was negative. WENDY screen was negative. Rheumatoid factor and anti-CCP were negative. SPEP was negative.  After thorough investigation it was found that potentially glimepiride (sulfonylurea) which he was taking could be the culprit for pancytopenia so he was taken off it.  Upon discharge from the hospital on 3/31/2018 his white blood cell count was 2.4 and hemoglobin was 7.8 and platelet 12. On 4/5/2018 the WBC count was 2.9 with ANC of 1.2 and hemoglobin 7.9 and platelets 75.  On 4/13/2018 white blood cell count was 3.3, ANC 1.3, hemoglobin 7 and platelets 13. At that point he was given 2 units of packed red blood cells and 1 unit of platelets  Most recently on 4/16/2018 white blood cell count was 3.2, ANC 1.1, hemoglobin 10 and platelets 20.    When he saw me at it further testing and KIMBERLEE was negative.  Peripheral blood flow cytometry for PNH reveals a very small percentage of red cells and a small percentage (less than 10%) of neutrophils and monocytes with a PNH immunophenotype, the significance of which is not known.  A repeat vitamin B12 level was 443. I told him to start taking vitamin B12 1000  g daily.  We kept him on observation.    He is doing well. He feels more energetic. Denies any bleeding. No infections. He still has some looser stools on average twice a day. Occasionally they are watery. No abdominal pain. No fevers. Denies any difficulty breathing. No pain. No B symptoms. He is able to eat and drink well. He is taking vitamin B12 supplements without problems.      ROS:  Rest of the comprehensive review of the system was essentially unremarkable.    I reviewed other history in EPIC as below.    Past  Medical/Surgical History:  Past Medical History:   Diagnosis Date     Aplastic anemia (H) 03/26/2018    thought secondary to reaction to Septra     BPH (benign prostatic hypertrophy)     failed  Flomax     C. difficile diarrhea 03/26/2018    treated with Flagyl     Cellulitis and abscess of leg, except foot 2004     Contact dermatitis and other eczema, due to unspecified cause      Diaphragmatic hernia without mention of obstruction or gangrene     hiatal hernia     Esophageal reflux      Hyperlipidemia LDL goal <100 3/11/2005     Hypothyroidism      Impotence of organic origin      Meningococcal encephalitis      Proteinuria      RBBB      Type 2 diabetes mellitus with diabetic nephropathy  (goal A1C<7) 10/24/2015     Unspecified essential hypertension      Past Surgical History:   Procedure Laterality Date     ARTHROPLASTY KNEE Left 3/18/2015    Procedure: ARTHROPLASTY KNEE;  Surgeon: Abebe Schroeder MD;  Location: [facility] OR     ARTHROPLASTY KNEE Right 3/14/2016    Procedure: ARTHROPLASTY KNEE;  Surgeon: Abebe Schroeder MD;  Location: [facility] OR     C NONSPECIFIC PROCEDURE  10/02    left knee meniscus tear repair     HC LAPAROSCOPY, SURGICAL; CHOLECYSTECTOMY  1998    Cholecystectomy, Laparoscopic     HC REMOVE TONSILS/ADENOIDS,13+ Y/O  1963    T & A 13+y.o.     Allergies:  Allergies as of 05/08/2018 - Review Complete 05/08/2018   Allergen Reaction Noted     Atorvastatin calcium  01/30/2008     Diovan [valsartan]  01/24/2011     Mold Other (See Comments) 07/14/2011     Penicillins  09/07/2011     Seasonal allergies  06/25/2007    glimepiride possibly causing pancytopenia  Current Medications:  Current Outpatient Prescriptions   Medication Sig Dispense Refill     amLODIPine (NORVASC) 10 MG tablet Take 1 tablet (10 mg) by mouth daily 90 tablet 3     aspirin 81 MG tablet Take by mouth daily 30 tablet      blood glucose monitoring (ACCU-CHEK FASTCLIX) lancets Use to test blood sugar 1 times daily or as directed. 102 each 11  "    blood glucose monitoring (ACCU-CHEK MULTICLIX) lancets Use to test blood sugar 1 time daily or as directed. 1 Box 11     blood glucose monitoring (ACCU-CHEK SMARTVIEW) test strip Use to test blood sugar 1 times daily or as directed. 100 strip 11     Blood Glucose Monitoring Suppl MAGDALENO 1 Device daily Smartview Meter 1 each 0     canagliflozin (INVOKANA) 100 MG tablet Take 1 tablet (100 mg) by mouth every morning (before breakfast) 30 tablet 11     Cyanocobalamin (B-12) 1000 MCG TBCR Take 1,000 mcg by mouth daily 100 tablet 11     levothyroxine (SYNTHROID/LEVOTHROID) 75 MCG tablet TAKE ONE TABLET BY MOUTH ONCE DAILY 30 tablet 0     metFORMIN (GLUCOPHAGE) 1000 MG tablet 1 tab twice a day 180 tablet 3     omeprazole (PRILOSEC) 20 MG CR capsule Take 1 capsule (20 mg) by mouth every morning TAKE 30'-60' BEFORE 1ST MEAL DAILY 90 capsule 3     rosuvastatin (CRESTOR) 40 MG tablet Take 0.5 tablets (20 mg) by mouth At Bedtime 45 tablet 3     sitagliptin (JANUVIA) 100 MG tablet Take 1 tablet (100 mg) by mouth daily 90 tablet 3      Family History:  Family History   Problem Relation Age of Onset     Family History Negative Mother      d:age 89 of \"old age\"     GASTROINTESTINAL DISEASE Father      d:age 79 liver failure after taking excessive amounts of Tylenol over 5-6 yrs     Neurologic Disorder Brother      b:1932  hx cerebral aneurysm age 59     No family history of bleeding or clotting disorder. Maternal uncle had throat cancer.  Social History:  Social History     Social History     Marital status:      Spouse name: N/A     Number of children: 2     Years of education: N/A     Occupational History     teacher [employer]     Social History Main Topics     Smoking status: Never Smoker     Smokeless tobacco: Never Used     Alcohol use Yes      Comment: beer 4-5 a month     Drug use: No     Sexual activity: Yes     Partners: Female     Other Topics Concern     Special Diet Yes     diabetic diet      " "Exercise No     nothing regular since bilateral knee surgeries      Social History Narrative    denies any smoking. No alcohol. He usually lives in [place] and spends winter in [place]. He lives alone.  Physical Exam:  /71  Pulse 90  Temp 98.9  F (37.2  C)  Ht 1.702 m (5' 7\")  Wt 74.8 kg (165 lb)  SpO2 98%  BMI 25.84 kg/m2  CONSTITUTIONAL: No apparent distress  EYES: PERRLA, without pallor or jaundice  ENT/MOUTH: Ears unremarkable. No oral lesions  CVS: s1s2 normal  RESPIRATORY: Chest is clear  GI: Abdomen is benign  NEURO: He is alert and oriented ×3  INTEGUMENT: no concerning his skin rashes   LYMPHATIC: no palpable lymphadenopathy  MUSCULOSKELETAL: Unremarkable. No bony tenderness.   EXTREMITIES: no pedal edema  PSYCH: Mentation, mood and affect are appropriate        Laboratory/Imaging Studies    Results for NEISHA FRANCOIS (MRN [id number]) as of 5/8/2018 14:02   Ref. Range 4/26/2018 09:53 4/30/2018 10:23 5/3/2018 09:45 5/8/2018 13:26   WBC Latest Ref Range: 4.0 - 11.0 10e9/L 3.8 (L) 3.8 (L) 4.8 5.1   Hemoglobin Latest Ref Range: 13.3 - 17.7 g/dL 9.2 (L) 9.1 (L) 9.2 (L) 9.3 (L)   Hematocrit Latest Ref Range: 40.0 - 53.0 % 27.4 (L) 27.0 (L) 27.2 (L) 27.8 (L)   Platelet Count Latest Ref Range: 150 - 450 10e9/L 27 (LL) 33 (LL) 39 (LL) 49 (LL)   RBC Count Latest Ref Range: 4.4 - 5.9 10e12/L 2.76 (L) 2.69 (L) 2.66 (L) 2.67 (L)   MCV Latest Ref Range: 78 - 100 fl 99 100 102 (H) 104 (H)   MCH Latest Ref Range: 26.5 - 33.0 pg 33.3 (H) 33.8 (H) 34.6 (H) 34.8 (H)   MCHC Latest Ref Range: 31.5 - 36.5 g/dL 33.6 33.7 33.8 33.5   RDW Latest Ref Range: 10.0 - 15.0 % 20.4 (H) 21.5 (H) 22.9 (H) 23.3 (H)   Diff Method Unknown Automated Method Automated Method Automated Method Automated Method   % Neutrophils Latest Units: % 51.5 53.6 55.4 53.8   % Lymphocytes Latest Units: % 40.3 37.2 37.1 39.1   % Monocytes Latest Units: % 6.8 7.3 6.3 6.1   % Eosinophils Latest Units: % 0.8 1.3 0.6 0.6   % Basophils Latest Units: " "% 0.3 0.3 0.4 0.2   % Immature Granulocytes Latest Units: % 0.3 0.3 0.2 0.2   Absolute Neutrophil Latest Ref Range: 1.6 - 8.3 10e9/L 2.0 2.1 2.7 2.8   Absolute Lymphocytes Latest Ref Range: 0.8 - 5.3 10e9/L 1.5 1.4 1.8 2.0   Absolute Monocytes Latest Ref Range: 0.0 - 1.3 10e9/L 0.3 0.3 0.3 0.3   Absolute Eosinophils Latest Ref Range: 0.0 - 0.7 10e9/L 0.0 0.1 0.0 0.0   Absolute Basophils Latest Ref Range: 0.0 - 0.2 10e9/L 0.0 0.0 0.0 0.0   Abs Immature Granulocytes Latest Ref Range: 0 - 0.4 10e9/L 0.0 0.0 0.0 0.0   % Retic Latest Ref Range: 0.5 - 2.0 % 3.0 (H) 3.7 (H) 4.1 (H) 4.9 (H)   Absolute Retic Latest Ref Range: 25 - 95 10e9/L 82.8 98.5 (H) 109.1 (H) 130.0 (H)       I reviewed the outside records as well  Received from [facility], [place], [place] are 16 stained   slides labeled [id number], collected   03-28-18 now designated [facility]-.  Also received is a copy of the   referring pathologist's report with   patient identifying information.     FINAL DIAGNOSIS:   Bone marrow, posterior iliac crest, decalcified trephine biopsy, aspirate   clot, touch imprints, and aspirate   smears ([id number], 03/28/2018):        - Marrow cellularity of 5% (moderately hypocellular for age) in   trephine core sections provided for   review        - Good evidence of trilineage hematopoietic maturation in core and   clot sections provided for review        - Benign appearing lymphoid aggregates in core and clot sections   provided for review        - Negative immunohistochemical stain for Parvovirus in clot section        - Marrow aspirate smears and touch imprints showing trilineage   hematopoietic maturation with no increase   in blasts        - Increased marrow iron stores with 5% sideroblasts and no ringed   sideroblasts.        - Flow cytometric immunophenotyping performed on marrow aspirate   reported by [facility] as   showing, \"No immunophenotypic evidence of acute leukemia or a T-cell   B-cell or " "Plasma cell neoplasm.\"        - Cytogenetic analysis performed on marrow aspirate reported by   [facility] as showing,   \"46,XY,gonzales(13;14)(q10;q19)?c[20],\" \"most probably constitutional\"        - FISH study performed on marrow aspirate by [facility]   reported as showing, \" Negative FISH   result for cytogenetic abnormalities commonly observed in myeloid   disorders\"        - Blood smear and peripheral blood counts not provided for review        - See Comment     COMMENT:   By separate report ([id number]; 4/18/18), flow cytometric immunophenotyping    performed on blood for this patient   at [facility] laboratory shows a very small percentage of red cells and a   small percentage (less than 10%) of   neutrophils and monocytes with a PNH immunophenotype.       Peripheral blood flow cytometry  SPECIMEN(S):   Blood     INTERPRETATION:   Blood:        Cells with a paroxysmal nocturnal hemoglobinuria (PNH)-type   immunophenotype represent approximately 0.3%   of erythrocytes, 3.4% of monocytes, and 4.4% of neutrophils, see comment.     COMMENT:   Classic paroxysmal nocturnal hemoglobinuria (PNH) is associated with large    PNH-type clones. PNH in the setting   of another bone marrow failure syndrome usually has small PNH-type clones,    less than 10%. Subclinical PNH   usually has rare PNH-type clones, less than 1%. Detection of a small clone    is not equivalent to a diagnosis of   hemolytic PNH.  Final diagnosis requires correlation with clinical,   morphologic, and ancillary findings.   (Fletcher, et al. Blood, 106 (12): 4705-4036, 2005. Fletcher, Hematology,   21-29, 2011.)     RESULTS:   About 0.33% of erythrocytes have decreased expression of CD59 (0.32% type   II, 0.01% type III).   About 3.45% of monocytes have decreased expression of CD14 and binding of   fluorescent aerolysin (FLAER).About   4.44% of neutrophils have decreased expression of CD24 and binding of   FLAER. " "    ASSESSMENT/PLAN:      Pancytopenia. At this time the cause is not entirely known but it could be possibly toxic reaction to the drug glimepiride. Otherwise his bone marrow biopsy was nondiagnostic. Peripheral blood and bone marrow Flow cytometry were negative.  Cytogenetic analysis performed on marrow aspirate showed 46,XY,gonzales(13;14)(q10;q19)?c[20], \"most probably constitutional\"        - FISH study performed on marrow aspirate showed \" Negative FISH result for cytogenetic abnormalities commonly observed in myeloid disorders\"     With time his counts have improved with normalization of the WBC count and platelets have also improved. Hemoglobin is 9.3 without any transfusion for over three and a half weeks.  Reticulocyte is up.    I have also asked him to take folic acid to help build young blood cells    I told him not to shows glimepiride in the future as this is a potential culprit causing his hematological problems and I do not want to risk this again. He completely understands that.    Small PNH clone. Peripheral blood flow cytometry for PNH reveals a very small percentage of red cells and a small percentage (less than 10%) of neutrophils and monocytes with a PNH immunophenotype, the significance of which is not known. Will consider repeating it in the future.     Thrombocytopenia- improved. Denies any significant bleeding. Continue serial CBC checks    He has vitamin B12 deficiency but antiparietal cell antibody and anti-intrinsic factor antibody were negative. He was given vitamin B12 shots in the hospital. Repeat B12 is 443. 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 This is likely constitutional. I will check peripheral blood chromosome analysis to confirm that. If he is found to have constitutional abnormality then I will refer him to genetics. At this time I do not know the " significance of this.    For now I will check his labs every 2 weeks and see him back in a couple of months      All questions answered and he is agreeable and comfortable with the plan     Terra Salazar

## 2018-05-09 DIAGNOSIS — K21.9 GASTROESOPHAGEAL REFLUX DISEASE WITHOUT ESOPHAGITIS: ICD-10-CM

## 2018-05-09 NOTE — TELEPHONE ENCOUNTER
"Requested Prescriptions   Pending Prescriptions Disp Refills     omeprazole (PRILOSEC) 20 MG CR capsule [Pharmacy Med Name: OMEPRAZOLE 20MG CAP] 90 capsule 3     Sig: TAKE ONE CAPSULE BY MOUTH ONCE DAILY IN THE MORNING 30-60  MINUTES  BEFORE  FIRST  DAILY  MEAL    PPI Protocol Passed    5/9/2018 10:47 AM       Passed - Not on Clopidogrel (unless Pantoprazole ordered)       Passed - No diagnosis of osteoporosis on record       Passed - Recent (12 mo) or future (30 days) visit within the authorizing provider's specialty    Patient had office visit in the last 12 months or has a visit in the next 30 days with authorizing provider or within the authorizing provider's specialty.  See \"Patient Info\" tab in inbasket, or \"Choose Columns\" in Meds & Orders section of the refill encounter.           Passed - Patient is age 18 or older        Prescription approved per [facility] [place] Protocol.    "

## 2018-05-18 ENCOUNTER — TELEPHONE (OUTPATIENT)
Dept: NURSING | Facility: CLINIC | Age: 78
End: 2018-05-18

## 2018-05-18 DIAGNOSIS — M79.10 MYALGIA: ICD-10-CM

## 2018-05-18 DIAGNOSIS — M25.50 ARTHRALGIA, UNSPECIFIED JOINT: ICD-10-CM

## 2018-05-18 DIAGNOSIS — R19.7 DIARRHEA, UNSPECIFIED TYPE: Primary | ICD-10-CM

## 2018-05-18 DIAGNOSIS — E11.21 TYPE 2 DIABETES MELLITUS WITH DIABETIC NEPHROPATHY, WITHOUT LONG-TERM CURRENT USE OF INSULIN (H): ICD-10-CM

## 2018-05-18 NOTE — TELEPHONE ENCOUNTER
"Patient said he was told to call if continues to have diarrhea. Having diarrhea again on and off for \"quite awhile\"- having issues since last appt 4/30. Tried yogurt and OTC immodium. Not helping. Having diarrhea today. Has about 2x/day. Some formed stools at times. No blood in stool that he has seen. Seeing oncology for a blood disorder. Diarrhea gold in color. Sometimes gets cramps. no dizziness. No abdominal swelling or fever. Feels he is able to keep hydrated. Last FIT test positive-unsure if advised on? Declined appt at this time.    Also notes he is urinating more frequently on Invonka - especially at night. Unsure if he will be able to tolerate it. Plans on doing labs end of month and then seeing Dr. Meraz. Please place orders as needed-current future orders show expected date end of July.  "

## 2018-05-18 NOTE — TELEPHONE ENCOUNTER
Pt to have fasting labs drawn in the next few days (tomorrow if possible) and also PU stool containers to get stool sample that can be brought back to the lab ASAP to assess if recurrent c diff or what is causing diarrhea issues. Labs ordered. Assist pt in getting lab appt ASAP

## 2018-05-21 NOTE — TELEPHONE ENCOUNTER
"Pt advised, stated understanding, and agreed to plan of care.  Fasting lab appt scheduled for wed at 9:30am at [facility]. Pt will plan on picking up stool sample kits then.     Pt also wants to inform PCP of new medication side effects--aching joints from new med Invokana, which was started on 4/30.  Since starting new RX Invokana 100mg tab, 1 tab daily  In AM for diabetes (this RX replaced glimepiride (AMARYL) 2 MG tablet, which was causing  Pancytopenia)   And pt says that his \"Joints are sore and aching all over. His Neck, ankles, feet, shoulders, elbows are all affected. Says that his Feet are aching, hurting, and bothering him too.\"  He is also having more frequent urination.   He says that the Invokana is causing this, and is wondering what your recommendations are?  Please advise.  "

## 2018-05-21 NOTE — TELEPHONE ENCOUNTER
Tried to call ptMarjorie MANN LM to stop Invokana and call update in 1 week re: urine frequency and muscle/joint achiness off of the medication. Will also await lab results from tomorrow re: sx.  Please call pt Tuesday 5/22/18 to be sure that pt got message to stop Invokana and call update 1 week. Once confirmed pt received tele message, then may close encounter

## 2018-05-22 DIAGNOSIS — M25.50 ARTHRALGIA, UNSPECIFIED JOINT: ICD-10-CM

## 2018-05-22 DIAGNOSIS — E11.21 TYPE 2 DIABETES MELLITUS WITH DIABETIC NEPHROPATHY, WITHOUT LONG-TERM CURRENT USE OF INSULIN (H): ICD-10-CM

## 2018-05-22 DIAGNOSIS — Q99.9 CHROMOSOMAL ABNORMALITY: ICD-10-CM

## 2018-05-22 DIAGNOSIS — R19.7 DIARRHEA, UNSPECIFIED TYPE: ICD-10-CM

## 2018-05-22 DIAGNOSIS — M79.10 MYALGIA: ICD-10-CM

## 2018-05-22 DIAGNOSIS — D61.818 PANCYTOPENIA (H): ICD-10-CM

## 2018-05-22 LAB
ALBUMIN SERPL-MCNC: 3.9 G/DL (ref 3.4–5)
ALP SERPL-CCNC: 88 U/L (ref 40–150)
ALT SERPL W P-5'-P-CCNC: 44 U/L (ref 0–70)
ANION GAP SERPL CALCULATED.3IONS-SCNC: 9 MMOL/L (ref 3–14)
AST SERPL W P-5'-P-CCNC: 28 U/L (ref 0–45)
BASOPHILS # BLD AUTO: 0 10E9/L (ref 0–0.2)
BASOPHILS NFR BLD AUTO: 0.2 %
BILIRUB SERPL-MCNC: 1.3 MG/DL (ref 0.2–1.3)
BUN SERPL-MCNC: 16 MG/DL (ref 7–30)
C COLI+JEJUNI+LARI FUSA STL QL NAA+PROBE: NOT DETECTED
C DIFF TOX B STL QL: NEGATIVE
CALCIUM SERPL-MCNC: 9.4 MG/DL (ref 8.5–10.1)
CHLORIDE SERPL-SCNC: 103 MMOL/L (ref 94–109)
CHOLEST SERPL-MCNC: 131 MG/DL
CK SERPL-CCNC: 33 U/L (ref 30–300)
CO2 SERPL-SCNC: 26 MMOL/L (ref 20–32)
CREAT SERPL-MCNC: 1.24 MG/DL (ref 0.66–1.25)
CREAT UR-MCNC: 115 MG/DL
CRP SERPL-MCNC: 3.2 MG/L (ref 0–8)
DIFFERENTIAL METHOD BLD: ABNORMAL
EC STX1 GENE STL QL NAA+PROBE: NOT DETECTED
EC STX2 GENE STL QL NAA+PROBE: NOT DETECTED
ENTERIC PATHOGEN COMMENT: NORMAL
EOSINOPHIL # BLD AUTO: 0 10E9/L (ref 0–0.7)
EOSINOPHIL NFR BLD AUTO: 0.8 %
ERYTHROCYTE [DISTWIDTH] IN BLOOD BY AUTOMATED COUNT: ABNORMAL % (ref 10–15)
ERYTHROCYTE [SEDIMENTATION RATE] IN BLOOD BY WESTERGREN METHOD: 57 MM/H (ref 0–20)
GFR SERPL CREATININE-BSD FRML MDRD: 56 ML/MIN/1.7M2
GLUCOSE SERPL-MCNC: 204 MG/DL (ref 70–99)
HBA1C MFR BLD: 6.4 % (ref 0–5.6)
HCT VFR BLD AUTO: 29 % (ref 40–53)
HDLC SERPL-MCNC: 59 MG/DL
HGB BLD-MCNC: 9.8 G/DL (ref 13.3–17.7)
IMM GRANULOCYTES # BLD: 0 10E9/L (ref 0–0.4)
IMM GRANULOCYTES NFR BLD: 0.2 %
LDH SERPL L TO P-CCNC: 192 U/L (ref 85–227)
LDLC SERPL CALC-MCNC: 35 MG/DL
LYMPHOCYTES # BLD AUTO: 1.4 10E9/L (ref 0.8–5.3)
LYMPHOCYTES NFR BLD AUTO: 29.1 %
MCH RBC QN AUTO: 37.4 PG (ref 26.5–33)
MCHC RBC AUTO-ENTMCNC: 33.8 G/DL (ref 31.5–36.5)
MCV RBC AUTO: 111 FL (ref 78–100)
MICROALBUMIN UR-MCNC: 30 MG/L
MICROALBUMIN/CREAT UR: 25.83 MG/G CR (ref 0–17)
MONOCYTES # BLD AUTO: 0.2 10E9/L (ref 0–1.3)
MONOCYTES NFR BLD AUTO: 4.8 %
NEUTROPHILS # BLD AUTO: 3.1 10E9/L (ref 1.6–8.3)
NEUTROPHILS NFR BLD AUTO: 64.9 %
NONHDLC SERPL-MCNC: 72 MG/DL
NOROV GI+II ORF1-ORF2 JNC STL QL NAA+PR: NOT DETECTED
PLATELET # BLD AUTO: 57 10E9/L (ref 150–450)
POTASSIUM SERPL-SCNC: 4.5 MMOL/L (ref 3.4–5.3)
PROT SERPL-MCNC: 7.5 G/DL (ref 6.8–8.8)
RBC # BLD AUTO: 2.62 10E12/L (ref 4.4–5.9)
RETICS # AUTO: 141.5 10E9/L (ref 25–95)
RETICS/RBC NFR AUTO: 5.4 % (ref 0.5–2)
RVA NSP5 STL QL NAA+PROBE: NOT DETECTED
SALMONELLA SP RPOD STL QL NAA+PROBE: NOT DETECTED
SHIGELLA SP+EIEC IPAH STL QL NAA+PROBE: NOT DETECTED
SODIUM SERPL-SCNC: 138 MMOL/L (ref 133–144)
SPECIMEN SOURCE: NORMAL
TRIGL SERPL-MCNC: 183 MG/DL
V CHOL+PARA RFBL+TRKH+TNAA STL QL NAA+PR: NOT DETECTED
WBC # BLD AUTO: 4.8 10E9/L (ref 4–11)
Y ENTERO RECN STL QL NAA+PROBE: NOT DETECTED

## 2018-05-22 PROCEDURE — 83615 LACTATE (LD) (LDH) ENZYME: CPT | Performed by: INTERNAL MEDICINE

## 2018-05-22 PROCEDURE — 82043 UR ALBUMIN QUANTITATIVE: CPT | Performed by: INTERNAL MEDICINE

## 2018-05-22 PROCEDURE — 85652 RBC SED RATE AUTOMATED: CPT | Performed by: INTERNAL MEDICINE

## 2018-05-22 PROCEDURE — 85045 AUTOMATED RETICULOCYTE COUNT: CPT | Performed by: INTERNAL MEDICINE

## 2018-05-22 PROCEDURE — 83036 HEMOGLOBIN GLYCOSYLATED A1C: CPT | Performed by: INTERNAL MEDICINE

## 2018-05-22 PROCEDURE — 82550 ASSAY OF CK (CPK): CPT | Performed by: INTERNAL MEDICINE

## 2018-05-22 PROCEDURE — 85025 COMPLETE CBC W/AUTO DIFF WBC: CPT | Performed by: INTERNAL MEDICINE

## 2018-05-22 PROCEDURE — 88261 CHROMOSOME ANALYSIS 5: CPT | Performed by: INTERNAL MEDICINE

## 2018-05-22 PROCEDURE — 88264 CHROMOSOME ANALYSIS 20-25: CPT | Performed by: INTERNAL MEDICINE

## 2018-05-22 PROCEDURE — 88289 CHROMOSOME STUDY ADDITIONAL: CPT | Performed by: INTERNAL MEDICINE

## 2018-05-22 PROCEDURE — 86255 FLUORESCENT ANTIBODY SCREEN: CPT | Mod: 90 | Performed by: INTERNAL MEDICINE

## 2018-05-22 PROCEDURE — 86431 RHEUMATOID FACTOR QUANT: CPT | Performed by: INTERNAL MEDICINE

## 2018-05-22 PROCEDURE — 80061 LIPID PANEL: CPT | Performed by: INTERNAL MEDICINE

## 2018-05-22 PROCEDURE — 36415 COLL VENOUS BLD VENIPUNCTURE: CPT | Performed by: INTERNAL MEDICINE

## 2018-05-22 PROCEDURE — 99000 SPECIMEN HANDLING OFFICE-LAB: CPT | Performed by: INTERNAL MEDICINE

## 2018-05-22 PROCEDURE — 80053 COMPREHEN METABOLIC PANEL: CPT | Performed by: INTERNAL MEDICINE

## 2018-05-22 PROCEDURE — 86038 ANTINUCLEAR ANTIBODIES: CPT | Performed by: INTERNAL MEDICINE

## 2018-05-22 PROCEDURE — 87506 IADNA-DNA/RNA PROBE TQ 6-11: CPT | Performed by: INTERNAL MEDICINE

## 2018-05-22 PROCEDURE — 88230 TISSUE CULTURE LYMPHOCYTE: CPT | Performed by: INTERNAL MEDICINE

## 2018-05-22 PROCEDURE — 87493 C DIFF AMPLIFIED PROBE: CPT | Performed by: INTERNAL MEDICINE

## 2018-05-22 PROCEDURE — 86140 C-REACTIVE PROTEIN: CPT | Performed by: INTERNAL MEDICINE

## 2018-05-23 LAB
ANA SER QL IF: NEGATIVE
ANCA IGG TITR SER IF: NORMAL {TITER}
RHEUMATOID FACT SER NEPH-ACNC: <20 IU/ML (ref 0–20)

## 2018-05-23 NOTE — TELEPHONE ENCOUNTER
"Called patient as does not look like this was followed up on. He did receive Dr. Meraz's message and has stopped the Invokana and will call with update. Says he is also seeing an oncologist. Diarrhea comes and goes but has it \"more than he should\". Had c diff in [place] in past. Using Imodium ID and it is not stopping the diarrhea, but is slowing it down. Having diarrhea about every other day. Please advise on labs when able.  "

## 2018-05-24 NOTE — TELEPHONE ENCOUNTER
Stool studies neg for infectious cause of diarrhea. C diff negative. No recurrence.    Bone marrow Hgb and platelet labs a little better  A1C good but refects pt having been on Invokana that just recently stopped so sugars expected to rise some now off Invokana.  Sed rate (marker of inflammation) mildly elevated. Not clear if  Related to recent sx or not. Kidney, liver OK.  Other labs assessing for autoimmune cause of joint pains negative.  Continue Immodium prn for now and see me nextr week in one of the same day appt slots. Based on sx then, will consider possible referral for colonoscopy and biopsy vs modification of diet vs other for loose stool hx. Pt to avoid caffeine and diary products benweet now and appt with me. Assist pt in scheduling.  Will also discuss alternative diabetes mellitus treatment options at [facility] appt with me since can't use Invokana and can't use Glimepiride. Check blood sugars twice a day (before breakfast and bedtime) for 4 days prior to the appointment with me and bring the results to the appointment.

## 2018-05-28 LAB — COPATH REPORT: NORMAL

## 2018-06-01 ENCOUNTER — OFFICE VISIT (OUTPATIENT)
Dept: INTERNAL MEDICINE | Facility: CLINIC | Age: 78
End: 2018-06-01
Payer: MEDICARE

## 2018-06-01 VITALS
TEMPERATURE: 98.3 F | HEART RATE: 86 BPM | SYSTOLIC BLOOD PRESSURE: 116 MMHG | WEIGHT: 164 LBS | RESPIRATION RATE: 16 BRPM | DIASTOLIC BLOOD PRESSURE: 70 MMHG | BODY MASS INDEX: 25.69 KG/M2

## 2018-06-01 DIAGNOSIS — D61.818 PANCYTOPENIA (H): ICD-10-CM

## 2018-06-01 DIAGNOSIS — E03.9 HYPOTHYROIDISM, UNSPECIFIED TYPE: ICD-10-CM

## 2018-06-01 DIAGNOSIS — E11.21 TYPE 2 DIABETES MELLITUS WITH DIABETIC NEPHROPATHY, WITHOUT LONG-TERM CURRENT USE OF INSULIN (H): ICD-10-CM

## 2018-06-01 PROCEDURE — 99214 OFFICE O/P EST MOD 30 MIN: CPT | Performed by: INTERNAL MEDICINE

## 2018-06-01 RX ORDER — LEVOTHYROXINE SODIUM 75 UG/1
75 TABLET ORAL DAILY
Qty: 90 TABLET | Refills: 3 | Status: SHIPPED | OUTPATIENT
Start: 2018-06-01 | End: 2018-07-30

## 2018-06-01 NOTE — PROGRESS NOTES
SUBJECTIVE:   Prakash Cramer is a 78 year old male who presents to clinic today for the following health issues:    Chief Complaint   Patient presents with     Diabetes     Diarrhea       Diabetes Follow-up    Patient is checking blood sugars: twice daily.    Blood sugar testing frequency justification: Uncontrolled diabetes  Results are as follows:         am - 130's to 150's fasting         Before  supper - 150's to 170's    Diabetic concerns: other - none     Symptoms of hypoglycemia (low blood sugar): none     Paresthesias (numbness or burning in feet) or sores: No     Date of last diabetic eye exam:  Sept 2017    BP Readings from Last 2 Encounters:   05/08/18 118/71   04/30/18 118/66     Hemoglobin A1C (%)   Date Value   05/22/2018 6.4 (H)   04/23/2018 6.9 (H)     LDL Cholesterol Calculated (mg/dL)   Date Value   05/22/2018 35   04/27/2017 43       Amount of exercise or physical activity: 6-7 days/week for an average of 30-45 minutes    Problems taking medications regularly: No    Medication side effects: none    Diet: no caffeine and no dairy   Pt's past medical history, family history, habits, medications and allergies were reviewed with the patient today.  See snap shot for  HCM status. Most recent lab results reviewed with pt. Problem list and histories reviewed & adjusted, as indicated.  Additional history as below:      Lab Results   Component Value Date     05/22/2018     Lab Results   Component Value Date    A1C 6.4 05/22/2018     Lab Results   Component Value Date    CHOL 131 05/22/2018     Lab Results   Component Value Date    LDL 35 05/22/2018     Lab Results   Component Value Date    HDL 59 05/22/2018     Lab Results   Component Value Date    TRIG 183 05/22/2018     Lab Results   Component Value Date    CR 1.24 05/22/2018     Lab Results   Component Value Date    ALT 44 05/22/2018     Lab Results   Component Value Date    AST 28 05/22/2018     Lab Results   Component Value Date    MICROL  "30 05/22/2018     Lab Results   Component Value Date    TSH 1.82 04/26/2018     Above labs reflect sugars mostly when also on Glimepiride that has now been stopped due to pancytopenia hx  Diarrhea improved. Stools more normalized now. C diff and other bacterial stool studies were normal   Had side effects with Invokana. Muscle achiness resolved off of med  On Metformin and  Januvia now. Blood sugars mildly elevated with this combo. Unable to use sulfonylurea meds.  Pancytopenia continues to slowly improve. Hematology notes reviewed.  Denies CP, SOB, abdominal pain, polyuria, polydipsia, vision changes, extremity numbness/parasthesias or skin problems.       Additional ROS:   Constitutional, HEENT, Cardiovascular, Pulmonary, GI and , Neuro, MSK and Psych review of systems/symptoms are otherwise negative or unchanged from previous, except as noted above.      OBJECTIVE:  /70  Pulse 86  Temp 98.3  F (36.8  C) (Oral)  Resp 16  Wt 164 lb (74.4 kg)  BMI 25.69 kg/m2   Estimated body mass index is 25.69 kg/(m^2) as calculated from the following:    Height as of 5/8/18: 5' 7\" (1.702 m).    Weight as of this encounter: 164 lb (74.4 kg).    Neck: no adenopathy. Thyroid normal to palpation. No bruits  Pulm: Lungs clear to auscultation   CV: Regular rates and rhythm  GI: Soft, nontender, Normal active bowel sounds, No hepatosplenomegaly or masses palpable  Ext: Peripheral pulses intact. Trace BLE edema.  Neuro: Normal strength and tone, sensory exam grossly normal    Assessment/Plan: (See plan discussion below for further details)  1. Type 2 diabetes mellitus with diabetic nephropathy, without long-term current use of insulin (H)  Blood sugars mildly above goal. Continue current me ds for now and repeat A1C in 3 mos. If >7, will consider low dose daily Levemir. Other labs in 1 year fasting  - sitagliptin (JANUVIA) 100 MG tablet; Take 1 tablet (100 mg) by mouth daily  Dispense: 90 tablet; Refill: 3  - Comprehensive " metabolic panel; Future  - Lipid panel reflex to direct LDL Fasting; Future  - Albumin Random Urine Quantitative with Creat Ratio; Future  - Hemoglobin A1c; Future    2. Hypothyroidism, unspecified type  Controlled. Continue current med. Repeat thyroid lab 1 year  - levothyroxine (SYNTHROID/LEVOTHROID) 75 MCG tablet; Take 1 tablet (75 mcg) by mouth daily  Dispense: 90 tablet; Refill: 3  - TSH with free T4 reflex; Future    3. Pancytopenia (H)  Improving. Continue management per Hematology    Plan discussion:  Continue current meds, diet  Call  [phone] or use [employer] to schedule a future lab appointment  non-fasting in early September for diabetes  Continue other management per Hematology       Zhou Meraz MD  Internal Medicine Department  [facility]

## 2018-06-01 NOTE — MR AVS SNAPSHOT
After Visit Summary   6/1/2018    Prakash Cramer    MRN: [id number]           Patient Information     Date Of Birth          1940        Visit Information        Provider Department      6/1/2018 11:00 AM Zhou Meraz MD [facility]        Today's Diagnoses     Hypothyroidism, unspecified type        Type 2 diabetes mellitus with diabetic nephropathy, without long-term current use of insulin (H)          Care Instructions    Continue current meds, diet  Call  [phone] or use [employer] to schedule a future lab appointment  non-fasting in early September for diabetes          Follow-ups after your visit        Your next 10 appointments already scheduled     Jun 05, 2018 10:00 AM CDT   LAB with LAB ONC [facility])    [address] [place] [address] [phone]           Please do not eat 10-12 hours before your appointment if you are coming in fasting for labs on lipids, cholesterol, or glucose (sugar). This does not apply to pregnant women. Water, hot tea and black coffee (with nothing added) are okay. Do not drink other fluids, diet soda or chew gum.            Jun 19, 2018  9:45 AM CDT   LAB with LAB ONC [facility])    [address] [place] [id number] [phone]           Please do not eat 10-12 hours before your appointment if you are coming in fasting for labs on lipids, cholesterol, or glucose (sugar). This does not apply to pregnant women. Water, hot tea and black coffee (with nothing added) are okay. Do not drink other fluids, diet soda or chew gum.            Jul 03, 2018 10:00 AM CDT   LAB with LAB ONC [facility] ([facility])    [address] [facility] [id number] [phone]           Please do not eat 10-12 hours before your appointment if you are coming in  fasting for labs on lipids, cholesterol, or glucose (sugar). This does not apply to pregnant women. Water, hot tea and black coffee (with nothing added) are okay. Do not drink other fluids, diet soda or chew gum.            Jul 17, 2018  2:30 PM CDT   LAB with LAB ONC [facility] ([facility])    [address] [place] [id number] [phone]           Please do not eat 10-12 hours before your appointment if you are coming in fasting for labs on lipids, cholesterol, or glucose (sugar). This does not apply to pregnant women. Water, hot tea and black coffee (with nothing added) are okay. Do not drink other fluids, diet soda or chew gum.            Jul 17, 2018  3:15 PM CDT   Return Visit with Terra Salazar MD   [facility])    [address] [place] [id number] [phone]              Who to contact     If you have questions or need follow up information about today's clinic visit or your schedule please contact [facility] directly at [phone].  Normal or non-critical lab and imaging results will be communicated to you by [email or web address], letter or phone within 4 business days after the clinic has received the results. If you do not hear from us within 7 days, please contact the clinic through [email or web address] or phone. If you have a critical or abnormal lab result, we will notify you by phone as soon as possible.  Submit refill requests through [employer] or call your pharmacy and they will forward the refill request to us. Please allow 3 business days for your refill to be completed.          Additional Information About Your Visit        [employer] Information     [employer] gives you secure access to your electronic health record. If you see a primary care provider, you can also send messages to your care team and make appointments. If you have questions, please call your primary care clinic.   If you do not have a primary care provider, please call [phone] and they will assist you.        Care EveryWhere ID     This is your Care EveryWhere ID. This could be used by other organizations to access your [facility] medical records  [id number]        Your Vitals Were     Pulse Temperature Respirations BMI (Body Mass Index)          86 98.3  F (36.8  C) (Oral) 16 25.69 kg/m2         Blood Pressure from Last 3 Encounters:   06/01/18 116/70   05/08/18 118/71   04/30/18 118/66    Weight from Last 3 Encounters:   06/01/18 164 lb (74.4 kg)   05/08/18 165 lb (74.8 kg)   04/30/18 168 lb (76.2 kg)              Today, you had the following     No orders found for display         Today's Medication Changes          These changes are accurate as of 6/1/18 11:47 AM.  If you have any questions, ask your nurse or doctor.               These medicines have changed or have updated prescriptions.        Dose/Directions    levothyroxine 75 MCG tablet   Commonly known as:  SYNTHROID/LEVOTHROID   This may have changed:  See the new instructions.   Used for:  Hypothyroidism, unspecified type   Changed by:  Zhou Meraz MD        Dose:  75 mcg   Take 1 tablet (75 mcg) by mouth daily   Quantity:  90 tablet   Refills:  3            Where to get your medicines      These medications [facility] [address] [place] [address] [facility] [address] [facility] [address]  [address] [facility] [address]     Phone:  [phone]     levothyroxine 75 MCG tablet         Some of these will need a paper prescription and others can be bought over the counter.  Ask your nurse if you have questions.     Bring a paper prescription for each of these medications     sitagliptin 100 MG tablet                Primary Care Provider Office Phone # Fax #    Zhou Meraz [phone]       [address] [facility] [address]        Equal Access to Services     ARASH GRACE AH: Ubaldo Stoner, larry klein, dylan  ying zepedaloree bartlett ah. Dianne [facility] [phone].    ATENCIÓN: Si domenica newton, tiene a stroud disposición servicios gratuitos de asistencia lingüística. Sofy al [phone].    We comply with applicable federal civil rights laws and [place] laws. We do not discriminate on the basis of race, color, national origin, age, disability, sex, sexual orientation, or gender identity.            Thank you!     Thank you for choosing [facility]  for your care. Our goal is always to provide you with excellent care. Hearing back from our patients is one way we can continue to improve our services. Please take a few minutes to complete the written survey that you may receive in the mail after your visit with us. Thank you!             Your Updated Medication List - Protect others around you: Learn how to safely use, store and throw away your medicines at www.disposemymeds.org.          This list is accurate as of 6/1/18 11:47 AM.  Always use your most recent med list.                   Brand Name Dispense Instructions for use Diagnosis    amLODIPine 10 MG tablet    NORVASC    90 tablet    Take 1 tablet (10 mg) by mouth daily    Essential hypertension       aspirin 81 MG tablet     30 tablet    Take by mouth daily    Type 2 diabetes mellitus with diabetic nephropathy, without long-term current use of insulin (H)       B-12 1000 MCG Tbcr     100 tablet    Take 1,000 mcg by mouth daily    Vitamin B12 deficiency (non anemic), Pancytopenia (H)       * blood glucose monitoring lancets     1 Box    Use to test blood sugar 1 time daily or as directed.    Type 2 diabetes mellitus with diabetic nephropathy, without long-term current use of insulin (H)       * blood glucose monitoring lancets     102 each    Use to test blood sugar 1 times daily or as directed.    Type 2 diabetes mellitus with diabetic nephropathy, without long-term current use of insulin (H)       Blood Glucose  Monitoring Suppl Ami     1 each    1 Device daily Smartview Meter    Type 2 diabetes mellitus with diabetic nephropathy, without long-term current use of insulin (H)       blood glucose monitoring test strip    ACCU-CHEK SMARTVIEW    100 strip    Use to test blood sugar 1 times daily or as directed.    Type 2 diabetes mellitus with diabetic nephropathy, without long-term current use of insulin (H)       levothyroxine 75 MCG tablet    SYNTHROID/LEVOTHROID    90 tablet    Take 1 tablet (75 mcg) by mouth daily    Hypothyroidism, unspecified type       metFORMIN 1000 MG tablet    GLUCOPHAGE    180 tablet    1 tab twice a day    Type 2 diabetes mellitus with diabetic nephropathy, without long-term current use of insulin (H)       omeprazole 20 MG CR capsule    priLOSEC    90 capsule    TAKE ONE CAPSULE BY MOUTH ONCE DAILY IN THE MORNING 30-60  MINUTES  BEFORE  FIRST  DAILY  MEAL    Gastroesophageal reflux disease without esophagitis       rosuvastatin 40 MG tablet    CRESTOR    45 tablet    Take 0.5 tablets (20 mg) by mouth At Bedtime    Hyperlipidemia LDL goal <100       sitagliptin 100 MG tablet    JANUVIA    90 tablet    Take 1 tablet (100 mg) by mouth daily    Type 2 diabetes mellitus with diabetic nephropathy, without long-term current use of insulin (H)       * Notice:  This list has 2 medication(s) that are the same as other medications prescribed for you. Read the directions carefully, and ask your doctor or other care provider to review them with you.

## 2018-06-01 NOTE — PATIENT INSTRUCTIONS
Continue current meds, diet  Call  [phone] or use [employer] to schedule a future lab appointment  non-fasting in early September for diabetes

## 2018-06-05 DIAGNOSIS — D61.818 PANCYTOPENIA (H): ICD-10-CM

## 2018-06-05 LAB
ALBUMIN SERPL-MCNC: 3.7 G/DL (ref 3.4–5)
ALP SERPL-CCNC: 80 U/L (ref 40–150)
ALT SERPL W P-5'-P-CCNC: 39 U/L (ref 0–70)
ANION GAP SERPL CALCULATED.3IONS-SCNC: 9 MMOL/L (ref 3–14)
AST SERPL W P-5'-P-CCNC: 25 U/L (ref 0–45)
BASOPHILS # BLD AUTO: 0 10E9/L (ref 0–0.2)
BASOPHILS NFR BLD AUTO: 0.4 %
BILIRUB SERPL-MCNC: 0.9 MG/DL (ref 0.2–1.3)
BUN SERPL-MCNC: 13 MG/DL (ref 7–30)
CALCIUM SERPL-MCNC: 8.8 MG/DL (ref 8.5–10.1)
CHLORIDE SERPL-SCNC: 103 MMOL/L (ref 94–109)
CO2 SERPL-SCNC: 26 MMOL/L (ref 20–32)
CREAT SERPL-MCNC: 1.04 MG/DL (ref 0.66–1.25)
DIFFERENTIAL METHOD BLD: ABNORMAL
EOSINOPHIL # BLD AUTO: 0.2 10E9/L (ref 0–0.7)
EOSINOPHIL NFR BLD AUTO: 3.4 %
ERYTHROCYTE [DISTWIDTH] IN BLOOD BY AUTOMATED COUNT: 19.9 % (ref 10–15)
GFR SERPL CREATININE-BSD FRML MDRD: 69 ML/MIN/1.7M2
GLUCOSE SERPL-MCNC: 203 MG/DL (ref 70–99)
HCT VFR BLD AUTO: 29.8 % (ref 40–53)
HGB BLD-MCNC: 10 G/DL (ref 13.3–17.7)
IMM GRANULOCYTES # BLD: 0 10E9/L (ref 0–0.4)
IMM GRANULOCYTES NFR BLD: 0.2 %
LDH SERPL L TO P-CCNC: 158 U/L (ref 85–227)
LYMPHOCYTES # BLD AUTO: 1.2 10E9/L (ref 0.8–5.3)
LYMPHOCYTES NFR BLD AUTO: 25.1 %
MCH RBC QN AUTO: 38.2 PG (ref 26.5–33)
MCHC RBC AUTO-ENTMCNC: 33.6 G/DL (ref 31.5–36.5)
MCV RBC AUTO: 114 FL (ref 78–100)
MONOCYTES # BLD AUTO: 0.3 10E9/L (ref 0–1.3)
MONOCYTES NFR BLD AUTO: 5.1 %
NEUTROPHILS # BLD AUTO: 3.3 10E9/L (ref 1.6–8.3)
NEUTROPHILS NFR BLD AUTO: 65.8 %
PLATELET # BLD AUTO: 60 10E9/L (ref 150–450)
POTASSIUM SERPL-SCNC: 4.4 MMOL/L (ref 3.4–5.3)
PROT SERPL-MCNC: 7 G/DL (ref 6.8–8.8)
RBC # BLD AUTO: 2.62 10E12/L (ref 4.4–5.9)
RETICS # AUTO: 96.7 10E9/L (ref 25–95)
RETICS/RBC NFR AUTO: 3.7 % (ref 0.5–2)
SODIUM SERPL-SCNC: 138 MMOL/L (ref 133–144)
WBC # BLD AUTO: 5 10E9/L (ref 4–11)

## 2018-06-05 PROCEDURE — 85045 AUTOMATED RETICULOCYTE COUNT: CPT | Performed by: INTERNAL MEDICINE

## 2018-06-05 PROCEDURE — 36415 COLL VENOUS BLD VENIPUNCTURE: CPT | Performed by: INTERNAL MEDICINE

## 2018-06-05 PROCEDURE — 83615 LACTATE (LD) (LDH) ENZYME: CPT | Performed by: INTERNAL MEDICINE

## 2018-06-05 PROCEDURE — 85025 COMPLETE CBC W/AUTO DIFF WBC: CPT | Performed by: INTERNAL MEDICINE

## 2018-06-05 PROCEDURE — 80053 COMPREHEN METABOLIC PANEL: CPT | Performed by: INTERNAL MEDICINE

## 2018-06-07 LAB — COPATH REPORT: NORMAL

## 2018-06-19 DIAGNOSIS — D61.818 PANCYTOPENIA (H): ICD-10-CM

## 2018-06-19 LAB
ALBUMIN SERPL-MCNC: 3.6 G/DL (ref 3.4–5)
ALP SERPL-CCNC: 70 U/L (ref 40–150)
ALT SERPL W P-5'-P-CCNC: 30 U/L (ref 0–70)
ANION GAP SERPL CALCULATED.3IONS-SCNC: 9 MMOL/L (ref 3–14)
AST SERPL W P-5'-P-CCNC: 22 U/L (ref 0–45)
BASOPHILS # BLD AUTO: 0 10E9/L (ref 0–0.2)
BASOPHILS NFR BLD AUTO: 0.4 %
BILIRUB SERPL-MCNC: 0.8 MG/DL (ref 0.2–1.3)
BUN SERPL-MCNC: 24 MG/DL (ref 7–30)
CALCIUM SERPL-MCNC: 8.7 MG/DL (ref 8.5–10.1)
CHLORIDE SERPL-SCNC: 106 MMOL/L (ref 94–109)
CO2 SERPL-SCNC: 25 MMOL/L (ref 20–32)
CREAT SERPL-MCNC: 0.97 MG/DL (ref 0.66–1.25)
DIFFERENTIAL METHOD BLD: ABNORMAL
EOSINOPHIL # BLD AUTO: 0.2 10E9/L (ref 0–0.7)
EOSINOPHIL NFR BLD AUTO: 3.7 %
ERYTHROCYTE [DISTWIDTH] IN BLOOD BY AUTOMATED COUNT: 17.2 % (ref 10–15)
GFR SERPL CREATININE-BSD FRML MDRD: 75 ML/MIN/1.7M2
GLUCOSE SERPL-MCNC: 201 MG/DL (ref 70–99)
HCT VFR BLD AUTO: 31.1 % (ref 40–53)
HGB BLD-MCNC: 10.6 G/DL (ref 13.3–17.7)
IMM GRANULOCYTES # BLD: 0 10E9/L (ref 0–0.4)
IMM GRANULOCYTES NFR BLD: 0.2 %
LDH SERPL L TO P-CCNC: 172 U/L (ref 85–227)
LYMPHOCYTES # BLD AUTO: 1.3 10E9/L (ref 0.8–5.3)
LYMPHOCYTES NFR BLD AUTO: 24.7 %
MCH RBC QN AUTO: 38.8 PG (ref 26.5–33)
MCHC RBC AUTO-ENTMCNC: 34.1 G/DL (ref 31.5–36.5)
MCV RBC AUTO: 114 FL (ref 78–100)
MONOCYTES # BLD AUTO: 0.2 10E9/L (ref 0–1.3)
MONOCYTES NFR BLD AUTO: 4.5 %
NEUTROPHILS # BLD AUTO: 3.4 10E9/L (ref 1.6–8.3)
NEUTROPHILS NFR BLD AUTO: 66.5 %
PLATELET # BLD AUTO: 48 10E9/L (ref 150–450)
POTASSIUM SERPL-SCNC: 4.1 MMOL/L (ref 3.4–5.3)
PROT SERPL-MCNC: 7.2 G/DL (ref 6.8–8.8)
RBC # BLD AUTO: 2.73 10E12/L (ref 4.4–5.9)
RETICS # AUTO: 74.8 10E9/L (ref 25–95)
RETICS/RBC NFR AUTO: 2.7 % (ref 0.5–2)
SODIUM SERPL-SCNC: 140 MMOL/L (ref 133–144)
WBC # BLD AUTO: 5.1 10E9/L (ref 4–11)

## 2018-06-19 PROCEDURE — 83615 LACTATE (LD) (LDH) ENZYME: CPT | Performed by: INTERNAL MEDICINE

## 2018-06-19 PROCEDURE — 85045 AUTOMATED RETICULOCYTE COUNT: CPT | Performed by: INTERNAL MEDICINE

## 2018-06-19 PROCEDURE — 85025 COMPLETE CBC W/AUTO DIFF WBC: CPT | Performed by: INTERNAL MEDICINE

## 2018-06-19 PROCEDURE — 36415 COLL VENOUS BLD VENIPUNCTURE: CPT | Performed by: INTERNAL MEDICINE

## 2018-06-19 PROCEDURE — 80053 COMPREHEN METABOLIC PANEL: CPT | Performed by: INTERNAL MEDICINE

## 2018-06-21 DIAGNOSIS — E11.21 TYPE 2 DIABETES MELLITUS WITH DIABETIC NEPHROPATHY, WITHOUT LONG-TERM CURRENT USE OF INSULIN (H): ICD-10-CM

## 2018-06-21 RX ORDER — SITAGLIPTIN 100 MG/1
TABLET, FILM COATED ORAL
Qty: 90 TABLET | Refills: 0 | OUTPATIENT
Start: 2018-06-21

## 2018-07-09 DIAGNOSIS — E78.5 HYPERLIPIDEMIA LDL GOAL <100: ICD-10-CM

## 2018-07-10 DIAGNOSIS — D61.818 PANCYTOPENIA (H): ICD-10-CM

## 2018-07-10 LAB
ALBUMIN SERPL-MCNC: 3.6 G/DL (ref 3.4–5)
ALP SERPL-CCNC: 80 U/L (ref 40–150)
ALT SERPL W P-5'-P-CCNC: 41 U/L (ref 0–70)
ANION GAP SERPL CALCULATED.3IONS-SCNC: 6 MMOL/L (ref 3–14)
AST SERPL W P-5'-P-CCNC: 28 U/L (ref 0–45)
BASOPHILS # BLD AUTO: 0 10E9/L (ref 0–0.2)
BASOPHILS NFR BLD AUTO: 0.5 %
BILIRUB SERPL-MCNC: 0.6 MG/DL (ref 0.2–1.3)
BUN SERPL-MCNC: 21 MG/DL (ref 7–30)
CALCIUM SERPL-MCNC: 8.6 MG/DL (ref 8.5–10.1)
CHLORIDE SERPL-SCNC: 106 MMOL/L (ref 94–109)
CO2 SERPL-SCNC: 27 MMOL/L (ref 20–32)
CREAT SERPL-MCNC: 1.06 MG/DL (ref 0.66–1.25)
DIFFERENTIAL METHOD BLD: ABNORMAL
EOSINOPHIL # BLD AUTO: 0.2 10E9/L (ref 0–0.7)
EOSINOPHIL NFR BLD AUTO: 3.8 %
ERYTHROCYTE [DISTWIDTH] IN BLOOD BY AUTOMATED COUNT: 14.9 % (ref 10–15)
GFR SERPL CREATININE-BSD FRML MDRD: 68 ML/MIN/1.7M2
GLUCOSE SERPL-MCNC: 171 MG/DL (ref 70–99)
HCT VFR BLD AUTO: 29.8 % (ref 40–53)
HGB BLD-MCNC: 10.2 G/DL (ref 13.3–17.7)
IMM GRANULOCYTES # BLD: 0 10E9/L (ref 0–0.4)
IMM GRANULOCYTES NFR BLD: 0.2 %
LDH SERPL L TO P-CCNC: 187 U/L (ref 85–227)
LYMPHOCYTES # BLD AUTO: 1.5 10E9/L (ref 0.8–5.3)
LYMPHOCYTES NFR BLD AUTO: 35 %
MCH RBC QN AUTO: 39.2 PG (ref 26.5–33)
MCHC RBC AUTO-ENTMCNC: 34.2 G/DL (ref 31.5–36.5)
MCV RBC AUTO: 115 FL (ref 78–100)
MONOCYTES # BLD AUTO: 0.2 10E9/L (ref 0–1.3)
MONOCYTES NFR BLD AUTO: 5.2 %
NEUTROPHILS # BLD AUTO: 2.3 10E9/L (ref 1.6–8.3)
NEUTROPHILS NFR BLD AUTO: 55.3 %
PLATELET # BLD AUTO: 43 10E9/L (ref 150–450)
POTASSIUM SERPL-SCNC: 4.2 MMOL/L (ref 3.4–5.3)
PROT SERPL-MCNC: 7.1 G/DL (ref 6.8–8.8)
RBC # BLD AUTO: 2.6 10E12/L (ref 4.4–5.9)
RETICS # AUTO: 57.7 10E9/L (ref 25–95)
RETICS/RBC NFR AUTO: 2.2 % (ref 0.5–2)
SODIUM SERPL-SCNC: 139 MMOL/L (ref 133–144)
WBC # BLD AUTO: 4.2 10E9/L (ref 4–11)

## 2018-07-10 PROCEDURE — 80053 COMPREHEN METABOLIC PANEL: CPT | Performed by: INTERNAL MEDICINE

## 2018-07-10 PROCEDURE — 85045 AUTOMATED RETICULOCYTE COUNT: CPT | Performed by: INTERNAL MEDICINE

## 2018-07-10 PROCEDURE — 85025 COMPLETE CBC W/AUTO DIFF WBC: CPT | Performed by: INTERNAL MEDICINE

## 2018-07-10 PROCEDURE — 36415 COLL VENOUS BLD VENIPUNCTURE: CPT | Performed by: INTERNAL MEDICINE

## 2018-07-10 PROCEDURE — 83615 LACTATE (LD) (LDH) ENZYME: CPT | Performed by: INTERNAL MEDICINE

## 2018-07-10 RX ORDER — ROSUVASTATIN CALCIUM 40 MG/1
TABLET, COATED ORAL
Qty: 45 TABLET | Refills: 2 | Status: SHIPPED | OUTPATIENT
Start: 2018-07-10 | End: 2019-05-07 | Stop reason: SINTOL

## 2018-07-10 NOTE — TELEPHONE ENCOUNTER
"Requested Prescriptions   Pending Prescriptions Disp Refills     rosuvastatin (CRESTOR) 40 MG tablet [Pharmacy Med Name: ROSUVASTATIN 40MG TAB]  Last Written Prescription Date:  06/22/2019  Last Fill Quantity: 45,  # refills: 3   Last office visit: 6/1/2018 with prescribing provider:     Future Office Visit:   Next 5 appointments (look out 90 days)     Jul 17, 2018  3:15 PM CDT   [facility] Visit with Terra Salazar MD   [facility]    [address] [place] [id number] [phone]                  8 tablet 22     Sig: TAKE ONE-HALF TABLET BY MOUTH AT BEDTIME    Statins Protocol Passed    7/9/2018  7:57 PM       Passed - LDL on file in past 12 months    Recent Labs   Lab Test  05/22/18   0955   LDL  35            Passed - No abnormal creatine kinase in past 12 months    Recent Labs   Lab Test  05/22/18   0955   CKT  33               Passed - Recent (12 mo) or future (30 days) visit within the authorizing provider's specialty    Patient had office visit in the last 12 months or has a visit in the next 30 days with authorizing provider or within the authorizing provider's specialty.  See \"Patient Info\" tab in inbasket, or \"Choose Columns\" in Meds & Orders section of the refill encounter.           Passed - Patient is age 18 or older          "

## 2018-07-17 ENCOUNTER — ONCOLOGY VISIT (OUTPATIENT)
Dept: ONCOLOGY | Facility: CLINIC | Age: 78
End: 2018-07-17
Payer: MEDICARE

## 2018-07-17 VITALS
DIASTOLIC BLOOD PRESSURE: 73 MMHG | BODY MASS INDEX: 24.96 KG/M2 | SYSTOLIC BLOOD PRESSURE: 137 MMHG | OXYGEN SATURATION: 99 % | WEIGHT: 159 LBS | HEIGHT: 67 IN | TEMPERATURE: 98.6 F | RESPIRATION RATE: 16 BRPM | HEART RATE: 89 BPM

## 2018-07-17 DIAGNOSIS — E53.8 VITAMIN B12 DEFICIENCY (NON ANEMIC): ICD-10-CM

## 2018-07-17 DIAGNOSIS — R89.8 ABNORMAL GENETIC TEST: ICD-10-CM

## 2018-07-17 DIAGNOSIS — Z11.59 ENCOUNTER FOR SCREENING FOR OTHER VIRAL DISEASES (CODE): ICD-10-CM

## 2018-07-17 DIAGNOSIS — D61.818 PANCYTOPENIA (H): ICD-10-CM

## 2018-07-17 DIAGNOSIS — D61.818 PANCYTOPENIA (H): Primary | ICD-10-CM

## 2018-07-17 DIAGNOSIS — D69.6 THROMBOCYTOPENIA (H): ICD-10-CM

## 2018-07-17 LAB
BASOPHILS # BLD AUTO: 0 10E9/L (ref 0–0.2)
BASOPHILS NFR BLD AUTO: 0.2 %
DIFFERENTIAL METHOD BLD: ABNORMAL
EOSINOPHIL # BLD AUTO: 0.1 10E9/L (ref 0–0.7)
EOSINOPHIL NFR BLD AUTO: 1.7 %
ERYTHROCYTE [DISTWIDTH] IN BLOOD BY AUTOMATED COUNT: 14.2 % (ref 10–15)
HCT VFR BLD AUTO: 31.7 % (ref 40–53)
HGB BLD-MCNC: 11 G/DL (ref 13.3–17.7)
IMM GRANULOCYTES # BLD: 0 10E9/L (ref 0–0.4)
IMM GRANULOCYTES NFR BLD: 0.4 %
LYMPHOCYTES # BLD AUTO: 1.6 10E9/L (ref 0.8–5.3)
LYMPHOCYTES NFR BLD AUTO: 30.6 %
MCH RBC QN AUTO: 39.4 PG (ref 26.5–33)
MCHC RBC AUTO-ENTMCNC: 34.7 G/DL (ref 31.5–36.5)
MCV RBC AUTO: 114 FL (ref 78–100)
MONOCYTES # BLD AUTO: 0.3 10E9/L (ref 0–1.3)
MONOCYTES NFR BLD AUTO: 5.4 %
NEUTROPHILS # BLD AUTO: 3.2 10E9/L (ref 1.6–8.3)
NEUTROPHILS NFR BLD AUTO: 61.7 %
PLATELET # BLD AUTO: 46 10E9/L (ref 150–450)
RBC # BLD AUTO: 2.79 10E12/L (ref 4.4–5.9)
RETICS # AUTO: 71.1 10E9/L (ref 25–95)
RETICS/RBC NFR AUTO: 2.6 % (ref 0.5–2)
WBC # BLD AUTO: 5.2 10E9/L (ref 4–11)

## 2018-07-17 PROCEDURE — 85025 COMPLETE CBC W/AUTO DIFF WBC: CPT | Performed by: INTERNAL MEDICINE

## 2018-07-17 PROCEDURE — 85045 AUTOMATED RETICULOCYTE COUNT: CPT | Performed by: INTERNAL MEDICINE

## 2018-07-17 PROCEDURE — 99214 OFFICE O/P EST MOD 30 MIN: CPT | Performed by: INTERNAL MEDICINE

## 2018-07-17 PROCEDURE — 36415 COLL VENOUS BLD VENIPUNCTURE: CPT | Performed by: INTERNAL MEDICINE

## 2018-07-17 ASSESSMENT — PAIN SCALES - GENERAL: PAINLEVEL: MILD PAIN (2)

## 2018-07-17 NOTE — LETTER
7/17/2018         RE: Prakash Cramer  [address]  [facility] [address]        Dear Colleague,    Thank you for referring your patient, Prakash Cramer, to the [facility]. Please see a copy of my visit note below.    Hematology follow up visit:  Date on this visit: 7/17/2018     CC   Pancytopenia    Primary Physician: Zhou Meraz     History Of Present Illness:    Please see previous note for details. I have copied and updated from prior note.  Mr. Cramer is a 78 year old male who was found to have pancytopenia around the end of March 2018 when he presented with a white blood cell count of 1.5 and hemoglobin of 7.8 and platelets of 4. He was admitted to the hospital in [place]. There was no bruising or petechiae or evidence of bleeding. He was feeling fatigued and at that point he was also noticed to have C diff infection and was started on p.o. vancomycin. He was given Neupogen ×1 and packed red distant transfusion ×2 and platelet transfusion ×1. He was also given vitamin B12 injections and I believe it was 3 injections which she was given during the hospitalization. He had a bone marrow biopsy done as part of his workup which showed mildly hypocellular bone marrow with 10-40% cellularity with erythroid hyperplasia and mild dyserythropoiesis (5% cellularity, moderately decreased for age as per pathology review at the [facility]). Reactive plasmacytosis and maturing trilineage hematopoiesis. There was no significant dysplasia in the granulocytes or megakaryocytes. Parvovirus immunohistochemistry was negative. These findings were deemed consistent with either evolving aplastic anemia or drug/toxin effect or vitamin/nutritional deficiency or infectious or viral and hypocellular MDS.   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 This finding is most probably constitutional in origin and not acquired; however it it is medically warranted a peripheral blood chromosome analysis couldn't be performed to rule out an acquired abnormality. If it is constitutional genetic counseling would be medically indicated    Comprehensive metabolic panel was unremarkable.   His vitamin B12 level was less than 159. Antiparietal cell antibody and anti-intrinsic factor antibody were negative. Iron studies were unremarkable. CMV IgM was negative. WENDY screen was negative. Rheumatoid factor and anti-CCP were negative. SPEP was negative.  After thorough investigation it was found that potentially glimepiride (sulfonylurea) which he was taking could be the culprit for pancytopenia so he was taken off it.  Upon discharge from the hospital on 3/31/2018 his white blood cell count was 2.4 and hemoglobin was 7.8 and platelet 12. On 4/5/2018 the WBC count was 2.9 with ANC of 1.2 and hemoglobin 7.9 and platelets 75.  On 4/13/2018 white blood cell count was 3.3, ANC 1.3, hemoglobin 7 and platelets 13. At that point he was given 2 units of packed red blood cells and 1 unit of platelets  Most recently on 4/16/2018 white blood cell count was 3.2, ANC 1.1, hemoglobin 10 and platelets 20.    When he saw me at it further testing and KIMBERLEE was negative.  Peripheral blood flow cytometry for PNH reveals a very small percentage of red cells and a small percentage (less than 10%) of neutrophils and monocytes with a PNH immunophenotype, the significance of which is not known.  A repeat vitamin B12 level was 443. I told him to start taking vitamin B12 1000  g daily.  We kept him on observation.    He feels well and he has more energy. He denies any recent infections. No spontaneous bleeding but he has a tendency to bruise easily. No new swellings. No B symptoms. No nausea vomiting diarrhea or constipation. No trouble breathing. He continues to take  folic acid and vitamin B12 supplements. A month ago he was exercising more than usual and he strained his back. This is getting better. No neurological problems.        ROS:  A comprehensive ROS was otherwise neg      I reviewed other history in EPIC as below.    Past Medical/Surgical History:  Past Medical History:   Diagnosis Date     Aplastic anemia (H) 03/26/2018    thought secondary to reaction to Septra     BPH (benign prostatic hypertrophy)     failed  Flomax     C. difficile diarrhea 03/26/2018    treated with Flagyl     Cellulitis and abscess of leg, except foot 2004     Contact dermatitis and other eczema, due to unspecified cause      Diaphragmatic hernia without mention of obstruction or gangrene     hiatal hernia     Esophageal reflux      Hyperlipidemia LDL goal <100 3/11/2005     Hypothyroidism      Impotence of organic origin      Meningococcal encephalitis      Proteinuria      RBBB      Type 2 diabetes mellitus with diabetic nephropathy  (goal A1C<7) 10/24/2015     Unspecified essential hypertension      Past Surgical History:   Procedure Laterality Date     ARTHROPLASTY KNEE Left 3/18/2015    Procedure: ARTHROPLASTY KNEE;  Surgeon: Abebe Schroeder MD;  Location: [facility] OR     ARTHROPLASTY KNEE Right 3/14/2016    Procedure: ARTHROPLASTY KNEE;  Surgeon: Abebe Schroeder MD;  Location: [facility] OR     C NONSPECIFIC PROCEDURE  10/02    left knee meniscus tear repair     HC LAPAROSCOPY, SURGICAL; CHOLECYSTECTOMY  1998    Cholecystectomy, Laparoscopic     HC REMOVE TONSILS/ADENOIDS,13+ Y/O  1963    T & A 13+y.o.     Allergies:  Allergies as of 07/17/2018 - Review Complete 07/17/2018   Allergen Reaction Noted     Atorvastatin calcium  01/30/2008     Diovan [valsartan]  01/24/2011     Glimepiride  05/08/2018     Invokana [canagliflozin]  06/01/2018     Mold Other (See Comments) 07/14/2011     Penicillins  09/07/2011     Seasonal allergies  06/25/2007    glimepiride possibly causing pancytopenia  Current  "Medications:  Current Outpatient Prescriptions   Medication Sig Dispense Refill     amLODIPine (NORVASC) 10 MG tablet Take 1 tablet (10 mg) by mouth daily 90 tablet 3     aspirin 81 MG tablet Take by mouth daily 30 tablet      blood glucose monitoring (ACCU-CHEK FASTCLIX) lancets Use to test blood sugar 1 times daily or as directed. 102 each 11     blood glucose monitoring (ACCU-CHEK MULTICLIX) lancets Use to test blood sugar 1 time daily or as directed. 1 Box 11     blood glucose monitoring (ACCU-CHEK SMARTVIEW) test strip Use to test blood sugar 1 times daily or as directed. 100 strip 11     Blood Glucose Monitoring Suppl MAGDALENO 1 Device daily Smartview Meter 1 each 0     Cyanocobalamin (B-12) 1000 MCG TBCR Take 1,000 mcg by mouth daily 100 tablet 11     levothyroxine (SYNTHROID/LEVOTHROID) 75 MCG tablet Take 1 tablet (75 mcg) by mouth daily 90 tablet 3     metFORMIN (GLUCOPHAGE) 1000 MG tablet 1 tab twice a day 180 tablet 3     omeprazole (PRILOSEC) 20 MG CR capsule TAKE ONE CAPSULE BY MOUTH ONCE DAILY IN THE MORNING 30-60  MINUTES  BEFORE  FIRST  DAILY  MEAL 90 capsule 3     rosuvastatin (CRESTOR) 40 MG tablet TAKE ONE-HALF TABLET BY MOUTH AT BEDTIME 45 tablet 2     sitagliptin (JANUVIA) 100 MG tablet Take 1 tablet (100 mg) by mouth daily 90 tablet 3      Family History:  Family History   Problem Relation Age of Onset     Family History Negative Mother      d:age 89 of \"old age\"     GASTROINTESTINAL DISEASE Father      d:age 79 liver failure after taking excessive amounts of Tylenol over 5-6 yrs     Neurologic Disorder Brother      b:1932  hx cerebral aneurysm age 59     No family history of bleeding or clotting disorder. Maternal uncle had throat cancer.  Social History:  Social History     Social History     Marital status:      Spouse name: N/A     Number of children: 2     Years of education: N/A     Occupational History     teacher [employer]     Social History Main Topics     Smoking status: " "Never Smoker     Smokeless tobacco: Never Used     Alcohol use Yes      Comment: beer 4-5 a month     Drug use: No     Sexual activity: Yes     Partners: Female     Other Topics Concern     Special Diet Yes     diabetic diet      Exercise No     nothing regular since bilateral knee surgeries      Social History Narrative    denies any smoking. No alcohol. He usually lives in [place] and spends winter in [place]. He lives alone.  Physical Exam:  /73  Pulse 89  Temp 98.6  F (37  C)  Resp 16  Ht 1.702 m (5' 7\")  Wt 72.1 kg (159 lb)  SpO2 99%  BMI 24.9 kg/m2  CONSTITUTIONAL: no acute distress  EYES: PERRLA, no palor or icterus.   ENT/MOUTH: no mouth lesions. Ears normal  CVS: s1s2 no m r g .   RESPIRATORY: clear to auscultation b/l  GI: soft non tender no hepatosplenomegaly  NEURO: AAOX3  Grossly non focal neuro exam  INTEGUMENT: Some superficial bruises off the skin off forearms and hand but otherwise no concerning his skin rashes  LYMPHATIC: no palpable cervical, supraclavicular, axillary or inguinal LAD  MUSCULOSKELETAL: Unremarkable. No bony tenderness.   EXTREMITIES: no edema  PSYCH: Mentation, mood and affect are normal. Decision making capacity is intact        Laboratory/Imaging Studies  Results for NEISHA FRANCOIS (MRN [id number]) as of 7/17/2018 14:58   Ref. Range 4/26/2018 09:53 4/30/2018 10:23 5/3/2018 09:45 5/8/2018 13:26 5/22/2018 09:55 6/5/2018 09:54 6/19/2018 09:40 7/10/2018 10:09 7/17/2018 14:23   WBC Latest Ref Range: 4.0 - 11.0 10e9/L 3.8 (L) 3.8 (L) 4.8 5.1 4.8 5.0 5.1 4.2 5.2   Hemoglobin Latest Ref Range: 13.3 - 17.7 g/dL 9.2 (L) 9.1 (L) 9.2 (L) 9.3 (L) 9.8 (L) 10.0 (L) 10.6 (L) 10.2 (L) 11.0 (L)   Hematocrit Latest Ref Range: 40.0 - 53.0 % 27.4 (L) 27.0 (L) 27.2 (L) 27.8 (L) 29.0 (L) 29.8 (L) 31.1 (L) 29.8 (L) 31.7 (L)   Platelet Count Latest Ref Range: 150 - 450 10e9/L 27 (LL) 33 (LL) 39 (LL) 49 (LL) 57 (L) 60 (L) 48 (LL) 43 (LL) 46 (LL)   RBC Count Latest Ref Range: 4.4 - 5.9 " 10e12/L 2.76 (L) 2.69 (L) 2.66 (L) 2.67 (L) 2.62 (L) 2.62 (L) 2.73 (L) 2.60 (L) 2.79 (L)   MCV Latest Ref Range: 78 - 100 fl 99 100 102 (H) 104 (H) 111 (H) 114 (H) 114 (H) 115 (H) 114 (H)   MCH Latest Ref Range: 26.5 - 33.0 pg 33.3 (H) 33.8 (H) 34.6 (H) 34.8 (H) 37.4 (H) 38.2 (H) 38.8 (H) 39.2 (H) 39.4 (H)   MCHC Latest Ref Range: 31.5 - 36.5 g/dL 33.6 33.7 33.8 33.5 33.8 33.6 34.1 34.2 34.7   RDW Latest Ref Range: 10.0 - 15.0 % 20.4 (H) 21.5 (H) 22.9 (H) 23.3 (H) Not Measured 19.9 (H) 17.2 (H) 14.9 14.2   Diff Method Unknown Automated Method Automated Method Automated Method Automated Method Automated Method Automated Method Automated Method Automated Method Automated Method   % Neutrophils Latest Units: % 51.5 53.6 55.4 53.8 64.9 65.8 66.5 55.3 61.7   % Lymphocytes Latest Units: % 40.3 37.2 37.1 39.1 29.1 25.1 24.7 35.0 30.6   % Monocytes Latest Units: % 6.8 7.3 6.3 6.1 4.8 5.1 4.5 5.2 5.4   % Eosinophils Latest Units: % 0.8 1.3 0.6 0.6 0.8 3.4 3.7 3.8 1.7   % Basophils Latest Units: % 0.3 0.3 0.4 0.2 0.2 0.4 0.4 0.5 0.2   % Immature Granulocytes Latest Units: % 0.3 0.3 0.2 0.2 0.2 0.2 0.2 0.2 0.4   Absolute Neutrophil Latest Ref Range: 1.6 - 8.3 10e9/L 2.0 2.1 2.7 2.8 3.1 3.3 3.4 2.3 3.2   Absolute Lymphocytes Latest Ref Range: 0.8 - 5.3 10e9/L 1.5 1.4 1.8 2.0 1.4 1.2 1.3 1.5 1.6   Absolute Monocytes Latest Ref Range: 0.0 - 1.3 10e9/L 0.3 0.3 0.3 0.3 0.2 0.3 0.2 0.2 0.3   Absolute Eosinophils Latest Ref Range: 0.0 - 0.7 10e9/L 0.0 0.1 0.0 0.0 0.0 0.2 0.2 0.2 0.1   Absolute Basophils Latest Ref Range: 0.0 - 0.2 10e9/L 0.0 0.0 0.0 0.0 0.0 0.0 0.0 0.0 0.0   Abs Immature Granulocytes Latest Ref Range: 0 - 0.4 10e9/L 0.0 0.0 0.0 0.0 0.0 0.0 0.0 0.0 0.0   % Retic Latest Ref Range: 0.5 - 2.0 % 3.0 (H) 3.7 (H) 4.1 (H) 4.9 (H) 5.4 (H) 3.7 (H) 2.7 (H) 2.2 (H) 2.6 (H)   Absolute Retic Latest Ref Range: 25 - 95 10e9/L 82.8 98.5 (H) 109.1 (H) 130.0 (H) 141.5 (H) 96.7 (H) 74.8 57.7 71.1       I reviewed the outside records as  "well  Received from [facility], [place], [place] are 16 stained   slides labeled [id number], collected   03-28-18 now designated [facility]-.  Also received is a copy of the   referring pathologist's report with   patient identifying information.     FINAL DIAGNOSIS:   Bone marrow, posterior iliac crest, decalcified trephine biopsy, aspirate   clot, touch imprints, and aspirate   smears ([id number], 03/28/2018):        - Marrow cellularity of 5% (moderately hypocellular for age) in   trephine core sections provided for   review        - Good evidence of trilineage hematopoietic maturation in core and   clot sections provided for review        - Benign appearing lymphoid aggregates in core and clot sections   provided for review        - Negative immunohistochemical stain for Parvovirus in clot section        - Marrow aspirate smears and touch imprints showing trilineage   hematopoietic maturation with no increase   in blasts        - Increased marrow iron stores with 5% sideroblasts and no ringed   sideroblasts.        - Flow cytometric immunophenotyping performed on marrow aspirate   [facility]   showing, \"No immunophenotypic evidence of acute leukemia or a T-cell   B-cell or Plasma cell neoplasm.\"        - Cytogenetic analysis performed on marrow aspirate reported by   [facility] as showing,   \"46,XY,gonzales(13;14)(q10;q19)?c[20],\" \"most probably constitutional\"        - FISH study performed on marrow aspirate by [facility]   reported as showing, \" Negative FISH   result for cytogenetic abnormalities commonly observed in myeloid   disorders\"        - Blood smear and peripheral blood counts not provided for review        - See Comment     COMMENT:   By separate report ([id number]; 4/18/18), flow cytometric immunophenotyping    performed on blood for this patient   at [facility] laboratory shows a very small percentage of red cells and a   small percentage (less than " 10%) of   neutrophils and monocytes with a PNH immunophenotype.       Peripheral blood flow cytometry  SPECIMEN(S):   Blood     INTERPRETATION:   Blood:        Cells with a paroxysmal nocturnal hemoglobinuria (PNH)-type   immunophenotype represent approximately 0.3%   of erythrocytes, 3.4% of monocytes, and 4.4% of neutrophils, see comment.     COMMENT:   Classic paroxysmal nocturnal hemoglobinuria (PNH) is associated with large    PNH-type clones. PNH in the setting   of another bone marrow failure syndrome usually has small PNH-type clones,    less than 10%. Subclinical PNH   usually has rare PNH-type clones, less than 1%. Detection of a small clone    is not equivalent to a diagnosis of   hemolytic PNH.  Final diagnosis requires correlation with clinical,   morphologic, and ancillary findings.   (Fletcher, et al. Blood, 106 (12): 4569-9755, 2005. Fletcher, Hematology,   21-29, 2011.)     RESULTS:   About 0.33% of erythrocytes have decreased expression of CD59 (0.32% type   II, 0.01% type III).   About 3.45% of monocytes have decreased expression of CD14 and binding of   fluorescent aerolysin (FLAER).About   4.44% of neutrophils have decreased expression of CD24 and binding of   FLAER.       Peripheral blood chromosome analysis.  METHODS:   PHA stimulated, MTX synchronized cultures.     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   Each of the metaphase cells analyzed had 45 chromosomes including one   normal chromosome 13, one normal   chromosome 14, and a Robertsonian translocation joining together, at their    centromeres, the long arms of one   chromosome 13 and one chromosome 14.     Robertsonian translocations are one of the most common occurring   constitutional  rearrangements in human   populations. As these translocations are not associated with any loss of   critical genetic material,  "they are   considered to be balanced, and would not be expected to be associated with    any phenotypic abnormalities in   this patient. However, during meiosis, the translocation can segregate   into gametes several different ways,   resulting, after fertilization, in zygotes that have trisomy or monosomy   for chromosomes 13 or 14. These   trisomies and/or monosomies can result in miscarriage or liveborn   offspring with multiple congenital   anomalies. Thus, carriers of such translocations are at increased   reproductive risk.     Genetic counseling regarding these results, and cytogenetic evaluation of   other family members who may carry   the translocation are recommended.     ASSESSMENT/PLAN:      Pancytopenia. At this time the cause is not entirely known but it could be possibly toxic reaction to the drug glimepiride. Otherwise his bone marrow biopsy was nondiagnostic. Peripheral blood and bone marrow Flow cytometry were negative.  Cytogenetic analysis performed on marrow aspirate showed 46,XY,gonzales(13;14)(q10;q19)?c[20], \"most probably constitutional\"        - FISH study performed on marrow aspirate showed \" Negative FISH result for cytogenetic abnormalities commonly observed in myeloid disorders\"     Overall he is doing well. White blood cell count have normalized. Hemoglobin has significantly improved and is now 11. Continue folic acid.    Thrombocytopenia. He still has significant thrombocytopenia. Platelets are 46 today they have kind of plateaued over the last couple of months. He denies any spontaneous bleeding although he has easy bruising but he tells me that easy bruising has been going on for a couple of years. This has not changed recently. WENDY screen was negative. Because his platelets have not normalized I would like to screen him for hepatitis B and C as well as HIV and check abdominal ultrasound to look for liver and spleen size.    Vitamin B12 deficiency with negative anti-parietal cell " antibody and anti-intrinsic factor antibody. Continue B12    Chromosomal abnormality. 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  We did a peripheral blood chromosome analysis which confirms these findings. 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 I will refer him to genetic counselor.    He was asking if he can get a shingles shot. He never has had issues with shingles. I told him that at this time I would prefer holding off as I am not 100% certain about the etiology of his hematological problem. He understands that    We did not address the following today  Small PNH clone. Peripheral blood flow cytometry for PNH reveals a very small percentage of red cells and a small percentage (less than 10%) of neutrophils and monocytes with a PNH immunophenotype, the significance of which is not known. Will consider repeating it in the future.     He will get his labs checked every month    I will see him back in 2 months    I answered all of his questions to his satisfaction. He is agreeable and comfortable with the plan.  Terra Salazar        Again, thank you for allowing me to participate in the care of your patient.        Sincerely,        Terra Salazar MD

## 2018-07-17 NOTE — NURSING NOTE
"Oncology Rooming Note    July 17, 2018 2:55 PM   Prakash Cramer is a 78 year old male who presents for:    Chief Complaint   Patient presents with     Oncology Clinic Visit     2 month follow up     Initial Vitals: /73  Pulse 89  Temp 98.6  F (37  C)  Resp 16  Ht 1.702 m (5' 7\")  Wt 72.1 kg (159 lb)  SpO2 99%  BMI 24.9 kg/m2 Estimated body mass index is 24.9 kg/(m^2) as calculated from the following:    Height as of this encounter: 1.702 m (5' 7\").    Weight as of this encounter: 72.1 kg (159 lb). Body surface area is 1.85 meters squared.  Mild Pain (2) Comment: back   No LMP for male patient.  Allergies reviewed: Yes  Medications reviewed: Yes    Medications: Medication refills not needed today.  Pharmacy name entered into [employer]:    [id number] - [place], [place] - [address].  [id number] - [place], [place] - [address]        5 minutes for nursing intake (face to face time)     Maisha Guadarrama LPN              "

## 2018-07-17 NOTE — PROGRESS NOTES
Hematology follow up visit:  Date on this visit: 7/17/2018     CC   Pancytopenia    Primary Physician: Zhou Meraz     History Of Present Illness:    Please see previous note for details. I have copied and updated from prior note.  Mr. Cramer is a 78 year old male who was found to have pancytopenia around the end of March 2018 when he presented with a white blood cell count of 1.5 and hemoglobin of 7.8 and platelets of 4. He was admitted to the hospital in [place]. There was no bruising or petechiae or evidence of bleeding. He was feeling fatigued and at that point he was also noticed to have C diff infection and was started on p.o. vancomycin. He was given Neupogen ×1 and packed red distant transfusion ×2 and platelet transfusion ×1. He was also given vitamin B12 injections and I believe it was 3 injections which she was given during the hospitalization. He had a bone marrow biopsy done as part of his workup which showed mildly hypocellular bone marrow with 10-40% cellularity with erythroid hyperplasia and mild dyserythropoiesis (5% cellularity, moderately decreased for age as per pathology review at the [facility]). Reactive plasmacytosis and maturing trilineage hematopoiesis. There was no significant dysplasia in the granulocytes or megakaryocytes. Parvovirus immunohistochemistry was negative. These findings were deemed consistent with either evolving aplastic anemia or drug/toxin effect or vitamin/nutritional deficiency or infectious or viral and hypocellular MDS.   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 This finding is most probably constitutional in origin and not acquired; however it it is medically warranted a peripheral blood chromosome analysis couldn't be performed to rule out an acquired  abnormality. If it is constitutional genetic counseling would be medically indicated    Comprehensive metabolic panel was unremarkable.   His vitamin B12 level was less than 159. Antiparietal cell antibody and anti-intrinsic factor antibody were negative. Iron studies were unremarkable. CMV IgM was negative. WENDY screen was negative. Rheumatoid factor and anti-CCP were negative. SPEP was negative.  After thorough investigation it was found that potentially glimepiride (sulfonylurea) which he was taking could be the culprit for pancytopenia so he was taken off it.  Upon discharge from the hospital on 3/31/2018 his white blood cell count was 2.4 and hemoglobin was 7.8 and platelet 12. On 4/5/2018 the WBC count was 2.9 with ANC of 1.2 and hemoglobin 7.9 and platelets 75.  On 4/13/2018 white blood cell count was 3.3, ANC 1.3, hemoglobin 7 and platelets 13. At that point he was given 2 units of packed red blood cells and 1 unit of platelets  Most recently on 4/16/2018 white blood cell count was 3.2, ANC 1.1, hemoglobin 10 and platelets 20.    When he saw me at it further testing and KIMBERLEE was negative.  Peripheral blood flow cytometry for PNH reveals a very small percentage of red cells and a small percentage (less than 10%) of neutrophils and monocytes with a PNH immunophenotype, the significance of which is not known.  A repeat vitamin B12 level was 443. I told him to start taking vitamin B12 1000  g daily.  We kept him on observation.    He feels well and he has more energy. He denies any recent infections. No spontaneous bleeding but he has a tendency to bruise easily. No new swellings. No B symptoms. No nausea vomiting diarrhea or constipation. No trouble breathing. He continues to take folic acid and vitamin B12 supplements. A month ago he was exercising more than usual and he strained his back. This is getting better. No neurological problems.        ROS:  A comprehensive ROS was otherwise neg      I reviewed other  history in EPIC as below.    Past Medical/Surgical History:  Past Medical History:   Diagnosis Date     Aplastic anemia (H) 03/26/2018    thought secondary to reaction to Septra     BPH (benign prostatic hypertrophy)     failed  Flomax     C. difficile diarrhea 03/26/2018    treated with Flagyl     Cellulitis and abscess of leg, except foot 2004     Contact dermatitis and other eczema, due to unspecified cause      Diaphragmatic hernia without mention of obstruction or gangrene     hiatal hernia     Esophageal reflux      Hyperlipidemia LDL goal <100 3/11/2005     Hypothyroidism      Impotence of organic origin      Meningococcal encephalitis      Proteinuria      RBBB      Type 2 diabetes mellitus with diabetic nephropathy  (goal A1C<7) 10/24/2015     Unspecified essential hypertension      Past Surgical History:   Procedure Laterality Date     ARTHROPLASTY KNEE Left 3/18/2015    Procedure: ARTHROPLASTY KNEE;  Surgeon: Abebe Schroeder MD;  Location: [facility] OR     ARTHROPLASTY KNEE Right 3/14/2016    Procedure: ARTHROPLASTY KNEE;  Surgeon: Abebe Schroeder MD;  Location: [facility] OR     C NONSPECIFIC PROCEDURE  10/02    left knee meniscus tear repair     HC LAPAROSCOPY, SURGICAL; CHOLECYSTECTOMY  1998    Cholecystectomy, Laparoscopic     HC REMOVE TONSILS/ADENOIDS,13+ Y/O  1963    T & A 13+y.o.     Allergies:  Allergies as of 07/17/2018 - Review Complete 07/17/2018   Allergen Reaction Noted     Atorvastatin calcium  01/30/2008     Diovan [valsartan]  01/24/2011     Glimepiride  05/08/2018     Invokana [canagliflozin]  06/01/2018     Mold Other (See Comments) 07/14/2011     Penicillins  09/07/2011     Seasonal allergies  06/25/2007    glimepiride possibly causing pancytopenia  Current Medications:  Current Outpatient Prescriptions   Medication Sig Dispense Refill     amLODIPine (NORVASC) 10 MG tablet Take 1 tablet (10 mg) by mouth daily 90 tablet 3     aspirin 81 MG tablet Take by mouth daily 30 tablet      blood glucose  "monitoring (ACCU-CHEK FASTCLIX) lancets Use to test blood sugar 1 times daily or as directed. 102 each 11     blood glucose monitoring (ACCU-CHEK MULTICLIX) lancets Use to test blood sugar 1 time daily or as directed. 1 Box 11     blood glucose monitoring (ACCU-CHEK SMARTVIEW) test strip Use to test blood sugar 1 times daily or as directed. 100 strip 11     Blood Glucose Monitoring Suppl MAGDALENO 1 Device daily Smartview Meter 1 each 0     Cyanocobalamin (B-12) 1000 MCG TBCR Take 1,000 mcg by mouth daily 100 tablet 11     levothyroxine (SYNTHROID/LEVOTHROID) 75 MCG tablet Take 1 tablet (75 mcg) by mouth daily 90 tablet 3     metFORMIN (GLUCOPHAGE) 1000 MG tablet 1 tab twice a day 180 tablet 3     omeprazole (PRILOSEC) 20 MG CR capsule TAKE ONE CAPSULE BY MOUTH ONCE DAILY IN THE MORNING 30-60  MINUTES  BEFORE  FIRST  DAILY  MEAL 90 capsule 3     rosuvastatin (CRESTOR) 40 MG tablet TAKE ONE-HALF TABLET BY MOUTH AT BEDTIME 45 tablet 2     sitagliptin (JANUVIA) 100 MG tablet Take 1 tablet (100 mg) by mouth daily 90 tablet 3      Family History:  Family History   Problem Relation Age of Onset     Family History Negative Mother      d:age 89 of \"old age\"     GASTROINTESTINAL DISEASE Father      d:age 79 liver failure after taking excessive amounts of Tylenol over 5-6 yrs     Neurologic Disorder Brother      b:1932  hx cerebral aneurysm age 59     No family history of bleeding or clotting disorder. Maternal uncle had throat cancer.  Social History:  Social History     Social History     Marital status:      Spouse name: N/A     Number of children: 2     Years of education: N/A     Occupational History     teacher [employer]     Social History Main Topics     Smoking status: Never Smoker     Smokeless tobacco: Never Used     Alcohol use Yes      Comment: beer 4-5 a month     Drug use: No     Sexual activity: Yes     Partners: Female     Other Topics Concern     Special Diet Yes     diabetic diet      Exercise " "No     nothing regular since bilateral knee surgeries      Social History Narrative    denies any smoking. No alcohol. He usually lives in [place] and spends winter in [place]. He lives alone.  Physical Exam:  /73  Pulse 89  Temp 98.6  F (37  C)  Resp 16  Ht 1.702 m (5' 7\")  Wt 72.1 kg (159 lb)  SpO2 99%  BMI 24.9 kg/m2  CONSTITUTIONAL: no acute distress  EYES: PERRLA, no palor or icterus.   ENT/MOUTH: no mouth lesions. Ears normal  CVS: s1s2 no m r g .   RESPIRATORY: clear to auscultation b/l  GI: soft non tender no hepatosplenomegaly  NEURO: AAOX3  Grossly non focal neuro exam  INTEGUMENT: Some superficial bruises off the skin off forearms and hand but otherwise no concerning his skin rashes  LYMPHATIC: no palpable cervical, supraclavicular, axillary or inguinal LAD  MUSCULOSKELETAL: Unremarkable. No bony tenderness.   EXTREMITIES: no edema  PSYCH: Mentation, mood and affect are normal. Decision making capacity is intact        Laboratory/Imaging Studies  Results for NEISHA FRANCOIS (MRN [id number]) as of 7/17/2018 14:58   Ref. Range 4/26/2018 09:53 4/30/2018 10:23 5/3/2018 09:45 5/8/2018 13:26 5/22/2018 09:55 6/5/2018 09:54 6/19/2018 09:40 7/10/2018 10:09 7/17/2018 14:23   WBC Latest Ref Range: 4.0 - 11.0 10e9/L 3.8 (L) 3.8 (L) 4.8 5.1 4.8 5.0 5.1 4.2 5.2   Hemoglobin Latest Ref Range: 13.3 - 17.7 g/dL 9.2 (L) 9.1 (L) 9.2 (L) 9.3 (L) 9.8 (L) 10.0 (L) 10.6 (L) 10.2 (L) 11.0 (L)   Hematocrit Latest Ref Range: 40.0 - 53.0 % 27.4 (L) 27.0 (L) 27.2 (L) 27.8 (L) 29.0 (L) 29.8 (L) 31.1 (L) 29.8 (L) 31.7 (L)   Platelet Count Latest Ref Range: 150 - 450 10e9/L 27 (LL) 33 (LL) 39 (LL) 49 (LL) 57 (L) 60 (L) 48 (LL) 43 (LL) 46 (LL)   RBC Count Latest Ref Range: 4.4 - 5.9 10e12/L 2.76 (L) 2.69 (L) 2.66 (L) 2.67 (L) 2.62 (L) 2.62 (L) 2.73 (L) 2.60 (L) 2.79 (L)   MCV Latest Ref Range: 78 - 100 fl 99 100 102 (H) 104 (H) 111 (H) 114 (H) 114 (H) 115 (H) 114 (H)   MCH Latest Ref Range: 26.5 - 33.0 pg 33.3 (H) 33.8 " (H) 34.6 (H) 34.8 (H) 37.4 (H) 38.2 (H) 38.8 (H) 39.2 (H) 39.4 (H)   MCHC Latest Ref Range: 31.5 - 36.5 g/dL 33.6 33.7 33.8 33.5 33.8 33.6 34.1 34.2 34.7   RDW Latest Ref Range: 10.0 - 15.0 % 20.4 (H) 21.5 (H) 22.9 (H) 23.3 (H) Not Measured 19.9 (H) 17.2 (H) 14.9 14.2   Diff Method Unknown Automated Method Automated Method Automated Method Automated Method Automated Method Automated Method Automated Method Automated Method Automated Method   % Neutrophils Latest Units: % 51.5 53.6 55.4 53.8 64.9 65.8 66.5 55.3 61.7   % Lymphocytes Latest Units: % 40.3 37.2 37.1 39.1 29.1 25.1 24.7 35.0 30.6   % Monocytes Latest Units: % 6.8 7.3 6.3 6.1 4.8 5.1 4.5 5.2 5.4   % Eosinophils Latest Units: % 0.8 1.3 0.6 0.6 0.8 3.4 3.7 3.8 1.7   % Basophils Latest Units: % 0.3 0.3 0.4 0.2 0.2 0.4 0.4 0.5 0.2   % Immature Granulocytes Latest Units: % 0.3 0.3 0.2 0.2 0.2 0.2 0.2 0.2 0.4   Absolute Neutrophil Latest Ref Range: 1.6 - 8.3 10e9/L 2.0 2.1 2.7 2.8 3.1 3.3 3.4 2.3 3.2   Absolute Lymphocytes Latest Ref Range: 0.8 - 5.3 10e9/L 1.5 1.4 1.8 2.0 1.4 1.2 1.3 1.5 1.6   Absolute Monocytes Latest Ref Range: 0.0 - 1.3 10e9/L 0.3 0.3 0.3 0.3 0.2 0.3 0.2 0.2 0.3   Absolute Eosinophils Latest Ref Range: 0.0 - 0.7 10e9/L 0.0 0.1 0.0 0.0 0.0 0.2 0.2 0.2 0.1   Absolute Basophils Latest Ref Range: 0.0 - 0.2 10e9/L 0.0 0.0 0.0 0.0 0.0 0.0 0.0 0.0 0.0   Abs Immature Granulocytes Latest Ref Range: 0 - 0.4 10e9/L 0.0 0.0 0.0 0.0 0.0 0.0 0.0 0.0 0.0   % Retic Latest Ref Range: 0.5 - 2.0 % 3.0 (H) 3.7 (H) 4.1 (H) 4.9 (H) 5.4 (H) 3.7 (H) 2.7 (H) 2.2 (H) 2.6 (H)   Absolute Retic Latest Ref Range: 25 - 95 10e9/L 82.8 98.5 (H) 109.1 (H) 130.0 (H) 141.5 (H) 96.7 (H) 74.8 57.7 71.1       I reviewed the outside records as well  Received from [facility], [facility][note 3], [place] are 16 stained   slides labeled [id number], collected   03-28-18 now designated [facility]-.  Also received is a copy of the   referring pathologist's report with   patient  "identifying information.     FINAL DIAGNOSIS:   Bone marrow, posterior iliac crest, decalcified trephine biopsy, aspirate   clot, touch imprints, and aspirate   smears ([id number], 03/28/2018):        - Marrow cellularity of 5% (moderately hypocellular for age) in   trephine core sections provided for   review        - Good evidence of trilineage hematopoietic maturation in core and   clot sections provided for review        - Benign appearing lymphoid aggregates in core and clot sections   provided for review        - Negative immunohistochemical stain for Parvovirus in clot section        - Marrow aspirate smears and touch imprints showing trilineage   hematopoietic maturation with no increase   in blasts        - Increased marrow iron stores with 5% sideroblasts and no ringed   sideroblasts.        - Flow cytometric immunophenotyping performed on marrow aspirate   reported by [facility] as   showing, \"No immunophenotypic evidence of acute leukemia or a T-cell   B-cell or Plasma cell neoplasm.\"        - Cytogenetic analysis performed on marrow aspirate reported by   [facility] as showing,   \"46,XY,gonzales(13;14)(q10;q19)?c[20],\" \"most probably constitutional\"        - FISH study performed on marrow aspirate by [facility]   reported as showing, \" Negative FISH   result for cytogenetic abnormalities commonly observed in myeloid   disorders\"        - Blood smear and peripheral blood counts not provided for review        - See Comment     COMMENT:   By separate report ([id number]; 4/18/18), flow cytometric immunophenotyping    performed on blood for this patient   at [facility] laboratory shows a very small percentage of red cells and a   small percentage (less than 10%) of   neutrophils and monocytes with a PNH immunophenotype.       Peripheral blood flow cytometry  SPECIMEN(S):   Blood     INTERPRETATION:   Blood:        Cells with a paroxysmal nocturnal hemoglobinuria (PNH)-type "   immunophenotype represent approximately 0.3%   of erythrocytes, 3.4% of monocytes, and 4.4% of neutrophils, see comment.     COMMENT:   Classic paroxysmal nocturnal hemoglobinuria (PNH) is associated with large    PNH-type clones. PNH in the setting   of another bone marrow failure syndrome usually has small PNH-type clones,    less than 10%. Subclinical PNH   usually has rare PNH-type clones, less than 1%. Detection of a small clone    is not equivalent to a diagnosis of   hemolytic PNH.  Final diagnosis requires correlation with clinical,   morphologic, and ancillary findings.   (Fletcher et al. Blood, 106 (12): [id number], 2005. Fletcher, Hematology,   21-29, 2011.)     RESULTS:   About 0.33% of erythrocytes have decreased expression of CD59 (0.32% type   II, 0.01% type III).   About 3.45% of monocytes have decreased expression of CD14 and binding of   fluorescent aerolysin (FLAER).About   4.44% of neutrophils have decreased expression of CD24 and binding of   FLAER.       Peripheral blood chromosome analysis.  METHODS:   PHA stimulated, MTX synchronized cultures.     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   Each of the metaphase cells analyzed had 45 chromosomes including one   normal chromosome 13, one normal   chromosome 14, and a Robertsonian translocation joining together, at their    centromeres, the long arms of one   chromosome 13 and one chromosome 14.     Robertsonian translocations are one of the most common occurring   constitutional  rearrangements in human   populations. As these translocations are not associated with any loss of   critical genetic material, they are   considered to be balanced, and would not be expected to be associated with    any phenotypic abnormalities in   this patient. However, during meiosis, the translocation can segregate   into gametes several  "different ways,   resulting, after fertilization, in zygotes that have trisomy or monosomy   for chromosomes 13 or 14. These   trisomies and/or monosomies can result in miscarriage or liveborn   offspring with multiple congenital   anomalies. Thus, carriers of such translocations are at increased   reproductive risk.     Genetic counseling regarding these results, and cytogenetic evaluation of   other family members who may carry   the translocation are recommended.     ASSESSMENT/PLAN:      Pancytopenia. At this time the cause is not entirely known but it could be possibly toxic reaction to the drug glimepiride. Otherwise his bone marrow biopsy was nondiagnostic. Peripheral blood and bone marrow Flow cytometry were negative.  Cytogenetic analysis performed on marrow aspirate showed 46,XY,gonzales(13;14)(q10;q19)?c[20], \"most probably constitutional\"        - FISH study performed on marrow aspirate showed \" Negative FISH result for cytogenetic abnormalities commonly observed in myeloid disorders\"     Overall he is doing well. White blood cell count have normalized. Hemoglobin has significantly improved and is now 11. Continue folic acid.    Thrombocytopenia. He still has significant thrombocytopenia. Platelets are 46 today they have kind of plateaued over the last couple of months. He denies any spontaneous bleeding although he has easy bruising but he tells me that easy bruising has been going on for a couple of years. This has not changed recently. WENDY screen was negative. Because his platelets have not normalized I would like to screen him for hepatitis B and C as well as HIV and check abdominal ultrasound to look for liver and spleen size.    Vitamin B12 deficiency with negative anti-parietal cell antibody and anti-intrinsic factor antibody. Continue B12    Chromosomal abnormality. 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  We did a peripheral blood " chromosome analysis which confirms these findings. 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 I will refer him to genetic counselor.    He was asking if he can get a shingles shot. He never has had issues with shingles. I told him that at this time I would prefer holding off as I am not 100% certain about the etiology of his hematological problem. He understands that    We did not address the following today  Small PNH clone. Peripheral blood flow cytometry for PNH reveals a very small percentage of red cells and a small percentage (less than 10%) of neutrophils and monocytes with a PNH immunophenotype, the significance of which is not known. Will consider repeating it in the future.     He will get his labs checked every month    I will see him back in 2 months    I answered all of his questions to his satisfaction. He is agreeable and comfortable with the plan.  Terra Salazar

## 2018-07-17 NOTE — MR AVS SNAPSHOT
After Visit Summary   7/17/2018    Prakash Cramer    MRN: [id number]           Patient Information     Date Of Birth          1940        Visit Information        Provider Department      7/17/2018 3:15 PM Terra Salazar MD [facility]        Today's Diagnoses     Pancytopenia (H)    -  1    Thrombocytopenia (H)        Encounter for screening for other viral diseases (CODE)         Abnormal genetic test          Care Instructions    Labs every month    Schedule US abdomen    See me back in 2 months    Refer to Genetics              Follow-ups after your visit        Additional Services     GENETICS REFERRAL       Your provider has referred you to: [facility]: Adult Genetics Clinic [facility] [phone]   [email or web address]  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     Please be aware that coverage of these services is subject to the terms and limitations of your health insurance plan.  Call member services at your health plan with any benefit or coverage questions.      Please bring the following with you to your appointment:    (1) Any X-Rays, CTs or MRIs which have been performed.  Contact the facility where they were done to arrange for  prior to your scheduled appointment.   (2) List of current medications   (3) This referral request   (4) Any documents/labs given to you for this referral                  Your next 10 appointments already scheduled     Jul 19, 2018 10:00 AM CDT   US ABDOMEN LIMITED with MGUS1, MG [facility] ([facility])    [id number] [place] [id number] [phone]           Please bring a list of your medicines (including vitamins, minerals and over-the-counter drugs). Also, tell your doctor about any allergies you may have. Wear comfortable clothes and leave your valuables at home.  Adults: No eating, smoking, gum chewing or  drinking for 8 hours before the exam. You may take medicine with a small sip of water.  Children: - Infants, breast-fed: may have breast milk up to 2 hours before exam. - Infants, formula: may have bottle until 4 hours before exam. - Children 1-5 years: No food or drink for 4 hours before exam. - Children 6 -12 years: No food or drink for 6 hours before exam. - Children over 12 years: No food or drink for 8 hours before exam. - J Tube Fed: No need to stop feedings.  Please call the Imaging Department at your exam site with any questions.            Aug 21, 2018 10:00 AM CDT   LAB with LAB ONC [facility] ([facility])    [address] [place] [id number] [phone]           Please do not eat 10-12 hours before your appointment if you are coming in fasting for labs on lipids, cholesterol, or glucose (sugar). This does not apply to pregnant women. Water, hot tea and black coffee (with nothing added) are okay. Do not drink other fluids, diet soda or chew gum.            Sep 18, 2018  1:30 PM CDT   LAB with LAB ONC [facility] ([facility])    [address] [place] [id number] [phone]           Please do not eat 10-12 hours before your appointment if you are coming in fasting for labs on lipids, cholesterol, or glucose (sugar). This does not apply to pregnant women. Water, hot tea and black coffee (with nothing added) are okay. Do not drink other fluids, diet soda or chew gum.            Sep 18, 2018  2:15 PM CDT   Return Visit with Terra Salazar MD   [facility] ([facility])    [address] [facility] [id number] [phone]            Oct 03, 2018 12:00 PM CDT   New Visit with Katie Vo GC   [facility])    [address] [facility] [id number] [phone]              Future tests that  were ordered for you today     Open Standing Orders        Priority Remaining Interval Expires Ordered    *CBC with platelets differential Routine 2/2 1 month 7/17/2019 7/17/2018    Reticulocyte count Routine 2/2 1 month 7/17/2019 7/17/2018    Comprehensive metabolic panel Routine 2/2 1 month 7/17/2019 7/17/2018    Lactate Dehydrogenase Routine 2/2 1 month 7/17/2019 7/17/2018          Open Future Orders        Priority Expected Expires Ordered    US Abdomen Limited Routine  7/17/2019 7/17/2018    Hepatitis C antibody Routine  7/17/2019 7/17/2018    Hepatitis B surface antigen Routine  7/17/2019 7/17/2018    HIV Antigen Antibody Combo Routine  7/17/2019 7/17/2018    Hepatitis B Surface Antibody Routine  7/17/2019 7/17/2018            Who to contact     If you have questions or need follow up information about today's clinic visit or your schedule please contact [facility] directly at [phone].  Normal or non-critical lab and imaging results will be communicated to you by H[employer] M[employer], letter or phone within 4 business days after the clinic has received the results. If you do not hear from us within 7 days, please contact the clinic through Teamly or phone. If you have a critical or abnormal lab result, we will notify you by phone as soon as possible.  Submit refill requests through Teamly or call your pharmacy and they will forward the refill request to us. Please allow 3 business days for your refill to be completed.          Additional Information About Your Visit        HIGH MOBILITY[employer] Information     Teamly gives you secure access to your electronic health record. If you see a primary care provider, you can also send messages to your care team and make appointments. If you have questions, please call your primary care clinic.  If you do not have a primary care provider, please call [phone] and they will assist you.      Teamly is an electronic gateway that provides easy, online access to your  "medical records. With [employer]ou, you can request a clinic appointment, read your test results, renew a prescription or communicate with your care team.     To access your existing account, please contact your [facility] or call [phone] for assistance.        Care EveryWhere ID     This is your Care EveryWhere ID. This could be used by other organizations to access your [facility] medical records  [id number]        Your Vitals Were     Pulse Temperature Respirations Height Pulse Oximetry BMI (Body Mass Index)    89 98.6  F (37  C) 16 1.702 m (5' 7\") 99% 24.9 kg/m2       Blood Pressure from Last 3 Encounters:   07/17/18 137/73   06/01/18 116/70   05/08/18 118/71    Weight from Last 3 Encounters:   07/17/18 72.1 kg (159 lb)   06/01/18 74.4 kg (164 lb)   05/08/18 74.8 kg (165 lb)              We Performed the Following     GENETICS REFERRAL        Primary Care Provider Office Phone # Fax #    Zhou Meraz [phone]       [address] [place] [address]        Equal Access to Services     [facility]: Hadii aad ku hadasho Soomaali, waaxda luqadaha, qaybta kaalmada adeegyada, ying avalosn annette bartlett . So [facility] [phone].    ATENCIÓN: Si habla español, tiene a stroud disposición servicios gratuitos de asistencia lingüística. Llame al [phone].    We comply with applicable federal civil rights laws and [place] laws. We do not discriminate on the basis of race, color, national origin, age, disability, sex, sexual orientation, or gender identity.            Thank you!     Thank you for choosing [facility]  for your care. Our goal is always to provide you with excellent care. Hearing back from our patients is one way we can continue to improve our services. Please take a few minutes to complete the written survey that you may receive in the mail after your visit with us. Thank you!             Your Updated Medication List - " Protect others around you: Learn how to safely use, store and throw away your medicines at www.disposemymeds.org.          This list is accurate as of 7/17/18  3:32 PM.  Always use your most recent med list.                   Brand Name Dispense Instructions for use Diagnosis    amLODIPine 10 MG tablet    NORVASC    90 tablet    Take 1 tablet (10 mg) by mouth daily    Essential hypertension       aspirin 81 MG tablet     30 tablet    Take by mouth daily    Type 2 diabetes mellitus with diabetic nephropathy, without long-term current use of insulin (H)       B-12 1000 MCG Tbcr     100 tablet    Take 1,000 mcg by mouth daily    Vitamin B12 deficiency (non anemic), Pancytopenia (H)       * blood glucose monitoring lancets     1 Box    Use to test blood sugar 1 time daily or as directed.    Type 2 diabetes mellitus with diabetic nephropathy, without long-term current use of insulin (H)       * blood glucose monitoring lancets     102 each    Use to test blood sugar 1 times daily or as directed.    Type 2 diabetes mellitus with diabetic nephropathy, without long-term current use of insulin (H)       Blood Glucose Monitoring Suppl Ami     1 each    1 Device daily Smartview Meter    Type 2 diabetes mellitus with diabetic nephropathy, without long-term current use of insulin (H)       blood glucose monitoring test strip    ACCU-CHEK SMARTVIEW    100 strip    Use to test blood sugar 1 times daily or as directed.    Type 2 diabetes mellitus with diabetic nephropathy, without long-term current use of insulin (H)       levothyroxine 75 MCG tablet    SYNTHROID/LEVOTHROID    90 tablet    Take 1 tablet (75 mcg) by mouth daily    Hypothyroidism, unspecified type       metFORMIN 1000 MG tablet    GLUCOPHAGE    180 tablet    1 tab twice a day    Type 2 diabetes mellitus with diabetic nephropathy, without long-term current use of insulin (H)       omeprazole 20 MG CR capsule    priLOSEC    90 capsule    TAKE ONE CAPSULE BY MOUTH ONCE  DAILY IN THE MORNING 30-60  MINUTES  BEFORE  FIRST  DAILY  MEAL    Gastroesophageal reflux disease without esophagitis       rosuvastatin 40 MG tablet    CRESTOR    45 tablet    TAKE ONE-HALF TABLET BY MOUTH AT BEDTIME    Hyperlipidemia LDL goal <100       sitagliptin 100 MG tablet    JANUVIA    90 tablet    Take 1 tablet (100 mg) by mouth daily    Type 2 diabetes mellitus with diabetic nephropathy, without long-term current use of insulin (H)       * Notice:  This list has 2 medication(s) that are the same as other medications prescribed for you. Read the directions carefully, and ask your doctor or other care provider to review them with you.

## 2018-07-19 ENCOUNTER — RADIANT APPOINTMENT (OUTPATIENT)
Dept: ULTRASOUND IMAGING | Facility: CLINIC | Age: 78
End: 2018-07-19
Attending: INTERNAL MEDICINE
Payer: MEDICARE

## 2018-07-19 DIAGNOSIS — D69.6 THROMBOCYTOPENIA (H): ICD-10-CM

## 2018-07-19 DIAGNOSIS — D61.818 PANCYTOPENIA (H): ICD-10-CM

## 2018-07-19 DIAGNOSIS — Z11.59 ENCOUNTER FOR SCREENING FOR OTHER VIRAL DISEASES (CODE): ICD-10-CM

## 2018-07-19 PROCEDURE — 76700 US EXAM ABDOM COMPLETE: CPT | Performed by: STUDENT IN AN ORGANIZED HEALTH CARE EDUCATION/TRAINING PROGRAM

## 2018-07-21 ENCOUNTER — HEALTH MAINTENANCE LETTER (OUTPATIENT)
Age: 78
End: 2018-07-21

## 2018-07-24 ENCOUNTER — DOCUMENTATION ONLY (OUTPATIENT)
Dept: LAB | Facility: CLINIC | Age: 78
End: 2018-07-24

## 2018-07-24 DIAGNOSIS — E03.9 HYPOTHYROIDISM, UNSPECIFIED TYPE: ICD-10-CM

## 2018-07-24 DIAGNOSIS — E11.21 TYPE 2 DIABETES MELLITUS WITH DIABETIC NEPHROPATHY, WITHOUT LONG-TERM CURRENT USE OF INSULIN (H): ICD-10-CM

## 2018-07-24 LAB
ALBUMIN SERPL-MCNC: 3.6 G/DL (ref 3.4–5)
ALP SERPL-CCNC: 92 U/L (ref 40–150)
ALT SERPL W P-5'-P-CCNC: 48 U/L (ref 0–70)
ANION GAP SERPL CALCULATED.3IONS-SCNC: 8 MMOL/L (ref 3–14)
AST SERPL W P-5'-P-CCNC: 27 U/L (ref 0–45)
BILIRUB SERPL-MCNC: 0.7 MG/DL (ref 0.2–1.3)
BUN SERPL-MCNC: 22 MG/DL (ref 7–30)
CALCIUM SERPL-MCNC: 8.6 MG/DL (ref 8.5–10.1)
CHLORIDE SERPL-SCNC: 102 MMOL/L (ref 94–109)
CHOLEST SERPL-MCNC: 111 MG/DL
CO2 SERPL-SCNC: 27 MMOL/L (ref 20–32)
CREAT SERPL-MCNC: 1.14 MG/DL (ref 0.66–1.25)
GFR SERPL CREATININE-BSD FRML MDRD: 62 ML/MIN/1.7M2
GLUCOSE SERPL-MCNC: 197 MG/DL (ref 70–99)
HBA1C MFR BLD: 6.3 % (ref 0–5.6)
HDLC SERPL-MCNC: 56 MG/DL
LDLC SERPL CALC-MCNC: 17 MG/DL
NONHDLC SERPL-MCNC: 55 MG/DL
POTASSIUM SERPL-SCNC: 4.5 MMOL/L (ref 3.4–5.3)
PROT SERPL-MCNC: 7.1 G/DL (ref 6.8–8.8)
SODIUM SERPL-SCNC: 137 MMOL/L (ref 133–144)
TRIGL SERPL-MCNC: 190 MG/DL
TSH SERPL DL<=0.005 MIU/L-ACNC: 2.26 MU/L (ref 0.4–4)

## 2018-07-24 PROCEDURE — 80061 LIPID PANEL: CPT | Performed by: INTERNAL MEDICINE

## 2018-07-24 PROCEDURE — 83036 HEMOGLOBIN GLYCOSYLATED A1C: CPT | Performed by: INTERNAL MEDICINE

## 2018-07-24 PROCEDURE — 80053 COMPREHEN METABOLIC PANEL: CPT | Performed by: INTERNAL MEDICINE

## 2018-07-24 PROCEDURE — 84443 ASSAY THYROID STIM HORMONE: CPT | Performed by: INTERNAL MEDICINE

## 2018-07-24 PROCEDURE — 36415 COLL VENOUS BLD VENIPUNCTURE: CPT | Performed by: INTERNAL MEDICINE

## 2018-07-25 NOTE — PROGRESS NOTES
"Hello,    We didn't receive a urine sample for a UMALBR in lab today from Prakash so the order was canceled and reordered as \"future.\"  "

## 2018-07-30 DIAGNOSIS — E03.9 HYPOTHYROIDISM, UNSPECIFIED TYPE: ICD-10-CM

## 2018-07-30 NOTE — TELEPHONE ENCOUNTER
Reason for Call:  Medication or medication refill:    Do you use a [facility]?  Name of the pharmacy and phone number for the current request:  [facility]    Name of the medication requested: levothyroxine (SYNTHROID/LEVOTHROID) 75 MCG tablet    Other request:     Can we leave a detailed message on this number? YES    Phone number patient can be reached at: Home number on file [phone] (home)    Best Time:     Call taken on 7/30/2018 at 9:39 AM by YANNICK DE JESUS

## 2018-07-31 RX ORDER — LEVOTHYROXINE SODIUM 75 UG/1
75 TABLET ORAL DAILY
Qty: 90 TABLET | Refills: 2 | Status: SHIPPED | OUTPATIENT
Start: 2018-07-31 | End: 2018-12-19

## 2018-08-21 DIAGNOSIS — D61.818 PANCYTOPENIA (H): ICD-10-CM

## 2018-08-21 DIAGNOSIS — E11.21 TYPE 2 DIABETES MELLITUS WITH DIABETIC NEPHROPATHY, WITHOUT LONG-TERM CURRENT USE OF INSULIN (H): ICD-10-CM

## 2018-08-21 DIAGNOSIS — E03.9 HYPOTHYROIDISM, UNSPECIFIED TYPE: ICD-10-CM

## 2018-08-21 DIAGNOSIS — Z11.59 ENCOUNTER FOR SCREENING FOR OTHER VIRAL DISEASES (CODE): ICD-10-CM

## 2018-08-21 DIAGNOSIS — D69.6 THROMBOCYTOPENIA (H): ICD-10-CM

## 2018-08-21 LAB
ALBUMIN SERPL-MCNC: 3.5 G/DL (ref 3.4–5)
ALP SERPL-CCNC: 87 U/L (ref 40–150)
ALT SERPL W P-5'-P-CCNC: 38 U/L (ref 0–70)
ANION GAP SERPL CALCULATED.3IONS-SCNC: 8 MMOL/L (ref 3–14)
AST SERPL W P-5'-P-CCNC: 31 U/L (ref 0–45)
BASOPHILS # BLD AUTO: 0 10E9/L (ref 0–0.2)
BASOPHILS NFR BLD AUTO: 0.2 %
BILIRUB SERPL-MCNC: 0.8 MG/DL (ref 0.2–1.3)
BUN SERPL-MCNC: 20 MG/DL (ref 7–30)
CALCIUM SERPL-MCNC: 8.5 MG/DL (ref 8.5–10.1)
CHLORIDE SERPL-SCNC: 103 MMOL/L (ref 94–109)
CO2 SERPL-SCNC: 27 MMOL/L (ref 20–32)
CREAT SERPL-MCNC: 1.07 MG/DL (ref 0.66–1.25)
CREAT UR-MCNC: 152 MG/DL
DIFFERENTIAL METHOD BLD: ABNORMAL
EOSINOPHIL # BLD AUTO: 0.1 10E9/L (ref 0–0.7)
EOSINOPHIL NFR BLD AUTO: 2.2 %
ERYTHROCYTE [DISTWIDTH] IN BLOOD BY AUTOMATED COUNT: 14.2 % (ref 10–15)
GFR SERPL CREATININE-BSD FRML MDRD: 67 ML/MIN/1.7M2
GLUCOSE SERPL-MCNC: 220 MG/DL (ref 70–99)
HCT VFR BLD AUTO: 30.1 % (ref 40–53)
HGB BLD-MCNC: 10.1 G/DL (ref 13.3–17.7)
IMM GRANULOCYTES # BLD: 0 10E9/L (ref 0–0.4)
IMM GRANULOCYTES NFR BLD: 0.2 %
LDH SERPL L TO P-CCNC: 219 U/L (ref 85–227)
LYMPHOCYTES # BLD AUTO: 1.3 10E9/L (ref 0.8–5.3)
LYMPHOCYTES NFR BLD AUTO: 27.5 %
MCH RBC QN AUTO: 38.4 PG (ref 26.5–33)
MCHC RBC AUTO-ENTMCNC: 33.6 G/DL (ref 31.5–36.5)
MCV RBC AUTO: 114 FL (ref 78–100)
MICROALBUMIN UR-MCNC: 26 MG/L
MICROALBUMIN/CREAT UR: 17.37 MG/G CR (ref 0–17)
MONOCYTES # BLD AUTO: 0.3 10E9/L (ref 0–1.3)
MONOCYTES NFR BLD AUTO: 6.1 %
NEUTROPHILS # BLD AUTO: 3 10E9/L (ref 1.6–8.3)
NEUTROPHILS NFR BLD AUTO: 63.8 %
PLATELET # BLD AUTO: 51 10E9/L (ref 150–450)
POTASSIUM SERPL-SCNC: 4.7 MMOL/L (ref 3.4–5.3)
PROT SERPL-MCNC: 7 G/DL (ref 6.8–8.8)
RBC # BLD AUTO: 2.63 10E12/L (ref 4.4–5.9)
RETICS # AUTO: 69.2 10E9/L (ref 25–95)
RETICS/RBC NFR AUTO: 2.6 % (ref 0.5–2)
SODIUM SERPL-SCNC: 138 MMOL/L (ref 133–144)
WBC # BLD AUTO: 4.6 10E9/L (ref 4–11)

## 2018-08-21 PROCEDURE — 82043 UR ALBUMIN QUANTITATIVE: CPT | Performed by: INTERNAL MEDICINE

## 2018-08-21 PROCEDURE — 85045 AUTOMATED RETICULOCYTE COUNT: CPT | Performed by: INTERNAL MEDICINE

## 2018-08-21 PROCEDURE — 86706 HEP B SURFACE ANTIBODY: CPT | Performed by: INTERNAL MEDICINE

## 2018-08-21 PROCEDURE — G0499 HEPB SCREEN HIGH RISK INDIV: HCPCS | Performed by: INTERNAL MEDICINE

## 2018-08-21 PROCEDURE — 83615 LACTATE (LD) (LDH) ENZYME: CPT | Performed by: INTERNAL MEDICINE

## 2018-08-21 PROCEDURE — 87389 HIV-1 AG W/HIV-1&-2 AB AG IA: CPT | Performed by: INTERNAL MEDICINE

## 2018-08-21 PROCEDURE — 86803 HEPATITIS C AB TEST: CPT | Performed by: INTERNAL MEDICINE

## 2018-08-21 PROCEDURE — 85025 COMPLETE CBC W/AUTO DIFF WBC: CPT | Performed by: INTERNAL MEDICINE

## 2018-08-21 PROCEDURE — 80053 COMPREHEN METABOLIC PANEL: CPT | Performed by: INTERNAL MEDICINE

## 2018-08-21 PROCEDURE — 36415 COLL VENOUS BLD VENIPUNCTURE: CPT | Performed by: INTERNAL MEDICINE

## 2018-08-22 LAB
HBV SURFACE AB SERPL IA-ACNC: 0.4 M[IU]/ML
HBV SURFACE AG SERPL QL IA: NONREACTIVE
HCV AB SERPL QL IA: NONREACTIVE
HIV 1+2 AB+HIV1 P24 AG SERPL QL IA: NONREACTIVE

## 2018-09-18 ENCOUNTER — ONCOLOGY VISIT (OUTPATIENT)
Dept: ONCOLOGY | Facility: CLINIC | Age: 78
End: 2018-09-18
Payer: MEDICARE

## 2018-09-18 VITALS
RESPIRATION RATE: 18 BRPM | DIASTOLIC BLOOD PRESSURE: 78 MMHG | BODY MASS INDEX: 25.9 KG/M2 | OXYGEN SATURATION: 98 % | SYSTOLIC BLOOD PRESSURE: 130 MMHG | WEIGHT: 165 LBS | HEART RATE: 73 BPM | TEMPERATURE: 98.7 F | HEIGHT: 67 IN

## 2018-09-18 DIAGNOSIS — D61.818 PANCYTOPENIA (H): Primary | ICD-10-CM

## 2018-09-18 DIAGNOSIS — D64.9 ANEMIA, UNSPECIFIED TYPE: ICD-10-CM

## 2018-09-18 DIAGNOSIS — E53.8 VITAMIN B12 DEFICIENCY (NON ANEMIC): ICD-10-CM

## 2018-09-18 DIAGNOSIS — D61.818 PANCYTOPENIA (H): ICD-10-CM

## 2018-09-18 LAB
BASOPHILS # BLD AUTO: 0 10E9/L (ref 0–0.2)
BASOPHILS NFR BLD AUTO: 0.4 %
DIFFERENTIAL METHOD BLD: ABNORMAL
EOSINOPHIL # BLD AUTO: 0.2 10E9/L (ref 0–0.7)
EOSINOPHIL NFR BLD AUTO: 3.4 %
ERYTHROCYTE [DISTWIDTH] IN BLOOD BY AUTOMATED COUNT: 14.6 % (ref 10–15)
FERRITIN SERPL-MCNC: 100 NG/ML (ref 26–388)
FOLATE SERPL-MCNC: 89.9 NG/ML
HCT VFR BLD AUTO: 30.3 % (ref 40–53)
HGB BLD-MCNC: 10.1 G/DL (ref 13.3–17.7)
IMM GRANULOCYTES # BLD: 0 10E9/L (ref 0–0.4)
IMM GRANULOCYTES NFR BLD: 0.2 %
IRON SATN MFR SERPL: 38 % (ref 15–46)
IRON SERPL-MCNC: 95 UG/DL (ref 35–180)
LDH SERPL L TO P-CCNC: 176 U/L (ref 85–227)
LYMPHOCYTES # BLD AUTO: 1.8 10E9/L (ref 0.8–5.3)
LYMPHOCYTES NFR BLD AUTO: 32.3 %
MCH RBC QN AUTO: 38.1 PG (ref 26.5–33)
MCHC RBC AUTO-ENTMCNC: 33.3 G/DL (ref 31.5–36.5)
MCV RBC AUTO: 114 FL (ref 78–100)
MONOCYTES # BLD AUTO: 0.3 10E9/L (ref 0–1.3)
MONOCYTES NFR BLD AUTO: 5 %
NEUTROPHILS # BLD AUTO: 3.3 10E9/L (ref 1.6–8.3)
NEUTROPHILS NFR BLD AUTO: 58.7 %
PLATELET # BLD AUTO: 57 10E9/L (ref 150–450)
RBC # BLD AUTO: 2.65 10E12/L (ref 4.4–5.9)
TIBC SERPL-MCNC: 253 UG/DL (ref 240–430)
VIT B12 SERPL-MCNC: 406 PG/ML (ref 193–986)
WBC # BLD AUTO: 5.6 10E9/L (ref 4–11)

## 2018-09-18 PROCEDURE — 40001003 ZZHCL STATISTIC FLOW INT 2-8 ABY TC 88187: Performed by: INTERNAL MEDICINE

## 2018-09-18 PROCEDURE — 88184 FLOWCYTOMETRY/ TC 1 MARKER: CPT | Performed by: INTERNAL MEDICINE

## 2018-09-18 PROCEDURE — 99000 SPECIMEN HANDLING OFFICE-LAB: CPT | Performed by: INTERNAL MEDICINE

## 2018-09-18 PROCEDURE — 82728 ASSAY OF FERRITIN: CPT | Performed by: INTERNAL MEDICINE

## 2018-09-18 PROCEDURE — 36415 COLL VENOUS BLD VENIPUNCTURE: CPT | Performed by: INTERNAL MEDICINE

## 2018-09-18 PROCEDURE — 82607 VITAMIN B-12: CPT | Performed by: INTERNAL MEDICINE

## 2018-09-18 PROCEDURE — 82746 ASSAY OF FOLIC ACID SERUM: CPT | Performed by: INTERNAL MEDICINE

## 2018-09-18 PROCEDURE — 88185 FLOWCYTOMETRY/TC ADD-ON: CPT | Performed by: INTERNAL MEDICINE

## 2018-09-18 PROCEDURE — 82668 ASSAY OF ERYTHROPOIETIN: CPT | Mod: 90 | Performed by: INTERNAL MEDICINE

## 2018-09-18 PROCEDURE — 83615 LACTATE (LD) (LDH) ENZYME: CPT | Performed by: INTERNAL MEDICINE

## 2018-09-18 PROCEDURE — 99214 OFFICE O/P EST MOD 30 MIN: CPT | Performed by: INTERNAL MEDICINE

## 2018-09-18 PROCEDURE — 85025 COMPLETE CBC W/AUTO DIFF WBC: CPT | Performed by: INTERNAL MEDICINE

## 2018-09-18 PROCEDURE — 83010 ASSAY OF HAPTOGLOBIN QUANT: CPT | Performed by: INTERNAL MEDICINE

## 2018-09-18 PROCEDURE — 83550 IRON BINDING TEST: CPT | Performed by: INTERNAL MEDICINE

## 2018-09-18 PROCEDURE — 83540 ASSAY OF IRON: CPT | Performed by: INTERNAL MEDICINE

## 2018-09-18 ASSESSMENT — PAIN SCALES - GENERAL: PAINLEVEL: NO PAIN (0)

## 2018-09-18 NOTE — LETTER
9/18/2018         RE: Prakash Cramer  [address]  [facility] [address]        Dear Colleague,    Thank you for referring your patient, Prakash Cramer, to the [facility]. Please see a copy of my visit note below.    Hematology follow up visit:  Date on this visit: 9/18/2018     CC   Pancytopenia    Primary Physician: Zhou Meraz     History Of Present Illness:    Please see previous note for details. I have copied and updated from prior note.  Mr. Cramer is a 78 year old male who was found to have pancytopenia around the end of March 2018 when he presented with a white blood cell count of 1.5 and hemoglobin of 7.8 and platelets of 4. He was admitted to the hospital in [place]. There was no bruising or petechiae or evidence of bleeding. He was feeling fatigued and at that point he was also noticed to have C diff infection and was started on p.o. vancomycin. He was given Neupogen ×1 and packed red distant transfusion ×2 and platelet transfusion ×1. He was also given vitamin B12 injections and I believe it was 3 injections which she was given during the hospitalization. He had a bone marrow biopsy done as part of his workup which showed mildly hypocellular bone marrow with 10-40% cellularity with erythroid hyperplasia and mild dyserythropoiesis (5% cellularity, moderately decreased for age as per pathology review at the [facility]). Reactive plasmacytosis and maturing trilineage hematopoiesis. There was no significant dysplasia in the granulocytes or megakaryocytes. Parvovirus immunohistochemistry was negative. These findings were deemed consistent with either evolving aplastic anemia or drug/toxin effect or vitamin/nutritional deficiency or infectious or viral and hypocellular MDS.   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 This finding is most probably constitutional in origin and not acquired; however it it is medically warranted a peripheral blood chromosome analysis couldn't be performed to rule out an acquired abnormality. If it is constitutional genetic counseling would be medically indicated    Comprehensive metabolic panel was unremarkable.   His vitamin B12 level was less than 159. Antiparietal cell antibody and anti-intrinsic factor antibody were negative. Iron studies were unremarkable. CMV IgM was negative. WENDY screen was negative. Rheumatoid factor and anti-CCP were negative. SPEP was negative.  After thorough investigation it was found that potentially glimepiride (sulfonylurea) which he was taking could be the culprit for pancytopenia so he was taken off it.  Upon discharge from the hospital on 3/31/2018 his white blood cell count was 2.4 and hemoglobin was 7.8 and platelet 12. On 4/5/2018 the WBC count was 2.9 with ANC of 1.2 and hemoglobin 7.9 and platelets 75.  On 4/13/2018 white blood cell count was 3.3, ANC 1.3, hemoglobin 7 and platelets 13. At that point he was given 2 units of packed red blood cells and 1 unit of platelets  Most recently on 4/16/2018 white blood cell count was 3.2, ANC 1.1, hemoglobin 10 and platelets 20.    When he saw me at it further testing and KIMBERLEE was negative.  Peripheral blood flow cytometry for PNH reveals a very small percentage of red cells and a small percentage (less than 10%) of neutrophils and monocytes with a PNH immunophenotype, the significance of which is not known.  A repeat vitamin B12 level was 443. I told him to start taking vitamin B12 1000  g daily.  We kept him on observation and his counts improved with normalization of the WBC count and improvement is Hg. His platelets although were better but plataued around 40s, so we did further w/up and recent WENDY, Hep B, C and HIV were negative. Spleen size was normal. Liver was borderline  enlarged.    He otherwise was doing well so we have been monitoring him with serial blood work    Overall he feels well.  He denies any interval infections.  Energy has been a stable.  Denies any significant bleeding.  He thinks that the superficial skin bruising is less but he is also doing less of yard work these days.  No B symptoms.  He has been able to eat and drink well.  No GI problems.  No trouble breathing.  No new swellings.  He continues to take vitamin B12 and folic acid.  Currently denies pain.  He is planning to go back to [place] for 3-1/2 months around end of December and he has an appointment with the oncologist around mid of January 2019.      ROS:  Rest of the comprehensive ROS was essentially unremarkable       I reviewed other history in EPIC as below.    Past Medical/Surgical History:  Past Medical History:   Diagnosis Date     Aplastic anemia (H) 03/26/2018    thought secondary to reaction to Septra     BPH (benign prostatic hypertrophy)     failed  Flomax     C. difficile diarrhea 03/26/2018    treated with Flagyl     Cellulitis and abscess of leg, except foot 2004     Contact dermatitis and other eczema, due to unspecified cause      Diaphragmatic hernia without mention of obstruction or gangrene     hiatal hernia     Esophageal reflux      Hyperlipidemia LDL goal <100 3/11/2005     Hypothyroidism      Impotence of organic origin      Meningococcal encephalitis      Proteinuria      RBBB      Type 2 diabetes mellitus with diabetic nephropathy  (goal A1C<7) 10/24/2015     Unspecified essential hypertension      Past Surgical History:   Procedure Laterality Date     ARTHROPLASTY KNEE Left 3/18/2015    Procedure: ARTHROPLASTY KNEE;  Surgeon: Abebe Schroeder MD;  Location: [facility] OR     ARTHROPLASTY KNEE Right 3/14/2016    Procedure: ARTHROPLASTY KNEE;  Surgeon: Abebe Schroeder MD;  Location:  OR     C NONSPECIFIC PROCEDURE  10/02    left knee meniscus tear repair     HC LAPAROSCOPY, SURGICAL;  "CHOLECYSTECTOMY  1998    Cholecystectomy, Laparoscopic     HC REMOVE TONSILS/ADENOIDS,13+ Y/O  1963    T & A 13+y.o.     Allergies:  Allergies as of 09/18/2018 - [place] as Reviewed 09/18/2018   Allergen Reaction Noted     Atorvastatin calcium  01/30/2008     Diovan [valsartan]  01/24/2011     Glimepiride  05/08/2018     Invokana [canagliflozin]  06/01/2018     Mold Other (See Comments) 07/14/2011     Penicillins  09/07/2011     Seasonal allergies  06/25/2007    glimepiride possibly causing pancytopenia  Current Medications:  Current Outpatient Prescriptions   Medication Sig Dispense Refill     amLODIPine (NORVASC) 10 MG tablet Take 1 tablet (10 mg) by mouth daily 90 tablet 3     aspirin 81 MG tablet Take by mouth daily 30 tablet      blood glucose monitoring (ACCU-CHEK FASTCLIX) lancets Use to test blood sugar 1 times daily or as directed. 102 each 11     blood glucose monitoring (ACCU-CHEK MULTICLIX) lancets Use to test blood sugar 1 time daily or as directed. 1 Box 11     blood glucose monitoring (ACCU-CHEK SMARTVIEW) test strip Use to test blood sugar 1 times daily or as directed. 100 strip 11     Blood Glucose Monitoring Suppl MAGDALENO 1 Device daily Smartview Meter 1 each 0     Cyanocobalamin (B-12) 1000 MCG TBCR Take 1,000 mcg by mouth daily 100 tablet 11     levothyroxine (SYNTHROID/LEVOTHROID) 75 MCG tablet Take 1 tablet (75 mcg) by mouth daily 90 tablet 2     metFORMIN (GLUCOPHAGE) 1000 MG tablet 1 tab twice a day 180 tablet 3     omeprazole (PRILOSEC) 20 MG CR capsule TAKE ONE CAPSULE BY MOUTH ONCE DAILY IN THE MORNING 30-60  MINUTES  BEFORE  FIRST  DAILY  MEAL 90 capsule 3     rosuvastatin (CRESTOR) 40 MG tablet TAKE ONE-HALF TABLET BY MOUTH AT BEDTIME 45 tablet 2     sitagliptin (JANUVIA) 100 MG tablet Take 1 tablet (100 mg) by mouth daily 90 tablet 3      Family History:  Family History   Problem Relation Age of Onset     Family History Negative Mother      d:age 89 of \"old age\"     GASTROINTESTINAL DISEASE " "Father      d:age 79 liver failure after taking excessive amounts of Tylenol over 5-6 yrs     Neurologic Disorder Brother      b:1932  hx cerebral aneurysm age 59     No family history of bleeding or clotting disorder. Maternal uncle had throat cancer.  Social History:  Social History     Social History     Marital status:      Spouse name: N/A     Number of children: 2     Years of education: N/A     Occupational History     teacher [employer]     Social History Main Topics     Smoking status: Never Smoker     Smokeless tobacco: Never Used     Alcohol use Yes      Comment: beer 4-5 a month     Drug use: No     Sexual activity: Yes     Partners: Female     Other Topics Concern     Special Diet Yes     diabetic diet      Exercise No     nothing regular since bilateral knee surgeries      Social History Narrative    denies any smoking. No alcohol. He usually lives in [place] and spends winter in [place]. He lives alone.  Physical Exam:  /78  Pulse 73  Temp 98.7  F (37.1  C)  Resp 18  Ht 1.702 m (5' 7\")  Wt 74.8 kg (165 lb)  SpO2 98%  BMI 25.84 kg/m2  CONSTITUTIONAL: No apparent distress  EYES: PERRLA, without pallor or jaundice  ENT/MOUTH: Ears unremarkable. No oral lesions  CVS: s1s2 normal  RESPIRATORY: Chest is clear  GI: Abdomen is benign  NEURO: Alert and oriented ×3  INTEGUMENT: no concerning skin rashes.  I did not notice any significant bruising today  LYMPHATIC: no palpable lymphadenopathy  MUSCULOSKELETAL: Unremarkable. No bony tenderness.   EXTREMITIES: no pedal edema  PSYCH: Mentation, mood and affect are appropriate          Laboratory/Imaging Studies  Results for NEISHA FRANCOIS (MRN [id number]) as of 9/18/2018 14:33   Ref. Range 7/10/2018 10:09 7/17/2018 14:23 8/21/2018 09:55 9/18/2018 13:23   WBC Latest Ref Range: 4.0 - 11.0 10e9/L 4.2 5.2 4.6 5.6   Hemoglobin Latest Ref Range: 13.3 - 17.7 g/dL 10.2 (L) 11.0 (L) 10.1 (L) 10.1 (L)   Hematocrit Latest Ref Range: 40.0 - " 53.0 % 29.8 (L) 31.7 (L) 30.1 (L) 30.3 (L)   Platelet Count Latest Ref Range: 150 - 450 10e9/L 43 (LL) 46 (LL) 51 (L) 57 (L)   RBC Count Latest Ref Range: 4.4 - 5.9 10e12/L 2.60 (L) 2.79 (L) 2.63 (L) 2.65 (L)   MCV Latest Ref Range: 78 - 100 fl 115 (H) 114 (H) 114 (H) 114 (H)   MCH Latest Ref Range: 26.5 - 33.0 pg 39.2 (H) 39.4 (H) 38.4 (H) 38.1 (H)   MCHC Latest Ref Range: 31.5 - 36.5 g/dL 34.2 34.7 33.6 33.3   RDW Latest Ref Range: 10.0 - 15.0 % 14.9 14.2 14.2 14.6   Diff Method Unknown Automated Method Automated Method Automated Method Automated Method   % Neutrophils Latest Units: % 55.3 61.7 63.8 58.7   % Lymphocytes Latest Units: % 35.0 30.6 27.5 32.3   % Monocytes Latest Units: % 5.2 5.4 6.1 5.0   % Eosinophils Latest Units: % 3.8 1.7 2.2 3.4   % Basophils Latest Units: % 0.5 0.2 0.2 0.4   % Immature Granulocytes Latest Units: % 0.2 0.4 0.2 0.2   Absolute Neutrophil Latest Ref Range: 1.6 - 8.3 10e9/L 2.3 3.2 3.0 3.3   Absolute Lymphocytes Latest Ref Range: 0.8 - 5.3 10e9/L 1.5 1.6 1.3 1.8   Absolute Monocytes Latest Ref Range: 0.0 - 1.3 10e9/L 0.2 0.3 0.3 0.3   Absolute Eosinophils Latest Ref Range: 0.0 - 0.7 10e9/L 0.2 0.1 0.1 0.2   Absolute Basophils Latest Ref Range: 0.0 - 0.2 10e9/L 0.0 0.0 0.0 0.0   Abs Immature Granulocytes Latest Ref Range: 0 - 0.4 10e9/L 0.0 0.0 0.0 0.0   % Retic Latest Ref Range: 0.5 - 2.0 % 2.2 (H) 2.6 (H) 2.6 (H)    Absolute Retic Latest Ref Range: 25 - 95 10e9/L 57.7 71.1 69.2      Results for NEISHA FRANCOIS (MRN [id number]) as of 9/18/2018 14:33   Ref. Range 9/18/2018 13:23   Ferritin Latest Ref Range: 26 - 388 ng/mL 100   Iron Latest Ref Range: 35 - 180 ug/dL 95   Iron Binding Cap Latest Ref Range: 240 - 430 ug/dL 253   Iron Saturation Index Latest Ref Range: 15 - 46 % 38   Lactate Dehydrogenase Latest Ref Range: 85 - 227 U/L 176       I reviewed the outside records as well  Received from [facility], [facility], [place] are 16 stained   slides labeled [id number],  "collected   03-28-18 now designated U[facility]-.  Also received is a copy of the   referring pathologist's report with   patient identifying information.     FINAL DIAGNOSIS:   Bone marrow, posterior iliac crest, decalcified trephine biopsy, aspirate   clot, touch imprints, and aspirate   smears ([id number], 03/28/2018):        - Marrow cellularity of 5% (moderately hypocellular for age) in   trephine core sections provided for   review        - Good evidence of trilineage hematopoietic maturation in core and   clot sections provided for review        - Benign appearing lymphoid aggregates in core and clot sections   provided for review        - Negative immunohistochemical stain for Parvovirus in clot section        - Marrow aspirate smears and touch imprints showing trilineage   hematopoietic maturation with no increase   in blasts        - Increased marrow iron stores with 5% sideroblasts and no ringed   sideroblasts.        - Flow cytometric immunophenotyping performed on marrow aspirate   reported by [facility] as   showing, \"No immunophenotypic evidence of acute leukemia or a T-cell   B-cell or Plasma cell neoplasm.\"        - Cytogenetic analysis performed on marrow aspirate reported by   [facility] as showing,   \"46,XY,gonzales(13;14)(q10;q19)?c[20],\" \"most probably constitutional\"        - FISH study performed on marrow aspirate by [facility]   reported as showing, \" Negative FISH   result for cytogenetic abnormalities commonly observed in myeloid   disorders\"        - Blood smear and peripheral blood counts not provided for review        - See Comment     COMMENT:   By separate report ([id number]; 4/18/18), flow cytometric immunophenotyping    performed on blood for this patient   at [facility] laboratory shows a very small percentage of red cells and a   small percentage (less than 10%) of   neutrophils and monocytes with a PNH immunophenotype.       Peripheral blood flow " cytometry  SPECIMEN(S):   Blood     INTERPRETATION:   Blood:        Cells with a paroxysmal nocturnal hemoglobinuria (PNH)-type   immunophenotype represent approximately 0.3%   of erythrocytes, 3.4% of monocytes, and 4.4% of neutrophils, see comment.     COMMENT:   Classic paroxysmal nocturnal hemoglobinuria (PNH) is associated with large    PNH-type clones. PNH in the setting   of another bone marrow failure syndrome usually has small PNH-type clones,    less than 10%. Subclinical PNH   usually has rare PNH-type clones, less than 1%. Detection of a small clone    is not equivalent to a diagnosis of   hemolytic PNH.  Final diagnosis requires correlation with clinical,   morphologic, and ancillary findings.   (Fletcher, et al. Blood, 106 (12): 7239-0750, 2005. Fletcher, Hematology,   21-29, 2011.)     RESULTS:   About 0.33% of erythrocytes have decreased expression of CD59 (0.32% type   II, 0.01% type III).   About 3.45% of monocytes have decreased expression of CD14 and binding of   fluorescent aerolysin (FLAER).About   4.44% of neutrophils have decreased expression of CD24 and binding of   FLAER.       Peripheral blood chromosome analysis.  METHODS:   PHA stimulated, MTX synchronized cultures.     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   Each of the metaphase cells analyzed had 45 chromosomes including one   normal chromosome 13, one normal   chromosome 14, and a Robertsonian translocation joining together, at their    centromeres, the long arms of one   chromosome 13 and one chromosome 14.     Robertsonian translocations are one of the most common occurring   constitutional  rearrangements in human   populations. As these translocations are not associated with any loss of   critical genetic material, they are   considered to be balanced, and would not be expected to be associated with    any  "phenotypic abnormalities in   this patient. However, during meiosis, the translocation can segregate   into gametes several different ways,   resulting, after fertilization, in zygotes that have trisomy or monosomy   for chromosomes 13 or 14. These   trisomies and/or monosomies can result in miscarriage or liveborn   offspring with multiple congenital   anomalies. Thus, carriers of such translocations are at increased   reproductive risk.     Genetic counseling regarding these results, and cytogenetic evaluation of   other family members who may carry   the translocation are recommended.     ASSESSMENT/PLAN:      Pancytopenia. At this time the cause is not entirely known but it could be possibly toxic reaction to the drug glimepiride. Otherwise his bone marrow biopsy was nondiagnostic. Peripheral blood and bone marrow Flow cytometry were negative.  Cytogenetic analysis performed on marrow aspirate showed 46,XY,gonzales(13;14)(q10;q19)?c[20], \"most probably constitutional\"        - FISH study performed on marrow aspirate showed \" Negative FISH result for cytogenetic abnormalities commonly observed in myeloid disorders\"     With time he has done well with normalization of the white blood cell count although hemoglobin is still remains around 10.  Platelets are better today although still 57.    Anemia.  I repeated iron studies which are unremarkable.  Folic acid, vitamin B12 and haptoglobin as well as erythropoietin are still in process.  For now he will continue with vitamin B12 and folic acid.    Thrombocytopenia.  Today platelets are 57.  He has less bruising clinically.  We will continue to observe.  Recent WENDY, Hep B, C and HIV were negative. Spleen size was normal. Liver was borderline enlarged.    Vitamin B12 deficiency with negative anti-parietal cell antibody and anti-intrinsic factor antibody.  Repeat vitamin B12 level is pending.  For now he will continue to take vitamin B12 1000 mcg daily.      Small PNH clone. " Peripheral blood flow cytometry for PNH reveals a very small percentage of red cells and a small percentage (less than 10%) of neutrophils and monocytes with a PNH immunophenotype, the significance of which is not known.  I have repeated flow cytometry for PNH and the result is pending.     Chromosomal abnormality. 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  We did a peripheral blood chromosome analysis which confirms these findings. 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   He is going to see genetic counselor next month.        We will make sure that all his records are transferred to [place] for his mood transition of care.  I also told him that he should make sure that the [place] records come to us as well    Return to clinic in a couple of months    I answered all of his questions to his satisfaction. He is agreeable and comfortable with the plan.    Terra Salazar        Again, thank you for allowing me to participate in the care of your patient.        Sincerely,        Terra Salazar MD

## 2018-09-18 NOTE — NURSING NOTE
"Oncology Rooming Note    September 18, 2018 2:12 PM   Prakash Cramer is a 78 year old male who presents for:    Chief Complaint   Patient presents with     Oncology Clinic Visit     follow up     Initial Vitals: /78  Pulse 73  Temp 98.7  F (37.1  C)  Resp 18  Ht 1.702 m (5' 7\")  Wt 74.8 kg (165 lb)  SpO2 98%  BMI 25.84 kg/m2 Estimated body mass index is 25.84 kg/(m^2) as calculated from the following:    Height as of this encounter: 1.702 m (5' 7\").    Weight as of this encounter: 74.8 kg (165 lb). Body surface area is 1.88 meters squared.  No Pain (0) Comment: Data Unavailable   No LMP for male patient.  Allergies reviewed: Yes  Medications reviewed: Yes    Medications: Medication refills not needed today.  Pharmacy name entered into Sand [facility]:    [id number] - [place], [place] - [address]  [id number] - [place], [place] - [address]         5 minutes for nursing intake (face to face time)     Angelic Fritz LPN              "

## 2018-09-18 NOTE — MR AVS SNAPSHOT
After Visit Summary   9/18/2018    Prakash Cramer    MRN: [id number]           Patient Information     Date Of Birth          1940        Visit Information        Provider Department      9/18/2018 2:15 PM Terra Salazar MD [facility]        Today's Diagnoses     Pancytopenia (H)    -  1    Anemia, unspecified type          Care Instructions    Labs monthly    See me back early December      Preventive Care:    Colorectal Cancer Screening: During our visit today, we discussed that it is recommended you receive colorectal cancer screening. Please call or make an appointment with your primary care provider to discuss this. You may also call the [facility] scheduling line ([phone]) to set up a colonoscopy appointment. Doctor recommended no more Colonoscopies     Preventive Care:    Diabetic Eye Exam Screening: During our visit today, we discussed that it is recommended you receive diabetic eye exam screening. Please call or make an appointment with your primary care provider to discuss this with them. You may also call the [facility] scheduling line ([phone]) to set up an eye exam at one of the [facility]. Had 6 weeks ago Nl results -will have results sent,                Follow-ups after your visit        Your next 10 appointments already scheduled     Oct 03, 2018 12:00 PM CDT   New Visit with Katie Vo GC   [facility])    [address] [place] [id number] [phone]            Oct 23, 2018 10:00 AM CDT   LAB with LAB ONC [facility])    [address] [place] [id number] [phone]           Please do not eat 10-12 hours before your appointment if you are coming in fasting for labs on lipids, cholesterol, or glucose (sugar). This does not apply to pregnant women. Water, hot tea and black coffee (with nothing added) are okay.  Do not drink other fluids, diet soda or chew gum.            Nov 20, 2018 10:15 AM CST   LAB with LAB ONC [facility] ([facility])    [address] [place] [id number] [phone]           Please do not eat 10-12 hours before your appointment if you are coming in fasting for labs on lipids, cholesterol, or glucose (sugar). This does not apply to pregnant women. Water, hot tea and black coffee (with nothing added) are okay. Do not drink other fluids, diet soda or chew gum.            Dec 04, 2018  1:30 PM CST   LAB with LAB ONC [facility] ([facility])    [address] [place] [id number]   [phone]           Please do not eat 10-12 hours before your appointment if you are coming in fasting for labs on lipids, cholesterol, or glucose (sugar). This does not apply to pregnant women. Water, hot tea and black coffee (with nothing added) are okay. Do not drink other fluids, diet soda or chew gum.            Dec 04, 2018  2:15 PM CST   Return Visit with Terra Salaazr MD   [facility] ([facility])    [address] [place] [id number] [phone]              Who to contact     If you have questions or need follow up information about today's clinic visit or your schedule please contact [facility] directly at [phone].  Normal or non-critical lab and imaging results will be communicated to you by MyChart, letter or phone within 4 business days after the clinic has received the results. If you do not hear from us within 7 days, please contact the clinic through [employer] or phone. If you have a critical or abnormal lab result, we will notify you by phone as soon as possible.  Submit refill requests through [employer] or call your pharmacy and they will forward the refill request to us. Please allow 3 business days for your refill to be  "completed.          Additional Information About Your Visit        [employer] Information     [employer] gives you secure access to your electronic health record. If you see a primary care provider, you can also send messages to your care team and make appointments. If you have questions, please call your primary care clinic.  If you do not have a primary care provider, please call [phone] and they will assist you.      [employer] is an electronic gateway that provides easy, online access to your medical records. With [employer], you can request a clinic appointment, read your test results, renew a prescription or communicate with your care team.     To access your existing account, please contact your [facility] or call [phone] for assistance.        Care EveryWhere ID     This is your Care EveryWhere ID. This could be used by other organizations to access your [facility] medical records  [id number]        Your Vitals Were     Pulse Temperature Respirations Height Pulse Oximetry BMI (Body Mass Index)    73 98.7  F (37.1  C) 18 1.702 m (5' 7\") 98% 25.84 kg/m2       Blood Pressure from Last 3 Encounters:   09/18/18 130/78   07/17/18 137/73   06/01/18 116/70    Weight from Last 3 Encounters:   09/18/18 74.8 kg (165 lb)   07/17/18 72.1 kg (159 lb)   06/01/18 74.4 kg (164 lb)               Primary Care Provider Office Phone # Fax #    Zhou Meraz [phone]       [address] [facility] [address]        Equal Access to Services     ARASH GRACE : Hadii aad ku hadasho Soomaali, waaxda luqadaha, qaybta kaalmada adeegyada, ying bartlett . So [facility] [phone].    ATENCIÓN: Si habla español, tiene a stroud disposición servicios gratuitos de asistencia lingüística. Llame al [phone].    We comply with applicable federal civil rights laws and [place] laws. We do not discriminate on the basis of race, color, national origin, age, disability, sex, " sexual orientation, or gender identity.            Thank you!     Thank you for choosing [facility]  for your care. Our goal is always to provide you with excellent care. Hearing back from our patients is one way we can continue to improve our services. Please take a few minutes to complete the written survey that you may receive in the mail after your visit with us. Thank you!             Your Updated Medication List - Protect others around you: Learn how to safely use, store and throw away your medicines at www.disposemymeds.org.          This list is accurate as of 9/18/18  3:10 PM.  Always use your most recent med list.                   Brand Name Dispense Instructions for use Diagnosis    amLODIPine 10 MG tablet    NORVASC    90 tablet    Take 1 tablet (10 mg) by mouth daily    Essential hypertension       aspirin 81 MG tablet     30 tablet    Take by mouth daily    Type 2 diabetes mellitus with diabetic nephropathy, without long-term current use of insulin (H)       B-12 1000 MCG Tbcr     100 tablet    Take 1,000 mcg by mouth daily    Vitamin B12 deficiency (non anemic), Pancytopenia (H)       * blood glucose monitoring lancets     1 Box    Use to test blood sugar 1 time daily or as directed.    Type 2 diabetes mellitus with diabetic nephropathy, without long-term current use of insulin (H)       * blood glucose monitoring lancets     102 each    Use to test blood sugar 1 times daily or as directed.    Type 2 diabetes mellitus with diabetic nephropathy, without long-term current use of insulin (H)       Blood Glucose Monitoring Suppl Ami     1 each    1 Device daily Smartview Meter    Type 2 diabetes mellitus with diabetic nephropathy, without long-term current use of insulin (H)       blood glucose monitoring test strip    ACCU-CHEK SMARTVIEW    100 strip    Use to test blood sugar 1 times daily or as directed.    Type 2 diabetes mellitus with diabetic nephropathy, without long-term  current use of insulin (H)       levothyroxine 75 MCG tablet    SYNTHROID/LEVOTHROID    90 tablet    Take 1 tablet (75 mcg) by mouth daily    Hypothyroidism, unspecified type       metFORMIN 1000 MG tablet    GLUCOPHAGE    180 tablet    1 tab twice a day    Type 2 diabetes mellitus with diabetic nephropathy, without long-term current use of insulin (H)       omeprazole 20 MG CR capsule    priLOSEC    90 capsule    TAKE ONE CAPSULE BY MOUTH ONCE DAILY IN THE MORNING 30-60  MINUTES  BEFORE  FIRST  DAILY  MEAL    Gastroesophageal reflux disease without esophagitis       rosuvastatin 40 MG tablet    CRESTOR    45 tablet    TAKE ONE-HALF TABLET BY MOUTH AT BEDTIME    Hyperlipidemia LDL goal <100       sitagliptin 100 MG tablet    JANUVIA    90 tablet    Take 1 tablet (100 mg) by mouth daily    Type 2 diabetes mellitus with diabetic nephropathy, without long-term current use of insulin (H)       * Notice:  This list has 2 medication(s) that are the same as other medications prescribed for you. Read the directions carefully, and ask your doctor or other care provider to review them with you.

## 2018-09-18 NOTE — PATIENT INSTRUCTIONS
Labs monthly    See me back early December      Preventive Care:    Colorectal Cancer Screening: During our visit today, we discussed that it is recommended you receive colorectal cancer screening. Please call or make an appointment with your primary care provider to discuss this. You may also call the [facility] scheduling line ([phone]) to set up a colonoscopy appointment. Doctor recommended no more Colonoscopies     Preventive Care:    Diabetic Eye Exam Screening: During our visit today, we discussed that it is recommended you receive diabetic eye exam screening. Please call or make an appointment with your primary care provider to discuss this with them. You may also call the [facility] scheduling line ([phone]) to set up an eye exam at one of the [facility]. Had 6 weeks ago Nl results -will have results sent,

## 2018-09-18 NOTE — PROGRESS NOTES
Hematology follow up visit:  Date on this visit: 9/18/2018     CC   Pancytopenia    Primary Physician: Zhou Meraz     History Of Present Illness:    Please see previous note for details. I have copied and updated from prior note.  Mr. Cramer is a 78 year old male who was found to have pancytopenia around the end of March 2018 when he presented with a white blood cell count of 1.5 and hemoglobin of 7.8 and platelets of 4. He was admitted to the hospital in [place]. There was no bruising or petechiae or evidence of bleeding. He was feeling fatigued and at that point he was also noticed to have C diff infection and was started on p.o. vancomycin. He was given Neupogen ×1 and packed red distant transfusion ×2 and platelet transfusion ×1. He was also given vitamin B12 injections and I believe it was 3 injections which she was given during the hospitalization. He had a bone marrow biopsy done as part of his workup which showed mildly hypocellular bone marrow with 10-40% cellularity with erythroid hyperplasia and mild dyserythropoiesis (5% cellularity, moderately decreased for age as per pathology review at the [facility] [place] [facility]). Reactive plasmacytosis and maturing trilineage hematopoiesis. There was no significant dysplasia in the granulocytes or megakaryocytes. Parvovirus immunohistochemistry was negative. These findings were deemed consistent with either evolving aplastic anemia or drug/toxin effect or vitamin/nutritional deficiency or infectious or viral and hypocellular MDS.   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 This finding is most probably constitutional in origin and not acquired; however it it is medically warranted a peripheral blood chromosome analysis couldn't be performed to rule out an acquired  abnormality. If it is constitutional genetic counseling would be medically indicated    Comprehensive metabolic panel was unremarkable.   His vitamin B12 level was less than 159. Antiparietal cell antibody and anti-intrinsic factor antibody were negative. Iron studies were unremarkable. CMV IgM was negative. WENDY screen was negative. Rheumatoid factor and anti-CCP were negative. SPEP was negative.  After thorough investigation it was found that potentially glimepiride (sulfonylurea) which he was taking could be the culprit for pancytopenia so he was taken off it.  Upon discharge from the hospital on 3/31/2018 his white blood cell count was 2.4 and hemoglobin was 7.8 and platelet 12. On 4/5/2018 the WBC count was 2.9 with ANC of 1.2 and hemoglobin 7.9 and platelets 75.  On 4/13/2018 white blood cell count was 3.3, ANC 1.3, hemoglobin 7 and platelets 13. At that point he was given 2 units of packed red blood cells and 1 unit of platelets  Most recently on 4/16/2018 white blood cell count was 3.2, ANC 1.1, hemoglobin 10 and platelets 20.    When he saw me at it further testing and KIMBERLEE was negative.  Peripheral blood flow cytometry for PNH reveals a very small percentage of red cells and a small percentage (less than 10%) of neutrophils and monocytes with a PNH immunophenotype, the significance of which is not known.  A repeat vitamin B12 level was 443. I told him to start taking vitamin B12 1000  g daily.  We kept him on observation and his counts improved with normalization of the WBC count and improvement is Hg. His platelets although were better but plataued around 40s, so we did further w/up and recent WENDY, Hep B, C and HIV were negative. Spleen size was normal. Liver was borderline enlarged.    He otherwise was doing well so we have been monitoring him with serial blood work    Overall he feels well.  He denies any interval infections.  Energy has been a stable.  Denies any significant bleeding.  He thinks that the  superficial skin bruising is less but he is also doing less of yard work these days.  No B symptoms.  He has been able to eat and drink well.  No GI problems.  No trouble breathing.  No new swellings.  He continues to take vitamin B12 and folic acid.  Currently denies pain.  He is planning to go back to [place] for 3-1/2 months around end of December and he has an appointment with the oncologist around mid of January 2019.      ROS:  Rest of the comprehensive ROS was essentially unremarkable       I reviewed other history in EPIC as below.    Past Medical/Surgical History:  Past Medical History:   Diagnosis Date     Aplastic anemia (H) 03/26/2018    thought secondary to reaction to Septra     BPH (benign prostatic hypertrophy)     failed  Flomax     C. difficile diarrhea 03/26/2018    treated with Flagyl     Cellulitis and abscess of leg, except foot 2004     Contact dermatitis and other eczema, due to unspecified cause      Diaphragmatic hernia without mention of obstruction or gangrene     hiatal hernia     Esophageal reflux      Hyperlipidemia LDL goal <100 3/11/2005     Hypothyroidism      Impotence of organic origin      Meningococcal encephalitis      Proteinuria      RBBB      Type 2 diabetes mellitus with diabetic nephropathy  (goal A1C<7) 10/24/2015     Unspecified essential hypertension      Past Surgical History:   Procedure Laterality Date     ARTHROPLASTY KNEE Left 3/18/2015    Procedure: ARTHROPLASTY KNEE;  Surgeon: Abebe Schroeder MD;  Location: [facility] OR     ARTHROPLASTY KNEE Right 3/14/2016    Procedure: ARTHROPLASTY KNEE;  Surgeon: Abebe Schroeder MD;  Location:  OR     C NONSPECIFIC PROCEDURE  10/02    left knee meniscus tear repair     HC LAPAROSCOPY, SURGICAL; CHOLECYSTECTOMY  1998    Cholecystectomy, Laparoscopic     HC REMOVE TONSILS/ADENOIDS,13+ Y/O  1963    T & A 13+y.o.     Allergies:  Allergies as of 09/18/2018 - [place] as Reviewed 09/18/2018   Allergen Reaction Noted     Atorvastatin  "calcium  01/30/2008     Diovan [valsartan]  01/24/2011     Glimepiride  05/08/2018     Invokana [canagliflozin]  06/01/2018     Mold Other (See Comments) 07/14/2011     Penicillins  09/07/2011     Seasonal allergies  06/25/2007    glimepiride possibly causing pancytopenia  Current Medications:  Current Outpatient Prescriptions   Medication Sig Dispense Refill     amLODIPine (NORVASC) 10 MG tablet Take 1 tablet (10 mg) by mouth daily 90 tablet 3     aspirin 81 MG tablet Take by mouth daily 30 tablet      blood glucose monitoring (ACCU-CHEK FASTCLIX) lancets Use to test blood sugar 1 times daily or as directed. 102 each 11     blood glucose monitoring (ACCU-CHEK MULTICLIX) lancets Use to test blood sugar 1 time daily or as directed. 1 Box 11     blood glucose monitoring (ACCU-CHEK SMARTVIEW) test strip Use to test blood sugar 1 times daily or as directed. 100 strip 11     Blood Glucose Monitoring Suppl MAGDALENO 1 Device daily Smartview Meter 1 each 0     Cyanocobalamin (B-12) 1000 MCG TBCR Take 1,000 mcg by mouth daily 100 tablet 11     levothyroxine (SYNTHROID/LEVOTHROID) 75 MCG tablet Take 1 tablet (75 mcg) by mouth daily 90 tablet 2     metFORMIN (GLUCOPHAGE) 1000 MG tablet 1 tab twice a day 180 tablet 3     omeprazole (PRILOSEC) 20 MG CR capsule TAKE ONE CAPSULE BY MOUTH ONCE DAILY IN THE MORNING 30-60  MINUTES  BEFORE  FIRST  DAILY  MEAL 90 capsule 3     rosuvastatin (CRESTOR) 40 MG tablet TAKE ONE-HALF TABLET BY MOUTH AT BEDTIME 45 tablet 2     sitagliptin (JANUVIA) 100 MG tablet Take 1 tablet (100 mg) by mouth daily 90 tablet 3      Family History:  Family History   Problem Relation Age of Onset     Family History Negative Mother      d:age 89 of \"old age\"     GASTROINTESTINAL DISEASE Father      d:age 79 liver failure after taking excessive amounts of Tylenol over 5-6 yrs     Neurologic Disorder Brother      b:1932  hx cerebral aneurysm age 59     No family history of bleeding or clotting disorder. Maternal uncle " "had throat cancer.  Social History:  Social History     Social History     Marital status:      Spouse name: N/A     Number of children: 2     Years of education: N/A     Occupational History     teacher [employer]     Social History Main Topics     Smoking status: Never Smoker     Smokeless tobacco: Never Used     Alcohol use Yes      Comment: beer 4-5 a month     Drug use: No     Sexual activity: Yes     Partners: Female     Other Topics Concern     Special Diet Yes     diabetic diet      Exercise No     nothing regular since bilateral knee surgeries      Social History Narrative    denies any smoking. No alcohol. He usually lives in [place] and spends winter in [place]. He lives alone.  Physical Exam:  /78  Pulse 73  Temp 98.7  F (37.1  C)  Resp 18  Ht 1.702 m (5' 7\")  Wt 74.8 kg (165 lb)  SpO2 98%  BMI 25.84 kg/m2  CONSTITUTIONAL: No apparent distress  EYES: PERRLA, without pallor or jaundice  ENT/MOUTH: Ears unremarkable. No oral lesions  CVS: s1s2 normal  RESPIRATORY: Chest is clear  GI: Abdomen is benign  NEURO: Alert and oriented ×3  INTEGUMENT: no concerning skin rashes.  I did not notice any significant bruising today  LYMPHATIC: no palpable lymphadenopathy  MUSCULOSKELETAL: Unremarkable. No bony tenderness.   EXTREMITIES: no pedal edema  PSYCH: Mentation, mood and affect are appropriate          Laboratory/Imaging Studies  Results for NEISHA FRANCOIS (MRN [id number]) as of 9/18/2018 14:33   Ref. Range 7/10/2018 10:09 7/17/2018 14:23 8/21/2018 09:55 9/18/2018 13:23   WBC Latest Ref Range: 4.0 - 11.0 10e9/L 4.2 5.2 4.6 5.6   Hemoglobin Latest Ref Range: 13.3 - 17.7 g/dL 10.2 (L) 11.0 (L) 10.1 (L) 10.1 (L)   Hematocrit Latest Ref Range: 40.0 - 53.0 % 29.8 (L) 31.7 (L) 30.1 (L) 30.3 (L)   Platelet Count Latest Ref Range: 150 - 450 10e9/L 43 (LL) 46 (LL) 51 (L) 57 (L)   RBC Count Latest Ref Range: 4.4 - 5.9 10e12/L 2.60 (L) 2.79 (L) 2.63 (L) 2.65 (L)   MCV Latest Ref Range: 78 " - 100 fl 115 (H) 114 (H) 114 (H) 114 (H)   MCH Latest Ref Range: 26.5 - 33.0 pg 39.2 (H) 39.4 (H) 38.4 (H) 38.1 (H)   MCHC Latest Ref Range: 31.5 - 36.5 g/dL 34.2 34.7 33.6 33.3   RDW Latest Ref Range: 10.0 - 15.0 % 14.9 14.2 14.2 14.6   Diff Method Unknown Automated Method Automated Method Automated Method Automated Method   % Neutrophils Latest Units: % 55.3 61.7 63.8 58.7   % Lymphocytes Latest Units: % 35.0 30.6 27.5 32.3   % Monocytes Latest Units: % 5.2 5.4 6.1 5.0   % Eosinophils Latest Units: % 3.8 1.7 2.2 3.4   % Basophils Latest Units: % 0.5 0.2 0.2 0.4   % Immature Granulocytes Latest Units: % 0.2 0.4 0.2 0.2   Absolute Neutrophil Latest Ref Range: 1.6 - 8.3 10e9/L 2.3 3.2 3.0 3.3   Absolute Lymphocytes Latest Ref Range: 0.8 - 5.3 10e9/L 1.5 1.6 1.3 1.8   Absolute Monocytes Latest Ref Range: 0.0 - 1.3 10e9/L 0.2 0.3 0.3 0.3   Absolute Eosinophils Latest Ref Range: 0.0 - 0.7 10e9/L 0.2 0.1 0.1 0.2   Absolute Basophils Latest Ref Range: 0.0 - 0.2 10e9/L 0.0 0.0 0.0 0.0   Abs Immature Granulocytes Latest Ref Range: 0 - 0.4 10e9/L 0.0 0.0 0.0 0.0   % Retic Latest Ref Range: 0.5 - 2.0 % 2.2 (H) 2.6 (H) 2.6 (H)    Absolute Retic Latest Ref Range: 25 - 95 10e9/L 57.7 71.1 69.2      Results for NEISHA FRANCOIS (MRN [id number]) as of 9/18/2018 14:33   Ref. Range 9/18/2018 13:23   Ferritin Latest Ref Range: 26 - 388 ng/mL 100   Iron Latest Ref Range: 35 - 180 ug/dL 95   Iron Binding Cap Latest Ref Range: 240 - 430 ug/dL 253   Iron Saturation Index Latest Ref Range: 15 - 46 % 38   Lactate Dehydrogenase Latest Ref Range: 85 - 227 U/L 176       I reviewed the outside records as well  Received from [facility] [place] [facility], [place] are 16 stained   slides labeled [id number], collected   03-28-18 now designated R-.  Also received is a copy of the   referring pathologist's report with   patient identifying information.     FINAL DIAGNOSIS:   Bone marrow, posterior iliac crest, decalcified trephine  "biopsy, aspirate   clot, touch imprints, and aspirate   smears ([id number], 03/28/2018):        - Marrow cellularity of 5% (moderately hypocellular for age) in   trephine core sections provided for   review        - Good evidence of trilineage hematopoietic maturation in core and   clot sections provided for review        - Benign appearing lymphoid aggregates in core and clot sections   provided for review        - Negative immunohistochemical stain for Parvovirus in clot section        - Marrow aspirate smears and touch imprints showing trilineage   hematopoietic maturation with no increase   in blasts        - Increased marrow iron stores with 5% sideroblasts and no ringed   sideroblasts.        - Flow cytometric immunophenotyping performed on marrow aspirate   reported by [facility] as   showing, \"No immunophenotypic evidence of acute leukemia or a T-cell   B-cell or Plasma cell neoplasm.\"        - Cytogenetic analysis performed on marrow aspirate reported by   [facility] as showing,   \"46,XY,gonzales(13;14)(q10;q19)?c[20],\" \"most probably constitutional\"        - FISH study performed on marrow aspirate by [facility]   reported as showing, \" Negative FISH   result for cytogenetic abnormalities commonly observed in myeloid   disorders\"        - Blood smear and peripheral blood counts not provided for review        - See Comment     COMMENT:   By separate report ([id number]; 4/18/18), flow cytometric immunophenotyping    performed on blood for this patient   at [facility] laboratory shows a very small percentage of red cells and a   small percentage (less than 10%) of   neutrophils and monocytes with a PNH immunophenotype.       Peripheral blood flow cytometry  SPECIMEN(S):   Blood     INTERPRETATION:   Blood:        Cells with a paroxysmal nocturnal hemoglobinuria (PNH)-type   immunophenotype represent approximately 0.3%   of erythrocytes, 3.4% of monocytes, and 4.4% of neutrophils, see " comment.     COMMENT:   Classic paroxysmal nocturnal hemoglobinuria (PNH) is associated with large    PNH-type clones. PNH in the setting   of another bone marrow failure syndrome usually has small PNH-type clones,    less than 10%. Subclinical PNH   usually has rare PNH-type clones, less than 1%. Detection of a small clone    is not equivalent to a diagnosis of   hemolytic PNH.  Final diagnosis requires correlation with clinical,   morphologic, and ancillary findings.   (Fletcher, et al. Blood, 106 (12): 0675-8397, 2005. Fletcher, Hematology,   21-29, 2011.)     RESULTS:   About 0.33% of erythrocytes have decreased expression of CD59 (0.32% type   II, 0.01% type III).   About 3.45% of monocytes have decreased expression of CD14 and binding of   fluorescent aerolysin (FLAER).About   4.44% of neutrophils have decreased expression of CD24 and binding of   FLAER.       Peripheral blood chromosome analysis.  METHODS:   PHA stimulated, MTX synchronized cultures.     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   Each of the metaphase cells analyzed had 45 chromosomes including one   normal chromosome 13, one normal   chromosome 14, and a Robertsonian translocation joining together, at their    centromeres, the long arms of one   chromosome 13 and one chromosome 14.     Robertsonian translocations are one of the most common occurring   constitutional  rearrangements in human   populations. As these translocations are not associated with any loss of   critical genetic material, they are   considered to be balanced, and would not be expected to be associated with    any phenotypic abnormalities in   this patient. However, during meiosis, the translocation can segregate   into gametes several different ways,   resulting, after fertilization, in zygotes that have trisomy or monosomy   for chromosomes 13 or 14.  "These   trisomies and/or monosomies can result in miscarriage or liveborn   offspring with multiple congenital   anomalies. Thus, carriers of such translocations are at increased   reproductive risk.     Genetic counseling regarding these results, and cytogenetic evaluation of   other family members who may carry   the translocation are recommended.     ASSESSMENT/PLAN:      Pancytopenia. At this time the cause is not entirely known but it could be possibly toxic reaction to the drug glimepiride. Otherwise his bone marrow biopsy was nondiagnostic. Peripheral blood and bone marrow Flow cytometry were negative.  Cytogenetic analysis performed on marrow aspirate showed 46,XY,gonzales(13;14)(q10;q19)?c[20], \"most probably constitutional\"        - FISH study performed on marrow aspirate showed \" Negative FISH result for cytogenetic abnormalities commonly observed in myeloid disorders\"     With time he has done well with normalization of the white blood cell count although hemoglobin is still remains around 10.  Platelets are better today although still 57.    Anemia.  I repeated iron studies which are unremarkable.  Folic acid, vitamin B12 and haptoglobin as well as erythropoietin are still in process.  For now he will continue with vitamin B12 and folic acid.    Thrombocytopenia.  Today platelets are 57.  He has less bruising clinically.  We will continue to observe.  Recent WENDY, Hep B, C and HIV were negative. Spleen size was normal. Liver was borderline enlarged.    Vitamin B12 deficiency with negative anti-parietal cell antibody and anti-intrinsic factor antibody.  Repeat vitamin B12 level is pending.  For now he will continue to take vitamin B12 1000 mcg daily.      Small PNH clone. Peripheral blood flow cytometry for PNH reveals a very small percentage of red cells and a small percentage (less than 10%) of neutrophils and monocytes with a PNH immunophenotype, the significance of which is not known.  I have repeated flow " cytometry for PNH and the result is pending.     Chromosomal abnormality. 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  We did a peripheral blood chromosome analysis which confirms these findings. 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   He is going to see genetic counselor next month.        We will make sure that all his records are transferred to [place] for his mood transition of care.  I also told him that he should make sure that the [place] records come to us as well    Return to clinic in a couple of months    I answered all of his questions to his satisfaction. He is agreeable and comfortable with the plan.    Terra Salazar    Addendum  The % of PNH type clones is even lower this time, so doubt that it is the culprit in causing pancytopenia. For now plan remains the same  Terra Salazar  9/21/2018

## 2018-09-19 ENCOUNTER — TELEPHONE (OUTPATIENT)
Dept: INTERNAL MEDICINE | Facility: CLINIC | Age: 78
End: 2018-09-19

## 2018-09-19 DIAGNOSIS — E11.21 TYPE 2 DIABETES MELLITUS WITH DIABETIC NEPHROPATHY, WITHOUT LONG-TERM CURRENT USE OF INSULIN (H): Primary | ICD-10-CM

## 2018-09-19 LAB — HAPTOGLOB SERPL-MCNC: 96 MG/DL (ref 35–175)

## 2018-09-19 NOTE — TELEPHONE ENCOUNTER
----- Message from Paola Caro sent at 9/18/2018  1:32 PM CDT -----  Regarding: LAB  Good Afternoon-    [facility] work today 09/18/2018. Patient stated his PCP wanted to have an A1C done since he was having blood work. I obtained the specimen, however, I need you to order the test to be run here at [facility].    Thank you  Paola ROBLES

## 2018-09-20 ENCOUNTER — TELEPHONE (OUTPATIENT)
Dept: INTERNAL MEDICINE | Facility: CLINIC | Age: 78
End: 2018-09-20

## 2018-09-20 LAB
COPATH REPORT: NORMAL
EPO SERPL-ACNC: 165 MU/ML (ref 4–27)

## 2018-09-20 NOTE — TELEPHONE ENCOUNTER
Reason for call:  Other   Patient called regarding (reason for call): wants lab results from lab you ordered and also lab results from Dr. Salazar and also wants a referral for balance  Additional comments: wants to go to therapy at [facility]    Phone number to reach patient:  Home number on file [phone] (home)    Best Time:  anytime    Can we leave a detailed message on this number?  YES

## 2018-09-21 NOTE — TELEPHONE ENCOUNTER
"I did not order any recent labs. Pt due for labs next  From my orders in early December  Pt will have to speak with  His oncoogist Dr Salazar re: results and interpretations of labs that provider ordered  Have not seen pt re: balance issues and, only based on a \"referal for balance\", not clear if brain/inner ear issue requiring specific treatment at a balance center vs musculoskeletal issue requiring general physical therapy vs  Something that could be addressed in clinic such as prescription for cane/walker or other. I would recommend pt be seen in clinic to evaluate toe problem further first to determine the most appropriate treatment  "

## 2018-10-01 ENCOUNTER — CARE COORDINATION (OUTPATIENT)
Dept: ONCOLOGY | Facility: CLINIC | Age: 78
End: 2018-10-01

## 2018-10-01 NOTE — PROGRESS NOTES
Received call from patient with request for a physical therapy referral for balance issues. States he could come here to [facility] or go to [facility]. Will send message to Dr. Salazar.

## 2018-10-09 ENCOUNTER — CARE COORDINATION (OUTPATIENT)
Dept: ONCOLOGY | Facility: CLINIC | Age: 78
End: 2018-10-09

## 2018-10-09 NOTE — PROGRESS NOTES
Dr. Salazar recommended that patient request referral for Physical Therapy from primary care provider for concerns regarding his balance issues.  Patient will reach out to his PCP.

## 2018-10-09 NOTE — PROGRESS NOTES
Patient called with request for referral for some physical therapy as he is having slight problem with balance issues stemming from when his blood counts were low in the past.   Advised would reach out to provider with request.

## 2018-10-11 ENCOUNTER — OFFICE VISIT (OUTPATIENT)
Dept: INTERNAL MEDICINE | Facility: CLINIC | Age: 78
End: 2018-10-11
Payer: MEDICARE

## 2018-10-11 VITALS
DIASTOLIC BLOOD PRESSURE: 76 MMHG | TEMPERATURE: 97.7 F | RESPIRATION RATE: 16 BRPM | OXYGEN SATURATION: 98 % | SYSTOLIC BLOOD PRESSURE: 130 MMHG | BODY MASS INDEX: 26.47 KG/M2 | WEIGHT: 169 LBS | HEART RATE: 100 BPM

## 2018-10-11 DIAGNOSIS — R26.9 ABNORMAL GAIT: Primary | ICD-10-CM

## 2018-10-11 DIAGNOSIS — E11.21 TYPE 2 DIABETES MELLITUS WITH DIABETIC NEPHROPATHY, WITHOUT LONG-TERM CURRENT USE OF INSULIN (H): ICD-10-CM

## 2018-10-11 PROCEDURE — 99213 OFFICE O/P EST LOW 20 MIN: CPT | Performed by: INTERNAL MEDICINE

## 2018-10-11 NOTE — MR AVS SNAPSHOT
After Visit Summary   10/11/2018    Prakash Cramer    MRN: [id number]           Patient Information     Date Of Birth          1940        Visit Information        Provider Department      10/11/2018 6:00 PM Zhou Meraz MD [facility]        Today's Diagnoses     Abnormal gait    -  1      Care Instructions     Referral to [facility] ([facility]). They will call to schedule. If not heard from them by Monday, then call for appt. Ask to be seen at the [facility]  See me in December before leaving for [place]. Do not need to fast for that appointment   Continue current medications  Flu shot in the next month           Follow-ups after your visit        Additional Services     [facility] PT, HAND, AND CHIROPRACTIC REFERRAL       Physical Therapy, Hand Therapy and Chiropractic Care are available through:  *[facility]  *Hand Therapy (Occupational Therapy or Physical Therapy)  *[facility]    Call one number to schedule at any of the above locations: [phone].    Physical therapy, Hand therapy and/or Chiropractic care has been recommended by your physician as an excellent treatment option to reduce pain and help people return to normal activities, including sports.  Therapy and/or chiropractic care services are a great complement or alternative to expensive and invasive surgery, injections, or long-term use of prescription medications. The primary goal is to identify the underlying problem and provide you the tools to manage your condition on your own.     Please be aware that coverage of these services is subject to the terms and limitations of your health insurance plan.  Call member services at your health plan with any benefit or coverage questions.      Please bring the following to your appointment:  *Your personal calendar for scheduling future appointments  *Comfortable clothing                  Your next  10 appointments already scheduled     Oct 23, 2018 10:00 AM CDT   LAB with LAB ONC [facility] ([facility])    [address] [place] [id number]   [phone]           Please do not eat 10-12 hours before your appointment if you are coming in fasting for labs on lipids, cholesterol, or glucose (sugar). This does not apply to pregnant women. Water, hot tea and black coffee (with nothing added) are okay. Do not drink other fluids, diet soda or chew gum.            Nov 20, 2018 10:15 AM CST   LAB with LAB ONC [facility] ([facility])    [address] [place] [id number]   [phone]           Please do not eat 10-12 hours before your appointment if you are coming in fasting for labs on lipids, cholesterol, or glucose (sugar). This does not apply to pregnant women. Water, hot tea and black coffee (with nothing added) are okay. Do not drink other fluids, diet soda or chew gum.            Dec 04, 2018  1:30 PM CST   LAB with LAB ONC [facility] ([facility])    [address] [place] [address]   [phone]           Please do not eat 10-12 hours before your appointment if you are coming in fasting for labs on lipids, cholesterol, or glucose (sugar). This does not apply to pregnant women. Water, hot tea and black coffee (with nothing added) are okay. Do not drink other fluids, diet soda or chew gum.            Dec 04, 2018  2:15 PM CST   Return Visit with Terra Salazar MD   [facility] ([facility])    [address] [place] [id number]   [phone]              Future tests that were ordered for you today     Open Future Orders        Priority Expected Expires Ordered    [facility] PT, HAND, AND CHIROPRACTIC REFERRAL Routine  10/11/2019 10/11/2018            Who to contact     If you have questions or need follow  up information about today's clinic visit or your schedule please contact [facility] directly at [phone].  Normal or non-critical lab and imaging results will be communicated to you by MyChart, letter or phone within 4 business days after the clinic has received the results. If you do not hear from us within 7 days, please contact the clinic through [employer] or phone. If you have a critical or abnormal lab result, we will notify you by phone as soon as possible.  Submit refill requests through [employer] or call your pharmacy and they will forward the refill request to us. Please allow 3 business days for your refill to be completed.          Additional Information About Your Visit        [employer] Information     [employer] gives you secure access to your electronic health record. If you see a primary care provider, you can also send messages to your care team and make appointments. If you have questions, please call your primary care clinic.  If you do not have a primary care provider, please call [phone] and they will assist you.        Care EveryWhere ID     This is your Care EveryWhere ID. This could be used by other organizations to access your [facility] medical records  [id number]        Your Vitals Were     Pulse Temperature Respirations Pulse Oximetry BMI (Body Mass Index)       100 97.7  F (36.5  C) (Oral) 16 98% 26.47 kg/m2        Blood Pressure from Last 3 Encounters:   10/11/18 130/76   09/18/18 130/78   07/17/18 137/73    Weight from Last 3 Encounters:   10/11/18 169 lb (76.7 kg)   09/18/18 165 lb (74.8 kg)   07/17/18 159 lb (72.1 kg)               Primary Care Provider Office Phone # Fax #    Zhou Meraz [phone]       [address] [facility] [address]        Equal Access to Services     ARAHS GRACE : Ubaldo Stoner, waaxda luqlawrence, qaybta kaalmarai adams, ying ovalle. So [facility] [phone].    ATENCIÓN:  Si habla lamar, tiene a stroud disposición servicios gratuitos de asistencia lingüística. Sofy zamarripa [phone].    We comply with applicable federal civil rights laws and [place] laws. We do not discriminate on the basis of race, color, national origin, age, disability, sex, sexual orientation, or gender identity.            Thank you!     Thank you for choosing [facility]  for your care. Our goal is always to provide you with excellent care. Hearing back from our patients is one way we can continue to improve our services. Please take a few minutes to complete the written survey that you may receive in the mail after your visit with us. Thank you!             Your Updated Medication List - Protect others around you: Learn how to safely use, store and throw away your medicines at www.disposemymeds.org.          This list is accurate as of 10/11/18  6:43 PM.  Always use your most recent med list.                   Brand Name Dispense Instructions for use Diagnosis    amLODIPine 10 MG tablet    NORVASC    90 tablet    Take 1 tablet (10 mg) by mouth daily    Essential hypertension       aspirin 81 MG tablet     30 tablet    Take by mouth daily    Type 2 diabetes mellitus with diabetic nephropathy, without long-term current use of insulin (H)       B-12 1000 MCG Tbcr     100 tablet    Take 1,000 mcg by mouth daily    Vitamin B12 deficiency (non anemic), Pancytopenia (H)       * blood glucose monitoring lancets     1 Box    Use to test blood sugar 1 time daily or as directed.    Type 2 diabetes mellitus with diabetic nephropathy, without long-term current use of insulin (H)       * blood glucose monitoring lancets     102 each    Use to test blood sugar 1 times daily or as directed.    Type 2 diabetes mellitus with diabetic nephropathy, without long-term current use of insulin (H)       Blood Glucose Monitoring Suppl Ami     1 each    1 Device daily Smartview Meter    Type 2 diabetes mellitus  with diabetic nephropathy, without long-term current use of insulin (H)       blood glucose monitoring test strip    ACCU-CHEK SMARTVIEW    100 strip    Use to test blood sugar 1 times daily or as directed.    Type 2 diabetes mellitus with diabetic nephropathy, without long-term current use of insulin (H)       levothyroxine 75 MCG tablet    SYNTHROID/LEVOTHROID    90 tablet    Take 1 tablet (75 mcg) by mouth daily    Hypothyroidism, unspecified type       metFORMIN 1000 MG tablet    GLUCOPHAGE    180 tablet    1 tab twice a day    Type 2 diabetes mellitus with diabetic nephropathy, without long-term current use of insulin (H)       omeprazole 20 MG CR capsule    priLOSEC    90 capsule    TAKE ONE CAPSULE BY MOUTH ONCE DAILY IN THE MORNING 30-60  MINUTES  BEFORE  FIRST  DAILY  MEAL    Gastroesophageal reflux disease without esophagitis       rosuvastatin 40 MG tablet    CRESTOR    45 tablet    TAKE ONE-HALF TABLET BY MOUTH AT BEDTIME    Hyperlipidemia LDL goal <100       sitagliptin 100 MG tablet    JANUVIA    90 tablet    Take 1 tablet (100 mg) by mouth daily    Type 2 diabetes mellitus with diabetic nephropathy, without long-term current use of insulin (H)       * Notice:  This list has 2 medication(s) that are the same as other medications prescribed for you. Read the directions carefully, and ask your doctor or other care provider to review them with you.

## 2018-10-11 NOTE — PATIENT INSTRUCTIONS
Referral to [facility] ([facility]). They will call to schedule. If not heard from them by Monday, then call for appt. Ask to be seen at the [facility]  See me in December before leaving for [place]. Do not need to fast for that appointment   Continue current medications  Flu shot in the next month

## 2018-10-11 NOTE — PROGRESS NOTES
SUBJECTIVE:   Prakash Cramer is a 78 year old male who presents to clinic today for the following health issues:    Chief Complaint   Patient presents with     Balance/ Vestibular     Patient is requesting a referral to [facility]     Pt's past medical history, family history, habits, medications and allergies were reviewed with the patient today.  See snap shot for  HCM status. Most recent lab results reviewed with pt. Problem list and histories reviewed & adjusted, as indicated.  Additional history as below:    Patient was previous hospitalized in [place] in April when he developed pancytopenia thought related to possible glimepiride reaction.  Patient has slowly been improving regarding his from a bone marrow standpoint.  Continues to be followed by oncology.  Most recent CBC from September along with diabetic labs from July reviewed with patient.  Results as below.  Patient states his legs have felt chronically little weaker since that admission and gait is a little more unstable.  She used to take normal long strides and now has more of a shorter slight swelling gait.  Denies tremor.  No generalized stiffness.  Patient had one fall only since April that was when he was coming off a step at the front of his house when attacked by multiple bees from a nest nearby  and had to leap out of the way.   No injury from that fall.   Blood sugar control excellent as per numbers below.   Patient has not been checking blood sugars recently. Denies CP, SOB, abdominal pain, polyuria, polydipsia, vision changes, extremity numbness/parasthesias or skin problems.      Component      Latest Ref Rng & Units 7/10/2018 7/24/2018 9/18/2018   WBC      4.0 - 11.0 10e9/L 4.2  5.6   RBC Count      4.4 - 5.9 10e12/L 2.60 (L)  2.65 (L)   Hemoglobin      13.3 - 17.7 g/dL 10.2 (L)  10.1 (L)   Hematocrit      40.0 - 53.0 % 29.8 (L)  30.3 (L)   MCV      78 - 100 fl 115 (H)  114 (H)   MCH      26.5 - 33.0 pg 39.2 (H)  38.1 (H)    MCHC      31.5 - 36.5 g/dL 34.2  33.3   RDW      10.0 - 15.0 % 14.9  14.6   Platelet Count      150 - 450 10e9/L 43 (LL)  57 (L)   Diff Method       Automated Method  Automated Method   % Neutrophils      % 55.3  58.7   % Lymphocytes      % 35.0  32.3   % Monocytes      % 5.2  5.0   % Eosinophils      % 3.8  3.4   % Basophils      % 0.5  0.4   % Immature Granulocytes      % 0.2  0.2   Absolute Neutrophil      1.6 - 8.3 10e9/L 2.3  3.3   Absolute Lymphocytes      0.8 - 5.3 10e9/L 1.5  1.8   Absolute Monocytes      0.0 - 1.3 10e9/L 0.2  0.3   Absolute Eosinophils      0.0 - 0.7 10e9/L 0.2  0.2   Absolute Basophils      0.0 - 0.2 10e9/L 0.0  0.0   Abs Immature Granulocytes      0 - 0.4 10e9/L 0.0  0.0   Sodium      133 - 144 mmol/L  137    Potassium      3.4 - 5.3 mmol/L  4.5    Chloride      94 - 109 mmol/L  102    Carbon Dioxide      20 - 32 mmol/L  27    Anion Gap      3 - 14 mmol/L  8    Glucose      70 - 99 mg/dL  197 (H)    Urea Nitrogen      7 - 30 mg/dL  22    Creatinine      0.66 - 1.25 mg/dL  1.14    GFR Estimate      >60 mL/min/1.7m2  62    GFR Estimate If Black      >60 mL/min/1.7m2  75    Calcium      8.5 - 10.1 mg/dL  8.6    Bilirubin Total      0.2 - 1.3 mg/dL  0.7    Albumin      3.4 - 5.0 g/dL  3.6    Protein Total      6.8 - 8.8 g/dL  7.1    Alkaline Phosphatase      40 - 150 U/L  92    ALT      0 - 70 U/L  48    AST      0 - 45 U/L  27    Cholesterol      <200 mg/dL  111    Triglycerides      <150 mg/dL  190 (H)    HDL Cholesterol      >39 mg/dL  56    LDL Cholesterol Calculated      <100 mg/dL  17    Non HDL Cholesterol      <130 mg/dL  55    TSH      0.40 - 4.00 mU/L  2.26    Hemoglobin A1C      0 - 5.6 %  6.3 (H)           Additional ROS:   Constitutional, HEENT, Cardiovascular, Pulmonary, GI and , Neuro, MSK and Psych review of systems/symptoms are otherwise negative or unchanged from previous, except as noted above.      OBJECTIVE:  /76  Pulse 100  Temp 97.7  F (36.5  C) (Oral)   "Resp 16  Wt 169 lb (76.7 kg)  SpO2 98%  BMI 26.47 kg/m2   Estimated body mass index is 26.47 kg/(m^2) as calculated from the following:    Height as of 9/18/18: 5' 7\" (1.702 m).    Weight as of this encounter: 169 lb (76.7 kg).    Neck: no adenopathy. Thyroid normal to palpation. No bruits  Pulm: Lungs clear to auscultation   CV: Regular rates and rhythm  GI: Soft, nontender, Normal active bowel sounds, No hepatosplenomegaly or masses palpable  Ext: Peripheral pulses intact. No edema.  Neuro: Normal strength and tone, sensory exam grossly normal.  No trauma.  Negative Romberg.  No cogwheel rigidity.  Gait slightly shuffling with shorter steps.  Patient not falling to the side with ambulation.  Able to turn 180 degrees with one step.  Patient able to rise out of a chair without using his hands to push off    Assessment/Plan: (See plan discussion below for further details)  1. Abnormal gait  No evidence on exam for Parkinson's besides mild gait changes.  Will have patient see physical therapy for further treatment of gait changes  - [facility] PT, HAND, AND CHIROPRACTIC REFERRAL; Future    2. Type 2 diabetes mellitus with diabetic nephropathy, without long-term current use of insulin (H)  Controlled.  Continue current medication.  Will repeat A1c in December when patient seen back in clinic prior to leaving to [place] for the winter.    Plan discussion:   Referral to [facility] ([facility]). They will call to schedule. If not heard from them by Monday, then call for appt. Ask to be seen at the [place] [facility]  See me in December before leaving for [place]. Do not need to fast for that appointment   Continue current medications  Flu shot in the next month     Zhou Meraz MD  Internal Medicine Department  [facility]        "

## 2018-10-16 ENCOUNTER — TELEPHONE (OUTPATIENT)
Dept: INTERNAL MEDICINE | Facility: CLINIC | Age: 78
End: 2018-10-16

## 2018-10-16 DIAGNOSIS — R26.9 ABNORMAL GAIT: Primary | ICD-10-CM

## 2018-10-16 NOTE — TELEPHONE ENCOUNTER
Pt was seen 10/11/18 and was referred to [facility] for physical therapy. I did not refer pt to [facility] and pt was informed that when I saw him. Not clear if this appt was made in the past prior to recent appt with me. Pt has appt scheduled at  [facility] site for PT on 10/25/18 per chart. Pt was told that I did not refer to [facility] for PT since outside of network.  Inform pt and [facility]

## 2018-10-16 NOTE — TELEPHONE ENCOUNTER
[facility] is calling to request orders/referral for physical therapy due to balance and gait.   Patient is being seen this afternoon at [facility]-10/16/18.  Please fax orders to [phone]

## 2018-10-18 NOTE — TELEPHONE ENCOUNTER
Spoke with SHARA Jo at [facility] said that this pt no showed their appointment. Pt has appt schedule at [facility] on 10/25. Please verify with pt that he is still ok with going to [facility] on the 25th.     Left message with pt. Please verify.    Shelley Fernandez, CMA

## 2018-10-23 ENCOUNTER — HOSPITAL ENCOUNTER (OUTPATIENT)
Dept: PHYSICAL THERAPY | Facility: CLINIC | Age: 78
Setting detail: THERAPIES SERIES
End: 2018-10-23
Attending: INTERNAL MEDICINE
Payer: MEDICARE

## 2018-10-23 DIAGNOSIS — D69.6 THROMBOCYTOPENIA (H): ICD-10-CM

## 2018-10-23 DIAGNOSIS — Z11.59 ENCOUNTER FOR SCREENING FOR OTHER VIRAL DISEASES: ICD-10-CM

## 2018-10-23 DIAGNOSIS — D61.818 PANCYTOPENIA (H): ICD-10-CM

## 2018-10-23 DIAGNOSIS — R26.9 ABNORMAL GAIT: ICD-10-CM

## 2018-10-23 LAB
ALBUMIN SERPL-MCNC: 3.8 G/DL (ref 3.4–5)
ALP SERPL-CCNC: 86 U/L (ref 40–150)
ALT SERPL W P-5'-P-CCNC: 30 U/L (ref 0–70)
ANION GAP SERPL CALCULATED.3IONS-SCNC: 5 MMOL/L (ref 3–14)
AST SERPL W P-5'-P-CCNC: 20 U/L (ref 0–45)
BASOPHILS # BLD AUTO: 0 10E9/L (ref 0–0.2)
BASOPHILS NFR BLD AUTO: 0.6 %
BILIRUB SERPL-MCNC: 0.6 MG/DL (ref 0.2–1.3)
BUN SERPL-MCNC: 22 MG/DL (ref 7–30)
CALCIUM SERPL-MCNC: 9 MG/DL (ref 8.5–10.1)
CHLORIDE SERPL-SCNC: 106 MMOL/L (ref 94–109)
CO2 SERPL-SCNC: 30 MMOL/L (ref 20–32)
CREAT SERPL-MCNC: 1.2 MG/DL (ref 0.66–1.25)
DIFFERENTIAL METHOD BLD: ABNORMAL
EOSINOPHIL # BLD AUTO: 0.1 10E9/L (ref 0–0.7)
EOSINOPHIL NFR BLD AUTO: 1.8 %
ERYTHROCYTE [DISTWIDTH] IN BLOOD BY AUTOMATED COUNT: 14.6 % (ref 10–15)
GFR SERPL CREATININE-BSD FRML MDRD: 59 ML/MIN/1.7M2
GLUCOSE SERPL-MCNC: 173 MG/DL (ref 70–99)
HCT VFR BLD AUTO: 31.5 % (ref 40–53)
HGB BLD-MCNC: 10.6 G/DL (ref 13.3–17.7)
IMM GRANULOCYTES # BLD: 0 10E9/L (ref 0–0.4)
IMM GRANULOCYTES NFR BLD: 0.2 %
LDH SERPL L TO P-CCNC: 164 U/L (ref 85–227)
LYMPHOCYTES # BLD AUTO: 1.4 10E9/L (ref 0.8–5.3)
LYMPHOCYTES NFR BLD AUTO: 25 %
MCH RBC QN AUTO: 38.5 PG (ref 26.5–33)
MCHC RBC AUTO-ENTMCNC: 33.7 G/DL (ref 31.5–36.5)
MCV RBC AUTO: 115 FL (ref 78–100)
MONOCYTES # BLD AUTO: 0.3 10E9/L (ref 0–1.3)
MONOCYTES NFR BLD AUTO: 5.2 %
NEUTROPHILS # BLD AUTO: 3.7 10E9/L (ref 1.6–8.3)
NEUTROPHILS NFR BLD AUTO: 67.2 %
PLATELET # BLD AUTO: 59 10E9/L (ref 150–450)
POTASSIUM SERPL-SCNC: 4.7 MMOL/L (ref 3.4–5.3)
PROT SERPL-MCNC: 7.4 G/DL (ref 6.8–8.8)
RBC # BLD AUTO: 2.75 10E12/L (ref 4.4–5.9)
RETICS # AUTO: 72.6 10E9/L (ref 25–95)
RETICS/RBC NFR AUTO: 2.6 % (ref 0.5–2)
SODIUM SERPL-SCNC: 141 MMOL/L (ref 133–144)
WBC # BLD AUTO: 5.4 10E9/L (ref 4–11)

## 2018-10-23 PROCEDURE — 85025 COMPLETE CBC W/AUTO DIFF WBC: CPT | Performed by: INTERNAL MEDICINE

## 2018-10-23 PROCEDURE — 36415 COLL VENOUS BLD VENIPUNCTURE: CPT | Performed by: INTERNAL MEDICINE

## 2018-10-23 PROCEDURE — 97110 THERAPEUTIC EXERCISES: CPT | Mod: GP | Performed by: PHYSICAL THERAPIST

## 2018-10-23 PROCEDURE — 85045 AUTOMATED RETICULOCYTE COUNT: CPT | Performed by: INTERNAL MEDICINE

## 2018-10-23 PROCEDURE — G8978 MOBILITY CURRENT STATUS: HCPCS | Mod: GP,CJ | Performed by: PHYSICAL THERAPIST

## 2018-10-23 PROCEDURE — 97161 PT EVAL LOW COMPLEX 20 MIN: CPT | Mod: GP | Performed by: PHYSICAL THERAPIST

## 2018-10-23 PROCEDURE — 97116 GAIT TRAINING THERAPY: CPT | Mod: GP | Performed by: PHYSICAL THERAPIST

## 2018-10-23 PROCEDURE — 40000719 ZZHC STATISTIC PT DEPARTMENT NEURO VISIT: Performed by: PHYSICAL THERAPIST

## 2018-10-23 PROCEDURE — 80053 COMPREHEN METABOLIC PANEL: CPT | Performed by: INTERNAL MEDICINE

## 2018-10-23 PROCEDURE — G8979 MOBILITY GOAL STATUS: HCPCS | Mod: GP,CI | Performed by: PHYSICAL THERAPIST

## 2018-10-23 PROCEDURE — 83615 LACTATE (LD) (LDH) ENZYME: CPT | Performed by: INTERNAL MEDICINE

## 2018-10-23 NOTE — PROGRESS NOTES
[facility]        OUTPATIENT PHYSICAL THERAPY FUNCTIONAL EVALUATION  PLAN OF TREATMENT FOR OUTPATIENT REHABILITATION  (COMPLETE FOR INITIAL CLAIMS ONLY)  Patient's Last Name, First Name, M.I.  YOB: 1940  Prakash Cramer     Provider's Name   [facility]   Medical Record No.  [id number]     Start of Care Date:  10/23/18   Onset Date:  04/23/18   Type:     _X__PT   ____OT  ____SLP Medical Diagnosis:  Abnormal gait     PT Diagnosis:  Impaired balance, Impaired gait, weakness Visits from SOC:  1                              __________________________________________________________________________________  Plan of Treatment/Functional Goals:  balance training, gait training, neuromuscular re-education, ROM, strengthening, stretching, transfer training           GOALS  Floor transfer  Hu will demonstrate a safe floor transfer sequence in 2/2 tirals to perform household tasks.  12/22/18    Car transfer  Hu will demonstrate a safe car transfer sequence and report increased ease with car transfers on a consistent basis.  12/22/18    FGA  Hu will demonstrate a 3 point improvement or better on the Functional Gait Assessment with initial score of 21/30.  12/22/18                                                           Therapy Frequency:  1 time/week   Predicted Duration of Therapy Intervention:  8 weeks    Faviola Jamil, PT                                    I CERTIFY THE NEED FOR THESE SERVICES FURNISHED UNDER        THIS PLAN OF TREATMENT AND WHILE UNDER MY CARE     (Physician co-signature of this document indicates review and certification of the therapy plan).                Certification Date From:  10/23/18   Certification Date To:  12/22/18    Referring Provider:  Zhou Meraz MD    Initial Assessment  See Epic Evaluation- Start of Care Date: 10/23/18

## 2018-10-23 NOTE — PROGRESS NOTES
10/23/18 0800   Quick Adds   Quick Adds Certification   Type of Visit Initial OP PT Evaluation   General Information   Start of Care Date 10/23/18   Referring Physician Zhou Meraz MD   Orders Evaluate and Treat as Indicated   Order Date 10/11/18   Medical Diagnosis Abnormal Gait   Onset of illness/injury or Date of Surgery 04/23/18   Surgical/Medical history reviewed Yes   Pertinent history of current problem (include personal factors and/or comorbidities that impact the POC) Bilateral TKA in 2015.  He has noticed his balance is worse in the evening.  He does get up at night and uses a light.  He was hospitalized last Spring when his bone marrow was not working.  He has noticed weakenss since then.  DMII   Patient role/Employment history Retired   Living environment House/townhome   Home/Community Accessibility Comments 1 step into house and 1 step into family room and flight down to basement which he accesses 2-3 times a week.  Wife passed away in 2012.  Son is living with him utl [place].  He has been doing yard work.   Assistive Devices Comments Owns a ww and a cane   Patient/Family Goals Statement Wants to improve balance and strength in legs   Fall Risk Screen   Fall screen completed by PT   Have you fallen 2 or more times in the past year? No   Have you fallen and had an injury in the past year? Yes   Timed Up and Go score (seconds) 10.66   Is patient a fall risk? Yes   Fall screen comments History of falls.   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   Vitals Signs   Heart Rate 88   SpO2 95   Blood Pressure 152/76   Cognitive Status Examination   Orientation orientation to person, place and time   Level of Consciousness alert   Follows Commands and Answers Questions 100% of the time;able to follow multistep instructions   Personal Safety and Judgment intact   Memory intact   Cognitive Comment He reports some difficulty  with word finding.   Posture   Posture Forward head position   Range of Motion (ROM)   ROM Comment Can reach above head and can reach down to mid shin with trunk flexion   Strength   Strength Comments Right hip flexion=4/5, right knee flexion/ext=4/5   Transfer Skills   Transfer Comments Uses UE support for sit<>stand and on stairs for balance   Gait   Gait Comments Shuffled steps with limited arm swing   Gait Special Tests   Gait Special Tests 25 FOOT TIMED WALK;FUNCTIONAL GAIT ASSESSMENT   Gait Special Tests 25 Foot Timed Walk   Seconds 10.5   Steps 16 Steps   Gait Special Tests Functional Gait Assessment Score out of 30   Score out of 30 21   Balance   Balance other (describe)   Balance Comments Limited weight shift during gait and increased sway in stance activities   Balance Special Tests   Balance Special Tests Timed up and go;Modified CTSIB Conditions;Sit to stand reps   Balance Special Tests Timed Up and Go   Seconds 10.66 Seconds   Balance Special Tests Modified CTSIB Conditions   Condition 1, seconds 30 Seconds   Condition 2, seconds 30 Seconds   Condition 4, seconds 30 Seconds   Condition 5, seconds 30 Seconds   Modified CTSIB Comments Moderate sway in Condition 4 and 5.   Balance Special Tests Sit to Stand Reps in 30 Seconds   Reps in 30 seconds 8   Height 18   Sensory Examination   Sensory Perception no deficits were identified   Sensory Perception Comments Denies tingling and numbness   Planned Therapy Interventions   Planned Therapy Interventions balance training;gait training;neuromuscular re-education;ROM;strengthening;stretching;transfer training   Clinical Impression   Criteria for Skilled Therapeutic Interventions Met yes, treatment indicated   PT Diagnosis Impaired balance, Impaired gait, weakness   Influenced by the following impairments Weakness, impaired balance, forward posture, shuffled gait, limited arm swing   Functional limitations due to impairments Increased risk for falls, decreased  ability for reaching outside base of support, difficulty getting on and off floor.   Clinical Presentation Evolving/Changing   Clinical Presentation Rationale Progressing symtpoms of impaired balance and difficulty with gait, falls, clinical judgement   Clinical Decision Making (Complexity) Low complexity   Therapy Frequency 1 time/week   Predicted Duration of Therapy Intervention (days/wks) 8 weeks   Risk & Benefits of therapy have been explained Yes   Patient, Family & other staff in agreement with plan of care Yes   Clinical Impression Comments Hu presents with impaired balance and weakness with shuffled gait pattern and history of falls.  He will benefit from skilled PT to address impairments and continue with independent living.   GOALS   PT Eval Goals 1;2;3   Goal 1   Goal Identifier Floor transfer   Goal Description Hu will demonstrate a safe floor transfer sequence in 2/2 tirals to perform household tasks.   Target Date 12/22/18   Goal 2   Goal Identifier Car transfer   Goal Description Hu will demonstrate a safe car transfer sequence and report increased ease with car transfers on a consistent basis.   Target Date 12/22/18   Goal 3   Goal Identifier FGA   Goal Description Hu will demonstrate a 3 point improvement or better on the Functional Gait Assessment with initial score of 21/30.   Target Date 12/22/18   Total Evaluation Time   Total Evaluation Time (Minutes) 30   Therapy Certification   Certification date from 10/23/18   Certification date to 12/22/18   Medical Diagnosis Abnormal gait   Certification I certify the need for these services furnished under this plan of treatment and while under my care.  (Physician co-signature of this document indicates review and certification of the therapy plan).

## 2018-10-31 ENCOUNTER — HOSPITAL ENCOUNTER (OUTPATIENT)
Dept: PHYSICAL THERAPY | Facility: CLINIC | Age: 78
Setting detail: THERAPIES SERIES
End: 2018-10-31
Attending: INTERNAL MEDICINE
Payer: MEDICARE

## 2018-10-31 PROCEDURE — 40000719 ZZHC STATISTIC PT DEPARTMENT NEURO VISIT: Performed by: PHYSICAL THERAPIST

## 2018-10-31 PROCEDURE — 97112 NEUROMUSCULAR REEDUCATION: CPT | Mod: GP | Performed by: PHYSICAL THERAPIST

## 2018-10-31 PROCEDURE — 97116 GAIT TRAINING THERAPY: CPT | Mod: GP | Performed by: PHYSICAL THERAPIST

## 2018-11-06 ENCOUNTER — HOSPITAL ENCOUNTER (OUTPATIENT)
Dept: PHYSICAL THERAPY | Facility: CLINIC | Age: 78
Setting detail: THERAPIES SERIES
End: 2018-11-06
Attending: INTERNAL MEDICINE
Payer: MEDICARE

## 2018-11-06 PROCEDURE — 97112 NEUROMUSCULAR REEDUCATION: CPT | Mod: GP | Performed by: PHYSICAL THERAPIST

## 2018-11-06 PROCEDURE — 97116 GAIT TRAINING THERAPY: CPT | Mod: GP | Performed by: PHYSICAL THERAPIST

## 2018-11-06 PROCEDURE — 40000719 ZZHC STATISTIC PT DEPARTMENT NEURO VISIT: Performed by: PHYSICAL THERAPIST

## 2018-11-16 ENCOUNTER — HOSPITAL ENCOUNTER (OUTPATIENT)
Dept: PHYSICAL THERAPY | Facility: CLINIC | Age: 78
Setting detail: THERAPIES SERIES
End: 2018-11-16
Attending: INTERNAL MEDICINE
Payer: MEDICARE

## 2018-11-16 PROCEDURE — 40000719 ZZHC STATISTIC PT DEPARTMENT NEURO VISIT: Performed by: PHYSICAL THERAPIST

## 2018-11-16 PROCEDURE — 97110 THERAPEUTIC EXERCISES: CPT | Mod: GP | Performed by: PHYSICAL THERAPIST

## 2018-11-16 PROCEDURE — 97112 NEUROMUSCULAR REEDUCATION: CPT | Mod: GP | Performed by: PHYSICAL THERAPIST

## 2018-11-20 ENCOUNTER — HOSPITAL ENCOUNTER (OUTPATIENT)
Dept: PHYSICAL THERAPY | Facility: CLINIC | Age: 78
Setting detail: THERAPIES SERIES
End: 2018-11-20
Attending: INTERNAL MEDICINE
Payer: MEDICARE

## 2018-11-20 DIAGNOSIS — D61.818 PANCYTOPENIA (H): ICD-10-CM

## 2018-11-20 DIAGNOSIS — E53.8 VITAMIN B12 DEFICIENCY (NON ANEMIC): ICD-10-CM

## 2018-11-20 LAB
BASOPHILS # BLD AUTO: 0 10E9/L (ref 0–0.2)
BASOPHILS NFR BLD AUTO: 0.3 %
DIFFERENTIAL METHOD BLD: ABNORMAL
EOSINOPHIL # BLD AUTO: 0.1 10E9/L (ref 0–0.7)
EOSINOPHIL NFR BLD AUTO: 1.3 %
ERYTHROCYTE [DISTWIDTH] IN BLOOD BY AUTOMATED COUNT: 14 % (ref 10–15)
HCT VFR BLD AUTO: 31.5 % (ref 40–53)
HGB BLD-MCNC: 10.6 G/DL (ref 13.3–17.7)
IMM GRANULOCYTES # BLD: 0 10E9/L (ref 0–0.4)
IMM GRANULOCYTES NFR BLD: 0.3 %
LYMPHOCYTES # BLD AUTO: 1.2 10E9/L (ref 0.8–5.3)
LYMPHOCYTES NFR BLD AUTO: 28.8 %
MCH RBC QN AUTO: 38.3 PG (ref 26.5–33)
MCHC RBC AUTO-ENTMCNC: 33.7 G/DL (ref 31.5–36.5)
MCV RBC AUTO: 114 FL (ref 78–100)
MONOCYTES # BLD AUTO: 0.2 10E9/L (ref 0–1.3)
MONOCYTES NFR BLD AUTO: 5.3 %
NEUTROPHILS # BLD AUTO: 2.6 10E9/L (ref 1.6–8.3)
NEUTROPHILS NFR BLD AUTO: 64 %
PLATELET # BLD AUTO: 55 10E9/L (ref 150–450)
RBC # BLD AUTO: 2.77 10E12/L (ref 4.4–5.9)
RETICS # AUTO: 65.9 10E9/L (ref 25–95)
RETICS/RBC NFR AUTO: 2.4 % (ref 0.5–2)
WBC # BLD AUTO: 4 10E9/L (ref 4–11)

## 2018-11-20 PROCEDURE — 97530 THERAPEUTIC ACTIVITIES: CPT | Mod: GP | Performed by: PHYSICAL THERAPIST

## 2018-11-20 PROCEDURE — 36415 COLL VENOUS BLD VENIPUNCTURE: CPT | Performed by: INTERNAL MEDICINE

## 2018-11-20 PROCEDURE — 40000719 ZZHC STATISTIC PT DEPARTMENT NEURO VISIT: Performed by: PHYSICAL THERAPIST

## 2018-11-20 PROCEDURE — 85025 COMPLETE CBC W/AUTO DIFF WBC: CPT | Performed by: INTERNAL MEDICINE

## 2018-11-20 PROCEDURE — 85045 AUTOMATED RETICULOCYTE COUNT: CPT | Performed by: INTERNAL MEDICINE

## 2018-11-20 PROCEDURE — 97110 THERAPEUTIC EXERCISES: CPT | Mod: GP | Performed by: PHYSICAL THERAPIST

## 2018-11-20 PROCEDURE — 97116 GAIT TRAINING THERAPY: CPT | Mod: GP,59 | Performed by: PHYSICAL THERAPIST

## 2018-11-26 NOTE — ADDENDUM NOTE
Encounter addended by: Faviola Jamil PT on: 11/26/2018  9:02 AM<BR>     Actions taken: Charge Capture section accepted

## 2018-11-27 ENCOUNTER — HOSPITAL ENCOUNTER (OUTPATIENT)
Dept: PHYSICAL THERAPY | Facility: CLINIC | Age: 78
Setting detail: THERAPIES SERIES
End: 2018-11-27
Attending: INTERNAL MEDICINE
Payer: MEDICARE

## 2018-11-27 PROCEDURE — 40000719 ZZHC STATISTIC PT DEPARTMENT NEURO VISIT: Performed by: PHYSICAL THERAPIST

## 2018-11-27 PROCEDURE — 97112 NEUROMUSCULAR REEDUCATION: CPT | Mod: GP | Performed by: PHYSICAL THERAPIST

## 2018-11-27 PROCEDURE — 97110 THERAPEUTIC EXERCISES: CPT | Mod: GP | Performed by: PHYSICAL THERAPIST

## 2018-11-27 NOTE — ADDENDUM NOTE
Encounter addended by: Faviola Jamil PT on: 11/27/2018 10:34 AM<BR>     Actions taken: Flowsheet accepted

## 2018-12-04 ENCOUNTER — ONCOLOGY VISIT (OUTPATIENT)
Dept: ONCOLOGY | Facility: CLINIC | Age: 78
End: 2018-12-04
Payer: MEDICARE

## 2018-12-04 VITALS
SYSTOLIC BLOOD PRESSURE: 132 MMHG | RESPIRATION RATE: 18 BRPM | BODY MASS INDEX: 26.53 KG/M2 | HEIGHT: 67 IN | HEART RATE: 83 BPM | TEMPERATURE: 98.6 F | OXYGEN SATURATION: 98 % | DIASTOLIC BLOOD PRESSURE: 74 MMHG | WEIGHT: 169 LBS

## 2018-12-04 DIAGNOSIS — D61.818 PANCYTOPENIA (H): ICD-10-CM

## 2018-12-04 DIAGNOSIS — D64.9 ANEMIA, UNSPECIFIED TYPE: Primary | ICD-10-CM

## 2018-12-04 DIAGNOSIS — E11.21 TYPE 2 DIABETES MELLITUS WITH DIABETIC NEPHROPATHY, WITHOUT LONG-TERM CURRENT USE OF INSULIN (H): ICD-10-CM

## 2018-12-04 DIAGNOSIS — E53.8 VITAMIN B12 DEFICIENCY (NON ANEMIC): ICD-10-CM

## 2018-12-04 DIAGNOSIS — D69.6 THROMBOCYTOPENIA (H): ICD-10-CM

## 2018-12-04 LAB
ANION GAP SERPL CALCULATED.3IONS-SCNC: 7 MMOL/L (ref 3–14)
BUN SERPL-MCNC: 19 MG/DL (ref 7–30)
CALCIUM SERPL-MCNC: 8.6 MG/DL (ref 8.5–10.1)
CHLORIDE SERPL-SCNC: 105 MMOL/L (ref 94–109)
CO2 SERPL-SCNC: 27 MMOL/L (ref 20–32)
CREAT SERPL-MCNC: 1.22 MG/DL (ref 0.66–1.25)
GFR SERPL CREATININE-BSD FRML MDRD: 57 ML/MIN/1.7M2
GLUCOSE SERPL-MCNC: 182 MG/DL (ref 70–99)
HBA1C MFR BLD: 7.1 % (ref 0–5.6)
POTASSIUM SERPL-SCNC: 4.3 MMOL/L (ref 3.4–5.3)
RETICS # AUTO: 64.2 10E9/L (ref 25–95)
RETICS/RBC NFR AUTO: 2.4 % (ref 0.5–2)
SODIUM SERPL-SCNC: 139 MMOL/L (ref 133–144)

## 2018-12-04 PROCEDURE — 85045 AUTOMATED RETICULOCYTE COUNT: CPT | Performed by: INTERNAL MEDICINE

## 2018-12-04 PROCEDURE — 99214 OFFICE O/P EST MOD 30 MIN: CPT | Performed by: INTERNAL MEDICINE

## 2018-12-04 PROCEDURE — 83036 HEMOGLOBIN GLYCOSYLATED A1C: CPT | Performed by: INTERNAL MEDICINE

## 2018-12-04 PROCEDURE — 80048 BASIC METABOLIC PNL TOTAL CA: CPT | Performed by: INTERNAL MEDICINE

## 2018-12-04 PROCEDURE — 36415 COLL VENOUS BLD VENIPUNCTURE: CPT | Performed by: INTERNAL MEDICINE

## 2018-12-04 ASSESSMENT — PAIN SCALES - GENERAL: PAINLEVEL: NO PAIN (0)

## 2018-12-04 NOTE — MR AVS SNAPSHOT
After Visit Summary   12/4/2018    Prakash Cramer    MRN: [id number]           Patient Information     Date Of Birth          1940        Visit Information        Provider Department      12/4/2018 2:15 PM Terra Salazar MD [facility]        Today's Diagnoses     Anemia, unspecified type    -  1    Thrombocytopenia (H)          Care Instructions    Follow with Hematologist in [place]    See me back with labs when you come back              Follow-ups after your visit        Your next 10 appointments already scheduled     Dec 18, 2018 10:00 AM CST   Neuro Treatment with Faviola Jamil, VIKASH   [facility] ([facility])    [address] [facility] [address] [phone]            Dec 19, 2018 10:00 AM CST   PHYSICAL with Zhou Meraz MD   [facility] ([facility])    [address] [id number] [phone]              Future tests that were ordered for you today     Open Future Orders        Priority Expected Expires Ordered    *CBC with platelets differential Routine 4/4/2019 12/4/2019 12/4/2018    Reticulocyte count Routine 4/4/2019 12/4/2019 12/4/2018    Comprehensive metabolic panel Routine 4/4/2019 12/4/2019 12/4/2018    Lactate Dehydrogenase Routine 4/4/2019 12/4/2019 12/4/2018            Who to contact     If you have questions or need follow up information about today's clinic visit or your schedule please contact [facility] directly at [phone].  Normal or non-critical lab and imaging results will be communicated to you by MyChart, letter or phone within 4 business days after the clinic has received the results. If you do not hear from us within 7 days, please contact the clinic through MyChart or phone. If you have a critical or abnormal lab result, we will notify you by phone as soon as possible.  Submit refill requests through  "[employer] or call your pharmacy and they will forward the refill request to us. Please allow 3 business days for your refill to be completed.          Additional Information About Your Visit        [employer] Information     [employer] gives you secure access to your electronic health record. If you see a primary care provider, you can also send messages to your care team and make appointments. If you have questions, please call your primary care clinic.  If you do not have a primary care provider, please call [phone] and they will assist you.      [employer] is an electronic gateway that provides easy, online access to your medical records. With [employer], you can request a clinic appointment, read your test results, renew a prescription or communicate with your care team.     To access your existing account, please contact your [facility] or call [phone] for assistance.        Care EveryWhere ID     This is your Care EveryWhere ID. This could be used by other organizations to access your [facility] medical records  [id number]        Your Vitals Were     Pulse Temperature Respirations Height Pulse Oximetry BMI (Body Mass Index)    83 98.6  F (37  C) 18 1.702 m (5' 7\") 98% 26.47 kg/m2       Blood Pressure from Last 3 Encounters:   12/04/18 132/74   10/11/18 130/76   09/18/18 130/78    Weight from Last 3 Encounters:   12/04/18 76.7 kg (169 lb)   10/11/18 76.7 kg (169 lb)   09/18/18 74.8 kg (165 lb)               Primary Care Provider Office Phone # Fax #    Zhou Meraz [phone]       [address] [place] [address]        Equal Access to Services     ADRIEL GRACE : Hadii tammy persaud Sofara, waaxda luqadaha, qaybta kaalmaying saleem. So [facility] [phone].    ATENCIÓN: Si habla español, tiene a stroud disposición servicios gratuitos de asistencia lingüística. Llame al [phone].    We comply with applicable federal " civil rights laws and [place] laws. We do not discriminate on the basis of race, color, national origin, age, disability, sex, sexual orientation, or gender identity.            Thank you!     Thank you for choosing [facility]  for your care. Our goal is always to provide you with excellent care. Hearing back from our patients is one way we can continue to improve our services. Please take a few minutes to complete the written survey that you may receive in the mail after your visit with us. Thank you!             Your Updated Medication List - Protect others around you: Learn how to safely use, store and throw away your medicines at www.disposemymeds.org.          This list is accurate as of 12/4/18  2:18 PM.  Always use your most recent med list.                   Brand Name Dispense Instructions for use Diagnosis    amLODIPine 10 MG tablet    NORVASC    90 tablet    Take 1 tablet (10 mg) by mouth daily    Essential hypertension       aspirin 81 MG tablet    ASA    30 tablet    Take by mouth daily    Type 2 diabetes mellitus with diabetic nephropathy, without long-term current use of insulin (H)       B-12 1000 MCG Tbcr     100 tablet    Take 1,000 mcg by mouth daily    Vitamin B12 deficiency (non anemic), Pancytopenia (H)       * blood glucose monitoring lancets     1 Box    Use to test blood sugar 1 time daily or as directed.    Type 2 diabetes mellitus with diabetic nephropathy, without long-term current use of insulin (H)       * blood glucose monitoring lancets     102 each    Use to test blood sugar 1 times daily or as directed.    Type 2 diabetes mellitus with diabetic nephropathy, without long-term current use of insulin (H)       Blood Glucose Monitoring Suppl Ami     1 each    1 Device daily Smartview Meter    Type 2 diabetes mellitus with diabetic nephropathy, without long-term current use of insulin (H)       blood glucose monitoring test strip    ACCU-CHEK SMARTVIEW    100 strip     Use to test blood sugar 1 times daily or as directed.    Type 2 diabetes mellitus with diabetic nephropathy, without long-term current use of insulin (H)       levothyroxine 75 MCG tablet    SYNTHROID/LEVOTHROID    90 tablet    Take 1 tablet (75 mcg) by mouth daily    Hypothyroidism, unspecified type       metFORMIN 1000 MG tablet    GLUCOPHAGE    180 tablet    1 tab twice a day    Type 2 diabetes mellitus with diabetic nephropathy, without long-term current use of insulin (H)       omeprazole 20 MG DR capsule    priLOSEC    90 capsule    TAKE ONE CAPSULE BY MOUTH ONCE DAILY IN THE MORNING 30-60  MINUTES  BEFORE  FIRST  DAILY  MEAL    Gastroesophageal reflux disease without esophagitis       rosuvastatin 40 MG tablet    CRESTOR    45 tablet    TAKE ONE-HALF TABLET BY MOUTH AT BEDTIME    Hyperlipidemia LDL goal <100       sitagliptin 100 MG tablet    JANUVIA    90 tablet    Take 1 tablet (100 mg) by mouth daily    Type 2 diabetes mellitus with diabetic nephropathy, without long-term current use of insulin (H)       * Notice:  This list has 2 medication(s) that are the same as other medications prescribed for you. Read the directions carefully, and ask your doctor or other care provider to review them with you.

## 2018-12-04 NOTE — LETTER
12/4/2018         RE: Prakash Cramer  [address]  [facility] [address]        Dear Colleague,    Thank you for referring your patient, Prakash Cramer, to the [facility]. Please see a copy of my visit note below.    Hematology follow up visit:  Date on this visit: 12/4/2018     CC   Pancytopenia    Primary Physician: Zhou Meraz     History Of Present Illness:    Please see previous note for details. I have copied and updated from prior note.  Mr. Cramer is a 78 year old male who was found to have pancytopenia around the end of March 2018 when he presented with a white blood cell count of 1.5 and hemoglobin of 7.8 and platelets of 4. He was admitted to the hospital in [place]. There was no bruising or petechiae or evidence of bleeding. He was feeling fatigued and at that point he was also noticed to have C diff infection and was started on p.o. vancomycin. He was given Neupogen ×1 and packed red distant transfusion ×2 and platelet transfusion ×1. He was also given vitamin B12 injections and I believe it was 3 injections which she was given during the hospitalization. He had a bone marrow biopsy done as part of his workup which showed mildly hypocellular bone marrow with 10-40% cellularity with erythroid hyperplasia and mild dyserythropoiesis (5% cellularity, moderately decreased for age as per pathology review at the [facility]). Reactive plasmacytosis and maturing trilineage hematopoiesis. There was no significant dysplasia in the granulocytes or megakaryocytes. Parvovirus immunohistochemistry was negative. These findings were deemed consistent with either evolving aplastic anemia or drug/toxin effect or vitamin/nutritional deficiency or infectious or viral and hypocellular MDS.   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 This finding is most probably constitutional in origin and not acquired; however it it is medically warranted a peripheral blood chromosome analysis couldn't be performed to rule out an acquired abnormality. If it is constitutional genetic counseling would be medically indicated    Comprehensive metabolic panel was unremarkable.   His vitamin B12 level was less than 159. Antiparietal cell antibody and anti-intrinsic factor antibody were negative. Iron studies were unremarkable. CMV IgM was negative. WENDY screen was negative. Rheumatoid factor and anti-CCP were negative. SPEP was negative.  After thorough investigation it was found that potentially glimepiride (sulfonylurea) which he was taking could be the culprit for pancytopenia so he was taken off it.  Upon discharge from the hospital on 3/31/2018 his white blood cell count was 2.4 and hemoglobin was 7.8 and platelet 12. On 4/5/2018 the WBC count was 2.9 with ANC of 1.2 and hemoglobin 7.9 and platelets 75.  On 4/13/2018 white blood cell count was 3.3, ANC 1.3, hemoglobin 7 and platelets 13. At that point he was given 2 units of packed red blood cells and 1 unit of platelets  Most recently on 4/16/2018 white blood cell count was 3.2, ANC 1.1, hemoglobin 10 and platelets 20.    When he saw me I did further testing and KIMBERLEE was negative.  Peripheral blood flow cytometry for PNH reveals a very small percentage of red cells and a small percentage (less than 10%) of neutrophils and monocytes with a PNH immunophenotype, the significance of which is not known.  Repeat flow cytometry for PNH in Sept 2018, shows that the % of PNH type clones is even lower this time, so doubt that it is the culprit in causing pancytopenia.   A repeat vitamin B12 level was 443. I told him to start taking vitamin B12 1000  g daily.  We kept him on observation and his counts improved with normalization of the WBC count and improvement is Hg. His platelets  although were better but plataued around 40s, so we did further w/up and recent WENDY, Hep B, C and HIV were negative. Spleen size was normal. Liver was borderline enlarged.    He remains on observation.    Today he comes in and tells me that he has been feeling well.  Overall his energy seems a little better.  Denies any interval infections.  No significant bleeding.  No B symptoms.  He has been following with physical therapy for balance issues which is somewhat helping.  He continues to take folic acid and vitamin B12 and multivitamins.  He is planning to go to [place] in January and will stay there for about 4 months.  He has an appointment with hematologist around mid January in [place].      ROS:  A comprehensive ROS was otherwise neg        I reviewed other history in EPIC as below.    Past Medical/Surgical History:  Past Medical History:   Diagnosis Date     Aplastic anemia (H) 03/26/2018    thought secondary to reaction to Septra     BPH (benign prostatic hypertrophy)     failed  Flomax     C. difficile diarrhea 03/26/2018    treated with Flagyl     Cellulitis and abscess of leg, except foot 2004     Contact dermatitis and other eczema, due to unspecified cause      Diaphragmatic hernia without mention of obstruction or gangrene     hiatal hernia     Esophageal reflux      Hyperlipidemia LDL goal <100 3/11/2005     Hypothyroidism      Impotence of organic origin      Meningococcal encephalitis      Proteinuria      RBBB      Type 2 diabetes mellitus with diabetic nephropathy  (goal A1C<7) 10/24/2015     Unspecified essential hypertension      Past Surgical History:   Procedure Laterality Date     ARTHROPLASTY KNEE Left 3/18/2015    Procedure: ARTHROPLASTY KNEE;  Surgeon: Abebe Schroeder MD;  Location: [facility] OR     ARTHROPLASTY KNEE Right 3/14/2016    Procedure: ARTHROPLASTY KNEE;  Surgeon: Abebe Schroeder MD;  Location:  OR     C NONSPECIFIC PROCEDURE  10/02    left knee meniscus tear repair     HC  "LAPAROSCOPY, SURGICAL; CHOLECYSTECTOMY  1998    Cholecystectomy, Laparoscopic     HC REMOVE TONSILS/ADENOIDS,13+ Y/O  1963    T & A 13+y.o.     Allergies:  Allergies as of 12/04/2018 - [place] as Reviewed 12/04/2018   Allergen Reaction Noted     Atorvastatin calcium  01/30/2008     Diovan [valsartan]  01/24/2011     Glimepiride  05/08/2018     Invokana [canagliflozin]  06/01/2018     Mold Other (See Comments) 07/14/2011     Penicillins  09/07/2011     Seasonal allergies  06/25/2007    glimepiride possibly causing pancytopenia  Current Medications:  Current Outpatient Prescriptions   Medication Sig Dispense Refill     amLODIPine (NORVASC) 10 MG tablet Take 1 tablet (10 mg) by mouth daily 90 tablet 3     aspirin 81 MG tablet Take by mouth daily 30 tablet      blood glucose monitoring (ACCU-CHEK FASTCLIX) lancets Use to test blood sugar 1 times daily or as directed. 102 each 11     blood glucose monitoring (ACCU-CHEK MULTICLIX) lancets Use to test blood sugar 1 time daily or as directed. 1 Box 11     blood glucose monitoring (ACCU-CHEK SMARTVIEW) test strip Use to test blood sugar 1 times daily or as directed. 100 strip 11     Blood Glucose Monitoring Suppl MAGDALENO 1 Device daily Smartview Meter 1 each 0     Cyanocobalamin (B-12) 1000 MCG TBCR Take 1,000 mcg by mouth daily 100 tablet 11     levothyroxine (SYNTHROID/LEVOTHROID) 75 MCG tablet Take 1 tablet (75 mcg) by mouth daily 90 tablet 2     metFORMIN (GLUCOPHAGE) 1000 MG tablet 1 tab twice a day 180 tablet 3     omeprazole (PRILOSEC) 20 MG CR capsule TAKE ONE CAPSULE BY MOUTH ONCE DAILY IN THE MORNING 30-60  MINUTES  BEFORE  FIRST  DAILY  MEAL 90 capsule 3     rosuvastatin (CRESTOR) 40 MG tablet TAKE ONE-HALF TABLET BY MOUTH AT BEDTIME 45 tablet 2     sitagliptin (JANUVIA) 100 MG tablet Take 1 tablet (100 mg) by mouth daily 90 tablet 3      Family History:  Family History   Problem Relation Age of Onset     Family History Negative Mother      d:age 89 of \"old age\"     " "GASTROINTESTINAL DISEASE Father      d:age 79 liver failure after taking excessive amounts of Tylenol over 5-6 yrs     Neurologic Disorder Brother      b:1932  hx cerebral aneurysm age 59     No family history of bleeding or clotting disorder. Maternal uncle had throat cancer.  Social History:  Social History     Social History     Marital status:      Spouse name: N/A     Number of children: 2     Years of education: N/A     Occupational History     teacher [employer]     Social History Main Topics     Smoking status: Never Smoker     Smokeless tobacco: Never Used     Alcohol use Yes      Comment: beer 4-5 a month     Drug use: No     Sexual activity: Yes     Partners: Female     Other Topics Concern     Special Diet Yes     diabetic diet      Exercise No     nothing regular since bilateral knee surgeries      Social History Narrative    denies any smoking. No alcohol. He usually lives in [place] and spends winter in [place]. He lives alone.  Physical Exam:  /74  Pulse 83  Temp 98.6  F (37  C)  Resp 18  Ht 1.702 m (5' 7\")  Wt 76.7 kg (169 lb)  SpO2 98%  BMI 26.47 kg/m2  CONSTITUTIONAL: no acute distress  EYES: PERRLA, no palor or icterus.   ENT/MOUTH: no mouth lesions. Ears normal  CVS: s1s2 no m r g .   RESPIRATORY: clear to auscultation b/l  GI: soft non tender no hepatosplenomegaly  NEURO: AAOX3  Grossly non focal neuro exam  INTEGUMENT: no obvious rashes  LYMPHATIC: no palpable cervical, supraclavicular, axillary or inguinal LAD  MUSCULOSKELETAL: Unremarkable. No bony tenderness.   EXTREMITIES: no edema  PSYCH: Mentation, mood and affect are normal. Decision making capacity is intact          Laboratory/Imaging Studies  Results for NEISHA FRANCOIS (MRN [id number]) as of 12/4/2018 13:53   Ref. Range 8/21/2018 09:55 9/18/2018 13:23 10/23/2018 09:56 11/20/2018 10:13   WBC Latest Ref Range: 4.0 - 11.0 10e9/L 4.6 5.6 5.4 4.0   Hemoglobin Latest Ref Range: 13.3 - 17.7 g/dL 10.1 (L) " 10.1 (L) 10.6 (L) 10.6 (L)   Hematocrit Latest Ref Range: 40.0 - 53.0 % 30.1 (L) 30.3 (L) 31.5 (L) 31.5 (L)   Platelet Count Latest Ref Range: 150 - 450 10e9/L 51 (L) 57 (L) 59 (L) 55 (L)   RBC Count Latest Ref Range: 4.4 - 5.9 10e12/L 2.63 (L) 2.65 (L) 2.75 (L) 2.77 (L)   MCV Latest Ref Range: 78 - 100 fl 114 (H) 114 (H) 115 (H) 114 (H)   MCH Latest Ref Range: 26.5 - 33.0 pg 38.4 (H) 38.1 (H) 38.5 (H) 38.3 (H)   MCHC Latest Ref Range: 31.5 - 36.5 g/dL 33.6 33.3 33.7 33.7   RDW Latest Ref Range: 10.0 - 15.0 % 14.2 14.6 14.6 14.0   Diff Method Unknown Automated Method Automated Method Automated Method Automated Method   % Neutrophils Latest Units: % 63.8 58.7 67.2 64.0   % Lymphocytes Latest Units: % 27.5 32.3 25.0 28.8   % Monocytes Latest Units: % 6.1 5.0 5.2 5.3   % Eosinophils Latest Units: % 2.2 3.4 1.8 1.3   % Basophils Latest Units: % 0.2 0.4 0.6 0.3   % Immature Granulocytes Latest Units: % 0.2 0.2 0.2 0.3   Absolute Neutrophil Latest Ref Range: 1.6 - 8.3 10e9/L 3.0 3.3 3.7 2.6   Absolute Lymphocytes Latest Ref Range: 0.8 - 5.3 10e9/L 1.3 1.8 1.4 1.2   Absolute Monocytes Latest Ref Range: 0.0 - 1.3 10e9/L 0.3 0.3 0.3 0.2   Absolute Eosinophils Latest Ref Range: 0.0 - 0.7 10e9/L 0.1 0.2 0.1 0.1   Absolute Basophils Latest Ref Range: 0.0 - 0.2 10e9/L 0.0 0.0 0.0 0.0   Abs Immature Granulocytes Latest Ref Range: 0 - 0.4 10e9/L 0.0 0.0 0.0 0.0   % Retic Latest Ref Range: 0.5 - 2.0 % 2.6 (H)  2.6 (H) 2.4 (H)   Absolute Retic Latest Ref Range: 25 - 95 10e9/L 69.2  72.6 65.9     Results for NEISHA FRANCOIS (MRN [id number]) as of 12/4/2018 13:53   Ref. Range 8/21/2018 09:55 8/21/2018 10:39 9/18/2018 13:23 10/23/2018 09:56   Sodium Latest Ref Range: 133 - 144 mmol/L 138   141   Potassium Latest Ref Range: 3.4 - 5.3 mmol/L 4.7   4.7   Chloride Latest Ref Range: 94 - 109 mmol/L 103   106   Carbon Dioxide Latest Ref Range: 20 - 32 mmol/L 27   30   Urea Nitrogen Latest Ref Range: 7 - 30 mg/dL 20   22   Creatinine Latest  Ref Range: 0.66 - 1.25 mg/dL 1.07   1.20   GFR Estimate Latest Ref Range: >60 mL/min/1.7m2 67   59 (L)   GFR Estimate If Black Latest Ref Range: >60 mL/min/1.7m2 81   71   Calcium Latest Ref Range: 8.5 - 10.1 mg/dL 8.5   9.0   Anion Gap Latest Ref Range: 3 - 14 mmol/L 8   5   Albumin Latest Ref Range: 3.4 - 5.0 g/dL 3.5   3.8   Protein Total Latest Ref Range: 6.8 - 8.8 g/dL 7.0   7.4   Bilirubin Total Latest Ref Range: 0.2 - 1.3 mg/dL 0.8   0.6   Alkaline Phosphatase Latest Ref Range: 40 - 150 U/L 87   86   ALT Latest Ref Range: 0 - 70 U/L 38   30   AST Latest Ref Range: 0 - 45 U/L 31   20   Albumin Urine mg/g Cr Latest Ref Range: 0 - 17 mg/g Cr  17.37 (H)     Albumin Urine mg/L Latest Units: mg/L  26     Creatinine Urine Latest Units: mg/dL  152     Erythropoietin Latest Ref Range: 4 - 27 mU/mL   165 (H)    Ferritin Latest Ref Range: 26 - 388 ng/mL   100    Folate Latest Ref Range: >5.4 ng/mL   89.9    Iron Latest Ref Range: 35 - 180 ug/dL   95    Iron Binding Cap Latest Ref Range: 240 - 430 ug/dL   253    Iron Saturation Index Latest Ref Range: 15 - 46 %   38    Lactate Dehydrogenase Latest Ref Range: 85 - 227 U/L 219  176 164   Vitamin B12 Latest Ref Range: 193 - 986 pg/mL   406      I reviewed the outside records as well  Received from [facility] [place], [place], [place] are 16 stained   slides labeled [id number], collected   03-28-18 now designated UHR-.  Also received is a copy of the   referring pathologist's report with   patient identifying information.     FINAL DIAGNOSIS:   Bone marrow, posterior iliac crest, decalcified trephine biopsy, aspirate   clot, touch imprints, and aspirate   smears ([id number], 03/28/2018):        - Marrow cellularity of 5% (moderately hypocellular for age) in   trephine core sections provided for   review        - Good evidence of trilineage hematopoietic maturation in core and   clot sections provided for review        - Benign appearing lymphoid aggregates in core  "and clot sections   provided for review        - Negative immunohistochemical stain for Parvovirus in clot section        - Marrow aspirate smears and touch imprints showing trilineage   hematopoietic maturation with no increase   in blasts        - Increased marrow iron stores with 5% sideroblasts and no ringed   sideroblasts.        - Flow cytometric immunophenotyping performed on marrow aspirate   reported by [facility] as   showing, \"No immunophenotypic evidence of acute leukemia or a T-cell   B-cell or Plasma cell neoplasm.\"        - Cytogenetic analysis performed on marrow aspirate reported by   [facility] as showing,   \"46,XY,gonzales(13;14)(q10;q19)?c[20],\" \"most probably constitutional\"        - FISH study performed on marrow aspirate by [facility]   reported as showing, \" Negative FISH   result for cytogenetic abnormalities commonly observed in myeloid   disorders\"        - Blood smear and peripheral blood counts not provided for review        - See Comment     COMMENT:   By separate report ([id number]; 4/18/18), flow cytometric immunophenotyping    performed on blood for this patient   at [facility] laboratory shows a very small percentage of red cells and a   small percentage (less than 10%) of   neutrophils and monocytes with a PNH immunophenotype.       Peripheral blood flow cytometry  SPECIMEN(S):   Blood     INTERPRETATION:   Blood:        Cells with a paroxysmal nocturnal hemoglobinuria (PNH)-type   immunophenotype represent approximately 0.3%   of erythrocytes, 3.4% of monocytes, and 4.4% of neutrophils, see comment.     COMMENT:   Classic paroxysmal nocturnal hemoglobinuria (PNH) is associated with large    PNH-type clones. PNH in the setting   of another bone marrow failure syndrome usually has small PNH-type clones,    less than 10%. Subclinical PNH   usually has rare PNH-type clones, less than 1%. Detection of a small clone    is not equivalent to a diagnosis of   hemolytic " PNH.  Final diagnosis requires correlation with clinical,   morphologic, and ancillary findings.   (Fletcher et al. Blood, 106 (12): 6581-9224, 2005. Fletcher, Hematology,   21-29, 2011.)     RESULTS:   About 0.33% of erythrocytes have decreased expression of CD59 (0.32% type   II, 0.01% type III).   About 3.45% of monocytes have decreased expression of CD14 and binding of   fluorescent aerolysin (FLAER).About   4.44% of neutrophils have decreased expression of CD24 and binding of   FLAER.       Peripheral blood chromosome analysis.  METHODS:   PHA stimulated, MTX synchronized cultures.     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   Each of the metaphase cells analyzed had 45 chromosomes including one   normal chromosome 13, one normal   chromosome 14, and a Robertsonian translocation joining together, at their    centromeres, the long arms of one   chromosome 13 and one chromosome 14.     Robertsonian translocations are one of the most common occurring   constitutional  rearrangements in human   populations. As these translocations are not associated with any loss of   critical genetic material, they are   considered to be balanced, and would not be expected to be associated with    any phenotypic abnormalities in   this patient. However, during meiosis, the translocation can segregate   into gametes several different ways,   resulting, after fertilization, in zygotes that have trisomy or monosomy   for chromosomes 13 or 14. These   trisomies and/or monosomies can result in miscarriage or liveborn   offspring with multiple congenital   anomalies. Thus, carriers of such translocations are at increased   reproductive risk.     Genetic counseling regarding these results, and cytogenetic evaluation of   other family members who may carry   the translocation are recommended.  "    ASSESSMENT/PLAN:      Pancytopenia. At this time the cause is not entirely known but it could be possibly toxic reaction to the drug glimepiride. Otherwise his bone marrow biopsy was nondiagnostic. Peripheral blood and bone marrow Flow cytometry were negative.  Cytogenetic analysis performed on marrow aspirate showed 46,XY,gonzales(13;14)(q10;q19)?c[20], \"most probably constitutional\"        - FISH study performed on marrow aspirate showed \" Negative FISH result for cytogenetic abnormalities commonly observed in myeloid disorders\"     With time he has done well with normalization of the white blood cell count.  Hg has improved to 10.6  Platelets still low but stable at 55.      Anemia.  I repeated iron studies Folic acid, vitamin B12 and haptoglobin, which are unremarkable. Continue taking vitamin B12 and folic acid.    Thrombocytopenia.  Platelets low but stable at 55.  Asymptomatic. Continue serial monitoring every few months   Recent WENDY, Hep B, C and HIV were negative. Spleen size was normal. Liver was borderline enlarged.    Vitamin B12 deficiency with negative anti-parietal cell antibody and anti-intrinsic factor antibody.  Repeat vitamin B12 level is 406.  Conitnue vitamin B12 1000 mcg daily.      Small PNH clone. Peripheral blood flow cytometry for PNH reveals a very small percentage of red cells and a small percentage (less than 10%) of neutrophils and monocytes with a PNH immunophenotype, the significance of which is not known.  Repeat flow cytometry for PNH shows that the % of PNH type clones is even lower this time, so doubt that it is the culprit in causing pancytopenia.     Chromosomal abnormality. 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  We did a peripheral blood chromosome analysis which confirms these findings. 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  He was supposed to see genetic counselor in October 2018 " but there was some conflict with his schedule so he had to cancel this appointment.  He tells me he will make that appointment when he comes back from [place].      He will make an appointment and he will come back from [place] to see me and we will repeat labs at that time.  He will make sure that all of his medical records are transferred from [place] to us so that we can review it.      I answered all of his questions to his satisfaction. He is agreeable and comfortable with the plan.    Terra Salazar          Again, thank you for allowing me to participate in the care of your patient.        Sincerely,        Terra Salazar MD

## 2018-12-04 NOTE — PROGRESS NOTES
Hematology follow up visit:  Date on this visit: 12/4/2018     CC   Pancytopenia    Primary Physician: Zhou Meraz     History Of Present Illness:    Please see previous note for details. I have copied and updated from prior note.  Mr. Cramer is a 78 year old male who was found to have pancytopenia around the end of March 2018 when he presented with a white blood cell count of 1.5 and hemoglobin of 7.8 and platelets of 4. He was admitted to the hospital in [place]. There was no bruising or petechiae or evidence of bleeding. He was feeling fatigued and at that point he was also noticed to have C diff infection and was started on p.o. vancomycin. He was given Neupogen ×1 and packed red distant transfusion ×2 and platelet transfusion ×1. He was also given vitamin B12 injections and I believe it was 3 injections which she was given during the hospitalization. He had a bone marrow biopsy done as part of his workup which showed mildly hypocellular bone marrow with 10-40% cellularity with erythroid hyperplasia and mild dyserythropoiesis (5% cellularity, moderately decreased for age as per pathology review at the [facility]). Reactive plasmacytosis and maturing trilineage hematopoiesis. There was no significant dysplasia in the granulocytes or megakaryocytes. Parvovirus immunohistochemistry was negative. These findings were deemed consistent with either evolving aplastic anemia or drug/toxin effect or vitamin/nutritional deficiency or infectious or viral and hypocellular MDS.   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 This finding is most probably constitutional in origin and not acquired; however it it is medically warranted a peripheral blood chromosome analysis couldn't be performed to rule out an acquired  abnormality. If it is constitutional genetic counseling would be medically indicated    Comprehensive metabolic panel was unremarkable.   His vitamin B12 level was less than 159. Antiparietal cell antibody and anti-intrinsic factor antibody were negative. Iron studies were unremarkable. CMV IgM was negative. WENDY screen was negative. Rheumatoid factor and anti-CCP were negative. SPEP was negative.  After thorough investigation it was found that potentially glimepiride (sulfonylurea) which he was taking could be the culprit for pancytopenia so he was taken off it.  Upon discharge from the hospital on 3/31/2018 his white blood cell count was 2.4 and hemoglobin was 7.8 and platelet 12. On 4/5/2018 the WBC count was 2.9 with ANC of 1.2 and hemoglobin 7.9 and platelets 75.  On 4/13/2018 white blood cell count was 3.3, ANC 1.3, hemoglobin 7 and platelets 13. At that point he was given 2 units of packed red blood cells and 1 unit of platelets  Most recently on 4/16/2018 white blood cell count was 3.2, ANC 1.1, hemoglobin 10 and platelets 20.    When he saw me I did further testing and KIMBERLEE was negative.  Peripheral blood flow cytometry for PNH reveals a very small percentage of red cells and a small percentage (less than 10%) of neutrophils and monocytes with a PNH immunophenotype, the significance of which is not known.  Repeat flow cytometry for PNH in Sept 2018, shows that the % of PNH type clones is even lower this time, so doubt that it is the culprit in causing pancytopenia.   A repeat vitamin B12 level was 443. I told him to start taking vitamin B12 1000  g daily.  We kept him on observation and his counts improved with normalization of the WBC count and improvement is Hg. His platelets although were better but plataued around 40s, so we did further w/up and recent WENDY, Hep B, C and HIV were negative. Spleen size was normal. Liver was borderline enlarged.    He remains on observation.    Today he comes in and tells me  that he has been feeling well.  Overall his energy seems a little better.  Denies any interval infections.  No significant bleeding.  No B symptoms.  He has been following with physical therapy for balance issues which is somewhat helping.  He continues to take folic acid and vitamin B12 and multivitamins.  He is planning to go to [place] in January and will stay there for about 4 months.  He has an appointment with hematologist around mid January in [place].      ROS:  A comprehensive ROS was otherwise neg        I reviewed other history in EPIC as below.    Past Medical/Surgical History:  Past Medical History:   Diagnosis Date     Aplastic anemia (H) 03/26/2018    thought secondary to reaction to Septra     BPH (benign prostatic hypertrophy)     failed  Flomax     C. difficile diarrhea 03/26/2018    treated with Flagyl     Cellulitis and abscess of leg, except foot 2004     Contact dermatitis and other eczema, due to unspecified cause      Diaphragmatic hernia without mention of obstruction or gangrene     hiatal hernia     Esophageal reflux      Hyperlipidemia LDL goal <100 3/11/2005     Hypothyroidism      Impotence of organic origin      Meningococcal encephalitis      Proteinuria      RBBB      Type 2 diabetes mellitus with diabetic nephropathy  (goal A1C<7) 10/24/2015     Unspecified essential hypertension      Past Surgical History:   Procedure Laterality Date     ARTHROPLASTY KNEE Left 3/18/2015    Procedure: ARTHROPLASTY KNEE;  Surgeon: Abebe Schroeder MD;  Location: [facility] OR     ARTHROPLASTY KNEE Right 3/14/2016    Procedure: ARTHROPLASTY KNEE;  Surgeon: Abebe Schroeedr MD;  Location: [facility] OR     C NONSPECIFIC PROCEDURE  10/02    left knee meniscus tear repair     HC LAPAROSCOPY, SURGICAL; CHOLECYSTECTOMY  1998    Cholecystectomy, Laparoscopic     HC REMOVE TONSILS/ADENOIDS,13+ Y/O  1963    T & A 13+y.o.     Allergies:  Allergies as of 12/04/2018 - [place] as Reviewed 12/04/2018   Allergen Reaction Noted      "Atorvastatin calcium  01/30/2008     Diovan [valsartan]  01/24/2011     Glimepiride  05/08/2018     Invokana [canagliflozin]  06/01/2018     Mold Other (See Comments) 07/14/2011     Penicillins  09/07/2011     Seasonal allergies  06/25/2007    glimepiride possibly causing pancytopenia  Current Medications:  Current Outpatient Prescriptions   Medication Sig Dispense Refill     amLODIPine (NORVASC) 10 MG tablet Take 1 tablet (10 mg) by mouth daily 90 tablet 3     aspirin 81 MG tablet Take by mouth daily 30 tablet      blood glucose monitoring (ACCU-CHEK FASTCLIX) lancets Use to test blood sugar 1 times daily or as directed. 102 each 11     blood glucose monitoring (ACCU-CHEK MULTICLIX) lancets Use to test blood sugar 1 time daily or as directed. 1 Box 11     blood glucose monitoring (ACCU-CHEK SMARTVIEW) test strip Use to test blood sugar 1 times daily or as directed. 100 strip 11     Blood Glucose Monitoring Suppl MAGDALENO 1 Device daily Smartview Meter 1 each 0     Cyanocobalamin (B-12) 1000 MCG TBCR Take 1,000 mcg by mouth daily 100 tablet 11     levothyroxine (SYNTHROID/LEVOTHROID) 75 MCG tablet Take 1 tablet (75 mcg) by mouth daily 90 tablet 2     metFORMIN (GLUCOPHAGE) 1000 MG tablet 1 tab twice a day 180 tablet 3     omeprazole (PRILOSEC) 20 MG CR capsule TAKE ONE CAPSULE BY MOUTH ONCE DAILY IN THE MORNING 30-60  MINUTES  BEFORE  FIRST  DAILY  MEAL 90 capsule 3     rosuvastatin (CRESTOR) 40 MG tablet TAKE ONE-HALF TABLET BY MOUTH AT BEDTIME 45 tablet 2     sitagliptin (JANUVIA) 100 MG tablet Take 1 tablet (100 mg) by mouth daily 90 tablet 3      Family History:  Family History   Problem Relation Age of Onset     Family History Negative Mother      d:age 89 of \"old age\"     GASTROINTESTINAL DISEASE Father      d:age 79 liver failure after taking excessive amounts of Tylenol over 5-6 yrs     Neurologic Disorder Brother      b:1932  hx cerebral aneurysm age 59     No family history of bleeding or clotting disorder. " "Maternal uncle had throat cancer.  Social History:  Social History     Social History     Marital status:      Spouse name: N/A     Number of children: 2     Years of education: N/A     Occupational History     teacher [employer]     Social History Main Topics     Smoking status: Never Smoker     Smokeless tobacco: Never Used     Alcohol use Yes      Comment: beer 4-5 a month     Drug use: No     Sexual activity: Yes     Partners: Female     Other Topics Concern     Special Diet Yes     diabetic diet      Exercise No     nothing regular since bilateral knee surgeries      Social History Narrative    denies any smoking. No alcohol. He usually lives in [place] and spends winter in [place]. He lives alone.  Physical Exam:  /74  Pulse 83  Temp 98.6  F (37  C)  Resp 18  Ht 1.702 m (5' 7\")  Wt 76.7 kg (169 lb)  SpO2 98%  BMI 26.47 kg/m2  CONSTITUTIONAL: no acute distress  EYES: PERRLA, no palor or icterus.   ENT/MOUTH: no mouth lesions. Ears normal  CVS: s1s2 no m r g .   RESPIRATORY: clear to auscultation b/l  GI: soft non tender no hepatosplenomegaly  NEURO: AAOX3  Grossly non focal neuro exam  INTEGUMENT: no obvious rashes  LYMPHATIC: no palpable cervical, supraclavicular, axillary or inguinal LAD  MUSCULOSKELETAL: Unremarkable. No bony tenderness.   EXTREMITIES: no edema  PSYCH: Mentation, mood and affect are normal. Decision making capacity is intact          Laboratory/Imaging Studies  Results for NEISHA FRANCOIS (MRN [id number]) as of 12/4/2018 13:53   Ref. Range 8/21/2018 09:55 9/18/2018 13:23 10/23/2018 09:56 11/20/2018 10:13   WBC Latest Ref Range: 4.0 - 11.0 10e9/L 4.6 5.6 5.4 4.0   Hemoglobin Latest Ref Range: 13.3 - 17.7 g/dL 10.1 (L) 10.1 (L) 10.6 (L) 10.6 (L)   Hematocrit Latest Ref Range: 40.0 - 53.0 % 30.1 (L) 30.3 (L) 31.5 (L) 31.5 (L)   Platelet Count Latest Ref Range: 150 - 450 10e9/L 51 (L) 57 (L) 59 (L) 55 (L)   RBC Count Latest Ref Range: 4.4 - 5.9 10e12/L 2.63 (L) " 2.65 (L) 2.75 (L) 2.77 (L)   MCV Latest Ref Range: 78 - 100 fl 114 (H) 114 (H) 115 (H) 114 (H)   MCH Latest Ref Range: 26.5 - 33.0 pg 38.4 (H) 38.1 (H) 38.5 (H) 38.3 (H)   MCHC Latest Ref Range: 31.5 - 36.5 g/dL 33.6 33.3 33.7 33.7   RDW Latest Ref Range: 10.0 - 15.0 % 14.2 14.6 14.6 14.0   Diff Method Unknown Automated Method Automated Method Automated Method Automated Method   % Neutrophils Latest Units: % 63.8 58.7 67.2 64.0   % Lymphocytes Latest Units: % 27.5 32.3 25.0 28.8   % Monocytes Latest Units: % 6.1 5.0 5.2 5.3   % Eosinophils Latest Units: % 2.2 3.4 1.8 1.3   % Basophils Latest Units: % 0.2 0.4 0.6 0.3   % Immature Granulocytes Latest Units: % 0.2 0.2 0.2 0.3   Absolute Neutrophil Latest Ref Range: 1.6 - 8.3 10e9/L 3.0 3.3 3.7 2.6   Absolute Lymphocytes Latest Ref Range: 0.8 - 5.3 10e9/L 1.3 1.8 1.4 1.2   Absolute Monocytes Latest Ref Range: 0.0 - 1.3 10e9/L 0.3 0.3 0.3 0.2   Absolute Eosinophils Latest Ref Range: 0.0 - 0.7 10e9/L 0.1 0.2 0.1 0.1   Absolute Basophils Latest Ref Range: 0.0 - 0.2 10e9/L 0.0 0.0 0.0 0.0   Abs Immature Granulocytes Latest Ref Range: 0 - 0.4 10e9/L 0.0 0.0 0.0 0.0   % Retic Latest Ref Range: 0.5 - 2.0 % 2.6 (H)  2.6 (H) 2.4 (H)   Absolute Retic Latest Ref Range: 25 - 95 10e9/L 69.2  72.6 65.9     Results for NEISHA FRANCOIS (MRN [id number]) as of 12/4/2018 13:53   Ref. Range 8/21/2018 09:55 8/21/2018 10:39 9/18/2018 13:23 10/23/2018 09:56   Sodium Latest Ref Range: 133 - 144 mmol/L 138   141   Potassium Latest Ref Range: 3.4 - 5.3 mmol/L 4.7   4.7   Chloride Latest Ref Range: 94 - 109 mmol/L 103   106   Carbon Dioxide Latest Ref Range: 20 - 32 mmol/L 27   30   Urea Nitrogen Latest Ref Range: 7 - 30 mg/dL 20   22   Creatinine Latest Ref Range: 0.66 - 1.25 mg/dL 1.07   1.20   GFR Estimate Latest Ref Range: >60 mL/min/1.7m2 67   59 (L)   GFR Estimate If Black Latest Ref Range: >60 mL/min/1.7m2 81   71   Calcium Latest Ref Range: 8.5 - 10.1 mg/dL 8.5   9.0   Anion Gap Latest  Ref Range: 3 - 14 mmol/L 8   5   Albumin Latest Ref Range: 3.4 - 5.0 g/dL 3.5   3.8   Protein Total Latest Ref Range: 6.8 - 8.8 g/dL 7.0   7.4   Bilirubin Total Latest Ref Range: 0.2 - 1.3 mg/dL 0.8   0.6   Alkaline Phosphatase Latest Ref Range: 40 - 150 U/L 87   86   ALT Latest Ref Range: 0 - 70 U/L 38   30   AST Latest Ref Range: 0 - 45 U/L 31   20   Albumin Urine mg/g Cr Latest Ref Range: 0 - 17 mg/g Cr  17.37 (H)     Albumin Urine mg/L Latest Units: mg/L  26     Creatinine Urine Latest Units: mg/dL  152     Erythropoietin Latest Ref Range: 4 - 27 mU/mL   165 (H)    Ferritin Latest Ref Range: 26 - 388 ng/mL   100    Folate Latest Ref Range: >5.4 ng/mL   89.9    Iron Latest Ref Range: 35 - 180 ug/dL   95    Iron Binding Cap Latest Ref Range: 240 - 430 ug/dL   253    Iron Saturation Index Latest Ref Range: 15 - 46 %   38    Lactate Dehydrogenase Latest Ref Range: 85 - 227 U/L 219  176 164   Vitamin B12 Latest Ref Range: 193 - 986 pg/mL   406      I reviewed the outside records as well  Received from [facility], [place], [place] are 16 stained   slides labeled [id number], collected   03-28-18 now designated U[facility]-.  Also received is a copy of the   referring pathologist's report with   patient identifying information.     FINAL DIAGNOSIS:   Bone marrow, posterior iliac crest, decalcified trephine biopsy, aspirate   clot, touch imprints, and aspirate   smears ([id number], 03/28/2018):        - Marrow cellularity of 5% (moderately hypocellular for age) in   trephine core sections provided for   review        - Good evidence of trilineage hematopoietic maturation in core and   clot sections provided for review        - Benign appearing lymphoid aggregates in core and clot sections   provided for review        - Negative immunohistochemical stain for Parvovirus in clot section        - Marrow aspirate smears and touch imprints showing trilineage   hematopoietic maturation with no increase   in blasts  "       - Increased marrow iron stores with 5% sideroblasts and no ringed   sideroblasts.        - Flow cytometric immunophenotyping performed on marrow aspirate   reported by [facility] as   showing, \"No immunophenotypic evidence of acute leukemia or a T-cell   B-cell or Plasma cell neoplasm.\"        - Cytogenetic analysis performed on marrow aspirate reported by   [facility] as showing,   \"46,XY,gonzales(13;14)(q10;q19)?c[20],\" \"most probably constitutional\"        - FISH study performed on marrow aspirate by [facility]   reported as showing, \" Negative FISH   result for cytogenetic abnormalities commonly observed in myeloid   disorders\"        - Blood smear and peripheral blood counts not provided for review        - See Comment     COMMENT:   By separate report ([id number]; 4/18/18), flow cytometric immunophenotyping    performed on blood for this patient   at [facility] laboratory shows a very small percentage of red cells and a   small percentage (less than 10%) of   neutrophils and monocytes with a PNH immunophenotype.       Peripheral blood flow cytometry  SPECIMEN(S):   Blood     INTERPRETATION:   Blood:        Cells with a paroxysmal nocturnal hemoglobinuria (PNH)-type   immunophenotype represent approximately 0.3%   of erythrocytes, 3.4% of monocytes, and 4.4% of neutrophils, see comment.     COMMENT:   Classic paroxysmal nocturnal hemoglobinuria (PNH) is associated with large    PNH-type clones. PNH in the setting   of another bone marrow failure syndrome usually has small PNH-type clones,    less than 10%. Subclinical PNH   usually has rare PNH-type clones, less than 1%. Detection of a small clone    is not equivalent to a diagnosis of   hemolytic PNH.  Final diagnosis requires correlation with clinical,   morphologic, and ancillary findings.   (Fletcher, et al. Blood, 106 (12): 0563-6576, 2005. Fletcher, Hematology,   21-29, 2011.)     RESULTS:   About 0.33% of erythrocytes have " decreased expression of CD59 (0.32% type   II, 0.01% type III).   About 3.45% of monocytes have decreased expression of CD14 and binding of   fluorescent aerolysin (FLAER).About   4.44% of neutrophils have decreased expression of CD24 and binding of   FLAER.       Peripheral blood chromosome analysis.  METHODS:   PHA stimulated, MTX synchronized cultures.     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   Each of the metaphase cells analyzed had 45 chromosomes including one   normal chromosome 13, one normal   chromosome 14, and a Robertsonian translocation joining together, at their    centromeres, the long arms of one   chromosome 13 and one chromosome 14.     Robertsonian translocations are one of the most common occurring   constitutional  rearrangements in human   populations. As these translocations are not associated with any loss of   critical genetic material, they are   considered to be balanced, and would not be expected to be associated with    any phenotypic abnormalities in   this patient. However, during meiosis, the translocation can segregate   into gametes several different ways,   resulting, after fertilization, in zygotes that have trisomy or monosomy   for chromosomes 13 or 14. These   trisomies and/or monosomies can result in miscarriage or liveborn   offspring with multiple congenital   anomalies. Thus, carriers of such translocations are at increased   reproductive risk.     Genetic counseling regarding these results, and cytogenetic evaluation of   other family members who may carry   the translocation are recommended.     ASSESSMENT/PLAN:      Pancytopenia. At this time the cause is not entirely known but it could be possibly toxic reaction to the drug glimepiride. Otherwise his bone marrow biopsy was nondiagnostic. Peripheral blood and bone marrow Flow cytometry were  "negative.  Cytogenetic analysis performed on marrow aspirate showed 46,XY,gonzales(13;14)(q10;q19)?c[20], \"most probably constitutional\"        - FISH study performed on marrow aspirate showed \" Negative FISH result for cytogenetic abnormalities commonly observed in myeloid disorders\"     With time he has done well with normalization of the white blood cell count.  Hg has improved to 10.6  Platelets still low but stable at 55.      Anemia.  I repeated iron studies Folic acid, vitamin B12 and haptoglobin, which are unremarkable. Continue taking vitamin B12 and folic acid.    Thrombocytopenia.  Platelets low but stable at 55.  Asymptomatic. Continue serial monitoring every few months   Recent WENDY, Hep B, C and HIV were negative. Spleen size was normal. Liver was borderline enlarged.    Vitamin B12 deficiency with negative anti-parietal cell antibody and anti-intrinsic factor antibody.  Repeat vitamin B12 level is 406.  Conitnue vitamin B12 1000 mcg daily.      Small PNH clone. Peripheral blood flow cytometry for PNH reveals a very small percentage of red cells and a small percentage (less than 10%) of neutrophils and monocytes with a PNH immunophenotype, the significance of which is not known.  Repeat flow cytometry for PNH shows that the % of PNH type clones is even lower this time, so doubt that it is the culprit in causing pancytopenia.     Chromosomal abnormality. 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  We did a peripheral blood chromosome analysis which confirms these findings. 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  He was supposed to see genetic counselor in October 2018 but there was some conflict with his schedule so he had to cancel this appointment.  He tells me he will make that appointment when he comes back from [place].      He will make an appointment and he will come back from [place] to see me and we will " repeat labs at that time.  He will make sure that all of his medical records are transferred from [place] to us so that we can review it.      I answered all of his questions to his satisfaction. He is agreeable and comfortable with the plan.    Terra Salazar

## 2018-12-18 ENCOUNTER — HOSPITAL ENCOUNTER (OUTPATIENT)
Dept: PHYSICAL THERAPY | Facility: CLINIC | Age: 78
Setting detail: THERAPIES SERIES
End: 2018-12-18
Attending: INTERNAL MEDICINE
Payer: MEDICARE

## 2018-12-18 PROCEDURE — 97750 PHYSICAL PERFORMANCE TEST: CPT | Mod: GP | Performed by: PHYSICAL THERAPIST

## 2018-12-18 PROCEDURE — G8980 MOBILITY D/C STATUS: HCPCS | Mod: GP,CI | Performed by: PHYSICAL THERAPIST

## 2018-12-18 PROCEDURE — 97112 NEUROMUSCULAR REEDUCATION: CPT | Mod: GP | Performed by: PHYSICAL THERAPIST

## 2018-12-18 PROCEDURE — G8979 MOBILITY GOAL STATUS: HCPCS | Mod: GP,CI | Performed by: PHYSICAL THERAPIST

## 2018-12-18 NOTE — PROGRESS NOTES
"  SUBJECTIVE:   Prakash Cramer is a 78 year old male who presents for Preventive Visit.    Are you in the first 12 months of your Medicare Part B coverage?  No    Physical Health:    In general, how would you rate your overall physical health? good    Outside of work, how many days during the week do you exercise? 4-5 days/week    Outside of work, approximately how many minutes a day do you exercise?30-45 minutes    If you drink alcohol do you typically have >3 drinks per day or >7 drinks per week? No    Do you usually eat at least 4 servings of fruit and vegetables a day, include whole grains & fiber and avoid regularly eating high fat or \"junk\" foods? Yes    Do you have any problems taking medications regularly?  No    Do you have any side effects from medications? none    Needs assistance for the following daily activities: no assistance needed    Which of the following safety concerns are present in your home?  none identified     Hearing impairment: No    In the past 6 months, have you been bothered by leaking of urine? no    Mental Health:    In general, how would you rate your overall mental or emotional health? excellent  PHQ-2 Score:      Do you feel safe in your environment? Yes    Do you have a Health Care Directive? Yes: Patient states has Advance Directive and will bring in a copy to clinic.    Additional concerns to address?  No    Fall risk:  Fallen 2 or more times in the past year?: Yes  Any fall with injury in the past year?: No    Cognitive Screenin) Repeat 3 items (Leader, Season, Table)    2) Clock draw: NORMAL   3) 3 item recall: Recalls 2 objects   Results: NORMAL clock, 1-2 items recalled: COGNITIVE IMPAIRMENT LESS LIKELY    Mini-CogTM Copyright GUILLERMO Hernández. Licensed by the author for use in [facility]; reprinted with permission (leonides@.[facility]). All rights reserved.      Do you have sleep apnea, excessive snoring or daytime drowsiness?: no    Rash  Duration of complaint: 7 DAYS "   Description:   Location: arms, torso, dorsal area  Character: blotchy  Itching (Pruritis): YES  Progression of Symptoms:  improving  Accompanying Signs & Symptoms:  Fever: no  Body aches or joint pain: no  Sore throat symptoms: no  Recent cold symptoms: no  History:   Previous similar rash: Yes, 4 years ago, patient was seen and treated at [facility]  Precipitating factors:   Exposure to similar rash: YES- due to potatoes   New exposures: None   Recent travel: no   Alleviating factors: none  Therapies Tried and outcome: none  Reviewed and updated as needed this visit by clinical staff         Reviewed and updated as needed this visit by Provider        Social History     Tobacco Use     Smoking status: Never Smoker     Smokeless tobacco: Never Used   Substance Use Topics     Alcohol use: Yes     Comment: beer 4-5 a month                           Current providers sharing in care for this patient include:   Patient Care Team:  Zhou Meraz MD as PCP - General (Internal Medicine)  Xuan Armijo RN as Nurse Coordinator (Oncology)  Terra Salazar MD as MD (Hematology & Oncology)    The following health maintenance items are reviewed in Epic and correct as of today:  Health Maintenance   Topic Date Due     ZOSTER IMMUNIZATION (2 of 3) 03/14/2012     COLONOSCOPY Q5 YR  01/01/2018     ADVANCE DIRECTIVE PLANNING Q5 YRS  03/06/2018     FOOT EXAM Q1 YEAR  06/22/2018     EYE EXAM Q1 YEAR  06/22/2018     INFLUENZA VACCINE (1) 09/01/2018     MEDICARE ANNUAL WELLNESS VISIT  12/04/2018     A1C Q6 MO  06/04/2019     TSH Q1 YEAR  07/24/2019     LIPID MONITORING Q1 YEAR  07/24/2019     MICROALBUMIN Q1 YEAR  08/21/2019     FALL RISK ASSESSMENT  10/11/2019     PHQ-2 Q1 YR  10/11/2019     ALT Q1 YR  10/23/2019     CREATININE Q1 YEAR  12/04/2019     TSH W/ FREE T4 REFLEX Q2 YEAR  07/24/2020     DTAP/TDAP/TD IMMUNIZATION (3 - Td) 02/06/2023     PNEUMOVAX IMMUNIZATION 65+ LOW/MEDIUM RISK  Completed     IPV IMMUNIZATION   Aged Out     MENINGITIS IMMUNIZATION  Aged Out     Labs reviewed in EPIC      Component      Latest Ref Rng & Units 7/24/2018 10/23/2018 11/20/2018   WBC      4.0 - 11.0 10e9/L   4.0   RBC Count      4.4 - 5.9 10e12/L   2.77 (L)   Hemoglobin      13.3 - 17.7 g/dL   10.6 (L)   Hematocrit      40.0 - 53.0 %   31.5 (L)   MCV      78 - 100 fl   114 (H)   MCH      26.5 - 33.0 pg   38.3 (H)   MCHC      31.5 - 36.5 g/dL   33.7   RDW      10.0 - 15.0 %   14.0   Platelet Count      150 - 450 10e9/L   55 (L)   Diff Method         Automated Method   % Neutrophils      %   64.0   % Lymphocytes      %   28.8   % Monocytes      %   5.3   % Eosinophils      %   1.3   % Basophils      %   0.3   % Immature Granulocytes      %   0.3   Absolute Neutrophil      1.6 - 8.3 10e9/L   2.6   Absolute Lymphocytes      0.8 - 5.3 10e9/L   1.2   Absolute Monocytes      0.0 - 1.3 10e9/L   0.2   Absolute Eosinophils      0.0 - 0.7 10e9/L   0.1   Absolute Basophils      0.0 - 0.2 10e9/L   0.0   Abs Immature Granulocytes      0 - 0.4 10e9/L   0.0   Sodium      133 - 144 mmol/L 137 141    Potassium      3.4 - 5.3 mmol/L 4.5 4.7    Chloride      94 - 109 mmol/L 102 106    Carbon Dioxide      20 - 32 mmol/L 27 30    Anion Gap      3 - 14 mmol/L 8 5    Glucose      70 - 99 mg/dL 197 (H) 173 (H)    Urea Nitrogen      7 - 30 mg/dL 22 22    Creatinine      0.66 - 1.25 mg/dL 1.14 1.20    GFR Estimate      >60 mL/min/1.7m2 62 59 (L)    GFR Estimate If Black      >60 mL/min/1.7m2 75 71    Calcium      8.5 - 10.1 mg/dL 8.6 9.0    Bilirubin Total      0.2 - 1.3 mg/dL 0.7 0.6    Albumin      3.4 - 5.0 g/dL 3.6 3.8    Protein Total      6.8 - 8.8 g/dL 7.1 7.4    Alkaline Phosphatase      40 - 150 U/L 92 86    ALT      0 - 70 U/L 48 30    AST      0 - 45 U/L 27 20    Cholesterol      <200 mg/dL 111     Triglycerides      <150 mg/dL 190 (H)     HDL Cholesterol      >39 mg/dL 56     LDL Cholesterol Calculated      <100 mg/dL 17     Non HDL Cholesterol      <130  mg/dL 55     % Retic      0.5 - 2.0 %      Absolute Retic      25 - 95 10e9/L      TSH      0.40 - 4.00 mU/L 2.26     Hemoglobin A1C      0 - 5.6 % 6.3 (H)       Component      Latest Ref Rng & Units 12/4/2018   WBC      4.0 - 11.0 10e9/L    RBC Count      4.4 - 5.9 10e12/L    Hemoglobin      13.3 - 17.7 g/dL    Hematocrit      40.0 - 53.0 %    MCV      78 - 100 fl    MCH      26.5 - 33.0 pg    MCHC      31.5 - 36.5 g/dL    RDW      10.0 - 15.0 %    Platelet Count      150 - 450 10e9/L    Diff Method          % Neutrophils      %    % Lymphocytes      %    % Monocytes      %    % Eosinophils      %    % Basophils      %    % Immature Granulocytes      %    Absolute Neutrophil      1.6 - 8.3 10e9/L    Absolute Lymphocytes      0.8 - 5.3 10e9/L    Absolute Monocytes      0.0 - 1.3 10e9/L    Absolute Eosinophils      0.0 - 0.7 10e9/L    Absolute Basophils      0.0 - 0.2 10e9/L    Abs Immature Granulocytes      0 - 0.4 10e9/L    Sodium      133 - 144 mmol/L 139   Potassium      3.4 - 5.3 mmol/L 4.3   Chloride      94 - 109 mmol/L 105   Carbon Dioxide      20 - 32 mmol/L 27   Anion Gap      3 - 14 mmol/L 7   Glucose      70 - 99 mg/dL 182 (H)   Urea Nitrogen      7 - 30 mg/dL 19   Creatinine      0.66 - 1.25 mg/dL 1.22   GFR Estimate      >60 mL/min/1.7m2 57 (L)   GFR Estimate If Black      >60 mL/min/1.7m2 70   Calcium      8.5 - 10.1 mg/dL 8.6   Bilirubin Total      0.2 - 1.3 mg/dL    Albumin      3.4 - 5.0 g/dL    Protein Total      6.8 - 8.8 g/dL    Alkaline Phosphatase      40 - 150 U/L    ALT      0 - 70 U/L    AST      0 - 45 U/L    Cholesterol      <200 mg/dL    Triglycerides      <150 mg/dL    HDL Cholesterol      >39 mg/dL    LDL Cholesterol Calculated      <100 mg/dL    Non HDL Cholesterol      <130 mg/dL    % Retic      0.5 - 2.0 % 2.4 (H)   Absolute Retic      25 - 95 10e9/L 64.2   TSH      0.40 - 4.00 mU/L    Hemoglobin A1C      0 - 5.6 % 7.1 (H)       ROS:  CONSTITUTIONAL: NEGATIVE for fever, chills,  "Weight back to baseline  INTEGUMENTARY/SKIN: NEGATIVE for worrisome moles or lesions. See note above re:  Recent rash  EYES: NEGATIVE for vision changes or irritation. Eye exam Sept 2018 at [facility]   ENT/MOUTH: NEGATIVE for ear, mouth and throat problems  RESP: NEGATIVE for significant cough or SOB  BREAST: NEGATIVE for masses, tenderness or discharge  CV: NEGATIVE for chest pain, palpitations or peripheral edema  GI: NEGATIVE for nausea, abdominal pain, heartburn, or change in bowel habits. Denies seeing blood in stools  : NEGATIVE for frequency, dysuria, or hematuria. Nocturia x3. Not bothersome  MUSCULOSKELETAL: NEGATIVE for significant arthralgias or myalgia  NEURO: NEGATIVE for weakness or paresthesias. Just completed PT for balance therapy and walking better now. No falls  ENDOCRINE: NEGATIVE for temperature intolerance. DM and lipids as above  HEME: NEGATIVE for bleeding problems. Hx low plts and Hgb. Followed by hematology  PSYCHIATRIC: NEGATIVE for changes in mood or affect    OBJECTIVE:   /70   Pulse 78   Temp 98.6  F (37  C) (Oral)   Resp 16   Wt 76.2 kg (168 lb)   SpO2 98%   BMI 26.31 kg/m   Estimated body mass index is 26.47 kg/m  as calculated from the following:    Height as of 12/4/18: 1.702 m (5' 7\").    Weight as of 12/4/18: 76.7 kg (169 lb).  EXAM:   General appearance - healthy, alert, no distress  Skin - No   Lesions. Mld dry erythema and excoriation marks right lateral thigh and bilateral inner thigh and bilateral buttocks. No vesicles. No excess warmth  Head - normocephalic, atraumatic  Eyes - BATSHEVA, EOMI, fundi exam with nondilated pupils negative.  Ears - External ears normal. Canals clear. TM's normal.  Nose/Sinuses - Nares normal. Septum midline. Mucosa normal. No drainage or sinus tenderness.  Oropharynx - No erythema, no adenopathy, no exudates.  Neck - Supple without adenopathy or thyromegaly. No bruits.  Lungs - Clear to auscultation without wheezes/rhonchi.  Heart " - Regular rate and rhythm without  clicks, or gallops. Faint 1/6 BENJAMIN apex  Nodes - No supraclavicular, axillary, or inguinal adenopathy palpable.  Abdomen - Abdomen soft, non-tender. BS normal. No masses or hepatosplenomegaly palpable. No bruits.  Extremities -No cyanosis, clubbing or edema.    Musculoskeletal - Spine ROM normal. Muscular strength intact.   Peripheral pulses - radial=4/4, femoral=4/4, posterior tibial=4/4, dorsalis pedis=4/4,  Neuro - Gait normal and stable.  Reflexes normal and symmetric. Sensation grossly WNL.  Genital - Normal-appearing male external genitalia. No scrotal masses or inguinal hernia palpable.   Rectal - Guaic negative stool. Normal tone. Prostate 1 plus in size to palpation. No rectal masses or prostate nodularity palpable        ASSESSMENT / PLAN:   1. Medicare annual wellness visit, subsequent  See plan discussion below for HCM    2. Type 2 diabetes mellitus with diabetic nephropathy, without long-term current use of insulin (H)  A1c worsened recently but overall still controlled for age.  Patient can improve diet which will improve blood sugar control.  Continue current medication, reduce carbohydrates in future labs as ordered  - metFORMIN (GLUCOPHAGE) 1000 MG tablet; 1 tab twice a day  Dispense: 180 tablet; Refill: 3  - Hemoglobin A1c; Future  - Comprehensive metabolic panel; Future  - Lipid panel reflex to direct LDL Fasting; Future  - Albumin Random Urine Quantitative with Creat Ratio; Future    3. Essential hypertension  Controlled.  Continue current medication  - amLODIPine (NORVASC) 10 MG tablet; Take 1 tablet (10 mg) by mouth daily  Dispense: 90 tablet; Refill: 3    4. Hypothyroidism, unspecified type  Controlled.  Continue current medication.  Future labs ordered  - levothyroxine (SYNTHROID/LEVOTHROID) 75 MCG tablet; Take 1 tablet (75 mcg) by mouth daily  Dispense: 90 tablet; Refill: 3  - TSH with free T4 reflex; Future    5. Dermatitis  Some type of allergic reaction.   "Patient blaming it on potato exposure though has eaten these in the past without issues.  Will treat topically with prescription triamcinolone and reduce itching with over-the-counter Zyrtec  - triamcinolone (KENALOG) 0.1 % external cream; Apply sparingly to affected area three times daily for up to 14 days.  Dispense: 30 g; Refill: 1  - OFFICE/OUTPT VISIT,EST,LEVL III    6. Screening for diabetic peripheral neuropathy  - FOOT EXAM  NO CHARGE [30261.114]    7. Screen for colon cancer   Stool guaiac neg. Prior positive FIT test but that was when pancytopenia much worse.  Will repeat FIT test.  If still positive, will arrange for colonoscopy  - Fecal colorectal cancer screen (FIT); Future    End of Life Planning:  Patient currently has an advanced directive: Yes.  Practitioner is supportive of decision.    COUNSELING:  Reviewed preventive health counseling, as reflected in patient instructions    BP Readings from Last 1 Encounters:   12/04/18 132/74     Estimated body mass index is 26.47 kg/m  as calculated from the following:    Height as of 12/4/18: 1.702 m (5' 7\").    Weight as of 12/4/18: 76.7 kg (169 lb).           reports that  has never smoked. he has never used smokeless tobacco.      Appropriate preventive services were discussed with this patient, including applicable screening as appropriate for cardiovascular disease, diabetes, osteopenia/osteoporosis, and glaucoma.  As appropriate for age/gender, discussed screening for colorectal cancer, prostate cancer, breast cancer, and cervical cancer. Checklist reviewing preventive services available has been given to the patient.    Reviewed patients plan of care and provided an AVS. The Basic Care Plan (routine screening as documented in Health Maintenance) for Prakash meets the Care Plan requirement. This Care Plan has been established and reviewed with the Patient.    Counseling Resources:  ATP IV Guidelines  Pooled Cohorts Equation Calculator  Breast Cancer Risk " Calculator  FRAX Risk Assessment  ICSI Preventive Guidelines  Dietary Guidelines for Americans, 2010  USDA's MyPlate  ASA Prophylaxis  Lung CA Screening      PLAN:  Continue current meds  Prescriptions refilled.    Call  [phone] or use [employer] to schedule a future lab appointment  non-fasting in April when return to [place]  Calorie/carbohydrate (sugar, bread, potato, pasta, etc) reduction in diet for weight loss.  Increase color on your plate with fruits and vegetables. Increase  frequency of walking or other aerobic exercise as able (goal is daily)  Triamcinolone steroid cream applied to affect skin rash three time sa day as needed for rsh   OTC Cetirizine/Zyrtec 10mg tab, 1 tab daily at bedtime for skin itchng  If itching issues persist  Despite above, then contact clinic  Pt was informed regarding extra E&M billing for medical issues not related to preventative physical   FIT stool test.  Drop off at the lab or mail at post office   Check with insurance or speak with your pharmacist re: Shingrix vaccine coverage for shingles prevention.  This is a 2 shot series done 2-6 months apart                   Zhou Meraz MD  [facility]

## 2018-12-18 NOTE — PATIENT INSTRUCTIONS
Continue current meds  Prescriptions refilled.    Call  [phone] or use [employer] to schedule a future lab appointment  non-fasting in April when return to [place]  Calorie/carbohydrate (sugar, bread, potato, pasta, etc) reduction in diet for weight loss.  Increase color on your plate with fruits and vegetables. Increase  frequency of walking or other aerobic exercise as able (goal is daily)  Triamcinolone steroid cream applied to affect skin rash three time sa day as needed for rsh   OTC Cetirizine/Zyrtec 10mg tab, 1 tab daily at bedtime for skin itchng  If itching issues persist  Despite above, then contact clinic  Pt was informed regarding extra E&M billing for medical issues not related to preventative physical   FIT stool test.  Drop off at the lab or mail at post office   Check with insurance or speak with your pharmacist re: Shingrix vaccine coverage for shingles prevention.  This is a 2 shot series done 2-6 months apart

## 2018-12-18 NOTE — PROGRESS NOTES
Outpatient Physical Therapy Discharge Note     Patient: Prakash Cramer  : 1940    Beginning/End Dates of Reporting Period:  10/23/18 to 2018 Sen for a total of 7 visits    Referring Provider: Zhou Meraz MD    Therapy Diagnosis: Impaired balance and weakness     Client Self Report: He was in [place] for 3 days over the weekend and feels his balance is off a little, but he thinks he did well while he was there.  He wasn't as stiff as he used to be with the car ride.    Objective Measurements:  Objective Measure: FGA  Details:  compared to   Objective Measure: 25 Foot Timed Walk  Details: 12 steps in 6.8 seconds  Objective Measure: TUG  Details: 7.25 seconds          Goals:  Goal Identifier Floor transfer   Goal Description Hu will demonstrate a safe floor transfer sequence in 2/2 tirals to perform household tasks.   Target Date 18   Date Met   18   Progress:Able to demonstrate safe sequence in clinic and reports feeling safe performing at home.     Goal Identifier Car transfer   Goal Description Hu will demonstrate a safe car transfer sequence and report increased ease with car transfers on a consistent basis.   Target Date 18   Date Met   18   Progress:Hu reports this task is improving and he is not having to work so hard to complete the task.     Goal Identifier FGA   Goal Description Hu will demonstrate a 3 point improvement or better on the Functional Gait Assessment with initial score of 21/30.   Target Date 18   Date Met   18   Progress:Improved score from 21/30 to 26/30.     Progress Toward Goals:   Progress this reporting period: Great progress with all gait assessments demonstrating improved arm swing and foot clearance.  No falls during this time frame and reports feeling more confident with walking balance.    Plan:  Discharge from therapy.    Discharge:    Reason for Discharge: Patient has met all goals.    Equipment Issued:  None    Discharge Plan: Patient to continue home program.

## 2018-12-19 ENCOUNTER — OFFICE VISIT (OUTPATIENT)
Dept: INTERNAL MEDICINE | Facility: CLINIC | Age: 78
End: 2018-12-19
Payer: MEDICARE

## 2018-12-19 ENCOUNTER — TELEPHONE (OUTPATIENT)
Dept: INTERNAL MEDICINE | Facility: CLINIC | Age: 78
End: 2018-12-19

## 2018-12-19 VITALS
RESPIRATION RATE: 16 BRPM | WEIGHT: 168 LBS | TEMPERATURE: 98.6 F | OXYGEN SATURATION: 98 % | DIASTOLIC BLOOD PRESSURE: 70 MMHG | BODY MASS INDEX: 26.31 KG/M2 | HEART RATE: 78 BPM | SYSTOLIC BLOOD PRESSURE: 122 MMHG

## 2018-12-19 DIAGNOSIS — E11.21 TYPE 2 DIABETES MELLITUS WITH DIABETIC NEPHROPATHY, WITHOUT LONG-TERM CURRENT USE OF INSULIN (H): ICD-10-CM

## 2018-12-19 DIAGNOSIS — Z13.89 SCREENING FOR DIABETIC PERIPHERAL NEUROPATHY: ICD-10-CM

## 2018-12-19 DIAGNOSIS — Z12.11 SCREEN FOR COLON CANCER: ICD-10-CM

## 2018-12-19 DIAGNOSIS — L30.9 DERMATITIS: ICD-10-CM

## 2018-12-19 DIAGNOSIS — Z00.00 MEDICARE ANNUAL WELLNESS VISIT, SUBSEQUENT: Primary | ICD-10-CM

## 2018-12-19 DIAGNOSIS — I10 ESSENTIAL HYPERTENSION: ICD-10-CM

## 2018-12-19 DIAGNOSIS — E03.9 HYPOTHYROIDISM, UNSPECIFIED TYPE: ICD-10-CM

## 2018-12-19 PROCEDURE — 99207 C FOOT EXAM  NO CHARGE: CPT | Mod: 25 | Performed by: INTERNAL MEDICINE

## 2018-12-19 PROCEDURE — 99213 OFFICE O/P EST LOW 20 MIN: CPT | Mod: 25 | Performed by: INTERNAL MEDICINE

## 2018-12-19 PROCEDURE — G0439 PPPS, SUBSEQ VISIT: HCPCS | Performed by: INTERNAL MEDICINE

## 2018-12-19 RX ORDER — AMLODIPINE BESYLATE 10 MG/1
10 TABLET ORAL DAILY
Qty: 90 TABLET | Refills: 3 | Status: SHIPPED | OUTPATIENT
Start: 2018-12-19 | End: 2020-01-01

## 2018-12-19 RX ORDER — LEVOTHYROXINE SODIUM 75 UG/1
75 TABLET ORAL DAILY
Qty: 90 TABLET | Refills: 3 | Status: SHIPPED | OUTPATIENT
Start: 2018-12-19 | End: 2020-01-01 | Stop reason: DRUGHIGH

## 2018-12-19 RX ORDER — TRIAMCINOLONE ACETONIDE 1 MG/G
CREAM TOPICAL
Qty: 30 G | Refills: 1 | Status: SHIPPED | OUTPATIENT
Start: 2018-12-19 | End: 2019-12-19

## 2018-12-19 NOTE — TELEPHONE ENCOUNTER
Reason for Call:  Other call back    Detailed comments: [facility] is requesting clarification on the area of application for [place].    Phone Number Patient can be reached at: Other phone number:  [phone]    Best Time: before 9pm    Can we leave a detailed message on this number? YES    Call taken on 12/19/2018 at 12:14 PM by MATTHIAS CUMMINGS

## 2019-01-16 ENCOUNTER — TRANSFERRED RECORDS (OUTPATIENT)
Dept: HEALTH INFORMATION MANAGEMENT | Facility: CLINIC | Age: 79
End: 2019-01-16

## 2019-03-11 ENCOUNTER — TRANSFERRED RECORDS (OUTPATIENT)
Dept: HEALTH INFORMATION MANAGEMENT | Facility: CLINIC | Age: 79
End: 2019-03-11

## 2019-03-11 LAB
ALT SERPL-CCNC: 24 U/L (ref 0–40)
AST SERPL-CCNC: 20 U/L (ref 0–40)
CREAT SERPL-MCNC: 1.2 MG/DL (ref 0.3–1.2)
GFR SERPL CREATININE-BSD FRML MDRD: 57.57 ML/MIN (ref 60–200)
GLUCOSE SERPL-MCNC: 230 MG/DL (ref 70–105)
POTASSIUM SERPL-SCNC: 5 MEQ/L (ref 3.3–5.1)

## 2019-04-16 ENCOUNTER — OFFICE VISIT (OUTPATIENT)
Dept: INTERNAL MEDICINE | Facility: CLINIC | Age: 79
End: 2019-04-16
Payer: MEDICARE

## 2019-04-16 VITALS
OXYGEN SATURATION: 98 % | RESPIRATION RATE: 16 BRPM | BODY MASS INDEX: 26 KG/M2 | DIASTOLIC BLOOD PRESSURE: 70 MMHG | SYSTOLIC BLOOD PRESSURE: 122 MMHG | WEIGHT: 166 LBS | HEART RATE: 86 BPM | TEMPERATURE: 98.1 F

## 2019-04-16 DIAGNOSIS — E78.5 HYPERLIPIDEMIA LDL GOAL <100: ICD-10-CM

## 2019-04-16 DIAGNOSIS — D61.9 APLASTIC ANEMIA (H): ICD-10-CM

## 2019-04-16 DIAGNOSIS — G62.9 NEUROPATHY: Primary | ICD-10-CM

## 2019-04-16 DIAGNOSIS — E11.21 TYPE 2 DIABETES MELLITUS WITH DIABETIC NEPHROPATHY, WITHOUT LONG-TERM CURRENT USE OF INSULIN (H): ICD-10-CM

## 2019-04-16 PROCEDURE — 99214 OFFICE O/P EST MOD 30 MIN: CPT | Performed by: INTERNAL MEDICINE

## 2019-04-16 NOTE — PATIENT INSTRUCTIONS
Stop Rosuvastatin for the the next 3 weeks to see if leg soreness improves and email me update in [place] then. If no improvement, then would consider imaging of the lumbar spine  Rrestart exercise  from balance therapy to see if improves. If not better in the next 3 weeks,then let me know and will refer back to the therapist   Continue tennis shoes and insert for foot support   Fasting labs  in mid June (blood and urine)

## 2019-04-16 NOTE — PROGRESS NOTES
SUBJECTIVE:   Prakash Cramer is a 79 year old male who presents to clinic today for the following   health issues:  Chief Complaint   Patient presents with     NEUROPATHY     Patient C/O Bilateral Foot Pain     Balance/ Vestibular     Patient C/O Balance is not getting better since November      Pt's past medical history, family history, habits, medications and allergies were reviewed with the patient today.  See snap shot for  HCM status. Most recent lab results reviewed with pt. Problem list and histories reviewed & adjusted, as indicated.  Additional history as below:    Soreness in feet with walking. Minimal with sitting  Soreness/achiness in calves and distal thighs some with  standing or walking. NO difference if active as long as updaright  No cramps in legs with sleeping  Had done balance therapy last November with therapist. Was helpful. Has not been doing the exercises more recently  No falls over the winter in [place].   occ LBP.  No incontinence. No neck pain. Slight stiffness of neck with turning neck quickly.  No numbness in arms  Feet better with tennis shoes and using inserts. Worse with dress shoes.   Has not been checking sugars recently  Oo Crestor  Hx aplastic anemia. Saw Oncology in [place] mid march. Note reviewed. Needs repeat labs in June     Lab Results   Component Value Date     03/11/2019     Lab Results   Component Value Date    A1C 7.1 12/04/2018     Lab Results   Component Value Date    CHOL 111 07/24/2018     Lab Results   Component Value Date    LDL 17 07/24/2018     Lab Results   Component Value Date    HDL 56 07/24/2018     Lab Results   Component Value Date    TRIG 190 07/24/2018     Lab Results   Component Value Date    CR 1.2 03/11/2019     Lab Results   Component Value Date    ALT 24 03/11/2019     Lab Results   Component Value Date    AST 20 03/11/2019     Lab Results   Component Value Date    MICROL 26 08/21/2018     Lab Results   Component Value Date    TSH 2.26  "07/24/2018       Additional ROS:   Constitutional, HEENT, Cardiovascular, Pulmonary, GI and , Neuro, MSK and Psych review of systems/symptoms are otherwise negative or unchanged from previous, except as noted above.      OBJECTIVE:  /70   Pulse 86   Temp 98.1  F (36.7  C) (Oral)   Resp 16   Wt 75.3 kg (166 lb)   SpO2 98%   BMI 26.00 kg/m     Estimated body mass index is 26 kg/m  as calculated from the following:    Height as of 12/4/18: 1.702 m (5' 7\").    Weight as of this encounter: 75.3 kg (166 lb).     Neck: no adenopathy. Thyroid normal to palpation. No bruits  Pulm: Lungs clear to auscultation   CV: Regular rates and rhythm  GI: Soft, nontender, Normal active bowel sounds, No hepatosplenomegaly or masses palpable  Ext: Peripheral pulses intact.  Specifically 2 plus DP and PT bilaterally.  No edema.  Neuro: Normal strength and tone, sensory exam  Reduced to LTS distal BLE. Slightly cautious but stable gait. Neg Romberg    Assessment/Plan: (See plan discussion below for further details)  1. Neuropathy  Chronic and likely related to DM. Will restart exercises for balance given to him from previous therapy. Denies vertigo now. See hyperlipidemia below re: leg pains also    2. Aplastic anemia (H)   HAS been improving per last Oncology note from [place] in March. Repeat labs June  - CBC with platelets; Future    3. Type 2 diabetes mellitus with diabetic nephropathy, without long-term current use of insulin (H)  Previously controlled for age per last A1C above. Recheck June. Not checking sugars now    4. Hyperlipidemia LDL goal <100   ? If some of the soreness in legs related to statin vs possible spinal stenosis lumbar. NO change with exertion  And strong peripheral pulses in distal BLE so doubt claudication. Will await response to legs from statin stoppage    Plan discussion:   Stop Rosuvastatin for the the next 3 weeks to see if leg soreness improves and email me update in [place] then. If no " improvement, then would consider imaging of the lumbar spine  Rrestart exercise  from balance therapy to see if improves. If not better in the next 3 weeks,then let me know and will refer back to the therapist   Continue tennis shoes and insert for foot support   Fasting labs  in mid June (blood and urine)  Continue other medications       Zhou Meraz MD  Internal Medicine Department  [facility]

## 2019-05-06 ENCOUNTER — TELEPHONE (OUTPATIENT)
Dept: INTERNAL MEDICINE | Facility: CLINIC | Age: 79
End: 2019-05-06

## 2019-05-06 NOTE — TELEPHONE ENCOUNTER
Patient called with update     Since stopping rosuvastatin over the last 3 weeks, he no loner has soreness in thighs or calves. Tingling in feet went away although can feel some soreness when he is working out  after completing exercises in the gym. Can also have some mild back pain after exercising as well.    No other complaints. Please advise if requires further recommendations.    Nadia SHAW RN, BSN, PHN

## 2019-05-07 NOTE — TELEPHONE ENCOUNTER
Dora Renner contacted Prakash on 05/07/19 and left a message. If patient calls back please relay message re: statins and to follow up with lab appointment.

## 2019-05-07 NOTE — TELEPHONE ENCOUNTER
Pt now has had intolerance x2 statins. Would remain off of cholesterol therapy therapy for now. Will await lab results from lab appt scheduled for 5/15/19

## 2019-05-08 DIAGNOSIS — K21.9 GASTROESOPHAGEAL REFLUX DISEASE WITHOUT ESOPHAGITIS: ICD-10-CM

## 2019-05-08 NOTE — TELEPHONE ENCOUNTER
"Requested Prescriptions   Pending Prescriptions Disp Refills     omeprazole (PRILOSEC) 20 MG DR capsule [Pharmacy Med Name: OMEPRAZOLE 20MG CAP]  Last Written Prescription Date:  05/09/2018  Last Fill Quantity: 90,  # refills: 03   Last Office Visit: 4/16/2019   Future Office Visit:    Next 5 appointments (look out 90 days)    May 15, 2019 12:45 PM CDT  Return Visit with Terra Salazar MD  [facility] [address] [place] [id number] [phone]          90 capsule 3     Sig: TAKE 1 CAPSULE BY MOUTH ONCE DAILY IN THE MORNING 30-60  MINUTES  BEFORE  FIRST  MEAL  OF  THE  DAY       PPI Protocol Passed - 5/8/2019  5:30 PM        Passed - Not on Clopidogrel (unless Pantoprazole ordered)        Passed - No diagnosis of osteoporosis on record        Passed - Recent (12 mo) or future (30 days) visit within the authorizing provider's specialty     Patient had office visit in the last 12 months or has a visit in the next 30 days with authorizing provider or within the authorizing provider's specialty.  See \"Patient Info\" tab in inbasket, or \"Choose Columns\" in Meds & Orders section of the refill encounter.              Passed - Medication is active on med list        Passed - Patient is age 18 or older          "

## 2019-05-15 ENCOUNTER — ONCOLOGY VISIT (OUTPATIENT)
Dept: ONCOLOGY | Facility: CLINIC | Age: 79
End: 2019-05-15
Payer: MEDICARE

## 2019-05-15 VITALS
OXYGEN SATURATION: 98 % | HEIGHT: 67 IN | TEMPERATURE: 98.4 F | RESPIRATION RATE: 18 BRPM | DIASTOLIC BLOOD PRESSURE: 85 MMHG | BODY MASS INDEX: 26.03 KG/M2 | WEIGHT: 165.88 LBS | SYSTOLIC BLOOD PRESSURE: 131 MMHG | HEART RATE: 88 BPM

## 2019-05-15 DIAGNOSIS — D69.6 THROMBOCYTOPENIA (H): ICD-10-CM

## 2019-05-15 DIAGNOSIS — D69.6 THROMBOCYTOPENIA (H): Primary | ICD-10-CM

## 2019-05-15 DIAGNOSIS — E11.21 TYPE 2 DIABETES MELLITUS WITH DIABETIC NEPHROPATHY, WITHOUT LONG-TERM CURRENT USE OF INSULIN (H): ICD-10-CM

## 2019-05-15 DIAGNOSIS — D64.9 ANEMIA, UNSPECIFIED TYPE: ICD-10-CM

## 2019-05-15 DIAGNOSIS — E03.9 HYPOTHYROIDISM, UNSPECIFIED TYPE: ICD-10-CM

## 2019-05-15 DIAGNOSIS — D61.9 APLASTIC ANEMIA (H): ICD-10-CM

## 2019-05-15 LAB
ALBUMIN SERPL-MCNC: 3.7 G/DL (ref 3.4–5)
ALP SERPL-CCNC: 103 U/L (ref 40–150)
ALT SERPL W P-5'-P-CCNC: 30 U/L (ref 0–70)
ANION GAP SERPL CALCULATED.3IONS-SCNC: 6 MMOL/L (ref 3–14)
AST SERPL W P-5'-P-CCNC: 19 U/L (ref 0–45)
BILIRUB SERPL-MCNC: 0.6 MG/DL (ref 0.2–1.3)
BUN SERPL-MCNC: 24 MG/DL (ref 7–30)
CALCIUM SERPL-MCNC: 8.8 MG/DL (ref 8.5–10.1)
CHLORIDE SERPL-SCNC: 106 MMOL/L (ref 94–109)
CHOLEST SERPL-MCNC: 228 MG/DL
CO2 SERPL-SCNC: 26 MMOL/L (ref 20–32)
CREAT SERPL-MCNC: 1.21 MG/DL (ref 0.66–1.25)
CREAT UR-MCNC: 239 MG/DL
ERYTHROCYTE [DISTWIDTH] IN BLOOD BY AUTOMATED COUNT: 16.8 % (ref 10–15)
GFR SERPL CREATININE-BSD FRML MDRD: 57 ML/MIN/{1.73_M2}
GLUCOSE SERPL-MCNC: 208 MG/DL (ref 70–99)
HBA1C MFR BLD: 6.2 % (ref 0–5.6)
HCT VFR BLD AUTO: 29.8 % (ref 40–53)
HDLC SERPL-MCNC: 59 MG/DL
HGB BLD-MCNC: 10.1 G/DL (ref 13.3–17.7)
LDH SERPL L TO P-CCNC: 187 U/L (ref 85–227)
LDLC SERPL CALC-MCNC: 134 MG/DL
MCH RBC QN AUTO: 38.1 PG (ref 26.5–33)
MCHC RBC AUTO-ENTMCNC: 33.9 G/DL (ref 31.5–36.5)
MCV RBC AUTO: 113 FL (ref 78–100)
MICROALBUMIN UR-MCNC: 45 MG/L
MICROALBUMIN/CREAT UR: 18.83 MG/G CR (ref 0–17)
NONHDLC SERPL-MCNC: 169 MG/DL
PLATELET # BLD AUTO: 65 10E9/L (ref 150–450)
POTASSIUM SERPL-SCNC: 4.4 MMOL/L (ref 3.4–5.3)
PROT SERPL-MCNC: 7.2 G/DL (ref 6.8–8.8)
RBC # BLD AUTO: 2.65 10E12/L (ref 4.4–5.9)
RETICS # AUTO: 68.9 10E9/L (ref 25–95)
RETICS/RBC NFR AUTO: 2.6 % (ref 0.5–2)
SODIUM SERPL-SCNC: 138 MMOL/L (ref 133–144)
T4 FREE SERPL-MCNC: 1.16 NG/DL (ref 0.76–1.46)
TRIGL SERPL-MCNC: 177 MG/DL
TSH SERPL DL<=0.005 MIU/L-ACNC: 4.08 MU/L (ref 0.4–4)
WBC # BLD AUTO: 4.1 10E9/L (ref 4–11)

## 2019-05-15 PROCEDURE — 80061 LIPID PANEL: CPT | Performed by: INTERNAL MEDICINE

## 2019-05-15 PROCEDURE — 84439 ASSAY OF FREE THYROXINE: CPT | Performed by: INTERNAL MEDICINE

## 2019-05-15 PROCEDURE — 36415 COLL VENOUS BLD VENIPUNCTURE: CPT | Performed by: INTERNAL MEDICINE

## 2019-05-15 PROCEDURE — 83036 HEMOGLOBIN GLYCOSYLATED A1C: CPT | Performed by: INTERNAL MEDICINE

## 2019-05-15 PROCEDURE — 82043 UR ALBUMIN QUANTITATIVE: CPT | Performed by: INTERNAL MEDICINE

## 2019-05-15 PROCEDURE — 84443 ASSAY THYROID STIM HORMONE: CPT | Performed by: INTERNAL MEDICINE

## 2019-05-15 PROCEDURE — 80053 COMPREHEN METABOLIC PANEL: CPT | Performed by: INTERNAL MEDICINE

## 2019-05-15 PROCEDURE — 85027 COMPLETE CBC AUTOMATED: CPT | Performed by: INTERNAL MEDICINE

## 2019-05-15 PROCEDURE — 99214 OFFICE O/P EST MOD 30 MIN: CPT | Performed by: INTERNAL MEDICINE

## 2019-05-15 PROCEDURE — 83615 LACTATE (LD) (LDH) ENZYME: CPT | Performed by: INTERNAL MEDICINE

## 2019-05-15 PROCEDURE — 85045 AUTOMATED RETICULOCYTE COUNT: CPT | Performed by: INTERNAL MEDICINE

## 2019-05-15 ASSESSMENT — MIFFLIN-ST. JEOR: SCORE: 1431.03

## 2019-05-15 ASSESSMENT — PAIN SCALES - GENERAL: PAINLEVEL: NO PAIN (0)

## 2019-05-15 NOTE — LETTER
5/15/2019         RE: Prakash Cramer  [address]  [facility] [address]        Dear Colleague,    Thank you for referring your patient, Prakash Cramer, to the [facility]. Please see a copy of my visit note below.    Hematology follow up visit:  Date on this visit: 5/15/2019     CC   Pancytopenia    Primary Physician: Zhou Meraz     History Of Present Illness:    Please see previous note for details. I have copied and updated from prior note.  Mr. Cramer is a 79 year old male who was found to have pancytopenia around the end of March 2018 when he presented with a white blood cell count of 1.5 and hemoglobin of 7.8 and platelets of 4. He was admitted to the hospital in [place]. There was no bruising or petechiae or evidence of bleeding. He was feeling fatigued and at that point he was also noticed to have C diff infection and was started on p.o. vancomycin. He was given Neupogen ×1 and packed red distant transfusion ×2 and platelet transfusion ×1. He was also given vitamin B12 injections and I believe it was 3 injections which she was given during the hospitalization. He had a bone marrow biopsy done as part of his workup which showed mildly hypocellular bone marrow with 10-40% cellularity with erythroid hyperplasia and mild dyserythropoiesis (5% cellularity, moderately decreased for age as per pathology review at the [facility]). Reactive plasmacytosis and maturing trilineage hematopoiesis. There was no significant dysplasia in the granulocytes or megakaryocytes. Parvovirus immunohistochemistry was negative. These findings were deemed consistent with either evolving aplastic anemia or drug/toxin effect or vitamin/nutritional deficiency or infectious or viral and hypocellular MDS.   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 This finding is most probably constitutional in origin and not acquired; however it it is medically warranted a peripheral blood chromosome analysis couldn't be performed to rule out an acquired abnormality. If it is constitutional genetic counseling would be medically indicated    Comprehensive metabolic panel was unremarkable.   His vitamin B12 level was less than 159. Antiparietal cell antibody and anti-intrinsic factor antibody were negative. Iron studies were unremarkable. CMV IgM was negative. WENDY screen was negative. Rheumatoid factor and anti-CCP were negative. SPEP was negative.  After thorough investigation it was found that potentially glimepiride (sulfonylurea) which he was taking could be the culprit for pancytopenia so he was taken off it.  Upon discharge from the hospital on 3/31/2018 his white blood cell count was 2.4 and hemoglobin was 7.8 and platelet 12. On 4/5/2018 the WBC count was 2.9 with ANC of 1.2 and hemoglobin 7.9 and platelets 75.  On 4/13/2018 white blood cell count was 3.3, ANC 1.3, hemoglobin 7 and platelets 13. At that point he was given 2 units of packed red blood cells and 1 unit of platelets  Most recently on 4/16/2018 white blood cell count was 3.2, ANC 1.1, hemoglobin 10 and platelets 20.    When he saw me I did further testing and KIMBERLEE was negative.  Peripheral blood flow cytometry for PNH reveals a very small percentage of red cells and a small percentage (less than 10%) of neutrophils and monocytes with a PNH immunophenotype, the significance of which is not known.  Repeat flow cytometry for PNH in Sept 2018, shows that the % of PNH type clones is even lower this time, so doubt that it is the culprit in causing pancytopenia.   A repeat vitamin B12 level was 443. I told him to start taking vitamin B12 1000  g daily.  We kept him on observation and his counts improved with normalization of the WBC count and improvement is Hg. His platelets  although were better but plataued around 40s, so we did further w/up and recent WENDY, Hep B, C and HIV were negative. Spleen size was normal. Liver was borderline enlarged.    He remains on observation.  He is doing well.  Denies any infections.  No bleeding.  Overall energy is a stable.  Few weeks ago he was having pain in his muscles and his statin was discontinued.  After discontinuing that he has noticed significant improvement in the muscular pain which has now resolved.  He denies new swellings.  No trouble breathing.  No neuropathy.  He continues to take vitamin B12 and folic acid without problems.  He followed with hematology in [place] and overall his blood work remained stable.      ROS:  Rest of the comprehensive ROS was essentially unremarkable.      I reviewed other history in EPIC as below.    Past Medical/Surgical History:  Past Medical History:   Diagnosis Date     Aplastic anemia (H) 03/26/2018    thought secondary to reaction to Septra     BPH (benign prostatic hypertrophy)     failed  Flomax     C. difficile diarrhea 03/26/2018    treated with Flagyl     Cellulitis and abscess of leg, except foot 2004     Contact dermatitis and other eczema, due to unspecified cause      Diaphragmatic hernia without mention of obstruction or gangrene     hiatal hernia     Esophageal reflux      Hyperlipidemia LDL goal <100 3/11/2005     Hypothyroidism      Impotence of organic origin      Meningococcal encephalitis      Proteinuria      RBBB      Type 2 diabetes mellitus with diabetic nephropathy  (goal A1C<7) 10/24/2015     Unspecified essential hypertension      Past Surgical History:   Procedure Laterality Date     ARTHROPLASTY KNEE Left 3/18/2015    Procedure: ARTHROPLASTY KNEE;  Surgeon: Abebe Schroeder MD;  Location: [facility] OR     ARTHROPLASTY KNEE Right 3/14/2016    Procedure: ARTHROPLASTY KNEE;  Surgeon: Abebe Schroeder MD;  Location:  OR     C NONSPECIFIC PROCEDURE  10/02    left knee meniscus tear  repair     HC LAPAROSCOPY, SURGICAL; CHOLECYSTECTOMY  1998    Cholecystectomy, Laparoscopic     HC REMOVE TONSILS/ADENOIDS,12+ Y/O  1963    T & A 13+y.o.     Allergies:  Allergies as of 05/15/2019 - Reviewed 05/15/2019   Allergen Reaction Noted     Atorvastatin calcium  01/30/2008     Crestor [rosuvastatin]  05/07/2019     Diovan [valsartan]  01/24/2011     Glimepiride  05/08/2018     Invokana [canagliflozin]  06/01/2018     Mold Other (See Comments) 07/14/2011     Penicillins  09/07/2011     Seasonal allergies  06/25/2007    glimepiride possibly causing pancytopenia  Current Medications:  Current Outpatient Medications   Medication Sig Dispense Refill     amLODIPine (NORVASC) 10 MG tablet Take 1 tablet (10 mg) by mouth daily 90 tablet 3     aspirin 81 MG tablet Take by mouth daily 30 tablet      blood glucose monitoring (ACCU-CHEK FASTCLIX) lancets Use to test blood sugar 1 times daily or as directed. 102 each 11     blood glucose monitoring (ACCU-CHEK MULTICLIX) lancets Use to test blood sugar 1 time daily or as directed. 1 Box 11     blood glucose monitoring (ACCU-CHEK SMARTVIEW) test strip Use to test blood sugar 1 times daily or as directed. 100 strip 11     Blood Glucose Monitoring Suppl MAGDALENO 1 Device daily Smartview Meter 1 each 0     Cyanocobalamin (B-12) 1000 MCG TBCR Take 1,000 mcg by mouth daily 100 tablet 11     levothyroxine (SYNTHROID/LEVOTHROID) 75 MCG tablet Take 1 tablet (75 mcg) by mouth daily 90 tablet 3     metFORMIN (GLUCOPHAGE) 1000 MG tablet 1 tab twice a day 180 tablet 3     omeprazole (PRILOSEC) 20 MG DR capsule TAKE 1 CAPSULE BY MOUTH ONCE DAILY IN THE MORNING 30-60  MINUTES  BEFORE  FIRST  MEAL  OF  THE  DAY 90 capsule 3     sitagliptin (JANUVIA) 100 MG tablet Take 1 tablet (100 mg) by mouth daily 90 tablet 3     STATIN NOT PRESCRIBED (INTENTIONAL) Please choose reason not prescribed, below       triamcinolone (KENALOG) 0.1 % external cream Apply sparingly to affected area three times  "daily for up to 14 days. 30 g 1      Family History:  Family History   Problem Relation Age of Onset     Family History Negative Mother         d:age 89 of \"old age\"     Gastrointestinal Disease Father         d:age 79 liver failure after taking excessive amounts of Tylenol over 5-6 yrs     Neurologic Disorder Brother         b:1932  hx cerebral aneurysm age 59     No family history of bleeding or clotting disorder. Maternal uncle had throat cancer.  Social History:  Social History     Socioeconomic History     Marital status:      Spouse name: Not on file     Number of children: 2     Years of education: Not on file     Highest education level: Not on file   Occupational History     Occupation: teacher     Employer: [employer]   Social Needs     Financial resource strain: Not on file     Food insecurity:     Worry: Not on file     Inability: Not on file     Transportation needs:     Medical: Not on file     Non-medical: Not on file   Tobacco Use     Smoking status: Never Smoker     Smokeless tobacco: Never Used   Substance and Sexual Activity     Alcohol use: Yes     Comment: beer 4-5 a month     Drug use: No     Sexual activity: Yes     Partners: Female   Lifestyle     Physical activity:     Days per week: Not on file     Minutes per session: Not on file     Stress: Not on file   Relationships     Social connections:     Talks on phone: Not on file     Gets together: Not on file     Attends Scientologist service: Not on file     Active member of club or organization: Not on file     Attends meetings of clubs or organizations: Not on file     Relationship status: Not on file     Intimate partner violence:     Fear of current or ex partner: Not on file     Emotionally abused: Not on file     Physically abused: Not on file     Forced sexual activity: Not on file   Other Topics Concern     Parent/sibling w/ CABG, MI or angioplasty before 65F 55M? Not Asked      Service Not Asked     Blood " "Transfusions Not Asked     Caffeine Concern Not Asked     Occupational Exposure Not Asked     Hobby Hazards Not Asked     Sleep Concern Not Asked     Stress Concern Not Asked     Weight Concern Not Asked     Special Diet Yes     Comment: diabetic diet      Back Care Not Asked     Exercise No     Comment: nothing regular since bilateral knee surgeries      Bike Helmet Not Asked     Seat Belt Not Asked     Self-Exams Not Asked   Social History Narrative     Not on file    denies any smoking. No alcohol. He usually lives in [place] and spends winter in [place]. He lives alone.  Physical Exam:  /85 (BP Location: Right arm)   Pulse 88   Temp 98.4  F (36.9  C) (Oral)   Resp 18   Ht 1.702 m (5' 7\")   Wt 75.2 kg (165 lb 14 oz)   SpO2 98%   BMI 25.98 kg/m      Wt Readings from Last 4 Encounters:   05/15/19 75.2 kg (165 lb 14 oz)   04/16/19 75.3 kg (166 lb)   12/19/18 76.2 kg (168 lb)   12/04/18 76.7 kg (169 lb)       CONSTITUTIONAL: No apparent distress  EYES: PERRLA, without pallor or jaundice  ENT/MOUTH: Ears unremarkable. No oral lesions  CVS: s1s2 normal  RESPIRATORY: Chest is clear  GI: Abdomen is benign  NEURO: Alert and oriented ×3  INTEGUMENT: no concerning skin rashes   LYMPHATIC: no palpable lymphadenopathy  MUSCULOSKELETAL: Unremarkable. No bony tenderness.   EXTREMITIES: no pedal edema  PSYCH: Mentation, mood and affect are appropriate        Laboratory/Imaging Studies    Results for NEISHA FRANCOIS (MRN [id number]) as of 5/15/2019 12:48   Ref. Range 10/23/2018 09:56 11/20/2018 10:13 12/4/2018 13:26 5/15/2019 10:39   WBC Latest Ref Range: 4.0 - 11.0 10e9/L 5.4 4.0  4.1   Hemoglobin Latest Ref Range: 13.3 - 17.7 g/dL 10.6 (L) 10.6 (L)  10.1 (L)   Hematocrit Latest Ref Range: 40.0 - 53.0 % 31.5 (L) 31.5 (L)  29.8 (L)   Platelet Count Latest Ref Range: 150 - 450 10e9/L 59 (L) 55 (L)  65 (L)   RBC Count Latest Ref Range: 4.4 - 5.9 10e12/L 2.75 (L) 2.77 (L)  2.65 (L)   MCV Latest Ref Range: 78 - 100 fl " 115 (H) 114 (H)  113 (H)   MCH Latest Ref Range: 26.5 - 33.0 pg 38.5 (H) 38.3 (H)  38.1 (H)   MCHC Latest Ref Range: 31.5 - 36.5 g/dL 33.7 33.7  33.9   RDW Latest Ref Range: 10.0 - 15.0 % 14.6 14.0  16.8 (H)   Diff Method Unknown Automated Method Automated Method     % Neutrophils Latest Units: % 67.2 64.0     % Lymphocytes Latest Units: % 25.0 28.8     % Monocytes Latest Units: % 5.2 5.3     % Eosinophils Latest Units: % 1.8 1.3     % Basophils Latest Units: % 0.6 0.3     % Immature Granulocytes Latest Units: % 0.2 0.3     Absolute Neutrophil Latest Ref Range: 1.6 - 8.3 10e9/L 3.7 2.6     Absolute Lymphocytes Latest Ref Range: 0.8 - 5.3 10e9/L 1.4 1.2     Absolute Monocytes Latest Ref Range: 0.0 - 1.3 10e9/L 0.3 0.2     Absolute Eosinophils Latest Ref Range: 0.0 - 0.7 10e9/L 0.1 0.1     Absolute Basophils Latest Ref Range: 0.0 - 0.2 10e9/L 0.0 0.0     Abs Immature Granulocytes Latest Ref Range: 0 - 0.4 10e9/L 0.0 0.0     % Retic Latest Ref Range: 0.5 - 2.0 % 2.6 (H) 2.4 (H) 2.4 (H) 2.6 (H)   Absolute Retic Latest Ref Range: 25 - 95 10e9/L 72.6 65.9 64.2 68.9     Recent B12 and ferritin in [place] were unremarkable.  Other labs in [place] were stable    I reviewed the outside records as well  Received from [facility], [place], [place] are 16 stained   slides labeled [id number], collected   03-28-18 now designated UHR-.  Also received is a copy of the   referring pathologist's report with   patient identifying information.     FINAL DIAGNOSIS:   Bone marrow, posterior iliac crest, decalcified trephine biopsy, aspirate   clot, touch imprints, and aspirate   smears ([id number], 03/28/2018):        - Marrow cellularity of 5% (moderately hypocellular for age) in   trephine core sections provided for   review        - Good evidence of trilineage hematopoietic maturation in core and   clot sections provided for review        - Benign appearing lymphoid aggregates in core and clot sections   provided for  "review        - Negative immunohistochemical stain for Parvovirus in clot section        - Marrow aspirate smears and touch imprints showing trilineage   hematopoietic maturation with no increase   in blasts        - Increased marrow iron stores with 5% sideroblasts and no ringed   sideroblasts.        - Flow cytometric immunophenotyping performed on marrow aspirate   reported by [facility] [place] [facility] as   showing, \"No immunophenotypic evidence of acute leukemia or a T-cell   B-cell or Plasma cell neoplasm.\"        - Cytogenetic analysis performed on marrow aspirate reported by   [facility] [place] [facility] as showing,   \"46,XY,gonzales(13;14)(q10;q19)?c[20],\" \"most probably constitutional\"        - FISH study performed on marrow aspirate by [facility] [place] [facility]   reported as showing, \" Negative FISH   result for cytogenetic abnormalities commonly observed in myeloid   disorders\"        - Blood smear and peripheral blood counts not provided for review        - See Comment     COMMENT:   By separate report ([id number]; 4/18/18), flow cytometric immunophenotyping    performed on blood for this patient   at [facility] laboratory shows a very small percentage of red cells and a   small percentage (less than 10%) of   neutrophils and monocytes with a PNH immunophenotype.       Peripheral blood flow cytometry  SPECIMEN(S):   Blood     INTERPRETATION:   Blood:        Cells with a paroxysmal nocturnal hemoglobinuria (PNH)-type   immunophenotype represent approximately 0.3%   of erythrocytes, 3.4% of monocytes, and 4.4% of neutrophils, see comment.     COMMENT:   Classic paroxysmal nocturnal hemoglobinuria (PNH) is associated with large    PNH-type clones. PNH in the setting   of another bone marrow failure syndrome usually has small PNH-type clones,    less than 10%. Subclinical PNH   usually has rare PNH-type clones, less than 1%. Detection of a small clone    is not equivalent to a diagnosis of   hemolytic PNH.  Final diagnosis requires " correlation with clinical,   morphologic, and ancillary findings.   (Fletcher et al. Blood, 106 (12): 6426-4768, 2005. Fletcher, Hematology,   21-29, 2011.)     RESULTS:   About 0.33% of erythrocytes have decreased expression of CD59 (0.32% type   II, 0.01% type III).   About 3.45% of monocytes have decreased expression of CD14 and binding of   fluorescent aerolysin (FLAER).About   4.44% of neutrophils have decreased expression of CD24 and binding of   FLAER.       Peripheral blood chromosome analysis.  METHODS:   PHA stimulated, MTX synchronized cultures.     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   Each of the metaphase cells analyzed had 45 chromosomes including one   normal chromosome 13, one normal   chromosome 14, and a Robertsonian translocation joining together, at their    centromeres, the long arms of one   chromosome 13 and one chromosome 14.     Robertsonian translocations are one of the most common occurring   constitutional  rearrangements in human   populations. As these translocations are not associated with any loss of   critical genetic material, they are   considered to be balanced, and would not be expected to be associated with    any phenotypic abnormalities in   this patient. However, during meiosis, the translocation can segregate   into gametes several different ways,   resulting, after fertilization, in zygotes that have trisomy or monosomy   for chromosomes 13 or 14. These   trisomies and/or monosomies can result in miscarriage or liveborn   offspring with multiple congenital   anomalies. Thus, carriers of such translocations are at increased   reproductive risk.     Genetic counseling regarding these results, and cytogenetic evaluation of   other family members who may carry   the translocation are recommended.     ASSESSMENT/PLAN:      Pancytopenia. At this time the  "cause is not entirely known but it could be possibly toxic reaction to the drug glimepiride. Otherwise his bone marrow biopsy was nondiagnostic. Peripheral blood and bone marrow Flow cytometry were negative.  Cytogenetic analysis performed on marrow aspirate showed 46,XY,gonzales(13;14)(q10;q19)?c[20], \"most probably constitutional\"        - FISH study performed on marrow aspirate showed \" Negative FISH result for cytogenetic abnormalities commonly observed in myeloid disorders\"     He has been on observation and overall he has done well.  White blood cell count is normal.  Hemoglobin has improved and then stabilized and now it is 10.1.  He remains thrombocytopenic in the platelets were 65 today.    Anemia.  Overall this is a stable.  He will continue folic acid vitamin B12 and we will repeat labs in 3 months.  He could have some degree of erythropoietin deficiency from chronic kidney disease.  I will repeat iron studies and erythropoietin level in 3 months.    Thrombocytopenia.  Although thrombocytopenia has improved but platelets still remain low.  There is stable and platelets are 65.  He denies any issues with bleeding.  Previously WENDY, Hep B, C and HIV were negative. Spleen size was normal. Liver was borderline enlarged.  I plan to repeat labs in 3 months.    Small PNH clone. Peripheral blood flow cytometry for PNH reveals a very small percentage of red cells and a small percentage (less than 10%) of neutrophils and monocytes with a PNH immunophenotype, the significance of which is not known.  Repeat flow cytometry for PNH shows that the % of PNH type clones is even lower this time, so doubt that it is the culprit in causing pancytopenia.  I will plan on repeating flow cytometry for PNH in case he has worsening of cytopenias.    Vitamin B12 deficiency with negative anti-parietal cell antibody and anti-intrinsic factor antibody.  Repeat vitamin B12 level has been in the normal range.  He will continue indefinite " vitamin B12 supplementation.      Chromosomal abnormality. 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  We did a peripheral blood chromosome analysis which confirms these findings. 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  Initially he was supposed to see genetic counselor in October 2018 but he was unable to make that appointment.  When we had previously discussed he told me that he is going to follow-up with genetic counselor after coming back from [place].  I brought this up again today but he is not interested in meeting with genetic counselor at this time.      Muscular pain due to statins.  Is a statins were put on hold.  He will follow with his primary care physician regarding this.    Labs in 3 in 6 months and see me back in 6 months.    I answered all of his questions to his satisfaction. He is agreeable and comfortable with the plan.    Terra Salazar          Again, thank you for allowing me to participate in the care of your patient.        Sincerely,        Terra Salazar MD

## 2019-05-15 NOTE — NURSING NOTE
"Oncology Rooming Note    May 15, 2019 12:56 PM   Prakash Cramer is a 79 year old male who presents for:    Chief Complaint   Patient presents with     Oncology Clinic Visit     Follow Up     Initial Vitals: /85 (BP Location: Right arm)   Pulse 88   Temp 98.4  F (36.9  C) (Oral)   Resp 18   Ht 1.702 m (5' 7\")   Wt 75.2 kg (165 lb 14 oz)   SpO2 98%   BMI 25.98 kg/m   Estimated body mass index is 25.98 kg/m  as calculated from the following:    Height as of this encounter: 1.702 m (5' 7\").    Weight as of this encounter: 75.2 kg (165 lb 14 oz). Body surface area is 1.89 meters squared.  No Pain (0) Comment: Data Unavailable   No LMP for male patient.  Allergies reviewed: Yes  Medications reviewed: Yes    Medications: Medication refills not needed today.  Pharmacy name entered into [email or web address]:    [facility] - [place], [place] - [address]  [id number] - [place], [place] - [address] LPN              "

## 2019-05-15 NOTE — PROGRESS NOTES
Hematology follow up visit:  Date on this visit: 5/15/2019     CC   Pancytopenia    Primary Physician: Zhou Meraz     History Of Present Illness:    Please see previous note for details. I have copied and updated from prior note.  Mr. Cramer is a 79 year old male who was found to have pancytopenia around the end of March 2018 when he presented with a white blood cell count of 1.5 and hemoglobin of 7.8 and platelets of 4. He was admitted to the hospital in [place]. There was no bruising or petechiae or evidence of bleeding. He was feeling fatigued and at that point he was also noticed to have C diff infection and was started on p.o. vancomycin. He was given Neupogen ×1 and packed red distant transfusion ×2 and platelet transfusion ×1. He was also given vitamin B12 injections and I believe it was 3 injections which she was given during the hospitalization. He had a bone marrow biopsy done as part of his workup which showed mildly hypocellular bone marrow with 10-40% cellularity with erythroid hyperplasia and mild dyserythropoiesis (5% cellularity, moderately decreased for age as per pathology review at the [facility]). Reactive plasmacytosis and maturing trilineage hematopoiesis. There was no significant dysplasia in the granulocytes or megakaryocytes. Parvovirus immunohistochemistry was negative. These findings were deemed consistent with either evolving aplastic anemia or drug/toxin effect or vitamin/nutritional deficiency or infectious or viral and hypocellular MDS.   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 This finding is most probably constitutional in origin and not acquired; however it it is medically warranted a peripheral blood chromosome analysis couldn't be performed to rule out an acquired  abnormality. If it is constitutional genetic counseling would be medically indicated    Comprehensive metabolic panel was unremarkable.   His vitamin B12 level was less than 159. Antiparietal cell antibody and anti-intrinsic factor antibody were negative. Iron studies were unremarkable. CMV IgM was negative. WENDY screen was negative. Rheumatoid factor and anti-CCP were negative. SPEP was negative.  After thorough investigation it was found that potentially glimepiride (sulfonylurea) which he was taking could be the culprit for pancytopenia so he was taken off it.  Upon discharge from the hospital on 3/31/2018 his white blood cell count was 2.4 and hemoglobin was 7.8 and platelet 12. On 4/5/2018 the WBC count was 2.9 with ANC of 1.2 and hemoglobin 7.9 and platelets 75.  On 4/13/2018 white blood cell count was 3.3, ANC 1.3, hemoglobin 7 and platelets 13. At that point he was given 2 units of packed red blood cells and 1 unit of platelets  Most recently on 4/16/2018 white blood cell count was 3.2, ANC 1.1, hemoglobin 10 and platelets 20.    When he saw me I did further testing and KIMBERLEE was negative.  Peripheral blood flow cytometry for PNH reveals a very small percentage of red cells and a small percentage (less than 10%) of neutrophils and monocytes with a PNH immunophenotype, the significance of which is not known.  Repeat flow cytometry for PNH in Sept 2018, shows that the % of PNH type clones is even lower this time, so doubt that it is the culprit in causing pancytopenia.   A repeat vitamin B12 level was 443. I told him to start taking vitamin B12 1000  g daily.  We kept him on observation and his counts improved with normalization of the WBC count and improvement is Hg. His platelets although were better but plataued around 40s, so we did further w/up and recent WENDY, Hep B, C and HIV were negative. Spleen size was normal. Liver was borderline enlarged.    He remains on observation.  He is doing well.  Denies any  infections.  No bleeding.  Overall energy is a stable.  Few weeks ago he was having pain in his muscles and his statin was discontinued.  After discontinuing that he has noticed significant improvement in the muscular pain which has now resolved.  He denies new swellings.  No trouble breathing.  No neuropathy.  He continues to take vitamin B12 and folic acid without problems.  He followed with hematology in [place] and overall his blood work remained stable.      ROS:  Rest of the comprehensive ROS was essentially unremarkable.      I reviewed other history in EPIC as below.    Past Medical/Surgical History:  Past Medical History:   Diagnosis Date     Aplastic anemia (H) 03/26/2018    thought secondary to reaction to Septra     BPH (benign prostatic hypertrophy)     failed  Flomax     C. difficile diarrhea 03/26/2018    treated with Flagyl     Cellulitis and abscess of leg, except foot 2004     Contact dermatitis and other eczema, due to unspecified cause      Diaphragmatic hernia without mention of obstruction or gangrene     hiatal hernia     Esophageal reflux      Hyperlipidemia LDL goal <100 3/11/2005     Hypothyroidism      Impotence of organic origin      Meningococcal encephalitis      Proteinuria      RBBB      Type 2 diabetes mellitus with diabetic nephropathy  (goal A1C<7) 10/24/2015     Unspecified essential hypertension      Past Surgical History:   Procedure Laterality Date     ARTHROPLASTY KNEE Left 3/18/2015    Procedure: ARTHROPLASTY KNEE;  Surgeon: Abebe Schroeder MD;  Location: [facility] OR     ARTHROPLASTY KNEE Right 3/14/2016    Procedure: ARTHROPLASTY KNEE;  Surgeon: Abebe Schroeder MD;  Location: [facility] OR     C NONSPECIFIC PROCEDURE  10/02    left knee meniscus tear repair     HC LAPAROSCOPY, SURGICAL; CHOLECYSTECTOMY  1998    Cholecystectomy, Laparoscopic     HC REMOVE TONSILS/ADENOIDS,13+ Y/O  1963    T & A 13+y.o.     Allergies:  Allergies as of 05/15/2019 - Reviewed 05/15/2019   Allergen Reaction  "Noted     Atorvastatin calcium  01/30/2008     Crestor [rosuvastatin]  05/07/2019     Diovan [valsartan]  01/24/2011     Glimepiride  05/08/2018     Invokana [canagliflozin]  06/01/2018     Mold Other (See Comments) 07/14/2011     Penicillins  09/07/2011     Seasonal allergies  06/25/2007    glimepiride possibly causing pancytopenia  Current Medications:  Current Outpatient Medications   Medication Sig Dispense Refill     amLODIPine (NORVASC) 10 MG tablet Take 1 tablet (10 mg) by mouth daily 90 tablet 3     aspirin 81 MG tablet Take by mouth daily 30 tablet      blood glucose monitoring (ACCU-CHEK FASTCLIX) lancets Use to test blood sugar 1 times daily or as directed. 102 each 11     blood glucose monitoring (ACCU-CHEK MULTICLIX) lancets Use to test blood sugar 1 time daily or as directed. 1 Box 11     blood glucose monitoring (ACCU-CHEK SMARTVIEW) test strip Use to test blood sugar 1 times daily or as directed. 100 strip 11     Blood Glucose Monitoring Suppl MAGDALENO 1 Device daily Smartview Meter 1 each 0     Cyanocobalamin (B-12) 1000 MCG TBCR Take 1,000 mcg by mouth daily 100 tablet 11     levothyroxine (SYNTHROID/LEVOTHROID) 75 MCG tablet Take 1 tablet (75 mcg) by mouth daily 90 tablet 3     metFORMIN (GLUCOPHAGE) 1000 MG tablet 1 tab twice a day 180 tablet 3     omeprazole (PRILOSEC) 20 MG DR capsule TAKE 1 CAPSULE BY MOUTH ONCE DAILY IN THE MORNING 30-60  MINUTES  BEFORE  FIRST  MEAL  OF  THE  DAY 90 capsule 3     sitagliptin (JANUVIA) 100 MG tablet Take 1 tablet (100 mg) by mouth daily 90 tablet 3     STATIN NOT PRESCRIBED (INTENTIONAL) Please choose reason not prescribed, below       triamcinolone (KENALOG) 0.1 % external cream Apply sparingly to affected area three times daily for up to 14 days. 30 g 1      Family History:  Family History   Problem Relation Age of Onset     Family History Negative Mother         d:age 89 of \"old age\"     Gastrointestinal Disease Father         d:age 79 liver failure after " taking excessive amounts of Tylenol over 5-6 yrs     Neurologic Disorder Brother         b:1932  hx cerebral aneurysm age 59     No family history of bleeding or clotting disorder. Maternal uncle had throat cancer.  Social History:  Social History     Socioeconomic History     Marital status:      Spouse name: Not on file     Number of children: 2     Years of education: Not on file     Highest education level: Not on file   Occupational History     Occupation: teacher     Employer: [employer]   Social Needs     Financial resource strain: Not on file     Food insecurity:     Worry: Not on file     Inability: Not on file     Transportation needs:     Medical: Not on file     Non-medical: Not on file   Tobacco Use     Smoking status: Never Smoker     Smokeless tobacco: Never Used   Substance and Sexual Activity     Alcohol use: Yes     Comment: beer 4-5 a month     Drug use: No     Sexual activity: Yes     Partners: Female   Lifestyle     Physical activity:     Days per week: Not on file     Minutes per session: Not on file     Stress: Not on file   Relationships     Social connections:     Talks on phone: Not on file     Gets together: Not on file     Attends Shinto service: Not on file     Active member of club or organization: Not on file     Attends meetings of clubs or organizations: Not on file     Relationship status: Not on file     Intimate partner violence:     Fear of current or ex partner: Not on file     Emotionally abused: Not on file     Physically abused: Not on file     Forced sexual activity: Not on file   Other Topics Concern     Parent/sibling w/ CABG, MI or angioplasty before 65F 55M? Not Asked      Service Not Asked     Blood Transfusions Not Asked     Caffeine Concern Not Asked     Occupational Exposure Not Asked     Hobby Hazards Not Asked     Sleep Concern Not Asked     Stress Concern Not Asked     Weight Concern Not Asked     Special Diet Yes     Comment: diabetic  "diet      Back Care Not Asked     Exercise No     Comment: nothing regular since bilateral knee surgeries      Bike Helmet Not Asked     Seat Belt Not Asked     Self-Exams Not Asked   Social History Narrative     Not on file    denies any smoking. No alcohol. He usually lives in [place] and spends winter in [place]. He lives alone.  Physical Exam:  /85 (BP Location: Right arm)   Pulse 88   Temp 98.4  F (36.9  C) (Oral)   Resp 18   Ht 1.702 m (5' 7\")   Wt 75.2 kg (165 lb 14 oz)   SpO2 98%   BMI 25.98 kg/m     Wt Readings from Last 4 Encounters:   05/15/19 75.2 kg (165 lb 14 oz)   04/16/19 75.3 kg (166 lb)   12/19/18 76.2 kg (168 lb)   12/04/18 76.7 kg (169 lb)       CONSTITUTIONAL: No apparent distress  EYES: PERRLA, without pallor or jaundice  ENT/MOUTH: Ears unremarkable. No oral lesions  CVS: s1s2 normal  RESPIRATORY: Chest is clear  GI: Abdomen is benign  NEURO: Alert and oriented ×3  INTEGUMENT: no concerning skin rashes   LYMPHATIC: no palpable lymphadenopathy  MUSCULOSKELETAL: Unremarkable. No bony tenderness.   EXTREMITIES: no pedal edema  PSYCH: Mentation, mood and affect are appropriate        Laboratory/Imaging Studies    Results for NEISHA FRANCOIS (MRN [id number]) as of 5/15/2019 12:48   Ref. Range 10/23/2018 09:56 11/20/2018 10:13 12/4/2018 13:26 5/15/2019 10:39   WBC Latest Ref Range: 4.0 - 11.0 10e9/L 5.4 4.0  4.1   Hemoglobin Latest Ref Range: 13.3 - 17.7 g/dL 10.6 (L) 10.6 (L)  10.1 (L)   Hematocrit Latest Ref Range: 40.0 - 53.0 % 31.5 (L) 31.5 (L)  29.8 (L)   Platelet Count Latest Ref Range: 150 - 450 10e9/L 59 (L) 55 (L)  65 (L)   RBC Count Latest Ref Range: 4.4 - 5.9 10e12/L 2.75 (L) 2.77 (L)  2.65 (L)   MCV Latest Ref Range: 78 - 100 fl 115 (H) 114 (H)  113 (H)   MCH Latest Ref Range: 26.5 - 33.0 pg 38.5 (H) 38.3 (H)  38.1 (H)   MCHC Latest Ref Range: 31.5 - 36.5 g/dL 33.7 33.7  33.9   RDW Latest Ref Range: 10.0 - 15.0 % 14.6 14.0  16.8 (H)   Diff Method Unknown Automated Method " Automated Method     % Neutrophils Latest Units: % 67.2 64.0     % Lymphocytes Latest Units: % 25.0 28.8     % Monocytes Latest Units: % 5.2 5.3     % Eosinophils Latest Units: % 1.8 1.3     % Basophils Latest Units: % 0.6 0.3     % Immature Granulocytes Latest Units: % 0.2 0.3     Absolute Neutrophil Latest Ref Range: 1.6 - 8.3 10e9/L 3.7 2.6     Absolute Lymphocytes Latest Ref Range: 0.8 - 5.3 10e9/L 1.4 1.2     Absolute Monocytes Latest Ref Range: 0.0 - 1.3 10e9/L 0.3 0.2     Absolute Eosinophils Latest Ref Range: 0.0 - 0.7 10e9/L 0.1 0.1     Absolute Basophils Latest Ref Range: 0.0 - 0.2 10e9/L 0.0 0.0     Abs Immature Granulocytes Latest Ref Range: 0 - 0.4 10e9/L 0.0 0.0     % Retic Latest Ref Range: 0.5 - 2.0 % 2.6 (H) 2.4 (H) 2.4 (H) 2.6 (H)   Absolute Retic Latest Ref Range: 25 - 95 10e9/L 72.6 65.9 64.2 68.9     Recent B12 and ferritin in [place] were unremarkable.  Other labs in [place] were stable    I reviewed the outside records as well  Received from [facility], [place], [place] are 16 stained   slides labeled [id number], collected   03-28-18 now designated [facility]-.  Also received is a copy of the   referring pathologist's report with   patient identifying information.     FINAL DIAGNOSIS:   Bone marrow, posterior iliac crest, decalcified trephine biopsy, aspirate   clot, touch imprints, and aspirate   smears ([id number], 03/28/2018):        - Marrow cellularity of 5% (moderately hypocellular for age) in   trephine core sections provided for   review        - Good evidence of trilineage hematopoietic maturation in core and   clot sections provided for review        - Benign appearing lymphoid aggregates in core and clot sections   provided for review        - Negative immunohistochemical stain for Parvovirus in clot section        - Marrow aspirate smears and touch imprints showing trilineage   hematopoietic maturation with no increase   in blasts        - Increased marrow iron stores  "with 5% sideroblasts and no ringed   sideroblasts.        - Flow cytometric immunophenotyping performed on marrow aspirate   reported by [facility] [place] [facility] as   showing, \"No immunophenotypic evidence of acute leukemia or a T-cell   B-cell or Plasma cell neoplasm.\"        - Cytogenetic analysis performed on marrow aspirate reported by   [facility] [place] [facility] as showing,   \"46,XY,gonzales(13;14)(q10;q19)?c[20],\" \"most probably constitutional\"        - FISH study performed on marrow aspirate by [facility] [place] [facility]   reported as showing, \" Negative FISH   result for cytogenetic abnormalities commonly observed in myeloid   disorders\"        - Blood smear and peripheral blood counts not provided for review        - See Comment     COMMENT:   By separate report ([id number]; 4/18/18), flow cytometric immunophenotyping    performed on blood for this patient   at [facility] laboratory shows a very small percentage of red cells and a   small percentage (less than 10%) of   neutrophils and monocytes with a PNH immunophenotype.       Peripheral blood flow cytometry  SPECIMEN(S):   Blood     INTERPRETATION:   Blood:        Cells with a paroxysmal nocturnal hemoglobinuria (PNH)-type   immunophenotype represent approximately 0.3%   of erythrocytes, 3.4% of monocytes, and 4.4% of neutrophils, see comment.     COMMENT:   Classic paroxysmal nocturnal hemoglobinuria (PNH) is associated with large    PNH-type clones. PNH in the setting   of another bone marrow failure syndrome usually has small PNH-type clones,    less than 10%. Subclinical PNH   usually has rare PNH-type clones, less than 1%. Detection of a small clone    is not equivalent to a diagnosis of   hemolytic PNH.  Final diagnosis requires correlation with clinical,   morphologic, and ancillary findings.   (Fletcher, et al. Blood, 106 (12): 0361-8419, 2005. Fletcher, Hematology,   21-29, 2011.)     RESULTS:   About 0.33% of erythrocytes have decreased expression of CD59 (0.32% type   II, " 0.01% type III).   About 3.45% of monocytes have decreased expression of CD14 and binding of   fluorescent aerolysin (FLAER).About   4.44% of neutrophils have decreased expression of CD24 and binding of   FLAER.       Peripheral blood chromosome analysis.  METHODS:   PHA stimulated, MTX synchronized cultures.     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   Each of the metaphase cells analyzed had 45 chromosomes including one   normal chromosome 13, one normal   chromosome 14, and a Robertsonian translocation joining together, at their    centromeres, the long arms of one   chromosome 13 and one chromosome 14.     Robertsonian translocations are one of the most common occurring   constitutional  rearrangements in human   populations. As these translocations are not associated with any loss of   critical genetic material, they are   considered to be balanced, and would not be expected to be associated with    any phenotypic abnormalities in   this patient. However, during meiosis, the translocation can segregate   into gametes several different ways,   resulting, after fertilization, in zygotes that have trisomy or monosomy   for chromosomes 13 or 14. These   trisomies and/or monosomies can result in miscarriage or liveborn   offspring with multiple congenital   anomalies. Thus, carriers of such translocations are at increased   reproductive risk.     Genetic counseling regarding these results, and cytogenetic evaluation of   other family members who may carry   the translocation are recommended.     ASSESSMENT/PLAN:      Pancytopenia. At this time the cause is not entirely known but it could be possibly toxic reaction to the drug glimepiride. Otherwise his bone marrow biopsy was nondiagnostic. Peripheral blood and bone marrow Flow cytometry were negative.  Cytogenetic analysis performed on marrow  "aspirate showed 46,XY,gonzales(13;14)(q10;q19)?c[20], \"most probably constitutional\"        - FISH study performed on marrow aspirate showed \" Negative FISH result for cytogenetic abnormalities commonly observed in myeloid disorders\"     He has been on observation and overall he has done well.  White blood cell count is normal.  Hemoglobin has improved and then stabilized and now it is 10.1.  He remains thrombocytopenic in the platelets were 65 today.    Anemia.  Overall this is a stable.  He will continue folic acid vitamin B12 and we will repeat labs in 3 months.  He could have some degree of erythropoietin deficiency from chronic kidney disease.  I will repeat iron studies and erythropoietin level in 3 months.    Thrombocytopenia.  Although thrombocytopenia has improved but platelets still remain low.  There is stable and platelets are 65.  He denies any issues with bleeding.  Previously WENDY, Hep B, C and HIV were negative. Spleen size was normal. Liver was borderline enlarged.  I plan to repeat labs in 3 months.    Small PNH clone. Peripheral blood flow cytometry for PNH reveals a very small percentage of red cells and a small percentage (less than 10%) of neutrophils and monocytes with a PNH immunophenotype, the significance of which is not known.  Repeat flow cytometry for PNH shows that the % of PNH type clones is even lower this time, so doubt that it is the culprit in causing pancytopenia.  I will plan on repeating flow cytometry for PNH in case he has worsening of cytopenias.    Vitamin B12 deficiency with negative anti-parietal cell antibody and anti-intrinsic factor antibody.  Repeat vitamin B12 level has been in the normal range.  He will continue indefinite vitamin B12 supplementation.      Chromosomal abnormality. 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  We did a peripheral blood chromosome analysis which confirms these findings. 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  Initially he was supposed to see genetic counselor in October 2018 but he was unable to make that appointment.  When we had previously discussed he told me that he is going to follow-up with genetic counselor after coming back from [place].  I brought this up again today but he is not interested in meeting with genetic counselor at this time.      Muscular pain due to statins.  Is a statins were put on hold.  He will follow with his primary care physician regarding this.    Labs in 3 in 6 months and see me back in 6 months.    I answered all of his questions to his satisfaction. He is agreeable and comfortable with the plan.    Terra Salazar

## 2019-05-16 DIAGNOSIS — E11.21 TYPE 2 DIABETES MELLITUS WITH DIABETIC NEPHROPATHY, WITHOUT LONG-TERM CURRENT USE OF INSULIN (H): ICD-10-CM

## 2019-05-16 NOTE — TELEPHONE ENCOUNTER
Requested Prescriptions   Pending Prescriptions Disp Refills     sitagliptin (JANUVIA) 100 MG tablet 90 tablet 3     Sig: Take 1 tablet (100 mg) by mouth daily       There is no refill protocol information for this order      Last Written Prescription Date:  06/01/18  Last Fill Quantity: 90,  # refills: 3   Last office visit: 4/16/2019 with prescribing provider:  04/16/19   Future Office Visit:  0    Pt wants written prescription mailed to his home address: [id number]  [place] [address]

## 2019-05-16 NOTE — TELEPHONE ENCOUNTER
"Requested Prescriptions   Pending Prescriptions Disp Refills     sitagliptin (JANUVIA) 100 MG tablet 90 tablet 3     Sig: Take 1 tablet (100 mg) by mouth daily       DPP4 Inhibitors Protocol Passed - 5/16/2019  9:30 AM        Passed - Blood pressure less than 140/90 in past 6 months     BP Readings from Last 3 Encounters:   05/15/19 131/85   04/16/19 122/70   12/19/18 122/70                 Passed - LDL on file in past 12 months     Recent Labs   Lab Test 05/15/19  1039   *             Passed - Microalbumin on file in past 12 months     Recent Labs   Lab Test 05/15/19  1224   MICROL 45   UMALCR 18.83*             Passed - HgbA1C in past 3 or 6 months     If HgbA1C is 8 or greater, it needs to be on file within the past 3 months.  If less than 8, must be on file within the past 6 months.     Recent Labs   Lab Test 05/15/19  1039   A1C 6.2*             Passed - Medication is active on med list        Passed - Patient is age 18 or older        Passed - Normal serum creatinine in past 12 months     Recent Labs   Lab Test 05/15/19  1039   CR 1.21             Passed - Recent (6 mo) or future (30 days) visit within the authorizing provider's specialty     Patient had office visit in the last 6 months or has a visit in the next 30 days with authorizing provider.  See \"Patient Info\" tab in inbasket, or \"Choose Columns\" in Meds & Orders section of the refill encounter.              "

## 2019-05-16 NOTE — TELEPHONE ENCOUNTER
Patient got a call from his local pharmacy that his Januvia script is ready but he was not wanting it filled here. In past had gotten handwritten scripts from Dr. Meraz with 3 renewals for this med. He sends scripts to [facility] because cannot afford it here. Patient would like new script mailed to him at his listed address ASAP.  Has enough left for 30 days.

## 2019-05-20 ENCOUNTER — TELEPHONE (OUTPATIENT)
Dept: INTERNAL MEDICINE | Facility: CLINIC | Age: 79
End: 2019-05-20

## 2019-05-20 NOTE — TELEPHONE ENCOUNTER
Reason for Call:  Request for results:    Name of test or procedure: labs     Date of test of procedure: 5/15/19    Location of the test or procedure: [facility]    OK to leave the result message on voice mail or with a family member? YES    Phone number Patient can be reached at:  Cell number on file:    Telephone Information:   Mobile [phone]       Additional comments:     Call taken on 5/20/2019 at 1:40 PM by MATTHIAS CUMMINGS

## 2019-05-21 NOTE — TELEPHONE ENCOUNTER
Patient is calling back to get his results. He would like a call today because he says his provider is not in clinic on Wednesdays.

## 2019-05-22 NOTE — TELEPHONE ENCOUNTER
Patient calling back and he does not have my chart, so can PCP please contact him on his cell phone.  [phone]

## 2019-05-24 NOTE — TELEPHONE ENCOUNTER
Spoke with pt last night and reviewed lab results. Will send a letter to pt also next week when I return to clinic

## 2019-06-20 ENCOUNTER — TELEPHONE (OUTPATIENT)
Dept: INTERNAL MEDICINE | Facility: CLINIC | Age: 79
End: 2019-06-20

## 2019-06-20 NOTE — TELEPHONE ENCOUNTER
Reason for Call:  Other call back    Detailed comments: Patient would like to change part of his healthcare directive    Phone Number Patient can be reached at: Cell number on file:    Telephone Information:   Mobile [phone]       Best Time: anytime  Can we leave a detailed message on this number? YES    Call taken on 6/20/2019 at 11:01 AM by Allan Friend

## 2019-06-25 ENCOUNTER — APPOINTMENT (OUTPATIENT)
Dept: NURSING | Facility: CLINIC | Age: 79
End: 2019-06-25
Payer: MEDICARE

## 2019-06-28 ENCOUNTER — DOCUMENTATION ONLY (OUTPATIENT)
Dept: OTHER | Facility: CLINIC | Age: 79
End: 2019-06-28

## 2019-08-22 ENCOUNTER — PATIENT OUTREACH (OUTPATIENT)
Dept: ONCOLOGY | Facility: CLINIC | Age: 79
End: 2019-08-22

## 2019-08-22 DIAGNOSIS — D69.6 THROMBOCYTOPENIA (H): ICD-10-CM

## 2019-08-22 DIAGNOSIS — D64.9 ANEMIA, UNSPECIFIED TYPE: ICD-10-CM

## 2019-08-22 LAB
ALBUMIN SERPL-MCNC: 3.5 G/DL (ref 3.4–5)
ALP SERPL-CCNC: 134 U/L (ref 40–150)
ALT SERPL W P-5'-P-CCNC: 25 U/L (ref 0–70)
ANION GAP SERPL CALCULATED.3IONS-SCNC: 9 MMOL/L (ref 3–14)
AST SERPL W P-5'-P-CCNC: 18 U/L (ref 0–45)
BASOPHILS # BLD AUTO: 0 10E9/L (ref 0–0.2)
BASOPHILS NFR BLD AUTO: 0.5 %
BILIRUB SERPL-MCNC: 0.4 MG/DL (ref 0.2–1.3)
BUN SERPL-MCNC: 24 MG/DL (ref 7–30)
CALCIUM SERPL-MCNC: 8.9 MG/DL (ref 8.5–10.1)
CHLORIDE SERPL-SCNC: 105 MMOL/L (ref 94–109)
CO2 SERPL-SCNC: 24 MMOL/L (ref 20–32)
CREAT SERPL-MCNC: 1.19 MG/DL (ref 0.66–1.25)
DIFFERENTIAL METHOD BLD: ABNORMAL
EOSINOPHIL # BLD AUTO: 0 10E9/L (ref 0–0.7)
EOSINOPHIL NFR BLD AUTO: 0.9 %
ERYTHROCYTE [DISTWIDTH] IN BLOOD BY AUTOMATED COUNT: 13.6 % (ref 10–15)
FERRITIN SERPL-MCNC: 34 NG/ML (ref 26–388)
GFR SERPL CREATININE-BSD FRML MDRD: 58 ML/MIN/{1.73_M2}
GLUCOSE SERPL-MCNC: 243 MG/DL (ref 70–99)
HCT VFR BLD AUTO: 36 % (ref 40–53)
HGB BLD-MCNC: 12 G/DL (ref 13.3–17.7)
IMM GRANULOCYTES # BLD: 0 10E9/L (ref 0–0.4)
IMM GRANULOCYTES NFR BLD: 0.2 %
IRON SATN MFR SERPL: 24 % (ref 15–46)
IRON SERPL-MCNC: 72 UG/DL (ref 35–180)
LDH SERPL L TO P-CCNC: 170 U/L (ref 85–227)
LYMPHOCYTES # BLD AUTO: 0.9 10E9/L (ref 0.8–5.3)
LYMPHOCYTES NFR BLD AUTO: 19.4 %
MCH RBC QN AUTO: 37.2 PG (ref 26.5–33)
MCHC RBC AUTO-ENTMCNC: 33.3 G/DL (ref 31.5–36.5)
MCV RBC AUTO: 112 FL (ref 78–100)
MONOCYTES # BLD AUTO: 0.2 10E9/L (ref 0–1.3)
MONOCYTES NFR BLD AUTO: 5.5 %
NEUTROPHILS # BLD AUTO: 3.2 10E9/L (ref 1.6–8.3)
NEUTROPHILS NFR BLD AUTO: 73.5 %
PLATELET # BLD AUTO: 107 10E9/L (ref 150–450)
POTASSIUM SERPL-SCNC: 4.2 MMOL/L (ref 3.4–5.3)
PROT SERPL-MCNC: 7.6 G/DL (ref 6.8–8.8)
RBC # BLD AUTO: 3.23 10E12/L (ref 4.4–5.9)
RETICS # AUTO: 73.3 10E9/L (ref 25–95)
RETICS/RBC NFR AUTO: 2.3 % (ref 0.5–2)
SODIUM SERPL-SCNC: 138 MMOL/L (ref 133–144)
TIBC SERPL-MCNC: 294 UG/DL (ref 240–430)
WBC # BLD AUTO: 4.4 10E9/L (ref 4–11)

## 2019-08-22 PROCEDURE — 36415 COLL VENOUS BLD VENIPUNCTURE: CPT | Performed by: INTERNAL MEDICINE

## 2019-08-22 PROCEDURE — 83540 ASSAY OF IRON: CPT | Performed by: INTERNAL MEDICINE

## 2019-08-22 PROCEDURE — 99000 SPECIMEN HANDLING OFFICE-LAB: CPT | Performed by: INTERNAL MEDICINE

## 2019-08-22 PROCEDURE — 83615 LACTATE (LD) (LDH) ENZYME: CPT | Performed by: INTERNAL MEDICINE

## 2019-08-22 PROCEDURE — 85045 AUTOMATED RETICULOCYTE COUNT: CPT | Performed by: INTERNAL MEDICINE

## 2019-08-22 PROCEDURE — 82728 ASSAY OF FERRITIN: CPT | Performed by: INTERNAL MEDICINE

## 2019-08-22 PROCEDURE — 85025 COMPLETE CBC W/AUTO DIFF WBC: CPT | Performed by: INTERNAL MEDICINE

## 2019-08-22 PROCEDURE — 80053 COMPREHEN METABOLIC PANEL: CPT | Performed by: INTERNAL MEDICINE

## 2019-08-22 PROCEDURE — 82668 ASSAY OF ERYTHROPOIETIN: CPT | Mod: 90 | Performed by: INTERNAL MEDICINE

## 2019-08-22 PROCEDURE — 83550 IRON BINDING TEST: CPT | Performed by: INTERNAL MEDICINE

## 2019-08-22 NOTE — PROGRESS NOTES
Patient here for his 3-month lab check.  Provided patient with written copy of his lab results.  Patient very pleased; they have improved and he feels well.  He will return in 3 months for labs and provider visit.

## 2019-08-23 LAB — EPO SERPL-ACNC: 72 MU/ML (ref 4–27)

## 2019-08-30 ENCOUNTER — OFFICE VISIT (OUTPATIENT)
Dept: INTERNAL MEDICINE | Facility: CLINIC | Age: 79
End: 2019-08-30
Payer: MEDICARE

## 2019-08-30 VITALS
RESPIRATION RATE: 16 BRPM | OXYGEN SATURATION: 98 % | BODY MASS INDEX: 25.78 KG/M2 | WEIGHT: 164.6 LBS | TEMPERATURE: 98 F | DIASTOLIC BLOOD PRESSURE: 80 MMHG | SYSTOLIC BLOOD PRESSURE: 140 MMHG | HEART RATE: 102 BPM

## 2019-08-30 DIAGNOSIS — E11.21 TYPE 2 DIABETES MELLITUS WITH DIABETIC NEPHROPATHY, WITHOUT LONG-TERM CURRENT USE OF INSULIN (H): ICD-10-CM

## 2019-08-30 DIAGNOSIS — R19.5 LOOSE STOOLS: Primary | ICD-10-CM

## 2019-08-30 PROCEDURE — 99214 OFFICE O/P EST MOD 30 MIN: CPT | Performed by: INTERNAL MEDICINE

## 2019-08-30 RX ORDER — METFORMIN HCL 500 MG
1000 TABLET, EXTENDED RELEASE 24 HR ORAL
Qty: 180 TABLET | Refills: 3 | Status: SHIPPED | OUTPATIENT
Start: 2019-08-30 | End: 2020-06-25

## 2019-08-30 NOTE — PROGRESS NOTES
"  SUBJECTIVE:                                                      HPI: Prakash Cramer is a pleasant 79 year old male who presents with loose stools:    Patient is a challenging historian.    Reports lifelong history of loose stools - \" I haven't been constipated a day in my life.\"  Loose stools seem to have worsened about a month ago.  Having 3-4 loose stools daily. Associated rectal urgency and occasional fecal incontinence. Bowel movements are often black, but patient is taking Pepto-Bismol at least daily, if not more often.    No abdominal pain or bloating.  No hematochezia.  No fevers or chills.  No anorexia, unintentional weight loss, or night sweats.    In addition to daily (or more frequent use of) Pepto-Bismol, patient is using Kaopectate at least daily, if not more often.  Medications are also significant for daily omeprazole (\"I have been on that forever\" - \"not sure why\") and metformin (non-XR formulation).    Recent labs, performed by patient's oncologist, are generally normal/stable from prior checks. Not suggestive of acute infection or metabolic dysfunction.    Last colonoscopy was performed in 2006. Repeat recommended in 5 years (not done).    The medication, allergy, and problem lists have been reviewed and updated as appropriate.     OBJECTIVE:                                                      BP (!) 140/80 (BP Location: Left arm, Patient Position: Chair, Cuff Size: Adult Regular)   Pulse 102   Temp 98  F (36.7  C) (Oral)   Resp 16   Wt 74.7 kg (164 lb 9.6 oz)   SpO2 98%   BMI 25.78 kg/m    Constitutional: well-appearing  Respiratory: normal respiratory effort; clear to auscultation bilaterally  Cardiovascular: regular rate and rhythm; no edema  Gastrointestinal: soft, non-tender, non-distended, and bowel sounds present; no organomegaly or masses     ASSESSMENT/PLAN:                                                      (R19.5) Loose stools  (primary encounter diagnosis)  (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    Plan:    - STOP Pepto-Bismol, Kaopectate, and omeprazole.   - REPLACE current metformin with metformin XR.   - follow-up in 2 weeks with me or PCP.   - if continued loose stools will recommend additional work-up including stool studies, celiac panel, and GI evaluation.    The instructions on the AVS were discussed and explained to the patient. Patient expressed understanding of instructions.    (Chart documentation was completed, in part, with [employer] voice-recognition software. Even though reviewed, some grammatical, spelling, and word errors may remain.)    Annette Amin MD   [address] [address]  T: [phone], F: [phone]

## 2019-08-30 NOTE — PATIENT INSTRUCTIONS
STOP taking Pepto Bismal.    STOP taking Kaopectate.    STOP taking omeprazole.    STOP current prescription of Metformin. START Metformin XR instead - 2 tabs with dinner.    Follow-up with me or Dr. Meraz in ~2-4 weeks please.

## 2019-09-04 ENCOUNTER — TELEPHONE (OUTPATIENT)
Dept: INTERNAL MEDICINE | Facility: CLINIC | Age: 79
End: 2019-09-04

## 2019-09-04 DIAGNOSIS — R19.5 LOOSE STOOLS: Primary | ICD-10-CM

## 2019-09-04 RX ORDER — FAMOTIDINE 20 MG/1
20 TABLET, FILM COATED ORAL 2 TIMES DAILY
Qty: 60 TABLET | Refills: 0 | Status: SHIPPED | OUTPATIENT
Start: 2019-09-04 | End: 2019-10-04

## 2019-09-04 NOTE — TELEPHONE ENCOUNTER
Reason for Call:  Other call back    Detailed comments: pt saw dr soto on 8/30/19 for diarrhea and said that its getting worse. Pt would like a call back from the nursing regarding what he should do. Please call pt back. Thanks.    Phone Number Patient can be reached at: Cell number on file:    Telephone Information:   Mobile [phone]       Best Time: anytime    Can we leave a detailed message on this number? YES    Call taken on 9/4/2019 at 12:09 PM by JAREK CANDELARIO

## 2019-09-04 NOTE — TELEPHONE ENCOUNTER
Stool studies and celiac labs ordered. Patient should come in for those ASAP.     May START Pepcid twice a day for acid reflux/upset stomach - sent to preferred pharmacy.

## 2019-09-04 NOTE — TELEPHONE ENCOUNTER
Patient calling, worsened diarrhea since last office visit with Dr. Amin. Meds were chanegd around. Diarrhea getting worse. Wears depends. No blood seen in the stools, but does report some black stools. 5 -6 episodes of diarrhea a day. Once in awhile stools are black but mostly gold. Was taking pepto bismal and kaopectate before and had darker stools but is off that. Hx gallbladder removal. Off omeprazole and now has more stomach aching and upset stomach, belching more, not a lot of acid reflux, stomach is more raw. Feels like he is staying hydrated. Feels he is drinking enough. Has not really changed diet other than has stayed away from spicy food for a long time. Having toast, cheese, crackers, mild foods right now. Should patient come in for another office visit?

## 2019-09-05 DIAGNOSIS — R19.5 LOOSE STOOLS: ICD-10-CM

## 2019-09-05 PROCEDURE — 36415 COLL VENOUS BLD VENIPUNCTURE: CPT | Performed by: INTERNAL MEDICINE

## 2019-09-05 PROCEDURE — 82784 ASSAY IGA/IGD/IGG/IGM EACH: CPT | Performed by: INTERNAL MEDICINE

## 2019-09-05 PROCEDURE — 83516 IMMUNOASSAY NONANTIBODY: CPT | Performed by: INTERNAL MEDICINE

## 2019-09-05 PROCEDURE — 83516 IMMUNOASSAY NONANTIBODY: CPT | Mod: 91 | Performed by: INTERNAL MEDICINE

## 2019-09-05 PROCEDURE — 99000 SPECIMEN HANDLING OFFICE-LAB: CPT | Performed by: INTERNAL MEDICINE

## 2019-09-05 PROCEDURE — 86256 FLUORESCENT ANTIBODY TITER: CPT | Mod: 90 | Performed by: INTERNAL MEDICINE

## 2019-09-06 DIAGNOSIS — R19.5 LOOSE STOOLS: ICD-10-CM

## 2019-09-06 DIAGNOSIS — Z12.11 SCREEN FOR COLON CANCER: ICD-10-CM

## 2019-09-06 LAB
C COLI+JEJUNI+LARI FUSA STL QL NAA+PROBE: NOT DETECTED
EC STX1 GENE STL QL NAA+PROBE: NOT DETECTED
EC STX2 GENE STL QL NAA+PROBE: NOT DETECTED
ENTERIC PATHOGEN COMMENT: NORMAL
GLIADIN IGA SER-ACNC: 2 U/ML
GLIADIN IGG SER-ACNC: <1 U/ML
IGA SERPL-MCNC: 447 MG/DL (ref 70–380)
NOROV GI+II ORF1-ORF2 JNC STL QL NAA+PR: NOT DETECTED
RVA NSP5 STL QL NAA+PROBE: NOT DETECTED
SALMONELLA SP RPOD STL QL NAA+PROBE: NOT DETECTED
SHIGELLA SP+EIEC IPAH STL QL NAA+PROBE: NOT DETECTED
TTG IGA SER-ACNC: 1 U/ML
TTG IGG SER-ACNC: <1 U/ML
V CHOL+PARA RFBL+TRKH+TNAA STL QL NAA+PR: NOT DETECTED
Y ENTERO RECN STL QL NAA+PROBE: NOT DETECTED

## 2019-09-06 PROCEDURE — 82274 ASSAY TEST FOR BLOOD FECAL: CPT | Performed by: INTERNAL MEDICINE

## 2019-09-06 PROCEDURE — 87506 IADNA-DNA/RNA PROBE TQ 6-11: CPT | Performed by: INTERNAL MEDICINE

## 2019-09-07 LAB — ENDOMYSIUM IGA TITR SER IF: NORMAL {TITER}

## 2019-09-08 DIAGNOSIS — R19.5 LOOSE STOOLS: Primary | ICD-10-CM

## 2019-09-11 LAB — HEMOCCULT STL QL IA: POSITIVE

## 2019-09-12 ENCOUNTER — TRANSFERRED RECORDS (OUTPATIENT)
Dept: HEALTH INFORMATION MANAGEMENT | Facility: CLINIC | Age: 79
End: 2019-09-12

## 2019-10-04 ENCOUNTER — OFFICE VISIT (OUTPATIENT)
Dept: INTERNAL MEDICINE | Facility: CLINIC | Age: 79
End: 2019-10-04
Payer: MEDICARE

## 2019-10-04 VITALS
HEART RATE: 99 BPM | BODY MASS INDEX: 26.23 KG/M2 | RESPIRATION RATE: 18 BRPM | OXYGEN SATURATION: 97 % | SYSTOLIC BLOOD PRESSURE: 128 MMHG | WEIGHT: 167.5 LBS | DIASTOLIC BLOOD PRESSURE: 78 MMHG

## 2019-10-04 DIAGNOSIS — R19.5 LOOSE STOOLS: ICD-10-CM

## 2019-10-04 PROCEDURE — 99213 OFFICE O/P EST LOW 20 MIN: CPT | Performed by: INTERNAL MEDICINE

## 2019-10-04 RX ORDER — FAMOTIDINE 20 MG/1
20 TABLET, FILM COATED ORAL 2 TIMES DAILY
Qty: 180 TABLET | Refills: 3 | Status: SHIPPED | OUTPATIENT
Start: 2019-10-04 | End: 2020-10-20

## 2019-10-04 NOTE — PATIENT INSTRUCTIONS
Recommend addition of a daily fiber supplement: psyllium/Metamucil.     ---    Follow-up with Dr. Meraz in November.

## 2019-10-04 NOTE — PROGRESS NOTES
SUBJECTIVE:                                                      HPI: Prakash Cramer is a pleasant 79 year old male who presents for follow-up of loose stools:    Patient was seen ~1 month ago for loose stools.    Patient's metformin was switched from immediate to extended release and his omeprazole Pepto-Bismol, and Kaopectate were stopped. Patient was started on Pepcid twice a day for acid reflux/upset stomach after stopping omeprazole. Labs and stool studies generally within normal limits/negative. Started on Imodium as needed. Evaluated by GI -additional work-up unrevealing and essentially no changes recommended.    Patient reports significant improvement in bowel movements. Patient generally has one formed bowel movement every morning. Every several days he has a second bowel movement in the afternoon, usually loose, after which he takes two Imodium.    The medication, allergy, and problem lists have been reviewed and updated as appropriate.     OBJECTIVE:                                                      /78   Pulse 99   Resp 18   Wt 76 kg (167 lb 8 oz)   SpO2 97%   BMI 26.23 kg/m    Constitutional: well-appearing    ASSESSMENT/PLAN:                                                      (R19.5) Loose stools  Comment: significantly improved since last visit, but still using Imodium a couple times a week.  Plan:    - recommend addition of daily psyllium/Metamucil - Rx provided.    - patient will be following up with his PCP next month.    The instructions on the AVS were discussed and explained to the patient. Patient expressed understanding of instructions.    (Chart documentation was completed, in part, with [employer] voice-recognition software. Even though reviewed, some grammatical, spelling, and word errors may remain.)    Annette Amin MD   [address] [address]  T: [phone], F: [phone]

## 2019-11-05 ENCOUNTER — ONCOLOGY VISIT (OUTPATIENT)
Dept: ONCOLOGY | Facility: CLINIC | Age: 79
End: 2019-11-05
Attending: INTERNAL MEDICINE
Payer: MEDICARE

## 2019-11-05 VITALS
TEMPERATURE: 98 F | OXYGEN SATURATION: 97 % | SYSTOLIC BLOOD PRESSURE: 153 MMHG | BODY MASS INDEX: 26.32 KG/M2 | DIASTOLIC BLOOD PRESSURE: 80 MMHG | HEIGHT: 67 IN | RESPIRATION RATE: 16 BRPM | WEIGHT: 167.7 LBS | HEART RATE: 96 BPM

## 2019-11-05 DIAGNOSIS — D64.9 ANEMIA, UNSPECIFIED TYPE: ICD-10-CM

## 2019-11-05 DIAGNOSIS — D69.6 THROMBOCYTOPENIA (H): Primary | ICD-10-CM

## 2019-11-05 DIAGNOSIS — D69.6 THROMBOCYTOPENIA (H): ICD-10-CM

## 2019-11-05 LAB
ALBUMIN SERPL-MCNC: 3.6 G/DL (ref 3.4–5)
ALP SERPL-CCNC: 188 U/L (ref 40–150)
ALT SERPL W P-5'-P-CCNC: 25 U/L (ref 0–70)
ANION GAP SERPL CALCULATED.3IONS-SCNC: 7 MMOL/L (ref 3–14)
AST SERPL W P-5'-P-CCNC: 15 U/L (ref 0–45)
BASOPHILS # BLD AUTO: 0 10E9/L (ref 0–0.2)
BASOPHILS NFR BLD AUTO: 0.4 %
BILIRUB SERPL-MCNC: 0.5 MG/DL (ref 0.2–1.3)
BUN SERPL-MCNC: 21 MG/DL (ref 7–30)
CALCIUM SERPL-MCNC: 8.8 MG/DL (ref 8.5–10.1)
CHLORIDE SERPL-SCNC: 101 MMOL/L (ref 94–109)
CO2 SERPL-SCNC: 28 MMOL/L (ref 20–32)
CREAT SERPL-MCNC: 1.34 MG/DL (ref 0.66–1.25)
DIFFERENTIAL METHOD BLD: ABNORMAL
EOSINOPHIL # BLD AUTO: 0.1 10E9/L (ref 0–0.7)
EOSINOPHIL NFR BLD AUTO: 1.2 %
ERYTHROCYTE [DISTWIDTH] IN BLOOD BY AUTOMATED COUNT: 14 % (ref 10–15)
GFR SERPL CREATININE-BSD FRML MDRD: 50 ML/MIN/{1.73_M2}
GLUCOSE SERPL-MCNC: 220 MG/DL (ref 70–99)
HCT VFR BLD AUTO: 39 % (ref 40–53)
HGB BLD-MCNC: 13.2 G/DL (ref 13.3–17.7)
IMM GRANULOCYTES # BLD: 0 10E9/L (ref 0–0.4)
IMM GRANULOCYTES NFR BLD: 0.2 %
LDH SERPL L TO P-CCNC: 159 U/L (ref 85–227)
LYMPHOCYTES # BLD AUTO: 0.9 10E9/L (ref 0.8–5.3)
LYMPHOCYTES NFR BLD AUTO: 18.3 %
MCH RBC QN AUTO: 37.2 PG (ref 26.5–33)
MCHC RBC AUTO-ENTMCNC: 33.8 G/DL (ref 31.5–36.5)
MCV RBC AUTO: 110 FL (ref 78–100)
MONOCYTES # BLD AUTO: 0.2 10E9/L (ref 0–1.3)
MONOCYTES NFR BLD AUTO: 4.9 %
NEUTROPHILS # BLD AUTO: 3.7 10E9/L (ref 1.6–8.3)
NEUTROPHILS NFR BLD AUTO: 75 %
PLATELET # BLD AUTO: 123 10E9/L (ref 150–450)
POTASSIUM SERPL-SCNC: 4.6 MMOL/L (ref 3.4–5.3)
PROT SERPL-MCNC: 7.5 G/DL (ref 6.8–8.8)
RBC # BLD AUTO: 3.55 10E12/L (ref 4.4–5.9)
RETICS # AUTO: 72.8 10E9/L (ref 25–95)
RETICS/RBC NFR AUTO: 2.1 % (ref 0.5–2)
SODIUM SERPL-SCNC: 136 MMOL/L (ref 133–144)
WBC # BLD AUTO: 4.9 10E9/L (ref 4–11)

## 2019-11-05 PROCEDURE — 36415 COLL VENOUS BLD VENIPUNCTURE: CPT | Performed by: INTERNAL MEDICINE

## 2019-11-05 PROCEDURE — 85025 COMPLETE CBC W/AUTO DIFF WBC: CPT | Performed by: INTERNAL MEDICINE

## 2019-11-05 PROCEDURE — 85045 AUTOMATED RETICULOCYTE COUNT: CPT | Performed by: INTERNAL MEDICINE

## 2019-11-05 PROCEDURE — 83615 LACTATE (LD) (LDH) ENZYME: CPT | Performed by: INTERNAL MEDICINE

## 2019-11-05 PROCEDURE — 99214 OFFICE O/P EST MOD 30 MIN: CPT | Performed by: INTERNAL MEDICINE

## 2019-11-05 PROCEDURE — 80053 COMPREHEN METABOLIC PANEL: CPT | Performed by: INTERNAL MEDICINE

## 2019-11-05 ASSESSMENT — PAIN SCALES - GENERAL: PAINLEVEL: NO PAIN (0)

## 2019-11-05 ASSESSMENT — MIFFLIN-ST. JEOR: SCORE: 1439.43

## 2019-11-05 NOTE — NURSING NOTE
"Oncology Rooming Note    November 5, 2019 3:50 PM   Prakash Cramer is a 79 year old male who presents for:    Chief Complaint   Patient presents with     Oncology Clinic Visit     6 mon f/u     Initial Vitals: BP (!) 153/80 (BP Location: Left arm)   Pulse 96   Temp 98  F (36.7  C) (Oral)   Resp 16   Ht 1.702 m (5' 7.01\")   Wt 76.1 kg (167 lb 11.2 oz)   SpO2 97%   BMI 26.26 kg/m   Estimated body mass index is 26.26 kg/m  as calculated from the following:    Height as of this encounter: 1.702 m (5' 7.01\").    Weight as of this encounter: 76.1 kg (167 lb 11.2 oz). Body surface area is 1.9 meters squared.  No Pain (0) Comment: Data Unavailable   No LMP for male patient.  Allergies reviewed: Yes  Medications reviewed: Yes    Medications: Medication refills not needed today.  Pharmacy name entered into [facility]:    [id number] - [place], [place] - [address]  [id number] - [place], [place] - [address]  WRITTEN PRESCRIPTION REQUESTED    Mckenzie Green LPN              "

## 2019-11-05 NOTE — PROGRESS NOTES
Hematology follow up visit:  Date on this visit: 11/5/2019     CC   Pancytopenia    Primary Physician: Zhou Meraz     History Of Present Illness:    Please see previous note for details. I have copied and updated from prior note.  Mr. Cramer is a 79 year old male who was found to have pancytopenia around the end of March 2018 when he presented with a white blood cell count of 1.5 and hemoglobin of 7.8 and platelets of 4. He was admitted to the hospital in [place]. There was no bruising or petechiae or evidence of bleeding. He was feeling fatigued and at that point he was also noticed to have C diff infection and was started on p.o. vancomycin. He was given Neupogen ×1 and packed red distant transfusion ×2 and platelet transfusion ×1. He was also given vitamin B12 injections and I believe it was 3 injections which she was given during the hospitalization. He had a bone marrow biopsy done as part of his workup which showed mildly hypocellular bone marrow with 10-40% cellularity with erythroid hyperplasia and mild dyserythropoiesis (5% cellularity, moderately decreased for age as per pathology review at the [facility]). Reactive plasmacytosis and maturing trilineage hematopoiesis. There was no significant dysplasia in the granulocytes or megakaryocytes. Parvovirus immunohistochemistry was negative. These findings were deemed consistent with either evolving aplastic anemia or drug/toxin effect or vitamin/nutritional deficiency or infectious or viral and hypocellular MDS.   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 This finding is most probably constitutional in origin and not acquired; however it it is medically warranted a peripheral blood chromosome analysis couldn't be performed to rule out an acquired  abnormality. If it is constitutional genetic counseling would be medically indicated    Comprehensive metabolic panel was unremarkable.   His vitamin B12 level was less than 159. Antiparietal cell antibody and anti-intrinsic factor antibody were negative. Iron studies were unremarkable. CMV IgM was negative. WENDY screen was negative. Rheumatoid factor and anti-CCP were negative. SPEP was negative.  After thorough investigation it was found that potentially glimepiride (sulfonylurea) which he was taking could be the culprit for pancytopenia so he was taken off it.  Upon discharge from the hospital on 3/31/2018 his white blood cell count was 2.4 and hemoglobin was 7.8 and platelet 12. On 4/5/2018 the WBC count was 2.9 with ANC of 1.2 and hemoglobin 7.9 and platelets 75.  On 4/13/2018 white blood cell count was 3.3, ANC 1.3, hemoglobin 7 and platelets 13. At that point he was given 2 units of packed red blood cells and 1 unit of platelets  Most recently on 4/16/2018 white blood cell count was 3.2, ANC 1.1, hemoglobin 10 and platelets 20.    When he saw me I did further testing and KIMBERLEE was negative.  Peripheral blood flow cytometry for PNH reveals a very small percentage of red cells and a small percentage (less than 10%) of neutrophils and monocytes with a PNH immunophenotype, the significance of which is not known.  Repeat flow cytometry for PNH in Sept 2018, shows that the % of PNH type clones is even lower this time, so doubt that it is the culprit in causing pancytopenia.   A repeat vitamin B12 level was 443. I told him to start taking vitamin B12 1000  g daily.  We kept him on observation and his counts improved with normalization of the WBC count and improvement is Hg. His platelets although were better but plataued around 40s, so we did further w/up and recent WENDY, Hep B, C and HIV were negative. Spleen size was normal. Liver was borderline enlarged.    He remains on observation.  He feels good he has good  energy.  He denies any infections or bleeding problems.  No new swellings.  No B symptoms.  Denies shortness of breath.          ROS:  A comprehensive ROS was otherwise neg      I reviewed other history in EPIC as below.    Past Medical/Surgical History:  Past Medical History:   Diagnosis Date     Aplastic anemia (H) 03/26/2018    thought secondary to reaction to Septra     BPH (benign prostatic hypertrophy)     failed  Flomax     C. difficile diarrhea 03/26/2018    treated with Flagyl     Cellulitis and abscess of leg, except foot 2004     Contact dermatitis and other eczema, due to unspecified cause      Diaphragmatic hernia without mention of obstruction or gangrene     hiatal hernia     Esophageal reflux      Hyperlipidemia LDL goal <100 3/11/2005     Hypothyroidism      Impotence of organic origin      Meningococcal encephalitis      Proteinuria      RBBB      Type 2 diabetes mellitus with diabetic nephropathy  (goal A1C<7) 10/24/2015     Unspecified essential hypertension      Past Surgical History:   Procedure Laterality Date     ARTHROPLASTY KNEE Left 3/18/2015    Procedure: ARTHROPLASTY KNEE;  Surgeon: Abebe Schroeder MD;  Location: [facility] OR     ARTHROPLASTY KNEE Right 3/14/2016    Procedure: ARTHROPLASTY KNEE;  Surgeon: Abebe Schroeder MD;  Location: [facility] OR     C NONSPECIFIC PROCEDURE  10/02    left knee meniscus tear repair     HC LAPAROSCOPY, SURGICAL; CHOLECYSTECTOMY  1998    Cholecystectomy, Laparoscopic     HC REMOVE TONSILS/ADENOIDS,12+ Y/O  1963    T & A 13+y.o.     Allergies:  Allergies as of 11/05/2019 - Reviewed 11/05/2019   Allergen Reaction Noted     Atorvastatin calcium  01/30/2008     Crestor [rosuvastatin]  05/07/2019     Diovan [valsartan]  01/24/2011     Glimepiride  05/08/2018     Invokana [canagliflozin]  06/01/2018     Mold Other (See Comments) 07/14/2011     Penicillins  09/07/2011     Seasonal allergies  06/25/2007    glimepiride possibly causing pancytopenia  Current  "Medications:  Current Outpatient Medications   Medication Sig Dispense Refill     amLODIPine (NORVASC) 10 MG tablet Take 1 tablet (10 mg) by mouth daily 90 tablet 3     aspirin 81 MG tablet Take by mouth daily 30 tablet      blood glucose monitoring (ACCU-CHEK FASTCLIX) lancets Use to test blood sugar 1 times daily or as directed. 102 each 11     blood glucose monitoring (ACCU-CHEK MULTICLIX) lancets Use to test blood sugar 1 time daily or as directed. 1 Box 11     blood glucose monitoring (ACCU-CHEK SMARTVIEW) test strip Use to test blood sugar 1 times daily or as directed. 100 strip 11     Blood Glucose Monitoring Suppl MAGDALENO 1 Device daily Smartview Meter 1 each 0     Cyanocobalamin (B-12) 1000 MCG TBCR Take 1,000 mcg by mouth daily 100 tablet 11     famotidine (PEPCID) 20 MG tablet Take 1 tablet (20 mg) by mouth 2 times daily 180 tablet 3     levothyroxine (SYNTHROID/LEVOTHROID) 75 MCG tablet Take 1 tablet (75 mcg) by mouth daily 90 tablet 3     metFORMIN (GLUCOPHAGE-XR) 500 MG 24 hr tablet Take 2 tablets (1,000 mg) by mouth daily (with dinner) 180 tablet 3     psyllium (METAMUCIL/KONSYL) capsule Take 1 capsule by mouth daily 90 capsule 3     sitagliptin (JANUVIA) 100 MG tablet Take 1 tablet (100 mg) by mouth daily 90 tablet 3     STATIN NOT PRESCRIBED (INTENTIONAL) Please choose reason not prescribed, below       triamcinolone (KENALOG) 0.1 % external cream Apply sparingly to affected area three times daily for up to 14 days. 30 g 1      Family History:  Family History   Problem Relation Age of Onset     Family History Negative Mother         d:age 89 of \"old age\"     Gastrointestinal Disease Father         d:age 79 liver failure after taking excessive amounts of Tylenol over 5-6 yrs     Neurologic Disorder Brother         b:1932  hx cerebral aneurysm age 59     No family history of bleeding or clotting disorder. Maternal uncle had throat cancer.  Social History:  Social History     Socioeconomic History     " Marital status:      Spouse name: Not on file     Number of children: 2     Years of education: Not on file     Highest education level: Not on file   Occupational History     Occupation: teacher     Employer: [employer]   Social Needs     Financial resource strain: Not on file     Food insecurity:     Worry: Not on file     Inability: Not on file     Transportation needs:     Medical: Not on file     Non-medical: Not on file   Tobacco Use     Smoking status: Never Smoker     Smokeless tobacco: Never Used   Substance and Sexual Activity     Alcohol use: Yes     Comment: beer 4-5 a month     Drug use: No     Sexual activity: Yes     Partners: Female   Lifestyle     Physical activity:     Days per week: Not on file     Minutes per session: Not on file     Stress: Not on file   Relationships     Social connections:     Talks on phone: Not on file     Gets together: Not on file     Attends [employer]ism service: Not on file     Active member of club or organization: Not on file     Attends meetings of clubs or organizations: Not on file     Relationship status: Not on file     Intimate partner violence:     Fear of current or ex partner: Not on file     Emotionally abused: Not on file     Physically abused: Not on file     Forced sexual activity: Not on file   Other Topics Concern     Parent/sibling w/ CABG, MI or angioplasty before 65F 55M? Not Asked      Service Not Asked     Blood Transfusions Not Asked     Caffeine Concern Not Asked     Occupational Exposure Not Asked     Hobby Hazards Not Asked     Sleep Concern Not Asked     Stress Concern Not Asked     Weight Concern Not Asked     Special Diet Yes     Comment: diabetic diet      Back Care Not Asked     Exercise No     Comment: nothing regular since bilateral knee surgeries      Bike Helmet Not Asked     Seat Belt Not Asked     Self-Exams Not Asked   Social History Narrative     Not on file    denies any smoking. No alcohol. He usually lives  "in [place] and spends winter in [place]. He lives alone.  Physical Exam:  BP (!) 153/80 (BP Location: Left arm)   Pulse 96   Temp 98  F (36.7  C) (Oral)   Resp 16   Ht 1.702 m (5' 7.01\")   Wt 76.1 kg (167 lb 11.2 oz)   SpO2 97%   BMI 26.26 kg/m     Wt Readings from Last 4 Encounters:   11/05/19 76.1 kg (167 lb 11.2 oz)   10/04/19 76 kg (167 lb 8 oz)   08/30/19 74.7 kg (164 lb 9.6 oz)   05/15/19 75.2 kg (165 lb 14 oz)       CONSTITUTIONAL: no acute distress  EYES: PERRLA, no palor or icterus.   ENT/MOUTH: no mouth lesions. Ears normal  CVS: s1s2 no m r g .   RESPIRATORY: clear to auscultation b/l  GI: soft non tender no hepatosplenomegaly  NEURO: AAOX3  Grossly non focal neuro exam  INTEGUMENT: no obvious rashes  LYMPHATIC: no palpable cervical, supraclavicular, axillary or inguinal LAD  MUSCULOSKELETAL: Unremarkable. No bony tenderness.   EXTREMITIES: no edema  PSYCH: Mentation, mood and affect are normal. Decision making capacity is intact          Laboratory/Imaging Studies    Results for NEISHA FRANCOIS (MRN [id number]) as of 11/5/2019 15:53   Ref. Range 11/5/2019 09:58   Sodium Latest Ref Range: 133 - 144 mmol/L 136   Potassium Latest Ref Range: 3.4 - 5.3 mmol/L 4.6   Chloride Latest Ref Range: 94 - 109 mmol/L 101   Carbon Dioxide Latest Ref Range: 20 - 32 mmol/L 28   Urea Nitrogen Latest Ref Range: 7 - 30 mg/dL 21   Creatinine Latest Ref Range: 0.66 - 1.25 mg/dL 1.34 (H)   GFR Estimate Latest Ref Range: >60 mL/min/1.73_m2 50 (L)   GFR Estimate If Black Latest Ref Range: >60 mL/min/1.73_m2 58 (L)   Calcium Latest Ref Range: 8.5 - 10.1 mg/dL 8.8   Anion Gap Latest Ref Range: 3 - 14 mmol/L 7   Albumin Latest Ref Range: 3.4 - 5.0 g/dL 3.6   Protein Total Latest Ref Range: 6.8 - 8.8 g/dL 7.5   Bilirubin Total Latest Ref Range: 0.2 - 1.3 mg/dL 0.5   Alkaline Phosphatase Latest Ref Range: 40 - 150 U/L 188 (H)   ALT Latest Ref Range: 0 - 70 U/L 25   AST Latest Ref Range: 0 - 45 U/L 15   Lactate Dehydrogenase " Latest Ref Range: 85 - 227 U/L 159   Glucose Latest Ref Range: 70 - 99 mg/dL 220 (H)   WBC Latest Ref Range: 4.0 - 11.0 10e9/L 4.9   Hemoglobin Latest Ref Range: 13.3 - 17.7 g/dL 13.2 (L)   Hematocrit Latest Ref Range: 40.0 - 53.0 % 39.0 (L)   Platelet Count Latest Ref Range: 150 - 450 10e9/L 123 (L)   RBC Count Latest Ref Range: 4.4 - 5.9 10e12/L 3.55 (L)   MCV Latest Ref Range: 78 - 100 fl 110 (H)   MCH Latest Ref Range: 26.5 - 33.0 pg 37.2 (H)   MCHC Latest Ref Range: 31.5 - 36.5 g/dL 33.8   RDW Latest Ref Range: 10.0 - 15.0 % 14.0   Diff Method Unknown Automated Method   % Neutrophils Latest Units: % 75.0   % Lymphocytes Latest Units: % 18.3   % Monocytes Latest Units: % 4.9   % Eosinophils Latest Units: % 1.2   % Basophils Latest Units: % 0.4   % Immature Granulocytes Latest Units: % 0.2   Absolute Neutrophil Latest Ref Range: 1.6 - 8.3 10e9/L 3.7   Absolute Lymphocytes Latest Ref Range: 0.8 - 5.3 10e9/L 0.9   Absolute Monocytes Latest Ref Range: 0.0 - 1.3 10e9/L 0.2   Absolute Eosinophils Latest Ref Range: 0.0 - 0.7 10e9/L 0.1   Absolute Basophils Latest Ref Range: 0.0 - 0.2 10e9/L 0.0       Recent B12 and ferritin in [place] were unremarkable.  Other labs in [place] were stable    I reviewed the outside records as well  Received from [facility], [facility], [place] are 16 stained   slides labeled [id number], collected   03-28-18 now designated [facility]-.  Also received is a copy of the   referring pathologist's report with   patient identifying information.     FINAL DIAGNOSIS:   Bone marrow, posterior iliac crest, decalcified trephine biopsy, aspirate   clot, touch imprints, and aspirate   smears ([id number], 03/28/2018):        - Marrow cellularity of 5% (moderately hypocellular for age) in   trephine core sections provided for   review        - Good evidence of trilineage hematopoietic maturation in core and   clot sections provided for review        - Benign appearing lymphoid aggregates in  "core and clot sections   provided for review        - Negative immunohistochemical stain for Parvovirus in clot section        - Marrow aspirate smears and touch imprints showing trilineage   hematopoietic maturation with no increase   in blasts        - Increased marrow iron stores with 5% sideroblasts and no ringed   sideroblasts.        - Flow cytometric immunophenotyping performed on marrow aspirate   reported by [facility] as   showing, \"No immunophenotypic evidence of acute leukemia or a T-cell   B-cell or Plasma cell neoplasm.\"        - Cytogenetic analysis performed on marrow aspirate reported by   [facility] as showing,   \"46,XY,gonzales(13;14)(q10;q19)?c[20],\" \"most probably constitutional\"        - FISH study performed on marrow aspirate by [facility]   reported as showing, \" Negative FISH   result for cytogenetic abnormalities commonly observed in myeloid   disorders\"        - Blood smear and peripheral blood counts not provided for review        - See Comment     COMMENT:   By separate report ([id number]; 4/18/18), flow cytometric immunophenotyping    performed on blood for this patient   at [facility] laboratory shows a very small percentage of red cells and a   small percentage (less than 10%) of   neutrophils and monocytes with a PNH immunophenotype.       Peripheral blood flow cytometry  SPECIMEN(S):   Blood     INTERPRETATION:   Blood:        Cells with a paroxysmal nocturnal hemoglobinuria (PNH)-type   immunophenotype represent approximately 0.3%   of erythrocytes, 3.4% of monocytes, and 4.4% of neutrophils, see comment.     COMMENT:   Classic paroxysmal nocturnal hemoglobinuria (PNH) is associated with large    PNH-type clones. PNH in the setting   of another bone marrow failure syndrome usually has small PNH-type clones,    less than 10%. Subclinical PNH   usually has rare PNH-type clones, less than 1%. Detection of a small clone    is not equivalent to a diagnosis of "   hemolytic PNH.  Final diagnosis requires correlation with clinical,   morphologic, and ancillary findings.   (Fletcher et al. Blood, 106 (12): 9088-3302, 2005. Fletcher, Hematology,   21-29, 2011.)     RESULTS:   About 0.33% of erythrocytes have decreased expression of CD59 (0.32% type   II, 0.01% type III).   About 3.45% of monocytes have decreased expression of CD14 and binding of   fluorescent aerolysin (FLAER).About   4.44% of neutrophils have decreased expression of CD24 and binding of   FLAER.       Peripheral blood chromosome analysis.  METHODS:   PHA stimulated, MTX synchronized cultures.     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   Each of the metaphase cells analyzed had 45 chromosomes including one   normal chromosome 13, one normal   chromosome 14, and a Robertsonian translocation joining together, at their    centromeres, the long arms of one   chromosome 13 and one chromosome 14.     Robertsonian translocations are one of the most common occurring   constitutional  rearrangements in human   populations. As these translocations are not associated with any loss of   critical genetic material, they are   considered to be balanced, and would not be expected to be associated with    any phenotypic abnormalities in   this patient. However, during meiosis, the translocation can segregate   into gametes several different ways,   resulting, after fertilization, in zygotes that have trisomy or monosomy   for chromosomes 13 or 14. These   trisomies and/or monosomies can result in miscarriage or liveborn   offspring with multiple congenital   anomalies. Thus, carriers of such translocations are at increased   reproductive risk.     Genetic counseling regarding these results, and cytogenetic evaluation of   other family members who may carry   the translocation are recommended.  "    ASSESSMENT/PLAN:      Pancytopenia. At this time the cause is not entirely known but it could be possibly toxic reaction to the drug glimepiride. Otherwise his bone marrow biopsy was nondiagnostic. Peripheral blood and bone marrow Flow cytometry were negative.  Cytogenetic analysis performed on marrow aspirate showed 46,XY,gonzales(13;14)(q10;q19)?c[20], \"most probably constitutional\"        - FISH study performed on marrow aspirate showed \" Negative FISH result for cytogenetic abnormalities commonly observed in myeloid disorders\"     He has been on observation and he is doing well.  There is been giving him more time to recover, his white blood cell counts have normalized.  Hemoglobin is 13.2 and platelets have also significantly improved.  He is doing well and we will continue to monitor his labs serially.  I recommend checking labs in 6 months.       Anemia.  Hemoglobin has improved and it is 13.2.  Continue folic acid and vitamin B12.  He does have chronic kidney disease and he could have a degree of erythropoietin deficiency.   Repeat labs in 6 months.    Thrombocytopenia.  Platelets have taken the longest time to recover but over the last few months they have started to show good recovery.  Platelets are 123 now.   Previously WENDY, Hep B, C and HIV were negative. Spleen size was normal. Liver was borderline enlarged.  Repeat labs in 6 months.    Elevated creatinine.  He has chronic kidney disease but last creatinine was slightly more elevated than his baseline.  I recommend that he keep himself well-hydrated and I recommend close follow-up with primary care physician.    Elevated blood pressure.  In the clinic his blood pressure was elevated today.  He is asymptomatic.  I told him that he should check his blood pressures at home when he is relaxed and keep a log of it.  If they are persistently elevated then he should talk to his primary care physician for better blood pressure management.  Today I am not making " changes to his blood pressure regimen.    We did not address the following today.    Small PNH clone. Peripheral blood flow cytometry for PNH reveals a very small percentage of red cells and a small percentage (less than 10%) of neutrophils and monocytes with a PNH immunophenotype, the significance of which is not known.  Repeat flow cytometry for PNH shows that the % of PNH type clones is even lower this time, so doubt that it is the culprit in causing pancytopenia.  I will plan on repeating flow cytometry for PNH in case he has worsening of cytopenias.    Vitamin B12 deficiency with negative anti-parietal cell antibody and anti-intrinsic factor antibody.  Repeat vitamin B12 level has been in the normal range.  He will continue indefinite vitamin B12 supplementation.      Chromosomal abnormality. 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  We did a peripheral blood chromosome analysis which confirms these findings. 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  Initially he was supposed to see genetic counselor in October 2018 but he was unable to make that appointment.  When we had previously discussed he told me that he is going to follow-up with genetic counselor after coming back from [place].  I brought this up again today but he is not interested in meeting with genetic counselor at this time.      Muscular pain due to statins.  Is a statins were put on hold.  He will follow with his primary care physician regarding this.    I will see him back in 6 months with repeat labs prior.    I answered all of his questions to his satisfaction. He is agreeable and comfortable with the plan.    Terra Salazar

## 2019-11-05 NOTE — LETTER
11/5/2019         RE: Prakash Cramer  [address]  [facility] [address]        Dear Colleague,    Thank you for referring your patient, Prakash Cramer, to the [facility]. Please see a copy of my visit note below.    Hematology follow up visit:  Date on this visit: 11/5/2019     CC   Pancytopenia    Primary Physician: Zhou Meraz     History Of Present Illness:    Please see previous note for details. I have copied and updated from prior note.  Mr. Cramer is a 79 year old male who was found to have pancytopenia around the end of March 2018 when he presented with a white blood cell count of 1.5 and hemoglobin of 7.8 and platelets of 4. He was admitted to the hospital in [place]. There was no bruising or petechiae or evidence of bleeding. He was feeling fatigued and at that point he was also noticed to have C diff infection and was started on p.o. vancomycin. He was given Neupogen ×1 and packed red distant transfusion ×2 and platelet transfusion ×1. He was also given vitamin B12 injections and I believe it was 3 injections which she was given during the hospitalization. He had a bone marrow biopsy done as part of his workup which showed mildly hypocellular bone marrow with 10-40% cellularity with erythroid hyperplasia and mild dyserythropoiesis (5% cellularity, moderately decreased for age as per pathology review at the [facility]). Reactive plasmacytosis and maturing trilineage hematopoiesis. There was no significant dysplasia in the granulocytes or megakaryocytes. Parvovirus immunohistochemistry was negative. These findings were deemed consistent with either evolving aplastic anemia or drug/toxin effect or vitamin/nutritional deficiency or infectious or viral and hypocellular MDS.   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 This finding is most probably constitutional in origin and not acquired; however it it is medically warranted a peripheral blood chromosome analysis couldn't be performed to rule out an acquired abnormality. If it is constitutional genetic counseling would be medically indicated    Comprehensive metabolic panel was unremarkable.   His vitamin B12 level was less than 159. Antiparietal cell antibody and anti-intrinsic factor antibody were negative. Iron studies were unremarkable. CMV IgM was negative. WENDY screen was negative. Rheumatoid factor and anti-CCP were negative. SPEP was negative.  After thorough investigation it was found that potentially glimepiride (sulfonylurea) which he was taking could be the culprit for pancytopenia so he was taken off it.  Upon discharge from the hospital on 3/31/2018 his white blood cell count was 2.4 and hemoglobin was 7.8 and platelet 12. On 4/5/2018 the WBC count was 2.9 with ANC of 1.2 and hemoglobin 7.9 and platelets 75.  On 4/13/2018 white blood cell count was 3.3, ANC 1.3, hemoglobin 7 and platelets 13. At that point he was given 2 units of packed red blood cells and 1 unit of platelets  Most recently on 4/16/2018 white blood cell count was 3.2, ANC 1.1, hemoglobin 10 and platelets 20.    When he saw me I did further testing and KIMBERLEE was negative.  Peripheral blood flow cytometry for PNH reveals a very small percentage of red cells and a small percentage (less than 10%) of neutrophils and monocytes with a PNH immunophenotype, the significance of which is not known.  Repeat flow cytometry for PNH in Sept 2018, shows that the % of PNH type clones is even lower this time, so doubt that it is the culprit in causing pancytopenia.   A repeat vitamin B12 level was 443. I told him to start taking vitamin B12 1000  g daily.  We kept him on observation and his counts improved with normalization of the WBC count and improvement is Hg. His platelets  although were better but plataued around 40s, so we did further w/up and recent WENDY, Hep B, C and HIV were negative. Spleen size was normal. Liver was borderline enlarged.    He remains on observation.  He feels good he has good energy.  He denies any infections or bleeding problems.  No new swellings.  No B symptoms.  Denies shortness of breath.          ROS:  A comprehensive ROS was otherwise neg      I reviewed other history in EPIC as below.    Past Medical/Surgical History:  Past Medical History:   Diagnosis Date     Aplastic anemia (H) 03/26/2018    thought secondary to reaction to Septra     BPH (benign prostatic hypertrophy)     failed  Flomax     C. difficile diarrhea 03/26/2018    treated with Flagyl     Cellulitis and abscess of leg, except foot 2004     Contact dermatitis and other eczema, due to unspecified cause      Diaphragmatic hernia without mention of obstruction or gangrene     hiatal hernia     Esophageal reflux      Hyperlipidemia LDL goal <100 3/11/2005     Hypothyroidism      Impotence of organic origin      Meningococcal encephalitis      Proteinuria      RBBB      Type 2 diabetes mellitus with diabetic nephropathy  (goal A1C<7) 10/24/2015     Unspecified essential hypertension      Past Surgical History:   Procedure Laterality Date     ARTHROPLASTY KNEE Left 3/18/2015    Procedure: ARTHROPLASTY KNEE;  Surgeon: Abebe Schroeder MD;  Location: [facility] OR     ARTHROPLASTY KNEE Right 3/14/2016    Procedure: ARTHROPLASTY KNEE;  Surgeon: Abebe Schroeder MD;  Location:  OR     C NONSPECIFIC PROCEDURE  10/02    left knee meniscus tear repair     HC LAPAROSCOPY, SURGICAL; CHOLECYSTECTOMY  1998    Cholecystectomy, Laparoscopic     HC REMOVE TONSILS/ADENOIDS,13+ Y/O  1963    T & A 13+y.o.     Allergies:  Allergies as of 11/05/2019 - Reviewed 11/05/2019   Allergen Reaction Noted     Atorvastatin calcium  01/30/2008     Crestor [rosuvastatin]  05/07/2019     Diovan [valsartan]  01/24/2011      "Glimepiride  05/08/2018     Invokana [canagliflozin]  06/01/2018     Mold Other (See Comments) 07/14/2011     Penicillins  09/07/2011     Seasonal allergies  06/25/2007    glimepiride possibly causing pancytopenia  Current Medications:  Current Outpatient Medications   Medication Sig Dispense Refill     amLODIPine (NORVASC) 10 MG tablet Take 1 tablet (10 mg) by mouth daily 90 tablet 3     aspirin 81 MG tablet Take by mouth daily 30 tablet      blood glucose monitoring (ACCU-CHEK FASTCLIX) lancets Use to test blood sugar 1 times daily or as directed. 102 each 11     blood glucose monitoring (ACCU-CHEK MULTICLIX) lancets Use to test blood sugar 1 time daily or as directed. 1 Box 11     blood glucose monitoring (ACCU-CHEK SMARTVIEW) test strip Use to test blood sugar 1 times daily or as directed. 100 strip 11     Blood Glucose Monitoring Suppl MAGDALENO 1 Device daily Smartview Meter 1 each 0     Cyanocobalamin (B-12) 1000 MCG TBCR Take 1,000 mcg by mouth daily 100 tablet 11     famotidine (PEPCID) 20 MG tablet Take 1 tablet (20 mg) by mouth 2 times daily 180 tablet 3     levothyroxine (SYNTHROID/LEVOTHROID) 75 MCG tablet Take 1 tablet (75 mcg) by mouth daily 90 tablet 3     metFORMIN (GLUCOPHAGE-XR) 500 MG 24 hr tablet Take 2 tablets (1,000 mg) by mouth daily (with dinner) 180 tablet 3     psyllium (METAMUCIL/KONSYL) capsule Take 1 capsule by mouth daily 90 capsule 3     sitagliptin (JANUVIA) 100 MG tablet Take 1 tablet (100 mg) by mouth daily 90 tablet 3     STATIN NOT PRESCRIBED (INTENTIONAL) Please choose reason not prescribed, below       triamcinolone (KENALOG) 0.1 % external cream Apply sparingly to affected area three times daily for up to 14 days. 30 g 1      Family History:  Family History   Problem Relation Age of Onset     Family History Negative Mother         d:age 89 of \"old age\"     Gastrointestinal Disease Father         d:age 79 liver failure after taking excessive amounts of Tylenol over 5-6 yrs     " Neurologic Disorder Brother         b:1932  hx cerebral aneurysm age 59     No family history of bleeding or clotting disorder. Maternal uncle had throat cancer.  Social History:  Social History     Socioeconomic History     Marital status:      Spouse name: Not on file     Number of children: 2     Years of education: Not on file     Highest education level: Not on file   Occupational History     Occupation: teacher     Employer: [employer]   Social Needs     Financial resource strain: Not on file     Food insecurity:     Worry: Not on file     Inability: Not on file     Transportation needs:     Medical: Not on file     Non-medical: Not on file   Tobacco Use     Smoking status: Never Smoker     Smokeless tobacco: Never Used   Substance and Sexual Activity     Alcohol use: Yes     Comment: beer 4-5 a month     Drug use: No     Sexual activity: Yes     Partners: Female   Lifestyle     Physical activity:     Days per week: Not on file     Minutes per session: Not on file     Stress: Not on file   Relationships     Social connections:     Talks on phone: Not on file     Gets together: Not on file     Attends Nondenominational service: Not on file     Active member of club or organization: Not on file     Attends meetings of clubs or organizations: Not on file     Relationship status: Not on file     Intimate partner violence:     Fear of current or ex partner: Not on file     Emotionally abused: Not on file     Physically abused: Not on file     Forced sexual activity: Not on file   Other Topics Concern     Parent/sibling w/ CABG, MI or angioplasty before 65F 55M? Not Asked      Service Not Asked     Blood Transfusions Not Asked     Caffeine Concern Not Asked     Occupational Exposure Not Asked     Hobby Hazards Not Asked     Sleep Concern Not Asked     Stress Concern Not Asked     Weight Concern Not Asked     Special Diet Yes     Comment: diabetic diet      Back Care Not Asked     Exercise No      "Comment: nothing regular since bilateral knee surgeries      Bike Helmet Not Asked     Seat Belt Not Asked     Self-Exams Not Asked   Social History Narrative     Not on file    denies any smoking. No alcohol. He usually lives in [place] and spends winter in [place]. He lives alone.  Physical Exam:  BP (!) 153/80 (BP Location: Left arm)   Pulse 96   Temp 98  F (36.7  C) (Oral)   Resp 16   Ht 1.702 m (5' 7.01\")   Wt 76.1 kg (167 lb 11.2 oz)   SpO2 97%   BMI 26.26 kg/m      Wt Readings from Last 4 Encounters:   11/05/19 76.1 kg (167 lb 11.2 oz)   10/04/19 76 kg (167 lb 8 oz)   08/30/19 74.7 kg (164 lb 9.6 oz)   05/15/19 75.2 kg (165 lb 14 oz)       CONSTITUTIONAL: no acute distress  EYES: PERRLA, no palor or icterus.   ENT/MOUTH: no mouth lesions. Ears normal  CVS: s1s2 no m r g .   RESPIRATORY: clear to auscultation b/l  GI: soft non tender no hepatosplenomegaly  NEURO: AAOX3  Grossly non focal neuro exam  INTEGUMENT: no obvious rashes  LYMPHATIC: no palpable cervical, supraclavicular, axillary or inguinal LAD  MUSCULOSKELETAL: Unremarkable. No bony tenderness.   EXTREMITIES: no edema  PSYCH: Mentation, mood and affect are normal. Decision making capacity is intact          Laboratory/Imaging Studies    Results for NEISHA FRANCOIS (MRN [id number]) as of 11/5/2019 15:53   Ref. Range 11/5/2019 09:58   Sodium Latest Ref Range: 133 - 144 mmol/L 136   Potassium Latest Ref Range: 3.4 - 5.3 mmol/L 4.6   Chloride Latest Ref Range: 94 - 109 mmol/L 101   Carbon Dioxide Latest Ref Range: 20 - 32 mmol/L 28   Urea Nitrogen Latest Ref Range: 7 - 30 mg/dL 21   Creatinine Latest Ref Range: 0.66 - 1.25 mg/dL 1.34 (H)   GFR Estimate Latest Ref Range: >60 mL/min/1.73_m2 50 (L)   GFR Estimate If Black Latest Ref Range: >60 mL/min/1.73_m2 58 (L)   Calcium Latest Ref Range: 8.5 - 10.1 mg/dL 8.8   Anion Gap Latest Ref Range: 3 - 14 mmol/L 7   Albumin Latest Ref Range: 3.4 - 5.0 g/dL 3.6   Protein Total Latest Ref Range: 6.8 - 8.8 " g/dL 7.5   Bilirubin Total Latest Ref Range: 0.2 - 1.3 mg/dL 0.5   Alkaline Phosphatase Latest Ref Range: 40 - 150 U/L 188 (H)   ALT Latest Ref Range: 0 - 70 U/L 25   AST Latest Ref Range: 0 - 45 U/L 15   Lactate Dehydrogenase Latest Ref Range: 85 - 227 U/L 159   Glucose Latest Ref Range: 70 - 99 mg/dL 220 (H)   WBC Latest Ref Range: 4.0 - 11.0 10e9/L 4.9   Hemoglobin Latest Ref Range: 13.3 - 17.7 g/dL 13.2 (L)   Hematocrit Latest Ref Range: 40.0 - 53.0 % 39.0 (L)   Platelet Count Latest Ref Range: 150 - 450 10e9/L 123 (L)   RBC Count Latest Ref Range: 4.4 - 5.9 10e12/L 3.55 (L)   MCV Latest Ref Range: 78 - 100 fl 110 (H)   MCH Latest Ref Range: 26.5 - 33.0 pg 37.2 (H)   MCHC Latest Ref Range: 31.5 - 36.5 g/dL 33.8   RDW Latest Ref Range: 10.0 - 15.0 % 14.0   Diff Method Unknown Automated Method   % Neutrophils Latest Units: % 75.0   % Lymphocytes Latest Units: % 18.3   % Monocytes Latest Units: % 4.9   % Eosinophils Latest Units: % 1.2   % Basophils Latest Units: % 0.4   % Immature Granulocytes Latest Units: % 0.2   Absolute Neutrophil Latest Ref Range: 1.6 - 8.3 10e9/L 3.7   Absolute Lymphocytes Latest Ref Range: 0.8 - 5.3 10e9/L 0.9   Absolute Monocytes Latest Ref Range: 0.0 - 1.3 10e9/L 0.2   Absolute Eosinophils Latest Ref Range: 0.0 - 0.7 10e9/L 0.1   Absolute Basophils Latest Ref Range: 0.0 - 0.2 10e9/L 0.0       Recent B12 and ferritin in [place] were unremarkable.  Other labs in [place] were stable    I reviewed the outside records as well  Received from [facility] [place] [facility], [place], [place] are 16 stained   slides labeled [id number], collected   03-28-18 now designated UHR-.  Also received is a copy of the   referring pathologist's report with   patient identifying information.     FINAL DIAGNOSIS:   Bone marrow, posterior iliac crest, decalcified trephine biopsy, aspirate   clot, touch imprints, and aspirate   smears ([id number], 03/28/2018):        - Marrow cellularity of 5% (moderately  "hypocellular for age) in   trephine core sections provided for   review        - Good evidence of trilineage hematopoietic maturation in core and   clot sections provided for review        - Benign appearing lymphoid aggregates in core and clot sections   provided for review        - Negative immunohistochemical stain for Parvovirus in clot section        - Marrow aspirate smears and touch imprints showing trilineage   hematopoietic maturation with no increase   in blasts        - Increased marrow iron stores with 5% sideroblasts and no ringed   sideroblasts.        - Flow cytometric immunophenotyping performed on marrow aspirate   reported by [facility] as   showing, \"No immunophenotypic evidence of acute leukemia or a T-cell   B-cell or Plasma cell neoplasm.\"        - Cytogenetic analysis performed on marrow aspirate reported by   [facility] as showing,   \"46,XY,gonzales(13;14)(q10;q19)?c[20],\" \"most probably constitutional\"        - FISH study performed on marrow aspirate by [facility]   reported as showing, \" Negative FISH   result for cytogenetic abnormalities commonly observed in myeloid   disorders\"        - Blood smear and peripheral blood counts not provided for review        - See Comment     COMMENT:   By separate report ([id number]; 4/18/18), flow cytometric immunophenotyping    performed on blood for this patient   at [facility] laboratory shows a very small percentage of red cells and a   small percentage (less than 10%) of   neutrophils and monocytes with a PNH immunophenotype.       Peripheral blood flow cytometry  SPECIMEN(S):   Blood     INTERPRETATION:   Blood:        Cells with a paroxysmal nocturnal hemoglobinuria (PNH)-type   immunophenotype represent approximately 0.3%   of erythrocytes, 3.4% of monocytes, and 4.4% of neutrophils, see comment.     COMMENT:   Classic paroxysmal nocturnal hemoglobinuria (PNH) is associated with large    PNH-type clones. PNH in the setting "   of another bone marrow failure syndrome usually has small PNH-type clones,    less than 10%. Subclinical PNH   usually has rare PNH-type clones, less than 1%. Detection of a small clone    is not equivalent to a diagnosis of   hemolytic PNH.  Final diagnosis requires correlation with clinical,   morphologic, and ancillary findings.   (Fletcher et al. Blood, 106 (12): 4903-9883, 2005. Fletcher, Hematology,   21-29, 2011.)     RESULTS:   About 0.33% of erythrocytes have decreased expression of CD59 (0.32% type   II, 0.01% type III).   About 3.45% of monocytes have decreased expression of CD14 and binding of   fluorescent aerolysin (FLAER).About   4.44% of neutrophils have decreased expression of CD24 and binding of   FLAER.       Peripheral blood chromosome analysis.  METHODS:   PHA stimulated, MTX synchronized cultures.     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   Each of the metaphase cells analyzed had 45 chromosomes including one   normal chromosome 13, one normal   chromosome 14, and a Robertsonian translocation joining together, at their    centromeres, the long arms of one   chromosome 13 and one chromosome 14.     Robertsonian translocations are one of the most common occurring   constitutional  rearrangements in human   populations. As these translocations are not associated with any loss of   critical genetic material, they are   considered to be balanced, and would not be expected to be associated with    any phenotypic abnormalities in   this patient. However, during meiosis, the translocation can segregate   into gametes several different ways,   resulting, after fertilization, in zygotes that have trisomy or monosomy   for chromosomes 13 or 14. These   trisomies and/or monosomies can result in miscarriage or liveborn   offspring with multiple congenital   anomalies. Thus, carriers  "of such translocations are at increased   reproductive risk.     Genetic counseling regarding these results, and cytogenetic evaluation of   other family members who may carry   the translocation are recommended.     ASSESSMENT/PLAN:      Pancytopenia. At this time the cause is not entirely known but it could be possibly toxic reaction to the drug glimepiride. Otherwise his bone marrow biopsy was nondiagnostic. Peripheral blood and bone marrow Flow cytometry were negative.  Cytogenetic analysis performed on marrow aspirate showed 46,XY,gonzales(13;14)(q10;q19)?c[20], \"most probably constitutional\"        - FISH study performed on marrow aspirate showed \" Negative FISH result for cytogenetic abnormalities commonly observed in myeloid disorders\"     He has been on observation and he is doing well.  There is been giving him more time to recover, his white blood cell counts have normalized.  Hemoglobin is 13.2 and platelets have also significantly improved.  He is doing well and we will continue to monitor his labs serially.  I recommend checking labs in 6 months.       Anemia.  Hemoglobin has improved and it is 13.2.  Continue folic acid and vitamin B12.  He does have chronic kidney disease and he could have a degree of erythropoietin deficiency.   Repeat labs in 6 months.    Thrombocytopenia.  Platelets have taken the longest time to recover but over the last few months they have started to show good recovery.  Platelets are 123 now.   Previously WENDY, Hep B, C and HIV were negative. Spleen size was normal. Liver was borderline enlarged.  Repeat labs in 6 months.    Elevated creatinine.  He has chronic kidney disease but last creatinine was slightly more elevated than his baseline.  I recommend that he keep himself well-hydrated and I recommend close follow-up with primary care physician.    Elevated blood pressure.  In the clinic his blood pressure was elevated today.  He is asymptomatic.  I told him that he should check " his blood pressures at home when he is relaxed and keep a log of it.  If they are persistently elevated then he should talk to his primary care physician for better blood pressure management.  Today I am not making changes to his blood pressure regimen.    We did not address the following today.    Small PNH clone. Peripheral blood flow cytometry for PNH reveals a very small percentage of red cells and a small percentage (less than 10%) of neutrophils and monocytes with a PNH immunophenotype, the significance of which is not known.  Repeat flow cytometry for PNH shows that the % of PNH type clones is even lower this time, so doubt that it is the culprit in causing pancytopenia.  I will plan on repeating flow cytometry for PNH in case he has worsening of cytopenias.    Vitamin B12 deficiency with negative anti-parietal cell antibody and anti-intrinsic factor antibody.  Repeat vitamin B12 level has been in the normal range.  He will continue indefinite vitamin B12 supplementation.      Chromosomal abnormality. 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  We did a peripheral blood chromosome analysis which confirms these findings. 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  Initially he was supposed to see genetic counselor in October 2018 but he was unable to make that appointment.  When we had previously discussed he told me that he is going to follow-up with genetic counselor after coming back from [place].  I brought this up again today but he is not interested in meeting with genetic counselor at this time.      Muscular pain due to statins.  Is a statins were put on hold.  He will follow with his primary care physician regarding this.    I will see him back in 6 months with repeat labs prior.    I answered all of his questions to his satisfaction. He is agreeable and comfortable with the plan.    Terra Salazar          Again,  thank you for allowing me to participate in the care of your patient.        Sincerely,        Terra Salazar MD

## 2019-12-02 DIAGNOSIS — E11.21 TYPE 2 DIABETES MELLITUS WITH DIABETIC NEPHROPATHY, WITHOUT LONG-TERM CURRENT USE OF INSULIN (H): ICD-10-CM

## 2019-12-03 NOTE — TELEPHONE ENCOUNTER
"Requested Prescriptions   Pending Prescriptions Disp Refills     blood glucose (ACCU-CHEK SMARTVIEW) test strip [Pharmacy Med Name: ACCU-CHEK SMART     BETZAIDA] 100 strip 11     Sig: USE 1 STRIP TO CHECK GLUCOSE ONCE DAILY OR  AS  DIRECTED       Diabetic Supplies Protocol Passed - 12/2/2019 10:02 AM        Passed - Medication is active on med list        Passed - Patient is 18 years of age or older        Passed - Recent (6 mo) or future (30 days) visit within the authorizing provider's specialty     Patient had office visit in the last 6 months or has a visit in the next 30 days with authorizing provider.  See \"Patient Info\" tab in inbasket, or \"Choose Columns\" in Meds & Orders section of the refill encounter.            Last Written Prescription Date:  4/30/2018  Last Fill Quantity: 100,  # refills: 11   Last office visit: 10/4/2019 with prescribing provider:  10/04/2019   Future Office Visit:   Next 5 appointments (look out 90 days)    Dec 20, 2019 11:00 AM CST  [facility] [address] [address] [phone]           "

## 2019-12-12 ENCOUNTER — DOCUMENTATION ONLY (OUTPATIENT)
Dept: LAB | Facility: CLINIC | Age: 79
End: 2019-12-12

## 2019-12-12 DIAGNOSIS — E03.9 HYPOTHYROIDISM, UNSPECIFIED TYPE: ICD-10-CM

## 2019-12-12 DIAGNOSIS — E78.5 HYPERLIPIDEMIA LDL GOAL <100: Primary | ICD-10-CM

## 2019-12-12 DIAGNOSIS — D69.6 THROMBOCYTOPENIA (H): ICD-10-CM

## 2019-12-12 DIAGNOSIS — E11.21 TYPE 2 DIABETES MELLITUS WITH DIABETIC NEPHROPATHY, WITHOUT LONG-TERM CURRENT USE OF INSULIN (H): ICD-10-CM

## 2019-12-12 DIAGNOSIS — Z12.5 SCREENING FOR PROSTATE CANCER: ICD-10-CM

## 2019-12-12 DIAGNOSIS — E78.5 HYPERLIPIDEMIA LDL GOAL <100: ICD-10-CM

## 2019-12-12 DIAGNOSIS — D64.9 ANEMIA, UNSPECIFIED TYPE: ICD-10-CM

## 2019-12-12 LAB
CHOLEST SERPL-MCNC: 266 MG/DL
HBA1C MFR BLD: 6.9 % (ref 0–5.6)
HDLC SERPL-MCNC: 61 MG/DL
LDLC SERPL CALC-MCNC: 138 MG/DL
NONHDLC SERPL-MCNC: 205 MG/DL
PSA SERPL-ACNC: 16.2 UG/L (ref 0–4)
T4 FREE SERPL-MCNC: 1.3 NG/DL (ref 0.76–1.46)
TRIGL SERPL-MCNC: 337 MG/DL
TSH SERPL DL<=0.005 MIU/L-ACNC: 4.66 MU/L (ref 0.4–4)

## 2019-12-12 PROCEDURE — 83036 HEMOGLOBIN GLYCOSYLATED A1C: CPT | Performed by: INTERNAL MEDICINE

## 2019-12-12 PROCEDURE — 84443 ASSAY THYROID STIM HORMONE: CPT | Performed by: INTERNAL MEDICINE

## 2019-12-12 PROCEDURE — 36415 COLL VENOUS BLD VENIPUNCTURE: CPT | Performed by: INTERNAL MEDICINE

## 2019-12-12 PROCEDURE — 84439 ASSAY OF FREE THYROXINE: CPT | Performed by: INTERNAL MEDICINE

## 2019-12-12 PROCEDURE — 80061 LIPID PANEL: CPT | Performed by: INTERNAL MEDICINE

## 2019-12-12 PROCEDURE — G0103 PSA SCREENING: HCPCS | Performed by: INTERNAL MEDICINE

## 2019-12-12 NOTE — PROGRESS NOTES
Pt is coming into [facility] on 12-12 please place future lab work for physical next week  Thank you Deysi ROBLES

## 2019-12-14 PROBLEM — R97.20 ELEVATED PROSTATE SPECIFIC ANTIGEN (PSA): Status: ACTIVE | Noted: 2019-12-14

## 2019-12-20 ENCOUNTER — OFFICE VISIT (OUTPATIENT)
Dept: INTERNAL MEDICINE | Facility: CLINIC | Age: 79
End: 2019-12-20
Payer: MEDICARE

## 2019-12-20 VITALS
HEIGHT: 67 IN | OXYGEN SATURATION: 99 % | TEMPERATURE: 98.2 F | HEART RATE: 89 BPM | DIASTOLIC BLOOD PRESSURE: 78 MMHG | BODY MASS INDEX: 26.68 KG/M2 | WEIGHT: 170 LBS | SYSTOLIC BLOOD PRESSURE: 134 MMHG

## 2019-12-20 DIAGNOSIS — Z00.00 MEDICARE ANNUAL WELLNESS VISIT, SUBSEQUENT: ICD-10-CM

## 2019-12-20 DIAGNOSIS — R97.20 ELEVATED PROSTATE SPECIFIC ANTIGEN (PSA): ICD-10-CM

## 2019-12-20 DIAGNOSIS — E11.21 TYPE 2 DIABETES MELLITUS WITH DIABETIC NEPHROPATHY, WITHOUT LONG-TERM CURRENT USE OF INSULIN (H): ICD-10-CM

## 2019-12-20 DIAGNOSIS — D61.818 PANCYTOPENIA (H): ICD-10-CM

## 2019-12-20 DIAGNOSIS — E03.9 HYPOTHYROIDISM, UNSPECIFIED TYPE: ICD-10-CM

## 2019-12-20 DIAGNOSIS — I10 ESSENTIAL HYPERTENSION: ICD-10-CM

## 2019-12-20 DIAGNOSIS — R19.5 POSITIVE FIT (FECAL IMMUNOCHEMICAL TEST): ICD-10-CM

## 2019-12-20 DIAGNOSIS — Z12.11 SPECIAL SCREENING FOR MALIGNANT NEOPLASMS, COLON: ICD-10-CM

## 2019-12-20 DIAGNOSIS — E78.5 HYPERLIPIDEMIA LDL GOAL <100: ICD-10-CM

## 2019-12-20 PROCEDURE — G0439 PPPS, SUBSEQ VISIT: HCPCS | Performed by: INTERNAL MEDICINE

## 2019-12-20 PROCEDURE — 99213 OFFICE O/P EST LOW 20 MIN: CPT | Mod: 25 | Performed by: INTERNAL MEDICINE

## 2019-12-20 ASSESSMENT — MIFFLIN-ST. JEOR: SCORE: 1449.74

## 2019-12-20 ASSESSMENT — ACTIVITIES OF DAILY LIVING (ADL): CURRENT_FUNCTION: NO ASSISTANCE NEEDED

## 2019-12-20 NOTE — PROGRESS NOTES
SUBJECTIVE:   Prakash Cramer is a 79 year old male who presents for Preventive Visit and f/u re: previous FIT stool test and review of labs as below for DM, hyperlipidemia, prostate cancer screen. Of note, pt states that he is leaving to [place] for the winter in 2 days and will be busy packing tomorrow so not able to  See other providers in [place] tomorrow    Component      Latest Ref Rng & Units 12/12/2019   Cholesterol      <200 mg/dL 266 (H)   Triglycerides      <150 mg/dL 337 (H)   HDL Cholesterol      >39 mg/dL 61   LDL Cholesterol Calculated      <100 mg/dL 138 (H)   Non HDL Cholesterol      <130 mg/dL 205 (H)   TSH      0.40 - 4.00 mU/L 4.66 (H)   Hemoglobin A1C      0 - 5.6 % 6.9 (H)   PSA      0 - 4 ug/L 16.20 (H)   T4 Free      0.76 - 1.46 ng/dL 1.30       Component      Latest Ref Rng & Units 11/5/2019   WBC      4.0 - 11.0 10e9/L 4.9   RBC Count      4.4 - 5.9 10e12/L 3.55 (L)   Hemoglobin      13.3 - 17.7 g/dL 13.2 (L)   Hematocrit      40.0 - 53.0 % 39.0 (L)   MCV      78 - 100 fl 110 (H)   MCH      26.5 - 33.0 pg 37.2 (H)   MCHC      31.5 - 36.5 g/dL 33.8   RDW      10.0 - 15.0 % 14.0   Platelet Count      150 - 450 10e9/L 123 (L)   Diff Method       Automated Method   % Neutrophils      % 75.0   % Lymphocytes      % 18.3   % Monocytes      % 4.9   % Eosinophils      % 1.2   % Basophils      % 0.4   % Immature Granulocytes      % 0.2   Absolute Neutrophil      1.6 - 8.3 10e9/L 3.7   Absolute Lymphocytes      0.8 - 5.3 10e9/L 0.9   Absolute Monocytes      0.0 - 1.3 10e9/L 0.2   Absolute Eosinophils      0.0 - 0.7 10e9/L 0.1   Absolute Basophils      0.0 - 0.2 10e9/L 0.0   Abs Immature Granulocytes      0 - 0.4 10e9/L 0.0     Are you in the first 12 months of your Medicare coverage?  No    Healthy Habits:     In general, how would you rate your overall health?  Good    Frequency of exercise:  2-3 days/week    Duration of exercise:  30-45 minutes    Do you usually eat at least 4 servings of fruit and  "vegetables a day, include whole grains    & fiber and avoid regularly eating high fat or \"junk\" foods?  No    Taking medications regularly:  Yes    Barriers to taking medications:  None    Medication side effects:  Muscle aches    Ability to successfully perform activities of daily living:  No assistance needed    Home Safety:  Throw rugs in the hallway and lack of grab bars in the bathroom    Hearing Impairment:  No hearing concerns    In the past 6 months, have you been bothered by leaking of urine?  No    In general, how would you rate your overall mental or emotional health?  Good      PHQ-2 Total Score: 0    Additional concerns today:  Yes    Do you feel safe in your environment? Yes    Have you ever done Advance Care Planning? (For example, a Health Directive, POLST, or a discussion with a medical provider or your loved ones about your wishes): Yes, advance care planning is on file.      Fall risk  Fallen 2 or more times in the past year?: No  Any fall with injury in the past year?: No    Cognitive Screening   1) Repeat 3 items (Leader, Season, Table)    2) Clock draw: NORMAL  3) 3 item recall: Recalls 3 objects  Results: 3 items recalled: COGNITIVE IMPAIRMENT LESS LIKELY    Mini-CogTM Copyright S Edgar. Licensed by the author for use in [facility]; reprinted with permission ([email or web address]@.[email or web address]). All rights reserved.      Do you have sleep apnea, excessive snoring or daytime drowsiness?: no    Reviewed and updated as needed this visit by clinical staff  Tobacco  Allergies  Meds         Reviewed and updated as needed this visit by Provider        Social History     Tobacco Use     Smoking status: Never Smoker     Smokeless tobacco: Never Used   Substance Use Topics     Alcohol use: Yes     Comment: beer 4-5 a month         Alcohol Use 12/20/2019   Prescreen: >3 drinks/day or >7 drinks/week? No   Prescreen: >3 drinks/day or >7 drinks/week? -           PROBLEMS TO ADD ON...    Current providers sharing " in care for this patient include:   Patient Care Team:  Zhou Meraz MD as PCP - General (Internal Medicine)  Zhou Meraz MD as Assigned PCP  Xuan Armijo, RN as Nurse Coordinator (Oncology)  Terra Salazar MD as MD (Hematology & Oncology)    The following health maintenance items are reviewed in Epic and correct as of today:  Health Maintenance   Topic Date Due     EYE EXAM  1940     COLONOSCOPY  01/01/2018     DIABETIC FOOT EXAM  12/19/2019     FALL RISK ASSESSMENT  12/19/2019     MEDICARE ANNUAL WELLNESS VISIT  12/19/2019     MICROALBUMIN  05/15/2020     A1C  06/12/2020     ALT  11/05/2020     BMP  11/05/2020     CREATININE  11/05/2020     LIPID  12/12/2020     TSH W/FREE T4 REFLEX  12/12/2020     DTAP/TDAP/TD IMMUNIZATION (3 - Td) 02/06/2023     ADVANCE CARE PLANNING  06/28/2024     PHQ-2  Completed     INFLUENZA VACCINE  Completed     PNEUMOCOCCAL IMMUNIZATION 65+ LOW/MEDIUM RISK  Completed     ZOSTER IMMUNIZATION  Completed     IPV IMMUNIZATION  Aged Out     MENINGITIS IMMUNIZATION  Aged Out     Labs reviewed in EPIC         Review of Systems  CONSTITUTIONAL: NEGATIVE for fever, chills, change in weight  INTEGUMENTARY/SKIN: NEGATIVE for worrisome rashes, moles or lesions  EYES: NEGATIVE for vision changes or irritation. Eye exam UTD  ENT/MOUTH: NEGATIVE for ear, mouth and throat problems  RESP: NEGATIVE for significant cough or SOB  CV: NEGATIVE for chest pain, palpitations or peripheral edema  GI: NEGATIVE for nausea, abdominal pain, heartburn, or change in bowel habits  : NEGATIVE for frequency, dysuria, or hematuria. Positive for elevated PSA (new). Mild reduced stream and chronic  MUSCULOSKELETAL: NEGATIVE for significant arthralgias or myalgia  NEURO: NEGATIVE for weakness, dizziness or paresthesias  ENDOCRINE: POSITIVE for DM, thyroid and lipid results as above. Not checking sugars. Intolerant of statin trials  HEME: NEGATIVE for bleeding problems.  Anemia and low plts getting better as above.  "Followed by Hematology and will have repeat labs in May 2020  PSYCHIATRIC: NEGATIVE for changes in mood or affect    OBJECTIVE:   /78   Pulse 89   Temp 98.2  F (36.8  C) (Oral)   Ht 5' 7\" (1.702 m)   Wt 170 lb (77.1 kg)   SpO2 99%   BMI 26.63 kg/m   Estimated body mass index is 26.63 kg/m  as calculated from the following:    Height as of this encounter: 5' 7\" (1.702 m).    Weight as of this encounter: 170 lb (77.1 kg).  Physical Exam  General appearance - healthy, alert, no distress  Skin - No rashes or lesions.  Head - normocephalic, atraumatic  Eyes - BATSHEVA, EOMI, fundi exam with nondilated pupils negative.  Ears - External ears normal. Canals clear. TM's normal.  Nose/Sinuses - Nares normal. Septum midline. Mucosa normal. No drainage or sinus tenderness.  Oropharynx - No erythema, no adenopathy, no exudates.  Neck - Supple without adenopathy or thyromegaly. No bruits.  Lungs - Clear to auscultation without wheezes/rhonchi.  Heart - Regular rate and rhythm without murmurs, clicks, or gallops.  Nodes - No supraclavicular, axillary, or inguinal adenopathy palpable.  Abdomen - Abdomen soft, non-tender. BS normal. No masses or hepatosplenomegaly palpable. No bruits.  Extremities -No cyanosis, clubbing or edema.    Musculoskeletal - Spine ROM normal. Muscular strength intact.   Peripheral pulses - radial=4/4, femoral=4/4, posterior tibial=4/4, dorsalis pedis=4/4,  Neuro - Gait normal. Reflexes normal and symmetric. Sensation grossly WNL.  Genital - Normal-appearing male external genitalia. No scrotal masses or inguinal hernia palpable.   Rectal - Guaic negative stool. Normal tone. Prostate 1 plus in size to palpation and nontender. No rectal masses or prostate nodularity palpable        ASSESSMENT / PLAN:   1. Medicare annual wellness visit, subsequent   HCM UTD    2. Type 2 diabetes mellitus with diabetic nephropathy, without long-term current use of insulin (H)  Controlled. Continue current medication.  " Repeat labs in May 2020 when returns from [place]  - sitagliptin (JANUVIA) 100 MG tablet; Take 1 tablet (100 mg) by mouth daily  Dispense: 90 tablet; Refill: 3  - Hemoglobin A1c; Future  - Comprehensive metabolic panel; Future  - Albumin Random Urine Quantitative with Creat Ratio; Future    3. Hyperlipidemia LDL goal <100  Intolerant of multiple statin trials.  Patient does not wish to try Zetia or PCSK9 meds.  Will continue diet therapy as able. Repeat lab 1 year  - Lipid panel reflex to direct LDL Fasting; Future    4. Elevated prostate specific antigen (PSA)   Last  PSA done 2008 and normal (1.78) at that time. NO nodule palpable and nontender. Needs prostate MRI vs bx. Not able to get appt with Urology pt leaving in 2 days and pt not willing to delay travel plans. Will speak with the Hematology MD pt has worked with in [place] previously to get recommendation re: Urologist to see    5. Hypothyroidism, unspecified type  Thyroid function too low.  Will increase levothyroxine from 75 up to 88 mcg daily.  Patient will seek out hospital lab where TSH lab can be rechecked in 6 weeks and will call us with that name and telephone/fax number so lab order to be sent to them.   - levothyroxine (SYNTHROID/LEVOTHROID) 88 MCG tablet; Take 1 tablet (88 mcg) by mouth daily  Dispense: 90 tablet; Refill: 3  - TSH with free T4 reflex; Future    6. Essential hypertension   controlled.  Continue current medication  - amLODIPine (NORVASC) 10 MG tablet; Take 1 tablet (10 mg) by mouth daily  Dispense: 90 tablet; Refill: 3    7. Special screening for malignant neoplasms, colon   Previous FIT test positive. Will repeat. If still positive, will have pt see a GI MD in [place] for colonoscopy.  Will speak with the Hematology MD pt has worked with in [place] previously to get recommendation re:  gastroenterologist to see  - Fecal colorectal cancer screen FIT; Future    8. Pancytopenia (H)   Improved. Followed by Hematology. Repeat labs May  "2020      COUNSELING:  Reviewed preventive health counseling, as reflected in patient instructions    Estimated body mass index is 26.63 kg/m  as calculated from the following:    Height as of this encounter: 5' 7\" (1.702 m).    Weight as of this encounter: 170 lb (77.1 kg).         reports that he has never smoked. He has never used smokeless tobacco.      Appropriate preventive services were discussed with this patient, including applicable screening as appropriate for cardiovascular disease, diabetes, osteopenia/osteoporosis, and glaucoma.  As appropriate for age/gender, discussed screening for colorectal cancer, prostate cancer, breast cancer, and cervical cancer. Checklist reviewing preventive services available has been given to the patient.    Reviewed patients plan of care and provided an AVS. The Complex Care Plan (for patients with higher acuity and needing more deliberate coordination of services) for Prakash meets the Care Plan requirement. This Care Plan has been established and reviewed with the Patient.    Counseling Resources:  ATP IV Guidelines  Pooled Cohorts Equation Calculator  Breast Cancer Risk Calculator  FRAX Risk Assessment  ICSI Preventive Guidelines  Dietary Guidelines for Americans, 2010  [employer]'s MyPlate  ASA Prophylaxis  Lung CA Screening      PLAN:  Increase levothyroxine to 88 mcg daily   Repeat FIT stool test and return to lab tomorrow   Continue other medications.  Will speak with  [facility] and Dr Colin Porter [phone] to get recs re: Urology and GI specialists pt could see re: PSA and FIT test results and to find out if TSH lab could be run through his clinic in 6 weeks vs other site known to have recheck TSH lab  Repeat nonfasting labs ion May 2020 when return from [place] back to SAM Meraz MD  [facility]     "

## 2019-12-23 DIAGNOSIS — Z12.11 SPECIAL SCREENING FOR MALIGNANT NEOPLASMS, COLON: ICD-10-CM

## 2019-12-23 LAB — HEMOCCULT STL QL IA: POSITIVE

## 2019-12-23 PROCEDURE — 82274 ASSAY TEST FOR BLOOD FECAL: CPT | Performed by: INTERNAL MEDICINE

## 2019-12-30 ENCOUNTER — TELEPHONE (OUTPATIENT)
Dept: INTERNAL MEDICINE | Facility: CLINIC | Age: 79
End: 2019-12-30

## 2019-12-31 NOTE — TELEPHONE ENCOUNTER
Spoke with pt. Positive FIT test. He is in [place] for winter. Has seen an hematologist there before. I told hm I will reach out to that Hematologist Thursday when clinic back open to see if he can recommend a gastroenterologist for pt to see to get a colonoscopy there in [place] and a urologist for elevated PSA work-up and will update pt on Friday    Will also have pt increase levothyroxine to 88 mcg daily and get repeat TSH in [place] in 6 weeks with results faxed to me     [facility] -  Dr Colin Porter [phone]

## 2020-01-01 PROBLEM — R19.5 POSITIVE FIT (FECAL IMMUNOCHEMICAL TEST): Status: ACTIVE | Noted: 2020-01-01

## 2020-01-01 RX ORDER — AMLODIPINE BESYLATE 10 MG/1
10 TABLET ORAL DAILY
Qty: 90 TABLET | Refills: 3 | Status: SHIPPED | OUTPATIENT
Start: 2020-01-01 | End: 2020-12-31

## 2020-01-01 RX ORDER — LEVOTHYROXINE SODIUM 88 UG/1
88 TABLET ORAL DAILY
Qty: 90 TABLET | Refills: 3 | Status: SHIPPED | OUTPATIENT
Start: 2020-01-01 | End: 2020-07-15

## 2020-01-02 NOTE — PATIENT INSTRUCTIONS
Increase levothyroxine to 88 mcg daily   Repeat FIT stool test and return to lab tomorrow   Continue other medications.  Will speak with  [facility] and Dr Colin Porter [phone] to get recs re: Urology and GI specialists pt could see re: PSA and FIT test results and to find out if TSH lab could be run through his clinic in 6 weeks vs other site known to have recheck TSH lab  Repeat nonfasting labs in May 2020 when return from [place] back to [place]

## 2020-01-15 ENCOUNTER — TRANSFERRED RECORDS (OUTPATIENT)
Dept: HEALTH INFORMATION MANAGEMENT | Facility: CLINIC | Age: 80
End: 2020-01-15

## 2020-01-15 LAB — PHQ9 SCORE: <5

## 2020-01-15 NOTE — TELEPHONE ENCOUNTER
Spoke last week with  Pt's oncologist in [place]. They inform me that pt spoke with them and was referred to urologist  Dr Caruso at [facility] [phone] and also to Dr Humberto Sharif () [phone]. Will discuss with pt

## 2020-05-05 ENCOUNTER — VIRTUAL VISIT (OUTPATIENT)
Dept: ONCOLOGY | Facility: CLINIC | Age: 80
End: 2020-05-05
Attending: INTERNAL MEDICINE
Payer: MEDICARE

## 2020-05-05 ENCOUNTER — TELEPHONE (OUTPATIENT)
Dept: INTERNAL MEDICINE | Facility: CLINIC | Age: 80
End: 2020-05-05

## 2020-05-05 DIAGNOSIS — E11.21 TYPE 2 DIABETES MELLITUS WITH DIABETIC NEPHROPATHY, WITHOUT LONG-TERM CURRENT USE OF INSULIN (H): ICD-10-CM

## 2020-05-05 DIAGNOSIS — D69.6 THROMBOCYTOPENIA (H): Primary | ICD-10-CM

## 2020-05-05 DIAGNOSIS — E78.5 HYPERLIPIDEMIA LDL GOAL <100: ICD-10-CM

## 2020-05-05 DIAGNOSIS — R97.20 ELEVATED PROSTATE SPECIFIC ANTIGEN (PSA): Primary | ICD-10-CM

## 2020-05-05 DIAGNOSIS — D64.9 ANEMIA, UNSPECIFIED TYPE: ICD-10-CM

## 2020-05-05 DIAGNOSIS — E03.9 HYPOTHYROIDISM, UNSPECIFIED TYPE: ICD-10-CM

## 2020-05-05 LAB
ALBUMIN SERPL-MCNC: 3.8 G/DL (ref 3.4–5)
ALP SERPL-CCNC: 161 U/L (ref 40–150)
ALT SERPL W P-5'-P-CCNC: 31 U/L (ref 0–70)
ANION GAP SERPL CALCULATED.3IONS-SCNC: 6 MMOL/L (ref 3–14)
AST SERPL W P-5'-P-CCNC: 19 U/L (ref 0–45)
BASOPHILS # BLD AUTO: 0 10E9/L (ref 0–0.2)
BASOPHILS NFR BLD AUTO: 0.3 %
BILIRUB SERPL-MCNC: 0.7 MG/DL (ref 0.2–1.3)
BUN SERPL-MCNC: 22 MG/DL (ref 7–30)
CALCIUM SERPL-MCNC: 8.9 MG/DL (ref 8.5–10.1)
CHLORIDE SERPL-SCNC: 106 MMOL/L (ref 94–109)
CO2 SERPL-SCNC: 25 MMOL/L (ref 20–32)
CREAT SERPL-MCNC: 1.39 MG/DL (ref 0.66–1.25)
DIFFERENTIAL METHOD BLD: ABNORMAL
EOSINOPHIL # BLD AUTO: 0.4 10E9/L (ref 0–0.7)
EOSINOPHIL NFR BLD AUTO: 6.3 %
ERYTHROCYTE [DISTWIDTH] IN BLOOD BY AUTOMATED COUNT: 13.3 % (ref 10–15)
GFR SERPL CREATININE-BSD FRML MDRD: 48 ML/MIN/{1.73_M2}
GLUCOSE SERPL-MCNC: 256 MG/DL (ref 70–99)
HBA1C MFR BLD: 6.7 % (ref 0–5.6)
HCT VFR BLD AUTO: 36.8 % (ref 40–53)
HGB BLD-MCNC: 12.7 G/DL (ref 13.3–17.7)
LDH SERPL L TO P-CCNC: 206 U/L (ref 85–227)
LYMPHOCYTES # BLD AUTO: 0.7 10E9/L (ref 0.8–5.3)
LYMPHOCYTES NFR BLD AUTO: 12.7 %
MCH RBC QN AUTO: 38.4 PG (ref 26.5–33)
MCHC RBC AUTO-ENTMCNC: 34.5 G/DL (ref 31.5–36.5)
MCV RBC AUTO: 111 FL (ref 78–100)
MONOCYTES # BLD AUTO: 0.3 10E9/L (ref 0–1.3)
MONOCYTES NFR BLD AUTO: 5.7 %
NEUTROPHILS # BLD AUTO: 4.3 10E9/L (ref 1.6–8.3)
NEUTROPHILS NFR BLD AUTO: 75 %
PLATELET # BLD AUTO: 35 10E9/L (ref 150–450)
POTASSIUM SERPL-SCNC: 4.6 MMOL/L (ref 3.4–5.3)
PROT SERPL-MCNC: 7.5 G/DL (ref 6.8–8.8)
RBC # BLD AUTO: 3.31 10E12/L (ref 4.4–5.9)
RETICS # AUTO: 77.1 10E9/L (ref 25–95)
RETICS/RBC NFR AUTO: 2.3 % (ref 0.5–2)
SODIUM SERPL-SCNC: 137 MMOL/L (ref 133–144)
T4 FREE SERPL-MCNC: 1.29 NG/DL (ref 0.76–1.46)
TSH SERPL DL<=0.005 MIU/L-ACNC: 5.72 MU/L (ref 0.4–4)
WBC # BLD AUTO: 5.8 10E9/L (ref 4–11)

## 2020-05-05 PROCEDURE — 36415 COLL VENOUS BLD VENIPUNCTURE: CPT | Performed by: INTERNAL MEDICINE

## 2020-05-05 PROCEDURE — 83036 HEMOGLOBIN GLYCOSYLATED A1C: CPT | Performed by: INTERNAL MEDICINE

## 2020-05-05 PROCEDURE — 99442 ZZC PHYSICIAN TELEPHONE EVALUATION 11-20 MIN: CPT | Performed by: INTERNAL MEDICINE

## 2020-05-05 PROCEDURE — 40000611 ZZHCL STATISTIC MORPHOLOGY W/INTERP HEMEPATH TC 85060: Performed by: INTERNAL MEDICINE

## 2020-05-05 PROCEDURE — 83615 LACTATE (LD) (LDH) ENZYME: CPT | Performed by: INTERNAL MEDICINE

## 2020-05-05 PROCEDURE — 85045 AUTOMATED RETICULOCYTE COUNT: CPT | Performed by: INTERNAL MEDICINE

## 2020-05-05 PROCEDURE — 84439 ASSAY OF FREE THYROXINE: CPT | Performed by: INTERNAL MEDICINE

## 2020-05-05 PROCEDURE — 80050 GENERAL HEALTH PANEL: CPT | Performed by: INTERNAL MEDICINE

## 2020-05-05 NOTE — NURSING NOTE
"Prakash Cramer is a 80 year old male who is being evaluated via a billable telephone visit.      The patient has been notified of following:     \"This telephone visit will be conducted via a call between you and your physician/provider. We have found that certain health care needs can be provided without the need for a physical exam.  This service lets us provide the care you need with a short phone conversation.  If a prescription is necessary we can send it directly to your pharmacy.  If lab work is needed we can place an order for that and you can then stop by our lab to have the test done at a later time.    Telephone visits are billed at different rates depending on your insurance coverage. During this emergency period, for some insurers they may be billed the same as an in-person visit.  Please reach out to your insurance provider with any questions.    If during the course of the call the physician/provider feels a telephone visit is not appropriate, you will not be charged for this service.\"    Patient has given verbal consent for Telephone visit?  Yes    What phone number would you like to be contacted at? [phone]    How would you like to obtain your AVS? Mail a copy    Back pain due to a fall.  Patient states he has an appointment on Thursday for this.  He had a colonoscopy in January in [place] where polyps were found and he states that some were precancerous.  His legs are not very strong and has a little trouble with balance.  He is up urinating 2-3 times per night.  He would like to discuss his back.    Coco Gay, CMA          "

## 2020-05-05 NOTE — TELEPHONE ENCOUNTER
Pt switching to different mail order pharmacy -   Please mail paper RX to his home address, and he will send in RX to new mail order pharmacy in [place].

## 2020-05-05 NOTE — TELEPHONE ENCOUNTER
Patient called requesting labs results. He went into get labs drawn (non - fasting) ordered by oncologist Dr. Salazar. Some of the labs that were previously ordered by Dr. Meraz were also done. Fasting lipids and Albumin random urine were not done. Pt was not taking statins since (5/7/19). Should patient come back fasting and get that done or could that wait? Could albumin random urine wait?     Pt stopped statin because he has aches. He is still aching and will be seeing orthopedic tomorrow. He also stopped Aleve because it decreased his platelets per oncologist.

## 2020-05-05 NOTE — PROGRESS NOTES
Hematology follow up visit:  Date on this visit: 5/5/2020     CC   Pancytopenia    Primary Physician: Zhou Meraz     History Of Present Illness:    Please see previous note for details. I have copied and updated from prior note.  Mr. Cramer is a 80 year old male who was found to have pancytopenia around the end of March 2018 when he presented with a white blood cell count of 1.5 and hemoglobin of 7.8 and platelets of 4. He was admitted to the hospital in [place]. There was no bruising or petechiae or evidence of bleeding. He was feeling fatigued and at that point he was also noticed to have C diff infection and was started on p.o. vancomycin. He was given Neupogen ×1 and packed red distant transfusion ×2 and platelet transfusion ×1. He was also given vitamin B12 injections and I believe it was 3 injections which she was given during the hospitalization. He had a bone marrow biopsy done as part of his workup which showed mildly hypocellular bone marrow with 10-40% cellularity with erythroid hyperplasia and mild dyserythropoiesis (5% cellularity, moderately decreased for age as per pathology review at the [facility]). Reactive plasmacytosis and maturing trilineage hematopoiesis. There was no significant dysplasia in the granulocytes or megakaryocytes. Parvovirus immunohistochemistry was negative. These findings were deemed consistent with either evolving aplastic anemia or drug/toxin effect or vitamin/nutritional deficiency or infectious or viral and hypocellular MDS.   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 This finding is most probably constitutional in origin and not acquired; however it it is medically warranted a peripheral blood chromosome analysis couldn't be performed to rule out an acquired  abnormality. If it is constitutional genetic counseling would be medically indicated    Comprehensive metabolic panel was unremarkable.   His vitamin B12 level was less than 159. Antiparietal cell antibody and anti-intrinsic factor antibody were negative. Iron studies were unremarkable. CMV IgM was negative. WENDY screen was negative. Rheumatoid factor and anti-CCP were negative. SPEP was negative.  After thorough investigation it was found that potentially glimepiride (sulfonylurea) which he was taking could be the culprit for pancytopenia so he was taken off it.  Upon discharge from the hospital on 3/31/2018 his white blood cell count was 2.4 and hemoglobin was 7.8 and platelet 12. On 4/5/2018 the WBC count was 2.9 with ANC of 1.2 and hemoglobin 7.9 and platelets 75.  On 4/13/2018 white blood cell count was 3.3, ANC 1.3, hemoglobin 7 and platelets 13. At that point he was given 2 units of packed red blood cells and 1 unit of platelets  Most recently on 4/16/2018 white blood cell count was 3.2, ANC 1.1, hemoglobin 10 and platelets 20.    When he saw me I did further testing and KIMBERLEE was negative.  Peripheral blood flow cytometry for PNH reveals a very small percentage of red cells and a small percentage (less than 10%) of neutrophils and monocytes with a PNH immunophenotype, the significance of which is not known.  Repeat flow cytometry for PNH in Sept 2018, shows that the % of PNH type clones is even lower this time, so doubt that it is the culprit in causing pancytopenia.   A repeat vitamin B12 level was 443. I told him to start taking vitamin B12 1000  g daily.  We kept him on observation and his counts improved with normalization of the WBC count and improvement is Hg. His platelets although were better but plataued around 40s, so we did further w/up and WENDY, Hep B, C and HIV were negative. Spleen size was normal. Liver was borderline enlarged.    He remained on observation. His platelets also improved to above  100 in late 2019.  CBC in Jan 2020 in [place] was stable.    Interval hx:  This is a telephone visit.  He tells me that his chronic back issue has been bothering him more and has had balance issues. These are going on for a long time and he is seeing his doctor on Thursday for that. He has lionel taking alleve once a day for about a month. No weight loss. No infections.  He had a colonoscopy as fecal occult blood test was positive. He had 5 polyps removed and 2 were precancerous so he will have another one in Jan 2021.  Denies bleeding. No night sweats. No new swellings.  Energy is decent. No dyspnea.        ROS:  Rest of the comprehensive review of the system was essentially unremarkable.    I reviewed other history in EPIC as below.    Past Medical/Surgical History:  Past Medical History:   Diagnosis Date     Aplastic anemia (H) 03/26/2018    thought secondary to reaction to Septra     BPH (benign prostatic hypertrophy)     failed  Flomax     C. difficile diarrhea 03/26/2018    treated with Flagyl     Cellulitis and abscess of leg, except foot 2004     Contact dermatitis and other eczema, due to unspecified cause      Diaphragmatic hernia without mention of obstruction or gangrene     hiatal hernia     Esophageal reflux      Hyperlipidemia LDL goal <100 3/11/2005     Hypothyroidism      Impotence of organic origin      Meningococcal encephalitis      Proteinuria      RBBB      Type 2 diabetes mellitus with diabetic nephropathy  (goal A1C<7) 10/24/2015     Unspecified essential hypertension      Past Surgical History:   Procedure Laterality Date     ARTHROPLASTY KNEE Left 3/18/2015    Procedure: ARTHROPLASTY KNEE;  Surgeon: Abebe Schroeder MD;  Location: [facility] OR     ARTHROPLASTY KNEE Right 3/14/2016    Procedure: ARTHROPLASTY KNEE;  Surgeon: Abebe Schroeder MD;  Location:  OR     C NONSPECIFIC PROCEDURE  10/02    left knee meniscus tear repair     HC LAPAROSCOPY, SURGICAL; CHOLECYSTECTOMY  1998    Cholecystectomy,  Laparoscopic     HC REMOVE TONSILS/ADENOIDS,13+ Y/O  1963    T & A 13+y.o.     Allergies:  Allergies as of 05/05/2020 - Reviewed 05/05/2020   Allergen Reaction Noted     Atorvastatin calcium  01/30/2008     Crestor [rosuvastatin]  05/07/2019     Diovan [valsartan]  01/24/2011     Glimepiride  05/08/2018     Invokana [canagliflozin]  06/01/2018     Mold Other (See Comments) 07/14/2011     Penicillins  09/07/2011     Seasonal allergies  06/25/2007    glimepiride possibly causing pancytopenia  Current Medications:  Current Outpatient Medications   Medication Sig Dispense Refill     amLODIPine (NORVASC) 10 MG tablet Take 1 tablet (10 mg) by mouth daily 90 tablet 3     aspirin 81 MG tablet Take by mouth daily 30 tablet      blood glucose (ACCU-CHEK SMARTVIEW) test strip USE 1 STRIP TO CHECK GLUCOSE ONCE DAILY OR  AS  DIRECTED 100 strip 1     blood glucose monitoring (ACCU-CHEK FASTCLIX) lancets Use to test blood sugar 1 times daily or as directed. 102 each 11     blood glucose monitoring (ACCU-CHEK MULTICLIX) lancets Use to test blood sugar 1 time daily or as directed. 1 Box 11     Blood Glucose Monitoring Suppl MAGDALENO 1 Device daily Smartview Meter 1 each 0     Cyanocobalamin (B-12) 1000 MCG TBCR Take 1,000 mcg by mouth daily 100 tablet 11     famotidine (PEPCID) 20 MG tablet Take 1 tablet (20 mg) by mouth 2 times daily 180 tablet 3     levothyroxine (SYNTHROID/LEVOTHROID) 88 MCG tablet Take 1 tablet (88 mcg) by mouth daily 90 tablet 3     metFORMIN (GLUCOPHAGE-XR) 500 MG 24 hr tablet Take 2 tablets (1,000 mg) by mouth daily (with dinner) 180 tablet 3     psyllium (METAMUCIL/KONSYL) capsule Take 1 capsule by mouth daily 90 capsule 3     sitagliptin (JANUVIA) 100 MG tablet Take 1 tablet (100 mg) by mouth daily 90 tablet 3     STATIN NOT PRESCRIBED (INTENTIONAL) Please choose reason not prescribed, below        Family History:  Family History   Problem Relation Age of Onset     Family History Negative Mother         d:age  "89 of \"old age\"     Gastrointestinal Disease Father         d:age 79 liver failure after taking excessive amounts of Tylenol over 5-6 yrs     Neurologic Disorder Brother         b:1932  hx cerebral aneurysm age 59     No family history of bleeding or clotting disorder. Maternal uncle had throat cancer.  Social History:  Social History     Socioeconomic History     Marital status:      Spouse name: Not on file     Number of children: 2     Years of education: Not on file     Highest education level: Not on file   Occupational History     Occupation: teacher     Employer: [employer]   Social Needs     Financial resource strain: Not on file     Food insecurity     Worry: Not on file     Inability: Not on file     Transportation needs     Medical: Not on file     Non-medical: Not on file   Tobacco Use     Smoking status: Never Smoker     Smokeless tobacco: Never Used   Substance and Sexual Activity     Alcohol use: Yes     Comment: beer 4-5 a month     Drug use: No     Sexual activity: Yes     Partners: Female   Lifestyle     Physical activity     Days per week: Not on file     Minutes per session: Not on file     Stress: Not on file   Relationships     Social connections     Talks on phone: Not on file     Gets together: Not on file     Attends Zoroastrianism service: Not on file     Active member of club or organization: Not on file     Attends meetings of clubs or organizations: Not on file     Relationship status: Not on file     Intimate partner violence     Fear of current or ex partner: Not on file     Emotionally abused: Not on file     Physically abused: Not on file     Forced sexual activity: Not on file   Other Topics Concern     Parent/sibling w/ CABG, MI or angioplasty before 65F 55M? Not Asked      Service Not Asked     Blood Transfusions Not Asked     Caffeine Concern Not Asked     Occupational Exposure Not Asked     Hobby Hazards Not Asked     Sleep Concern Not Asked     Stress " Concern Not Asked     Weight Concern Not Asked     Special Diet Yes     Comment: diabetic diet      Back Care Not Asked     Exercise No     Comment: nothing regular since bilateral knee surgeries      Bike Helmet Not Asked     Seat Belt Not Asked     Self-Exams Not Asked   Social History Narrative     Not on file    denies any smoking. No alcohol. He usually lives in [place] and spends winter in [place]. He lives alone.  Physical Exam:  There were no vitals taken for this visit.   Wt Readings from Last 4 Encounters:   12/20/19 77.1 kg (170 lb)   11/05/19 76.1 kg (167 lb 11.2 oz)   10/04/19 76 kg (167 lb 8 oz)   08/30/19 74.7 kg (164 lb 9.6 oz)       Constitutional.  Does not seem to be in any acute distress.  Respiratory.  Speaking in full sentences.  No coughing noted.  Neurological.  Alert and oriented x3.  Psychiatric.  Mood, mentation and affect are normal.  Decision making capacity is intact.      The rest of a comprehensive physical examination is deferred due to Public Health Emergency telephone visit restrictions.        Laboratory/Imaging Studies    Reviewed        I reviewed the outside records as well  Received from [facility], [place], [place] are 16 stained   slides labeled [id number], collected   03-28-18 now designated R-.  Also received is a copy of the   referring pathologist's report with   patient identifying information.     FINAL DIAGNOSIS:   Bone marrow, posterior iliac crest, decalcified trephine biopsy, aspirate   clot, touch imprints, and aspirate   smears ([id number], 03/28/2018):        - Marrow cellularity of 5% (moderately hypocellular for age) in   trephine core sections provided for   review        - Good evidence of trilineage hematopoietic maturation in core and   clot sections provided for review        - Benign appearing lymphoid aggregates in core and clot sections   provided for review        - Negative immunohistochemical stain for Parvovirus in clot  "section        - Marrow aspirate smears and touch imprints showing trilineage   hematopoietic maturation with no increase   in blasts        - Increased marrow iron stores with 5% sideroblasts and no ringed   sideroblasts.        - Flow cytometric immunophenotyping performed on marrow aspirate   reported by [facility] as   showing, \"No immunophenotypic evidence of acute leukemia or a T-cell   B-cell or Plasma cell neoplasm.\"        - Cytogenetic analysis performed on marrow aspirate reported by   [facility] as showing,   \"46,XY,gonzales(13;14)(q10;q19)?c[20],\" \"most probably constitutional\"        - FISH study performed on marrow aspirate by [facility]   reported as showing, \" Negative FISH   result for cytogenetic abnormalities commonly observed in myeloid   disorders\"        - Blood smear and peripheral blood counts not provided for review        - See Comment     COMMENT:   By separate report ([id number]; 4/18/18), flow cytometric immunophenotyping    performed on blood for this patient   at [facility] laboratory shows a very small percentage of red cells and a   small percentage (less than 10%) of   neutrophils and monocytes with a PNH immunophenotype.       Peripheral blood flow cytometry  SPECIMEN(S):   Blood     INTERPRETATION:   Blood:        Cells with a paroxysmal nocturnal hemoglobinuria (PNH)-type   immunophenotype represent approximately 0.3%   of erythrocytes, 3.4% of monocytes, and 4.4% of neutrophils, see comment.     COMMENT:   Classic paroxysmal nocturnal hemoglobinuria (PNH) is associated with large    PNH-type clones. PNH in the setting   of another bone marrow failure syndrome usually has small PNH-type clones,    less than 10%. Subclinical PNH   usually has rare PNH-type clones, less than 1%. Detection of a small clone    is not equivalent to a diagnosis of   hemolytic PNH.  Final diagnosis requires correlation with clinical,   morphologic, and ancillary findings.   (Fletcher, " et al. Blood, 106 (12): 6953-4509, 2005. Fletcher, Hematology,   21-29, 2011.)     RESULTS:   About 0.33% of erythrocytes have decreased expression of CD59 (0.32% type   II, 0.01% type III).   About 3.45% of monocytes have decreased expression of CD14 and binding of   fluorescent aerolysin (FLAER).About   4.44% of neutrophils have decreased expression of CD24 and binding of   FLAER.       Peripheral blood chromosome analysis.  METHODS:   PHA stimulated, MTX synchronized cultures.     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   Each of the metaphase cells analyzed had 45 chromosomes including one   normal chromosome 13, one normal   chromosome 14, and a Robertsonian translocation joining together, at their    centromeres, the long arms of one   chromosome 13 and one chromosome 14.     Robertsonian translocations are one of the most common occurring   constitutional  rearrangements in human   populations. As these translocations are not associated with any loss of   critical genetic material, they are   considered to be balanced, and would not be expected to be associated with    any phenotypic abnormalities in   this patient. However, during meiosis, the translocation can segregate   into gametes several different ways,   resulting, after fertilization, in zygotes that have trisomy or monosomy   for chromosomes 13 or 14. These   trisomies and/or monosomies can result in miscarriage or liveborn   offspring with multiple congenital   anomalies. Thus, carriers of such translocations are at increased   reproductive risk.     Genetic counseling regarding these results, and cytogenetic evaluation of   other family members who may carry   the translocation are recommended.     ASSESSMENT/PLAN:      Pancytopenia. At this time the cause is not entirely known but it could be possibly toxic reaction to the  "drug glimepiride. Otherwise his bone marrow biopsy was nondiagnostic. Peripheral blood and bone marrow Flow cytometry were negative.  Cytogenetic analysis performed on marrow aspirate showed 46,XY,gonzales(13;14)(q10;q19)?c[20], \"most probably constitutional\"        - FISH study performed on marrow aspirate showed \" Negative FISH result for cytogenetic abnormalities commonly observed in myeloid disorders\"     He has been on observation and with time the pancytopenia improved significantly with normalization of the white blood cell count and almost normalization of hemoglobin and platelets.  Symptomatically he is doing well.  White blood cell count is normal.  Hemoglobin is a stable.  His platelets today are down to 35.    He is asymptomatic.  The only new thing which he has tried recently is Aleve and he had some wine but not very recently.  I told him to avoid alcohol as much as possible and also recommend that he avoids taking Aleve.  I have added peripheral blood smear to the sample from today.  LDH is normal.  I would like to repeat labs in 1 week.  If the platelet count continues to worsen then I will consider repeating a bone marrow biopsy.          Anemia.  Overall hemoglobin has significantly improved from before and today it is fairly stable at 12.7.  He will continue to take folic acid and vitamin B12.      Thrombocytopenia.  His platelets took the longest time to recover but this started to recover in late 2018/ early 2019 and the improved to above 100 in late 2019.  Even in January 2020, they were above 100.  Today surprisingly platelets are 35.  As mentioned above the rest of the CBC seems pretty stable.  We will check peripheral blood smear and I would like to repeat labs in 1 week.  If the platelets continue to go down, then I will consider repeat bone marrow biopsy.   Previously WENDY, Hep B, C and HIV were negative. Spleen size was normal. Liver was borderline enlarged.      Back pain/chronic balance " issues.  He has had these issues for a long time but recently have been bothering him more.  He will follow with his doctor about this.  He has been taking Aleve over the last 1 month or so.  I told him to stop taking Aleve as it can affect the platelet function and he also has chronic kidney disease.  He can take Tylenol instead.  We will also discuss with his doctor about other pain medications which he can take.    We did not address the following today.    Elevated creatinine.  He has stable chronic kidney disease.      Small PNH clone. Peripheral blood flow cytometry for PNH reveals a very small percentage of red cells and a small percentage (less than 10%) of neutrophils and monocytes with a PNH immunophenotype, the significance of which is not known.  Repeat flow cytometry for PNH shows that the % of PNH type clones is even lower this time, so doubt that it is the culprit in causing pancytopenia.  I will plan on repeating flow cytometry for PNH in case he has worsening of cytopenias.    Vitamin B12 deficiency with negative anti-parietal cell antibody and anti-intrinsic factor antibody.  Repeat vitamin B12 level has been in the normal range.  He will continue indefinite vitamin B12 supplementation.      Chromosomal abnormality. 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  We did a peripheral blood chromosome analysis which confirms these findings. 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  Initially he was supposed to see genetic counselor in October 2018 but he was unable to make that appointment.  When we had previously discussed he told me that he is going to follow-up with genetic counselor after coming back from [place].  I brought this up again today but he is not interested in meeting with genetic counselor at this time.      Muscular pain due to statins. Statins were put on hold.  He will follow with his primary  care physician regarding this.    We will determine the follow-up accordingly.    All questions answered.  He is agreeable and comfortable with the plan.    Terra Salazar    Total phone call time.  14 minutes.

## 2020-05-06 LAB — COPATH REPORT: NORMAL

## 2020-05-08 ENCOUNTER — PATIENT OUTREACH (OUTPATIENT)
Dept: ONCOLOGY | Facility: CLINIC | Age: 80
End: 2020-05-08

## 2020-05-08 NOTE — TELEPHONE ENCOUNTER
Provided patient with platelet lab results - as requested from 5/5/20- 35,000.  Message left on voicemail.  Patient was understanding the need to repeat labs in one week and he will schedule per his message he left on writer's voicemail.

## 2020-05-12 ENCOUNTER — PATIENT OUTREACH (OUTPATIENT)
Dept: ONCOLOGY | Facility: CLINIC | Age: 80
End: 2020-05-12

## 2020-05-12 DIAGNOSIS — D69.6 THROMBOCYTOPENIA (H): ICD-10-CM

## 2020-05-12 LAB
BASOPHILS # BLD AUTO: 0 10E9/L (ref 0–0.2)
BASOPHILS NFR BLD AUTO: 0.3 %
DIFFERENTIAL METHOD BLD: ABNORMAL
EOSINOPHIL # BLD AUTO: 0.1 10E9/L (ref 0–0.7)
EOSINOPHIL NFR BLD AUTO: 1.8 %
ERYTHROCYTE [DISTWIDTH] IN BLOOD BY AUTOMATED COUNT: 13.9 % (ref 10–15)
HCT VFR BLD AUTO: 36.8 % (ref 40–53)
HGB BLD-MCNC: 12.6 G/DL (ref 13.3–17.7)
IMM GRANULOCYTES # BLD: 0 10E9/L (ref 0–0.4)
IMM GRANULOCYTES NFR BLD: 0.3 %
LYMPHOCYTES # BLD AUTO: 0.8 10E9/L (ref 0.8–5.3)
LYMPHOCYTES NFR BLD AUTO: 13.3 %
MCH RBC QN AUTO: 37.6 PG (ref 26.5–33)
MCHC RBC AUTO-ENTMCNC: 34.2 G/DL (ref 31.5–36.5)
MCV RBC AUTO: 110 FL (ref 78–100)
MONOCYTES # BLD AUTO: 0.4 10E9/L (ref 0–1.3)
MONOCYTES NFR BLD AUTO: 7.3 %
NEUTROPHILS # BLD AUTO: 4.6 10E9/L (ref 1.6–8.3)
NEUTROPHILS NFR BLD AUTO: 77 %
PLATELET # BLD AUTO: 42 10E9/L (ref 150–450)
RBC # BLD AUTO: 3.35 10E12/L (ref 4.4–5.9)
WBC # BLD AUTO: 6 10E9/L (ref 4–11)

## 2020-05-12 PROCEDURE — 36415 COLL VENOUS BLD VENIPUNCTURE: CPT | Performed by: INTERNAL MEDICINE

## 2020-05-12 PROCEDURE — 85025 COMPLETE CBC W/AUTO DIFF WBC: CPT | Performed by: INTERNAL MEDICINE

## 2020-05-12 NOTE — TELEPHONE ENCOUNTER
Contacted patient with lab results from today.  Advised of platelets being 45,000, up from a week ago, 35,000.  He states he has refrained from using Aleve and has not been having any alcohol.  Advised will inform Dr. Salazar that we spoke.  Hu was wondering what Dr. Salazar's thoughts would be.

## 2020-05-15 NOTE — TELEPHONE ENCOUNTER
Spoke with pt.   Had colonoscopy in [place] with polyps and will need repeat 2021.  Never saw Urology in [place] as instructed previously for elevated  PSA in Dec 2019. Will  Get recheck PSA lab in the next 1 week along with lipids.  Recent A1C 6.7. TSH minimally elevated but FT4 normal. Will continue  DM and thyroid meds and repeat labs in 3 mos for A1C and TSH  Low plts and Hgb being managed by Oncology. See recent note in chart. Pt states he was taking some Aleve recently but stopped now. Instructed him to avoid all NSAIDS.  Pt requests Januvia Rx be mailed to him and will go out in tomorrow mail

## 2020-05-19 DIAGNOSIS — R97.20 ELEVATED PROSTATE SPECIFIC ANTIGEN (PSA): ICD-10-CM

## 2020-05-19 DIAGNOSIS — E03.9 HYPOTHYROIDISM, UNSPECIFIED TYPE: ICD-10-CM

## 2020-05-19 DIAGNOSIS — E11.21 TYPE 2 DIABETES MELLITUS WITH DIABETIC NEPHROPATHY, WITHOUT LONG-TERM CURRENT USE OF INSULIN (H): ICD-10-CM

## 2020-05-19 DIAGNOSIS — E78.5 HYPERLIPIDEMIA LDL GOAL <100: ICD-10-CM

## 2020-05-19 LAB
CHOLEST SERPL-MCNC: 258 MG/DL
HDLC SERPL-MCNC: 55 MG/DL
LDLC SERPL CALC-MCNC: 140 MG/DL
NONHDLC SERPL-MCNC: 203 MG/DL
TRIGL SERPL-MCNC: 316 MG/DL

## 2020-05-19 PROCEDURE — 36415 COLL VENOUS BLD VENIPUNCTURE: CPT | Performed by: INTERNAL MEDICINE

## 2020-05-19 PROCEDURE — 99000 SPECIMEN HANDLING OFFICE-LAB: CPT | Performed by: INTERNAL MEDICINE

## 2020-05-19 PROCEDURE — 84153 ASSAY OF PSA TOTAL: CPT | Mod: 90 | Performed by: INTERNAL MEDICINE

## 2020-05-19 PROCEDURE — 80061 LIPID PANEL: CPT | Performed by: INTERNAL MEDICINE

## 2020-05-19 PROCEDURE — 84154 ASSAY OF PSA FREE: CPT | Mod: 90 | Performed by: INTERNAL MEDICINE

## 2020-05-21 LAB
PSA FREE MFR SERPL: 9 %
PSA FREE SERPL-MCNC: 1.5 NG/ML
PSA SERPL-MCNC: 16.4 NG/ML (ref 0–4)

## 2020-05-22 ENCOUNTER — DOCUMENTATION ONLY (OUTPATIENT)
Dept: LAB | Facility: CLINIC | Age: 80
End: 2020-05-22

## 2020-05-26 DIAGNOSIS — D69.6 THROMBOCYTOPENIA (H): ICD-10-CM

## 2020-05-26 DIAGNOSIS — D69.6 THROMBOCYTOPENIA (H): Primary | ICD-10-CM

## 2020-05-26 LAB
BASOPHILS # BLD AUTO: 0.1 10E9/L (ref 0–0.2)
BASOPHILS NFR BLD AUTO: 1 %
DIFFERENTIAL METHOD BLD: ABNORMAL
EOSINOPHIL # BLD AUTO: 0.1 10E9/L (ref 0–0.7)
EOSINOPHIL NFR BLD AUTO: 2 %
ERYTHROCYTE [DISTWIDTH] IN BLOOD BY AUTOMATED COUNT: 13.3 % (ref 10–15)
HCT VFR BLD AUTO: 38.8 % (ref 40–53)
HGB BLD-MCNC: 13.3 G/DL (ref 13.3–17.7)
LYMPHOCYTES # BLD AUTO: 1 10E9/L (ref 0.8–5.3)
LYMPHOCYTES NFR BLD AUTO: 17 %
MCH RBC QN AUTO: 37.6 PG (ref 26.5–33)
MCHC RBC AUTO-ENTMCNC: 34.3 G/DL (ref 31.5–36.5)
MCV RBC AUTO: 110 FL (ref 78–100)
MONOCYTES # BLD AUTO: 0.4 10E9/L (ref 0–1.3)
MONOCYTES NFR BLD AUTO: 7 %
NEUTROPHILS # BLD AUTO: 4.1 10E9/L (ref 1.6–8.3)
NEUTROPHILS NFR BLD AUTO: 73 %
PLATELET # BLD AUTO: 64 10E9/L (ref 150–450)
RBC # BLD AUTO: 3.54 10E12/L (ref 4.4–5.9)
WBC # BLD AUTO: 5.7 10E9/L (ref 4–11)

## 2020-05-26 PROCEDURE — 36415 COLL VENOUS BLD VENIPUNCTURE: CPT | Performed by: INTERNAL MEDICINE

## 2020-05-26 PROCEDURE — 85025 COMPLETE CBC W/AUTO DIFF WBC: CPT | Performed by: INTERNAL MEDICINE

## 2020-06-02 ENCOUNTER — TELEPHONE (OUTPATIENT)
Dept: INTERNAL MEDICINE | Facility: CLINIC | Age: 80
End: 2020-06-02

## 2020-06-02 NOTE — TELEPHONE ENCOUNTER
Reason for Call:  Request for results:    Name of test or procedure: lab test(s)    Date of test of procedure: 05.19.2020    Location of the test or procedure: [place], [place]    OK to leave the result message on voice mail or with a family member? YES    Phone number Patient can be reached at:  Home number on file [phone] (home)    Additional comments: the patient wants to know what type of lab procedures were taken & why hasn't he received the results yet, why taking so long?    Call taken on 6/2/2020 at 1:24 PM by Tiana Novoa

## 2020-06-11 DIAGNOSIS — E03.9 HYPOTHYROIDISM, UNSPECIFIED TYPE: ICD-10-CM

## 2020-06-12 RX ORDER — LEVOTHYROXINE SODIUM 75 UG/1
TABLET ORAL
Qty: 90 TABLET | Refills: 0 | Status: SHIPPED | OUTPATIENT
Start: 2020-06-12 | End: 2020-10-12

## 2020-06-19 ENCOUNTER — TELEPHONE (OUTPATIENT)
Dept: INTERNAL MEDICINE | Facility: CLINIC | Age: 80
End: 2020-06-19

## 2020-06-19 DIAGNOSIS — E11.21 TYPE 2 DIABETES MELLITUS WITH DIABETIC NEPHROPATHY, WITHOUT LONG-TERM CURRENT USE OF INSULIN (H): ICD-10-CM

## 2020-06-19 DIAGNOSIS — R97.20 ELEVATED PROSTATE SPECIFIC ANTIGEN (PSA): Primary | ICD-10-CM

## 2020-06-19 DIAGNOSIS — E03.9 HYPOTHYROIDISM, UNSPECIFIED TYPE: ICD-10-CM

## 2020-06-19 DIAGNOSIS — R94.4 ABNORMAL RENAL FUNCTION TEST: ICD-10-CM

## 2020-06-19 NOTE — TELEPHONE ENCOUNTER
Patient called requesting lab results from 05/19/2020.  He also wants to get clarification of lab charges.     1) Routing message to PCP for lab review. Please  advice on results or follow up recommendations. Pt is requesting a call back with results today.     2) Patient was transferred to patient financial services for billing questions.

## 2020-06-22 ENCOUNTER — DOCUMENTATION ONLY (OUTPATIENT)
Dept: LAB | Facility: CLINIC | Age: 80
End: 2020-06-22

## 2020-06-22 DIAGNOSIS — D61.818 PANCYTOPENIA (H): Primary | ICD-10-CM

## 2020-06-23 ENCOUNTER — PATIENT OUTREACH (OUTPATIENT)
Dept: ONCOLOGY | Facility: CLINIC | Age: 80
End: 2020-06-23

## 2020-06-23 DIAGNOSIS — D61.818 PANCYTOPENIA (H): ICD-10-CM

## 2020-06-23 LAB
BASOPHILS # BLD AUTO: 0 10E9/L (ref 0–0.2)
BASOPHILS NFR BLD AUTO: 0.4 %
DIFFERENTIAL METHOD BLD: ABNORMAL
EOSINOPHIL # BLD AUTO: 0.1 10E9/L (ref 0–0.7)
EOSINOPHIL NFR BLD AUTO: 1.9 %
ERYTHROCYTE [DISTWIDTH] IN BLOOD BY AUTOMATED COUNT: 12.9 % (ref 10–15)
HCT VFR BLD AUTO: 38.6 % (ref 40–53)
HGB BLD-MCNC: 13.3 G/DL (ref 13.3–17.7)
IMM GRANULOCYTES # BLD: 0 10E9/L (ref 0–0.4)
IMM GRANULOCYTES NFR BLD: 0.2 %
LYMPHOCYTES # BLD AUTO: 1.2 10E9/L (ref 0.8–5.3)
LYMPHOCYTES NFR BLD AUTO: 25.6 %
MCH RBC QN AUTO: 37.5 PG (ref 26.5–33)
MCHC RBC AUTO-ENTMCNC: 34.5 G/DL (ref 31.5–36.5)
MCV RBC AUTO: 109 FL (ref 78–100)
MONOCYTES # BLD AUTO: 0.4 10E9/L (ref 0–1.3)
MONOCYTES NFR BLD AUTO: 7.9 %
NEUTROPHILS # BLD AUTO: 3.1 10E9/L (ref 1.6–8.3)
NEUTROPHILS NFR BLD AUTO: 64 %
PLATELET # BLD AUTO: 63 10E9/L (ref 150–450)
RBC # BLD AUTO: 3.55 10E12/L (ref 4.4–5.9)
WBC # BLD AUTO: 4.8 10E9/L (ref 4–11)

## 2020-06-23 PROCEDURE — 36415 COLL VENOUS BLD VENIPUNCTURE: CPT | Performed by: NURSE PRACTITIONER

## 2020-06-23 PROCEDURE — 85025 COMPLETE CBC W/AUTO DIFF WBC: CPT | Performed by: NURSE PRACTITIONER

## 2020-06-23 NOTE — TELEPHONE ENCOUNTER
Attempted to call patient, but connected and then call was dropped.  Writer was returning patient's call to review labs. Was going to provide following information:      If things are better then repeat labs 2 months later and follow up with me     If there is a precipitous drop then have it reviewed by Dr COREAS     If only minor changes then it can wait till I come back     Review of labs from 6/23, show they are stable.  Will attempt call back to patient at later time.

## 2020-06-23 NOTE — TELEPHONE ENCOUNTER
Tried to call pt to discuss most recent labs. JOHNATHAN RIOJAS on furlough this week and will not have access again to computer chart until late Wed night this week so LM that I will call pt again this Thursday AM to discuss further and plan to refer onto Urology given persistent elevated PSA compared to Dec though only minimally higher  compared to that lab

## 2020-06-25 DIAGNOSIS — E11.21 TYPE 2 DIABETES MELLITUS WITH DIABETIC NEPHROPATHY, WITHOUT LONG-TERM CURRENT USE OF INSULIN (H): ICD-10-CM

## 2020-06-25 RX ORDER — METFORMIN HCL 500 MG
TABLET, EXTENDED RELEASE 24 HR ORAL
Qty: 180 TABLET | Refills: 0 | Status: SHIPPED | OUTPATIENT
Start: 2020-06-25 | End: 2020-07-02

## 2020-06-25 NOTE — TELEPHONE ENCOUNTER
Contacted patient to give results - left message as instructed in previous note.  Advised patient to call scheduling for repeat lab and follow-up in two months.

## 2020-06-27 NOTE — TELEPHONE ENCOUNTER
Spoke with pt. C[place] him fishing on [place] and without anything to write with. Dicussed recent labs results and need to see Urology. Told him I will call him tomorrow AM at 9:00am before he goes out fishing again so he can write down Urology office numbers to call to make appt. States will be back in [place] in 9 days

## 2020-06-30 NOTE — TELEPHONE ENCOUNTER
RECORDS STATUS - ALL OTHER DIAGNOSIS      RECORDS RECEIVED FROM: [facility]   DATE RECEIVED: 7/8/2020    NOTES STATUS DETAILS   OFFICE NOTE from referring provider     OFFICE NOTE from medical oncologist Complete Epic- Thrombocytopenia and anemia    DISCHARGE SUMMARY from hospital N/A    DISCHARGE REPORT from the ER     OPERATIVE REPORT N/A    MEDICATION LIST Complete [facility]   CLINICAL TRIAL TREATMENTS TO DATE     LABS     PATHOLOGY REPORTS N/A    ANYTHING RELATED TO DIAGNOSIS Complete Labs last updated on 6/26/2020    GENONOMIC TESTING     TYPE:     IMAGING (NEED IMAGES & REPORT)     CT SCANS     MRI     MAMMO     ULTRASOUND     PET

## 2020-07-01 ENCOUNTER — TELEPHONE (OUTPATIENT)
Dept: INTERNAL MEDICINE | Facility: CLINIC | Age: 80
End: 2020-07-01

## 2020-07-01 NOTE — TELEPHONE ENCOUNTER
Pt just  had PSA  Lab in May and elevated. I spoke with him this past weekend and instructed him he needed to see Urology to discuss possible biopsy of prostate vs MRI prostate vs other. No repeat PSA  is needed prior to appt with Urology and I did not tell pt he needed a PSA repeated again before the appt. Pt to see Urology on 7/8/20 as scheduled with labs as already drawn. Inform pt

## 2020-07-01 NOTE — TELEPHONE ENCOUNTER
Reason for Call: Request for an order or referral:    Order or referral being requested: PSA lab    Date needed: as soon as possible    Has the patient been seen by the PCP for this problem? YES    Additional comments: Pt spoke with Dr Meraz by phone. Pt was to get a PSA test.  Pt made a lab appt at [place] for 7/6/20, but there isn't an order.  He also made an appt with a urologist for 7/8/20. He will need the lab results for that appt.  Please call pt back when order has been placed.    Phone number Patient can be reached at:  Home number on file [phone] (home)    Best Time:  anytime    Can we leave a detailed message on this number?  YES    Call taken on 7/1/2020 at 2:19 PM by MATTHIAS CUMMINGS

## 2020-07-01 NOTE — TELEPHONE ENCOUNTER
EV - See message below. PSA lab order pended, please sign if appropriate.    Thanks!  WING De Jesus

## 2020-07-02 ENCOUNTER — VIRTUAL VISIT (OUTPATIENT)
Dept: INTERNAL MEDICINE | Facility: CLINIC | Age: 80
End: 2020-07-02
Payer: MEDICARE

## 2020-07-02 DIAGNOSIS — R97.20 ELEVATED PROSTATE SPECIFIC ANTIGEN (PSA): ICD-10-CM

## 2020-07-02 DIAGNOSIS — E03.9 HYPOTHYROIDISM, UNSPECIFIED TYPE: ICD-10-CM

## 2020-07-02 DIAGNOSIS — E11.21 TYPE 2 DIABETES MELLITUS WITH DIABETIC NEPHROPATHY, WITHOUT LONG-TERM CURRENT USE OF INSULIN (H): Primary | ICD-10-CM

## 2020-07-02 DIAGNOSIS — R19.5 LOOSE STOOLS: ICD-10-CM

## 2020-07-02 DIAGNOSIS — N18.30 CKD (CHRONIC KIDNEY DISEASE) STAGE 3, GFR 30-59 ML/MIN (H): ICD-10-CM

## 2020-07-02 PROCEDURE — 99442 ZZC PHYSICIAN TELEPHONE EVALUATION 11-20 MIN: CPT | Performed by: INTERNAL MEDICINE

## 2020-07-02 NOTE — PROGRESS NOTES
"Prakash Cramer is a 80 year old male who is being evaluated via a billable telephone visit.      The patient has been notified of following:     \"This telephone visit will be conducted via a call between you and your physician/provider. We have found that certain health care needs can be provided without the need for a physical exam.  This service lets us provide the care you need with a short phone conversation.  If a prescription is necessary we can send it directly to your pharmacy.  If lab work is needed we can place an order for that and you can then stop by our lab to have the test done at a later time.    Telephone visits are billed at different rates depending on your insurance coverage. During this emergency period, for some insurers they may be billed the same as an in-person visit.  Please reach out to your insurance provider with any questions.    If during the course of the call the physician/provider feels a telephone visit is not appropriate, you will not be charged for this service.\"    Patient has given verbal consent for Telephone visit?  Yes    What phone number would you like to be contacted at? [phone]    How would you like to obtain your AVS? Mail a copy    Subjective     Prakash Cramer is a 80 year old male who presents via phone visit today for the following health issues:    HPI  Diabetes Follow-up    How often are you checking your blood sugar? Daily or every other day. Checking in AM only  What time of day are you checking your blood sugars (select all that apply)?  Before meals  Have you had any blood sugars above 200?  No  Have you had any blood sugars below 70?  No    What symptoms do you notice when your blood sugar is low?  None    What concerns do you have today about your diabetes? None     Do you have any of these symptoms? (Select all that apply)  No numbness or tingling in feet.  No redness, sores or blisters on feet.  No complaints of excessive thirst.  No reports of " blurry vision.  No significant changes to weight.    Have you had a diabetic eye exam in the last 12 months? No                Hyperlipidemia Follow-Up      Are you regularly taking any medication or supplement to lower your cholesterol?   No    Are you having muscle aches or other side effects that you think could be caused by your cholesterol lowering medication? N/A     Hypertension Follow-up      Do you check your blood pressure regularly outside of the clinic? Yes     Are you following a low salt diet? No    Are your blood pressures ever more than 140 on the top number (systolic) OR more   than 90 on the bottom number (diastolic), for example 140/90? No    BP Readings from Last 2 Encounters:   12/20/19 134/78   11/05/19 (!) 153/80     Hemoglobin A1C (%)   Date Value   05/05/2020 6.7 (H)   12/12/2019 6.9 (H)     LDL Cholesterol Calculated (mg/dL)   Date Value   05/19/2020 140 (H)   12/12/2019 138 (H)       Hypothyroidism Follow-up      Since last visit, patient describes the following symptoms: Weight stable, no hair loss, no skin changes, no constipation, no loose stools      How many servings of fruits and vegetables do you eat daily?  2-3    On average, how many sweetened beverages do you drink each day (Examples: soda, juice, sweet tea, etc.  Do NOT count diet or artificially sweetened beverages)?   0    How many days per week do you exercise enough to make your heart beat faster? 5    How many minutes a day do you exercise enough to make your heart beat faster? 30 - 60-Walking 35mins    How many days per week do you miss taking your medication? 0       Due to the current impact of the Covid19 virus and recommendations by [facility] administration, CDC, etc to limit  clinic visits and pt exposure risk, was recommend pt proceed with virtual phone visit to address acute/stable  medical needs with plan to defer other non-acute health maintenance issues/exam to a future date when less self-isolation is required.    I  have reviewed the nursing note as documented above.   See below for other information/data  and my personal notes capturing the substance of my conversation with the patient.       HPI:    Blood sugars 130-150 in AM. Higher if snacks before bedtime   Occ loose stool.  Has BM 2x/day. Using Immodium daily. No blood in stools   Worse with spicy foods. Moderate fruit intake , Mod carb intake    Sx had been better for awhile when changed from IR metformin to ER Metformin but present again now  Denies chest pain, shortness of breath, abd pain       Lab Results   Component Value Date     05/05/2020     Lab Results   Component Value Date    A1C 6.7 05/05/2020     Lab Results   Component Value Date    CHOL 258 05/19/2020     Lab Results   Component Value Date     05/19/2020     Lab Results   Component Value Date    HDL 55 05/19/2020     Lab Results   Component Value Date    TRIG 316 05/19/2020     Lab Results   Component Value Date    CR 1.39 05/05/2020     Lab Results   Component Value Date    ALT 31 05/05/2020     Lab Results   Component Value Date    AST 19 05/05/2020     Lab Results   Component Value Date    MICROL 45 05/15/2019     Lab Results   Component Value Date    TSH 5.72 05/05/2020       Additional ROS:   Constitutional, HEENT, Cardiovascular, Pulmonary, GI and , Neuro, MSK and Psych review of systems/symptoms are otherwise negative or unchanged from previous, except as noted above.      ASSESSMENT:   1. Type 2 diabetes mellitus with diabetic nephropathy, without long-term current use of insulin (H)  Controlled.  However possibly having loose stool side effects with metformin.  Patient will stop metformin for now and call update to nurse line in 2 weeks regarding symptoms.  Further medication treatment options will be based on whether metformin contributing to loose stools.  Continue other medication    2. Loose stools  ?  Related to metformin versus other.  See above regarding trial off of  metformin.  Colonoscopy in [place] this past winter unremarkable.  Will pursue further work-up as necessary based on response off of metformin    3. Hypothyroidism, unspecified type  Recent levothyroxine dose increase.  Patient will have recheck TSH early next week as scheduled    4. CKD (chronic kidney disease) stage 3, GFR 30-59 ml/min (H)  Mild recent worsening.  Due for BMP recheck.  Labs next week as previously ordered    5. Elevated prostate specific antigen (PSA)  Appointment with urology scheduled for next week to discuss possible MRI prostate, bx, etc. Last rectal exam Dec 2019 with 1+ enlarged prostate but no  Nodule palpable at that time. Had planned for MRI/bx arrangements in [place] then but pt did not have done. Repeat PSA now still elevated but minimally increased compared to Dec 2019      PLAN:   Stop Metformin to see if causing loose stools   Pt to call nurseline [phone] in 1 week with update re: bowel movements   Labs 7/6/20 as scheduled  See Urology 7/8/20 as scheduled  Continue other medications    Phone call contact time  Call Started at 9:53am  Call Ended at 10:10am  Total minutes: 17 min    (Chart documentation was completed, in part, with [employer] voice-recognition software. Even though reviewed, some grammatical, spelling, and word errors may remain.)    Zhou Meraz MD  Internal Medicine Department  [facility] (formerly [facility])[3]

## 2020-07-02 NOTE — TELEPHONE ENCOUNTER
To clarify for pt, pt DOES need to have repeat thyroid and kidney labs done and they should be drawn 7/6/20 with scheduled lab appt at [place] still. He just does not need a PSA lab also done since that as recently done. Pt to keep lab appt as scheduled. Inform pt  to be sure clear

## 2020-07-02 NOTE — TELEPHONE ENCOUNTER
Spoke with pt again 6/28/20 and reviewed need further for Urology  appt to discuss prostate bx vs MRI prostate vs other. Also that pt should  have f/u NF labs done. Orders are in chart and pt has lab appt scheduled for 7/6/20 at  and also has urology appt scheduled for 7/8/20 with Dr Brannon

## 2020-07-03 NOTE — PATIENT INSTRUCTIONS
Stop Metformin to see if causing loose stools   Pt to call nurseline [phone] in 1 week with update re: bowel movements   Labs 7/6/20 as scheduled  See Urology 7/8/20 as scheduled  Continue other medications

## 2020-07-06 DIAGNOSIS — R94.4 ABNORMAL RENAL FUNCTION TEST: ICD-10-CM

## 2020-07-06 DIAGNOSIS — E11.21 TYPE 2 DIABETES MELLITUS WITH DIABETIC NEPHROPATHY, WITHOUT LONG-TERM CURRENT USE OF INSULIN (H): ICD-10-CM

## 2020-07-06 DIAGNOSIS — E03.9 HYPOTHYROIDISM, UNSPECIFIED TYPE: ICD-10-CM

## 2020-07-06 LAB
ANION GAP SERPL CALCULATED.3IONS-SCNC: 7 MMOL/L (ref 3–14)
BUN SERPL-MCNC: 21 MG/DL (ref 7–30)
CALCIUM SERPL-MCNC: 8.6 MG/DL (ref 8.5–10.1)
CHLORIDE SERPL-SCNC: 104 MMOL/L (ref 94–109)
CO2 SERPL-SCNC: 26 MMOL/L (ref 20–32)
CREAT SERPL-MCNC: 1.29 MG/DL (ref 0.66–1.25)
GFR SERPL CREATININE-BSD FRML MDRD: 52 ML/MIN/{1.73_M2}
GLUCOSE SERPL-MCNC: 287 MG/DL (ref 70–99)
POTASSIUM SERPL-SCNC: 4.5 MMOL/L (ref 3.4–5.3)
SODIUM SERPL-SCNC: 137 MMOL/L (ref 133–144)
T4 FREE SERPL-MCNC: 1.28 NG/DL (ref 0.76–1.46)
TSH SERPL DL<=0.005 MIU/L-ACNC: 5.86 MU/L (ref 0.4–4)

## 2020-07-06 PROCEDURE — 80048 BASIC METABOLIC PNL TOTAL CA: CPT | Performed by: INTERNAL MEDICINE

## 2020-07-06 PROCEDURE — 84439 ASSAY OF FREE THYROXINE: CPT | Performed by: INTERNAL MEDICINE

## 2020-07-06 PROCEDURE — 84443 ASSAY THYROID STIM HORMONE: CPT | Performed by: INTERNAL MEDICINE

## 2020-07-06 PROCEDURE — 36415 COLL VENOUS BLD VENIPUNCTURE: CPT | Performed by: INTERNAL MEDICINE

## 2020-07-08 ENCOUNTER — VIRTUAL VISIT (OUTPATIENT)
Dept: ONCOLOGY | Facility: CLINIC | Age: 80
End: 2020-07-08
Attending: UROLOGY
Payer: MEDICARE

## 2020-07-08 ENCOUNTER — TELEPHONE (OUTPATIENT)
Dept: INTERNAL MEDICINE | Facility: CLINIC | Age: 80
End: 2020-07-08

## 2020-07-08 ENCOUNTER — PRE VISIT (OUTPATIENT)
Dept: ONCOLOGY | Facility: CLINIC | Age: 80
End: 2020-07-08

## 2020-07-08 DIAGNOSIS — R97.20 ELEVATED PROSTATE SPECIFIC ANTIGEN (PSA): Primary | ICD-10-CM

## 2020-07-08 PROCEDURE — 99443 ZZC PHYSICIAN TELEPHONE EVALUATION 21-30 MIN: CPT | Performed by: UROLOGY

## 2020-07-08 PROCEDURE — 40001009 ZZH VIDEO/TELEPHONE VISIT; NO CHARGE

## 2020-07-08 NOTE — PROGRESS NOTES
"Prakash Cramer is a 80 year old male who is being evaluated via a billable telephone visit.      The patient has been notified of following:     \"This telephone visit will be conducted via a call between you and your physician/provider. We have found that certain health care needs can be provided without the need for a physical exam.  This service lets us provide the care you need with a short phone conversation.  If a prescription is necessary we can send it directly to your pharmacy.  If lab work is needed we can place an order for that and you can then stop by our lab to have the test done at a later time.    Telephone visits are billed at different rates depending on your insurance coverage. During this emergency period, for some insurers they may be billed the same as an in-person visit.  Please reach out to your insurance provider with any questions.    If during the course of the call the physician/provider feels a telephone visit is not appropriate, you will not be charged for this service.\"    Patient has given verbal consent for Telephone visit?  Yes     What phone number would you like to be contacted at? [phone]      How would you like to obtain your AVS? Magno          Chief Complaint:   Elevated PSA         History of Present Illness:    Prakash Cramer is a very pleasant 80 year old male who presents with a history of Elevated PSA (Prostate Specific Antigen).  This was noted on routine labs in December 2019 at which time his PSA was 16.2. This was rechecked in May 2020 and found to be 16.4 with free PSA of 9%. Given this finding, was referred for evaluation. He has no significant urologic history. Had not had PSA checked for many years and had not previously been elevated. He has mild urinary symptoms at baseline, including urgency/frequency. Nocturia x 3. No hematuria or dysuria. Tried tamsulosin in the past but did not help. Has been dealing with thrombocytopenia and following with " hematology.    PSA 16.4 (9% free) May 2020  16.2 in 12/2019          Past Medical History:     Past Medical History:   Diagnosis Date     Aplastic anemia (H) 03/26/2018    thought secondary to reaction to Septra     BPH (benign prostatic hypertrophy)     failed  Flomax     C. difficile diarrhea 03/26/2018    treated with Flagyl     Cellulitis and abscess of leg, except foot 2004     Contact dermatitis and other eczema, due to unspecified cause      Diaphragmatic hernia without mention of obstruction or gangrene     hiatal hernia     Esophageal reflux      Hyperlipidemia LDL goal <100 3/11/2005     Hypothyroidism      Impotence of organic origin      Meningococcal encephalitis      Proteinuria      RBBB      Type 2 diabetes mellitus with diabetic nephropathy  (goal A1C<7) 10/24/2015     Unspecified essential hypertension           Past Surgical History:     Past Surgical History:   Procedure Laterality Date     ARTHROPLASTY KNEE Left 3/18/2015    Procedure: ARTHROPLASTY KNEE;  Surgeon: Abebe Schroeder MD;  Location: [facility] OR     ARTHROPLASTY KNEE Right 3/14/2016    Procedure: ARTHROPLASTY KNEE;  Surgeon: Abebe Schroeder MD;  Location: [facility] OR     C NONSPECIFIC PROCEDURE  10/02    left knee meniscus tear repair     HC LAPAROSCOPY, SURGICAL; CHOLECYSTECTOMY  1998    Cholecystectomy, Laparoscopic     HC REMOVE TONSILS/ADENOIDS,13+ Y/O  1963    T & A 13+y.o.          Medications     Current Outpatient Medications   Medication     amLODIPine (NORVASC) 10 MG tablet     aspirin 81 MG tablet     blood glucose (ACCU-CHEK SMARTVIEW) test strip     blood glucose monitoring (ACCU-CHEK FASTCLIX) lancets     blood glucose monitoring (ACCU-CHEK MULTICLIX) lancets     Blood Glucose Monitoring Suppl MAGDALENO     Cyanocobalamin (B-12) 1000 MCG TBCR     EUTHYROX 75 MCG tablet     famotidine (PEPCID) 20 MG tablet     levothyroxine (SYNTHROID/LEVOTHROID) 88 MCG tablet     psyllium (METAMUCIL/KONSYL) capsule     sitagliptin (JANUVIA) 100 MG  "tablet     STATIN NOT PRESCRIBED (INTENTIONAL)     No current facility-administered medications for this visit.           Family History:     Family History   Problem Relation Age of Onset     Family History Negative Mother         d:age 89 of \"old age\"     Gastrointestinal Disease Father         d:age 79 liver failure after taking excessive amounts of Tylenol over 5-6 yrs     Neurologic Disorder Brother         b:1932  hx cerebral aneurysm age 59     No known family history of  malignancy.         Social History:     Social History     Socioeconomic History     Marital status:      Spouse name: Not on file     Number of children: 2     Years of education: Not on file     Highest education level: Not on file   Occupational History     Occupation: teacher     Employer: [employer]   Social Needs     Financial resource strain: Not on file     Food insecurity     Worry: Not on file     Inability: Not on file     Transportation needs     Medical: Not on file     Non-medical: Not on file   Tobacco Use     Smoking status: Never Smoker     Smokeless tobacco: Never Used   Substance and Sexual Activity     Alcohol use: Yes     Comment: beer 4-5 a month     Drug use: No     Sexual activity: Yes     Partners: Female   Lifestyle     Physical activity     Days per week: Not on file     Minutes per session: Not on file     Stress: Not on file   Relationships     Social connections     Talks on phone: Not on file     Gets together: Not on file     Attends Spiritism service: Not on file     Active member of club or organization: Not on file     Attends meetings of clubs or organizations: Not on file     Relationship status: Not on file     Intimate partner violence     Fear of current or ex partner: Not on file     Emotionally abused: Not on file     Physically abused: Not on file     Forced sexual activity: Not on file   Other Topics Concern     Parent/sibling w/ CABG, MI or angioplasty before 65F 55M? Not Asked "      Service Not Asked     Blood Transfusions Not Asked     Caffeine Concern Not Asked     Occupational Exposure Not Asked     Hobby Hazards Not Asked     Sleep Concern Not Asked     Stress Concern Not Asked     Weight Concern Not Asked     Special Diet Yes     Comment: diabetic diet      Back Care Not Asked     Exercise No     Comment: nothing regular since bilateral knee surgeries      Bike Helmet Not Asked     Seat Belt Not Asked     Self-Exams Not Asked   Social History Narrative     Not on file     Retired shop [employer]         Allergies:   Atorvastatin calcium; Crestor [rosuvastatin]; Diovan [valsartan]; Glimepiride; Invokana [canagliflozin]; Mold; Penicillins; and Seasonal allergies         Review of Systems:  From intake questionnaire     Skin: negative  Eyes: negative  Ears/Nose/Throat: negative  Respiratory: No shortness of breath, dyspnea on exertion, cough, or hemoptysis  Cardiovascular: No chest pain or palpitations  Gastrointestinal: negative; no nausea/vomiting, constipation or diarrhea  Genitourinary: as per HPI  Musculoskeletal: negative  Neurologic: negative  Psychiatric: negative  Hematologic/Lymphatic/Immunologic: negative  Endocrine: negative      Labs and Pathology:    I reviewed all applicable laboratory and pathology data and went over findings with patient  Significant for   Lab Results   Component Value Date    CR 1.29 07/06/2020    CR 1.39 05/05/2020    CR 1.34 11/05/2019    CR 1.19 08/22/2019    CR 1.21 05/15/2019    CR 1.2 03/11/2019    CR 1.22 12/04/2018    CR 1.20 10/23/2018    CR 1.07 08/21/2018    CR 1.14 07/24/2018     PSA   Date Value Ref Range Status   12/12/2019 16.20 (H) 0 - 4 ug/L Final     Comment:     Assay Method:  Chemiluminescence using Siemens Vista analyzer   09/02/2008 1.78 0 - 4 ug/L Final   01/02/2008 2.56 0 - 4 ug/L Final   11/13/2002 1.3 0 - 4 ug/L Final       Outside and Past Medical records:    I spent 10 minutes reviewing outside and past medical  records.         Assessment and Plan:     Assessment: 80 year old male with elevated PSA to 16.20. We had a long discussion about the utility of PSA screening.  We talked about the Pros and Cons.  We discussed the findings of the PLCO and EORTC studies.  We discussed the results of the [place] trial of treatment vs. observation in clinically detected prostate cancer as well as the results of the PIVOT trial in the context of screen-detected cancer.  We discussed the recommendations by the AUA, and USPSTF. We also discussed the significant (2-6%) and rising risk of biopsy associated sepsis.    We also discussed the emerging role of MRI in the diagnosis of prostate cancer and the potential for performing MRI-Ultrasound Fusion biopsy if suspicious lesions are noted. At this point, he would like to proceed with an MRI to assess the prostate for possible suspicious lesions. Pending these findings, will discuss possible biopsy.    Plan:  MRI prostate and clinic visit to follow    Orders  Orders Placed This Encounter   Procedures     MR Prostate wo & w Contrast     Phone call duration: 25 minutes    This telephone visit served as a new patient encounter.    Abebe Brannon MD  Urology  [facility]

## 2020-07-08 NOTE — LETTER
"    7/8/2020         RE: Prakash Cramer  [address]  [place] [address]      Prakash Cramer is a 80 year old male who is being evaluated via a billable telephone visit.      The patient has been notified of following:     \"This telephone visit will be conducted via a call between you and your physician/provider. We have found that certain health care needs can be provided without the need for a physical exam.  This service lets us provide the care you need with a short phone conversation.  If a prescription is necessary we can send it directly to your pharmacy.  If lab work is needed we can place an order for that and you can then stop by our lab to have the test done at a later time.    Telephone visits are billed at different rates depending on your insurance coverage. During this emergency period, for some insurers they may be billed the same as an in-person visit.  Please reach out to your insurance provider with any questions.    If during the course of the call the physician/provider feels a telephone visit is not appropriate, you will not be charged for this service.\"    Patient has given verbal consent for Telephone visit?  Yes     What phone number would you like to be contacted at? [phone]      How would you like to obtain your AVS? Magno          Chief Complaint:   Elevated PSA         History of Present Illness:    Prakash Cramer is a very pleasant 80 year old male who presents with a history of Elevated PSA (Prostate Specific Antigen).  This was noted on routine labs in December 2019 at which time his PSA was 16.2. This was rechecked in May 2020 and found to be 16.4 with free PSA of 9%. Given this finding, was referred for evaluation. He has no significant urologic history. Had not had PSA checked for many years and had not previously been elevated. He has mild urinary symptoms at baseline, including urgency/frequency. Nocturia x 3. No hematuria or dysuria. Tried tamsulosin in the past " but did not help. Has been dealing with thrombocytopenia and following with hematology.    PSA 16.4 (9% free) May 2020  16.2 in 12/2019          Past Medical History:     Past Medical History:   Diagnosis Date     Aplastic anemia (H) 03/26/2018    thought secondary to reaction to Septra     BPH (benign prostatic hypertrophy)     failed  Flomax     C. difficile diarrhea 03/26/2018    treated with Flagyl     Cellulitis and abscess of leg, except foot 2004     Contact dermatitis and other eczema, due to unspecified cause      Diaphragmatic hernia without mention of obstruction or gangrene     hiatal hernia     Esophageal reflux      Hyperlipidemia LDL goal <100 3/11/2005     Hypothyroidism      Impotence of organic origin      Meningococcal encephalitis      Proteinuria      RBBB      Type 2 diabetes mellitus with diabetic nephropathy  (goal A1C<7) 10/24/2015     Unspecified essential hypertension           Past Surgical History:     Past Surgical History:   Procedure Laterality Date     ARTHROPLASTY KNEE Left 3/18/2015    Procedure: ARTHROPLASTY KNEE;  Surgeon: Abebe Schroeder MD;  Location: [facility]     ARTHROPLASTY KNEE Right 3/14/2016    Procedure: ARTHROPLASTY KNEE;  Surgeon: Abebe Schroeder MD;  Location: [facility] OR     C NONSPECIFIC PROCEDURE  10/02    left knee meniscus tear repair     HC LAPAROSCOPY, SURGICAL; CHOLECYSTECTOMY  1998    Cholecystectomy, Laparoscopic     HC REMOVE TONSILS/ADENOIDS,13+ Y/O  1963    T & A 13+y.o.          Medications     Current Outpatient Medications   Medication     amLODIPine (NORVASC) 10 MG tablet     aspirin 81 MG tablet     blood glucose (ACCU-CHEK SMARTVIEW) test strip     blood glucose monitoring (ACCU-CHEK FASTCLIX) lancets     blood glucose monitoring (ACCU-CHEK MULTICLIX) lancets     Blood Glucose Monitoring Suppl MAGDALENO     Cyanocobalamin (B-12) 1000 MCG TBCR     EUTHYROX 75 MCG tablet     famotidine (PEPCID) 20 MG tablet     levothyroxine (SYNTHROID/LEVOTHROID) 88 MCG tablet  "    psyllium (METAMUCIL/KONSYL) capsule     sitagliptin (JANUVIA) 100 MG tablet     STATIN NOT PRESCRIBED (INTENTIONAL)     No current facility-administered medications for this visit.           Family History:     Family History   Problem Relation Age of Onset     Family History Negative Mother         d:age 89 of \"old age\"     Gastrointestinal Disease Father         d:age 79 liver failure after taking excessive amounts of Tylenol over 5-6 yrs     Neurologic Disorder Brother         b:1932  hx cerebral aneurysm age 59     No known family history of  malignancy.         Social History:     Social History     Socioeconomic History     Marital status:      Spouse name: Not on file     Number of children: 2     Years of education: Not on file     Highest education level: Not on file   Occupational History     Occupation: teacher     Employer: [employer]   Social Needs     Financial resource strain: Not on file     Food insecurity     Worry: Not on file     Inability: Not on file     Transportation needs     Medical: Not on file     Non-medical: Not on file   Tobacco Use     Smoking status: Never Smoker     Smokeless tobacco: Never Used   Substance and Sexual Activity     Alcohol use: Yes     Comment: beer 4-5 a month     Drug use: No     Sexual activity: Yes     Partners: Female   Lifestyle     Physical activity     Days per week: Not on file     Minutes per session: Not on file     Stress: Not on file   Relationships     Social connections     Talks on phone: Not on file     Gets together: Not on file     Attends [employer] service: Not on file     Active member of club or organization: Not on file     Attends meetings of clubs or organizations: Not on file     Relationship status: Not on file     Intimate partner violence     Fear of current or ex partner: Not on file     Emotionally abused: Not on file     Physically abused: Not on file     Forced sexual activity: Not on file   Other Topics Concern "     Parent/sibling w/ CABG, MI or angioplasty before 65F 55M? Not Asked      Service Not Asked     Blood Transfusions Not Asked     Caffeine Concern Not Asked     Occupational Exposure Not Asked     Hobby Hazards Not Asked     Sleep Concern Not Asked     Stress Concern Not Asked     Weight Concern Not Asked     Special Diet Yes     Comment: diabetic diet      Back Care Not Asked     Exercise No     Comment: nothing regular since bilateral knee surgeries      Bike Helmet Not Asked     Seat Belt Not Asked     Self-Exams Not Asked   Social History Narrative     Not on file     Retired shop [employer]         Allergies:   Atorvastatin calcium; Crestor [rosuvastatin]; Diovan [valsartan]; Glimepiride; Invokana [canagliflozin]; Mold; Penicillins; and Seasonal allergies         Review of Systems:  From intake questionnaire     Skin: negative  Eyes: negative  Ears/Nose/Throat: negative  Respiratory: No shortness of breath, dyspnea on exertion, cough, or hemoptysis  Cardiovascular: No chest pain or palpitations  Gastrointestinal: negative; no nausea/vomiting, constipation or diarrhea  Genitourinary: as per HPI  Musculoskeletal: negative  Neurologic: negative  Psychiatric: negative  Hematologic/Lymphatic/Immunologic: negative  Endocrine: negative      Labs and Pathology:    I reviewed all applicable laboratory and pathology data and went over findings with patient  Significant for   Lab Results   Component Value Date    CR 1.29 07/06/2020    CR 1.39 05/05/2020    CR 1.34 11/05/2019    CR 1.19 08/22/2019    CR 1.21 05/15/2019    CR 1.2 03/11/2019    CR 1.22 12/04/2018    CR 1.20 10/23/2018    CR 1.07 08/21/2018    CR 1.14 07/24/2018     PSA   Date Value Ref Range Status   12/12/2019 16.20 (H) 0 - 4 ug/L Final     Comment:     Assay Method:  Chemiluminescence using Siemens Vista analyzer   09/02/2008 1.78 0 - 4 ug/L Final   01/02/2008 2.56 0 - 4 ug/L Final   11/13/2002 1.3 0 - 4 ug/L Final       Outside and Past Medical  records:    I spent 10 minutes reviewing outside and past medical records.         Assessment and Plan:     Assessment: 80 year old male with elevated PSA to 16.20. We had a long discussion about the utility of PSA screening.  We talked about the Pros and Cons.  We discussed the findings of the PLCO and EORTC studies.  We discussed the results of the [place] trial of treatment vs. observation in clinically detected prostate cancer as well as the results of the PIVOT trial in the context of screen-detected cancer.  We discussed the recommendations by the AUA, and USPSTF. We also discussed the significant (2-6%) and rising risk of biopsy associated sepsis.    We also discussed the emerging role of MRI in the diagnosis of prostate cancer and the potential for performing MRI-Ultrasound Fusion biopsy if suspicious lesions are noted. At this point, he would like to proceed with an MRI to assess the prostate for possible suspicious lesions. Pending these findings, will discuss possible biopsy.    Plan:  MRI prostate and clinic visit to follow    Orders  Orders Placed This Encounter   Procedures     MR Prostate wo & w Contrast     Phone call duration: 25 minutes    This telephone visit served as a new patient encounter.    Abebe Brannon MD  Urology  [facility]          Abebe Brannon MD

## 2020-07-08 NOTE — TELEPHONE ENCOUNTER
Patient called wanting to talk to dr wesley about his metformin and his stools are not normal and he is willing to go back on statins again please call patient regarding this phone [phone]

## 2020-07-13 ENCOUNTER — VIRTUAL VISIT (OUTPATIENT)
Dept: INTERNAL MEDICINE | Facility: CLINIC | Age: 80
End: 2020-07-13
Payer: MEDICARE

## 2020-07-13 VITALS — BODY MASS INDEX: 26.31 KG/M2 | WEIGHT: 168 LBS

## 2020-07-13 DIAGNOSIS — E11.21 TYPE 2 DIABETES MELLITUS WITH DIABETIC NEPHROPATHY, WITHOUT LONG-TERM CURRENT USE OF INSULIN (H): ICD-10-CM

## 2020-07-13 DIAGNOSIS — E03.9 HYPOTHYROIDISM, UNSPECIFIED TYPE: ICD-10-CM

## 2020-07-13 DIAGNOSIS — D69.6 THROMBOCYTOPENIA (H): ICD-10-CM

## 2020-07-13 DIAGNOSIS — R97.20 ELEVATED PROSTATE SPECIFIC ANTIGEN (PSA): ICD-10-CM

## 2020-07-13 DIAGNOSIS — E78.5 HYPERLIPIDEMIA LDL GOAL <100: ICD-10-CM

## 2020-07-13 PROCEDURE — 99442 ZZC PHYSICIAN TELEPHONE EVALUATION 11-20 MIN: CPT | Performed by: INTERNAL MEDICINE

## 2020-07-13 ASSESSMENT — PATIENT HEALTH QUESTIONNAIRE - PHQ9: SUM OF ALL RESPONSES TO PHQ QUESTIONS 1-9: 1

## 2020-07-13 NOTE — PROGRESS NOTES
"Prakash Cramer is a 80 year old male who is being evaluated via a billable telephone visit.      The patient has been notified of following:     \"This telephone visit will be conducted via a call between you and your physician/provider. We have found that certain health care needs can be provided without the need for a physical exam.  This service lets us provide the care you need with a short phone conversation.  If a prescription is necessary we can send it directly to your pharmacy.  If lab work is needed we can place an order for that and you can then stop by our lab to have the test done at a later time.    Telephone visits are billed at different rates depending on your insurance coverage. During this emergency period, for some insurers they may be billed the same as an in-person visit.  Please reach out to your insurance provider with any questions.    If during the course of the call the physician/provider feels a telephone visit is not appropriate, you will not be charged for this service.\"    Patient has given verbal consent for Telephone visit?  Yes    What phone number would you like to be contacted at? [phone]    How would you like to obtain your AVS? Magno Carlton     Prakash Cramer is a 80 year old male who presents via phone visit today for the following health issues:    HPI  Went off metformin and now the diarrhea has gone away.  Possibly going on a cholesterol medication  MRI scheduled because he has a high PSA      Due to the current impact of the Covid19 virus and recommendations by [facility], CDC, etc to limit  clinic visits and pt exposure risk, was recommend pt proceed with virtual phone visit to address acute/stable  medical needs with plan to defer other non-acute health maintenance issues/exam to a future date when less self-isolation is required.    I have reviewed the nursing note as documented above.   See below for other information/data  and my personal notes " capturing the substance of my conversation with the patient.       HPI:    On Januvia. Stopped Metformin 8 days ago and diarrhea resolved. Hx BM suppression with previous use of sufonylurea med (Glimepiride).   No hx CHF so possible candidate for Actos trial at lower dose. Patient not candidate for meds like Invokana with  CKD.  Though not a specific contraindication to using injection therapy like Victoza or cousin in pts with CKD, would worry about potential effect on oral intake which could thereby worsen GFR by other means.  Since going off of metformin, patient states blood sugars often about 200 in the morning and will rise further up to about 260 later in the day.  Denies chest pain, shortness of breath, abdominal pain.  Has seen urology and has an MRI of the prostate scheduled.  History of intolerance to previous statin trials.  Patient now willing to try Zetia to see if it can control lipids.  Discussed  Option of  starting once a day long-acting insulin to bring blood sugars down since fairly consistent through the day.  He is hesitant to do this.  Explained to the patient that there are not other options besides insulin except for Actos option. Labs below reflect A1C when pt was on Metformin and Januvia  In Jan 2020, pt was instructed to increase levothyroxine to 88mcg daily but review of med list shows that pt had requested a refill dose of 75mcg tabs instead that was approved so TSH drawn below on 7/6/20 reflects lower dose than should be using which would explain elevated TSH    Lab Results   Component Value Date     07/06/2020     Lab Results   Component Value Date    A1C 6.7 05/05/2020     Lab Results   Component Value Date    CHOL 258 05/19/2020     Lab Results   Component Value Date     05/19/2020     Lab Results   Component Value Date    HDL 55 05/19/2020     Lab Results   Component Value Date    TRIG 316 05/19/2020     Lab Results   Component Value Date    CR 1.29 07/06/2020     Lab  Results   Component Value Date    ALT 31 05/05/2020     Lab Results   Component Value Date    AST 19 05/05/2020     Lab Results   Component Value Date    MICROL 45 05/15/2019     Lab Results   Component Value Date    TSH 5.86 07/06/2020          Additional ROS:   Constitutional, HEENT, Cardiovascular, Pulmonary, GI and , Neuro, MSK and Psych review of systems/symptoms are otherwise negative or unchanged from previous, except as noted above.      ASSESSMENT:   1. Type 2 diabetes mellitus with diabetic nephropathy, without long-term current use of insulin (H)   Prior A1C at goal but now not controlled off of Metformin. Pt hesitant to try insulin. Will therefore do trial of Actos as long as pt not having edema/ SOB amd sugars improving. WIll update me re: in a couple weeks. If not better, will refer to DM Ed for insulin teaching  - pioglitazone (ACTOS) 15 MG tablet; Take 1 tablet (15 mg) by mouth daily  Dispense: 30 tablet; Refill: 11  - Hemoglobin A1c; Future  - Comprehensive metabolic panel; Future    2. Hyperlipidemia LDL goal <100  Uncontrolled. INtoelrant of statins. Will try Zetia. Future lab  As ordered  - ezetimibe (ZETIA) 10 MG tablet; Take 1 tablet (10 mg) by mouth daily  Dispense: 30 tablet; Refill: 11  - Comprehensive metabolic panel; Future  - Lipid panel reflex to direct LDL Fasting; Future    3. Thrombocytopenia (H)  Moderately stable. Denies bruising issues now. Followed by oncology. Will repeat lab in 6 weeks  - CBC with platelets; Future    4. Elevated prostate specific antigen (PSA)  Seeing Urology. Will await results of prostate MRI and further consultation from Dr Brannon    5. Hypothyroidism, unspecified type  Pt incorrectly taking 75mcg tab now. Will stop it again and have pt take Levothyroxine 88mcg daily and recheck 6 weeks  - TSH with free T4 reflex; Future  - levothyroxine (SYNTHROID/LEVOTHROID) 88 MCG tablet; Take 1 tablet (88 mcg) by mouth daily  Dispense: 90 tablet; Refill: 3      PLAN:    Stop Levothroxine 75mcg tabs and restart levothyroxine 88 mcg tablet, 1 tablet daily in the morning for thyroid   Zetia 10 mg tablet, 1 tablet daily for cholesterol management   Actos 15 mg tablet, 1 tablet daily for diabetes.  Continue Januvia   In 2 weeks, check blood sugars before breakfast and bedtime for 4 days and send results in a [employer] message.  Also weigh her self at home today and in 2 weeks and include those 2 weights in the [employer] message.  If tolerating medication and blood sugars improving, will adjust Actos dose as necessary.  If side effects of ankle swelling/other fluid retention or blood sugars not improving at all, will then refer to diabetes education for teaching regarding insulin use  Call  [phone] or use [employer] to schedule a future lab appointment  fasting in 6 weeks. For fasting labs, please refrain from eating for 8 hours or more.  Be sure to  drink water and take your  medications the day of the test.   Will send pt coupon for GoodRx to use with new meds            Phone call contact time  Call Started at 11:38am  Call Ended at 11: 57  Total minutes: 19 min    (Chart documentation was completed, in part, with [employer] voice-recognition software. Even though reviewed, some grammatical, spelling, and word errors may remain.)    Zhou Meraz MD  Internal Medicine Department  [facility]

## 2020-07-14 PROBLEM — R19.5 LOOSE STOOLS: Status: RESOLVED | Noted: 2020-07-02 | Resolved: 2020-07-14

## 2020-07-14 RX ORDER — EZETIMIBE 10 MG/1
10 TABLET ORAL DAILY
Qty: 30 TABLET | Refills: 11
Start: 2020-07-14 | End: 2020-07-15

## 2020-07-14 RX ORDER — LEVOTHYROXINE SODIUM 88 UG/1
88 TABLET ORAL DAILY
Qty: 90 TABLET | Refills: 3 | Status: SHIPPED | OUTPATIENT
Start: 2020-07-14 | End: 2021-07-30

## 2020-07-14 RX ORDER — PIOGLITAZONEHYDROCHLORIDE 15 MG/1
15 TABLET ORAL DAILY
Qty: 30 TABLET | Refills: 11
Start: 2020-07-14 | End: 2020-07-15

## 2020-07-15 RX ORDER — EZETIMIBE 10 MG/1
10 TABLET ORAL DAILY
Qty: 30 TABLET | Refills: 11 | Status: SHIPPED | OUTPATIENT
Start: 2020-07-15 | End: 2020-11-03

## 2020-07-15 RX ORDER — PIOGLITAZONEHYDROCHLORIDE 15 MG/1
15 TABLET ORAL DAILY
Qty: 30 TABLET | Refills: 11 | Status: SHIPPED | OUTPATIENT
Start: 2020-07-15 | End: 2020-08-02 | Stop reason: DRUGHIGH

## 2020-07-29 ENCOUNTER — HOSPITAL ENCOUNTER (OUTPATIENT)
Dept: MRI IMAGING | Facility: CLINIC | Age: 80
Discharge: HOME | End: 2020-07-29
Attending: UROLOGY | Admitting: UROLOGY
Payer: MEDICARE

## 2020-07-29 DIAGNOSIS — R97.20 ELEVATED PROSTATE SPECIFIC ANTIGEN (PSA): ICD-10-CM

## 2020-07-29 PROCEDURE — 25500064 ZZH RX 255 OP 636: Performed by: UROLOGY

## 2020-07-29 PROCEDURE — 72197 MRI PELVIS W/O & W/DYE: CPT

## 2020-07-29 PROCEDURE — A9585 GADOBUTROL INJECTION: HCPCS | Performed by: UROLOGY

## 2020-07-29 RX ORDER — GADOBUTROL 604.72 MG/ML
7.5 INJECTION INTRAVENOUS ONCE
Status: COMPLETED | OUTPATIENT
Start: 2020-07-29 | End: 2020-07-29

## 2020-07-29 RX ADMIN — GADOBUTROL 7.5 ML: 604.72 INJECTION INTRAVENOUS at 13:45

## 2020-07-30 ENCOUNTER — VIRTUAL VISIT (OUTPATIENT)
Dept: UROLOGY | Facility: CLINIC | Age: 80
End: 2020-07-30
Payer: MEDICARE

## 2020-07-30 VITALS — WEIGHT: 168 LBS | BODY MASS INDEX: 26.37 KG/M2 | HEIGHT: 67 IN

## 2020-07-30 DIAGNOSIS — R97.20 ELEVATED PROSTATE SPECIFIC ANTIGEN (PSA): Primary | ICD-10-CM

## 2020-07-30 DIAGNOSIS — D69.6 THROMBOCYTOPENIA (H): ICD-10-CM

## 2020-07-30 DIAGNOSIS — E11.21 TYPE 2 DIABETES MELLITUS WITH DIABETIC NEPHROPATHY, WITHOUT LONG-TERM CURRENT USE OF INSULIN (H): ICD-10-CM

## 2020-07-30 PROCEDURE — 99442 ZZC PHYSICIAN TELEPHONE EVALUATION 11-20 MIN: CPT | Performed by: UROLOGY

## 2020-07-30 ASSESSMENT — PAIN SCALES - GENERAL: PAINLEVEL: NO PAIN (0)

## 2020-07-30 ASSESSMENT — MIFFLIN-ST. JEOR: SCORE: 1435.67

## 2020-07-30 NOTE — PROGRESS NOTES
"Prakash Cramer is a 80 year old male who is being evaluated via a billable telephone visit.      The patient has been notified of following:     \"This telephone visit will be conducted via a call between you and your physician/provider. We have found that certain health care needs can be provided without the need for a physical exam.  This service lets us provide the care you need with a short phone conversation.  If a prescription is necessary we can send it directly to your pharmacy.  If lab work is needed we can place an order for that and you can then stop by our lab to have the test done at a later time.    Telephone visits are billed at different rates depending on your insurance coverage. During this emergency period, for some insurers they may be billed the same as an in-person visit.  Please reach out to your insurance provider with any questions.    If during the course of the call the physician/provider feels a telephone visit is not appropriate, you will not be charged for this service.\"    Patient has given verbal consent for Telephone visit?  Yes    What phone number would you like to be contacted at? [phone]     How would you like to obtain your AVS? Mail a copy    Chief Complaint   It was my pleasure to speak with Prakash Cramer who is a 80 year old male for follow-up of Elevated PSA.      HPI  Prakash Cramer is a very pleasant 80 year old male who presents with a history of Elevated PSA (Prostate Specific Antigen).  This was noted on routine labs in December 2019 at which time his PSA was 16.2. This was rechecked in May 2020 and found to be 16.4 with free PSA of 9%. Given this finding, was referred for evaluation. He has no significant urologic history. Had not had PSA checked for many years and had not previously been elevated. He has mild urinary symptoms at baseline, including urgency/frequency. Nocturia x 3. No hematuria or dysuria. Tried tamsulosin in the past but did not help. Has been " dealing with thrombocytopenia and following with hematology.     PSA 16.4 (9% free) May 2020  16.2 in 12/2019    MRI Prostate 7/29/2020  IMPRESSION:  1. Based on the most suspicious abnormality, this exam is  characterized as PIRADS 5 - Clinically significant cancer is highly  likely to be present. The most suspicious abnormality is located at  the anterior left transition zone (12 - 2:00 position) and there is  high suspicion of extraprostatic extension.  2. Additional PIRADS 4 lesion in the posterior right mid to apical  peripheral zone.   3. Suspicious lesions in the right acetabulum and L4 vertebral body.  4. Indeterminate but not highly suspicious sub-8 mm lymph nodes in the  bilateral external iliac/common femoral locations.    I have reviewed and updated the patient's Past Medical History, Social History, Family History and Medication List.    ALLERGIES  Atorvastatin calcium; Crestor [rosuvastatin]; Diovan [valsartan]; Glimepiride; Invokana [canagliflozin]; Mold; Penicillins; and Seasonal allergies    ASSESSMENT and PLAN  80 year old male with elevated PSA to 16.20. We reviewed our previous discussion about the utility of PSA screening.  We talked about the Pros and Cons.  We discussed the findings of the PLCO and EORTC studies.  We discussed the results of the [place] trial of treatment vs. observation in clinically detected prostate cancer as well as the results of the PIVOT trial in the context of screen-detected cancer.  We discussed the recommendations by the AUA, and USPSTF. We also discussed the significant (2-6%) and rising risk of biopsy associated sepsis.    At this point, a TRUS biopsy would be reasonable given his rising PSA to 16, coupled with PIRADS 5 lesion on his MRI. We discussed the risks of biopsy, particularly given his thrombocytopenia, but given high-risk lesions and with possibility of metastasis, biopsy is reasonable. As such, will schedule MRI-targeted biopsy in the near  future.      Plan:  Schedule for MRI fusion biopsy    Phone call duration: 15 minutes    Abebe Brannon MD  Urology  [facility]

## 2020-07-30 NOTE — NURSING NOTE
Chief Complaint   Patient presents with     Elevated PSA     Review MRI results       Jacqui Woo, EMT

## 2020-07-31 ENCOUNTER — TELEPHONE (OUTPATIENT)
Dept: INTERNAL MEDICINE | Facility: CLINIC | Age: 80
End: 2020-07-31

## 2020-07-31 DIAGNOSIS — E11.21 TYPE 2 DIABETES MELLITUS WITH DIABETIC NEPHROPATHY, WITHOUT LONG-TERM CURRENT USE OF INSULIN (H): Primary | ICD-10-CM

## 2020-07-31 NOTE — TELEPHONE ENCOUNTER
BG number report:    Avg for mornin  Avg for evenings: 240    Pt does not have all 15 days of numbers, so this is averaged.     Please advise on any med changes.

## 2020-08-02 RX ORDER — PIOGLITAZONEHYDROCHLORIDE 30 MG/1
30 TABLET ORAL DAILY
Qty: 30 TABLET | Refills: 11 | Status: SHIPPED | OUTPATIENT
Start: 2020-08-02 | End: 2021-05-14

## 2020-08-03 NOTE — TELEPHONE ENCOUNTER
Increase Pioglitazone to 15mg tab, 2 tabs (30mg total) daily until runs out of the 15mg tabs. Then change  to 30mg tab, 1 tab daily for diabetes. Continue other medications. Give update to me again in 3 weeks re: the  blood sugar average in AM  and bedtime for the 7 days prior to the update. If blood sugars remain > 200, will have to then start insulin therapy with daily Levemir dosing. Rx for 30mg tab sent to pt's  [facility] pharmacy

## 2020-08-10 NOTE — TELEPHONE ENCOUNTER
RN called back to pt.  Relayed provider message.  Pt states understanding.  No further action needed at this time.

## 2020-08-18 ENCOUNTER — PRE VISIT (OUTPATIENT)
Dept: UROLOGY | Facility: CLINIC | Age: 80
End: 2020-08-18

## 2020-08-18 ENCOUNTER — TELEPHONE (OUTPATIENT)
Dept: UROLOGY | Facility: CLINIC | Age: 80
End: 2020-08-18

## 2020-08-18 DIAGNOSIS — R97.20 ELEVATED PROSTATE SPECIFIC ANTIGEN (PSA): Primary | ICD-10-CM

## 2020-08-18 RX ORDER — CIPROFLOXACIN 500 MG/1
500 TABLET, FILM COATED ORAL 2 TIMES DAILY
Qty: 6 TABLET | Refills: 0 | Status: SHIPPED | OUTPATIENT
Start: 2020-08-25 | End: 2020-08-28

## 2020-08-18 NOTE — TELEPHONE ENCOUNTER
Called, discussed Bx prep including discontinuing blood thinning medications, taking cipro 500mg BID starting the day before Bx, using a fleet enema 2 hours before Bx, and eating/drinking normally the day of. All of the patient's questions were answered and the patient stated their understanding. A [employer] message was sent to patient with written instructions.

## 2020-08-18 NOTE — TELEPHONE ENCOUNTER
Chief Complaint : Fusion Prostate Bx    Hx/Sx: Elevated PSA    Records/Orders: MRI available    Pt Contacted: Called, discussed Bx prep including discontinuing blood thinning medications, taking cipro 500mg BID starting the day before Bx, using a fleet enema 2 hours before Bx, and eating/drinking normally the day of. All of the patient's questions were answered and the patient stated their understanding. A [employer] message was sent to patient with written instructions.        At Rooming: Gent, Targets : 2    Celestine Sainz, EMT

## 2020-08-18 NOTE — TELEPHONE ENCOUNTER
Visit Type : Biopsy follow up     Records/Orders: pathology     Pt Contacted: no    At Rooming: phone

## 2020-08-26 ENCOUNTER — OFFICE VISIT (OUTPATIENT)
Dept: UROLOGY | Facility: CLINIC | Age: 80
End: 2020-08-26
Payer: COMMERCIAL

## 2020-08-26 DIAGNOSIS — R97.20 ELEVATED PROSTATE SPECIFIC ANTIGEN (PSA): Primary | ICD-10-CM

## 2020-08-26 RX ORDER — LIDOCAINE HYDROCHLORIDE 10 MG/ML
10 INJECTION, SOLUTION EPIDURAL; INFILTRATION; INTRACAUDAL; PERINEURAL ONCE
Status: COMPLETED | OUTPATIENT
Start: 2020-08-26 | End: 2020-08-26

## 2020-08-26 RX ORDER — GENTAMICIN 40 MG/ML
80 INJECTION, SOLUTION INTRAMUSCULAR; INTRAVENOUS ONCE
Status: COMPLETED | OUTPATIENT
Start: 2020-08-26 | End: 2020-08-26

## 2020-08-26 RX ADMIN — LIDOCAINE HYDROCHLORIDE 10 ML: 10 INJECTION, SOLUTION EPIDURAL; INFILTRATION; INTRACAUDAL; PERINEURAL at 13:46

## 2020-08-26 RX ADMIN — GENTAMICIN 80 MG: 40 INJECTION, SOLUTION INTRAMUSCULAR; INTRAVENOUS at 12:34

## 2020-08-26 NOTE — NURSING NOTE
Chief Complaint   Patient presents with     Prostate Biopsy     elevated PSA       There were no vitals taken for this visit. There is no height or weight on file to calculate BMI.    Verified with pt that he has been off aspirin products and blood thinners, started Cipro 500 mg BID x 3 days 08/25/2020, and did a fleets enema this morning.    The following medication was given:     MEDICATION:  Gentamicin   ROUTE: IM  SITE: Ventrogluteal - Right  DOSE: 80 mg/ 2 mL  LOT #: [id number]  : [employer]  EXPIRATION DATE: 08/21  NDC#: 81373-179-12   Was there drug waste? No      Pt tolerated injection well.        Patient Active Problem List   Diagnosis     Esophageal reflux     Essential hypertension     Diaphragmatic hernia     Impotence of organic origin     Hyperlipidemia LDL goal <100     BPH (benign prostatic hypertrophy)     Proteinuria     RBBB     Hypothyroidism     Status post total right knee replacement     Type 2 diabetes mellitus with diabetic nephropathy, without long-term current use of insulin (H)     Aplastic anemia (H)     Pancytopenia (H)     Thrombocytopenia (H)     Elevated prostate specific antigen (PSA)     CKD (chronic kidney disease) stage 3, GFR 30-59 ml/min (H)       Allergies   Allergen Reactions     Atorvastatin Calcium      myalgias     Crestor [Rosuvastatin]      Myalgias     Diovan [Valsartan]      increase k+     Glimepiride      Pancytopenia       Invokana [Canagliflozin]      Joint pain     Mold Other (See Comments)     Eyes itchy,red and dry     Penicillins      Seasonal Allergies      Eyes itch       Current Outpatient Medications   Medication Sig Dispense Refill     amLODIPine (NORVASC) 10 MG tablet Take 1 tablet (10 mg) by mouth daily 90 tablet 3     blood glucose (ACCU-CHEK SMARTVIEW) test strip USE 1 STRIP TO CHECK GLUCOSE ONCE DAILY OR  AS  DIRECTED 100 strip 1     blood glucose monitoring (ACCU-CHEK FASTCLIX) lancets Use to test blood sugar 1 times daily or as  directed. 102 each 11     blood glucose monitoring (ACCU-CHEK MULTICLIX) lancets Use to test blood sugar 1 time daily or as directed. 1 Box 11     Blood Glucose Monitoring Suppl MAGDALENO 1 Device daily Smartview Meter 1 each 0     ciprofloxacin (CIPRO) 500 MG tablet Take 1 tablet (500 mg) by mouth 2 times daily for 3 days 6 tablet 0     Cyanocobalamin (B-12) 1000 MCG TBCR Take 1,000 mcg by mouth daily 100 tablet 11     EUTHYROX 75 MCG tablet Take 1 tablet by mouth once daily 90 tablet 0     ezetimibe (ZETIA) 10 MG tablet Take 1 tablet (10 mg) by mouth daily 30 tablet 11     famotidine (PEPCID) 20 MG tablet Take 1 tablet (20 mg) by mouth 2 times daily 180 tablet 3     levothyroxine (SYNTHROID/LEVOTHROID) 88 MCG tablet Take 1 tablet (88 mcg) by mouth daily 90 tablet 3     pioglitazone (ACTOS) 30 MG tablet Take 1 tablet (30 mg) by mouth daily 30 tablet 11     sitagliptin (JANUVIA) 100 MG tablet Take 1 tablet (100 mg) by mouth daily 90 tablet 3     aspirin 81 MG tablet Take by mouth daily 30 tablet      psyllium (METAMUCIL/KONSYL) capsule Take 1 capsule by mouth daily (Patient not taking: Reported on 8/26/2020) 90 capsule 3     STATIN NOT PRESCRIBED (INTENTIONAL) Please choose reason not prescribed, below         Social History     Tobacco Use     Smoking status: Never Smoker     Smokeless tobacco: Never Used   Substance Use Topics     Alcohol use: Yes     Comment: beer 4-5 a month     Drug use: No       Leigha Garcia CMA  8/26/2020  12:18 PM

## 2020-08-26 NOTE — NURSING NOTE
Chief Complaint   Patient presents with     Prostate Biopsy     elevated PSA       There were no vitals taken for this visit. There is no height or weight on file to calculate BMI.    Patient Active Problem List   Diagnosis     Esophageal reflux     Essential hypertension     Diaphragmatic hernia     Impotence of organic origin     Hyperlipidemia LDL goal <100     BPH (benign prostatic hypertrophy)     Proteinuria     RBBB     Hypothyroidism     Status post total right knee replacement     Type 2 diabetes mellitus with diabetic nephropathy, without long-term current use of insulin (H)     Aplastic anemia (H)     Pancytopenia (H)     Thrombocytopenia (H)     Elevated prostate specific antigen (PSA)     CKD (chronic kidney disease) stage 3, GFR 30-59 ml/min (H)       Allergies   Allergen Reactions     Atorvastatin Calcium      myalgias     Crestor [Rosuvastatin]      Myalgias     Diovan [Valsartan]      increase k+     Glimepiride      Pancytopenia       Invokana [Canagliflozin]      Joint pain     Mold Other (See Comments)     Eyes itchy,red and dry     Penicillins      Seasonal Allergies      Eyes itch       Current Outpatient Medications   Medication Sig Dispense Refill     amLODIPine (NORVASC) 10 MG tablet Take 1 tablet (10 mg) by mouth daily 90 tablet 3     blood glucose (ACCU-CHEK SMARTVIEW) test strip USE 1 STRIP TO CHECK GLUCOSE ONCE DAILY OR  AS  DIRECTED 100 strip 1     blood glucose monitoring (ACCU-CHEK FASTCLIX) lancets Use to test blood sugar 1 times daily or as directed. 102 each 11     blood glucose monitoring (ACCU-CHEK MULTICLIX) lancets Use to test blood sugar 1 time daily or as directed. 1 Box 11     Blood Glucose Monitoring Suppl MAGDALENO 1 Device daily Smartview Meter 1 each 0     ciprofloxacin (CIPRO) 500 MG tablet Take 1 tablet (500 mg) by mouth 2 times daily for 3 days 6 tablet 0     Cyanocobalamin (B-12) 1000 MCG TBCR Take 1,000 mcg by mouth daily 100 tablet 11     EUTHYROX 75 MCG tablet Take 1  tablet by mouth once daily 90 tablet 0     ezetimibe (ZETIA) 10 MG tablet Take 1 tablet (10 mg) by mouth daily 30 tablet 11     famotidine (PEPCID) 20 MG tablet Take 1 tablet (20 mg) by mouth 2 times daily 180 tablet 3     levothyroxine (SYNTHROID/LEVOTHROID) 88 MCG tablet Take 1 tablet (88 mcg) by mouth daily 90 tablet 3     pioglitazone (ACTOS) 30 MG tablet Take 1 tablet (30 mg) by mouth daily 30 tablet 11     sitagliptin (JANUVIA) 100 MG tablet Take 1 tablet (100 mg) by mouth daily 90 tablet 3     aspirin 81 MG tablet Take by mouth daily 30 tablet      psyllium (METAMUCIL/KONSYL) capsule Take 1 capsule by mouth daily (Patient not taking: Reported on 2020) 90 capsule 3     STATIN NOT PRESCRIBED (INTENTIONAL) Please choose reason not prescribed, below         Social History     Tobacco Use     Smoking status: Never Smoker     Smokeless tobacco: Never Used   Substance Use Topics     Alcohol use: Yes     Comment: beer 4-5 a month     Drug use: No       Patient states he did all biopsy prep accordingly, including taking the antibiotics as directed, stopping blood thinners, and completing the enema shortly prior to his appointment.    Invasive Procedure Safety Checklist:    Procedure: Prostate Biopsy    Action: Complete sections and checkboxes as appropriate.    Pre-procedure:  1. Patient ID Verified with 2 identifiers (Mayte and  or MRN) : YES    2. Procedure and site verified with patient/designee (when able) : YES    3. Accurate consent documentation in medical record : YES    4. H&P (or appropriate assessment) documented in medical record : N/A  H&P must be up to 30 days prior to procedure an updated within 24 hours of                 Procedure as applicable.     5. Relevant diagnostic and radiology test results appropriately labeled and displayed as applicable : YES    6. Blood products, implants, devices, and/or special equipment available for the procedure as applicable : YES    7. Procedure site(s)  marked with provider initials [Exclusions: none] : NO    8. Marking not required. Reason : Yes  Procedure does not require site marking    Time Out:     Time-Out performed immediately prior to starting procedure, including verbal and active participation of all team members addressing: YES    1. Correct patient identity.  2. Confirmed that the correct side and site are marked.  3. An accurate procedure to be done.  4. Agreement on the procedure to be done.  5. Correct patient position.  6. Relevant images and results are properly labeled and appropriately displayed.  7. The need to administer antibiotics or fluids for irrigation purposes during the procedure as applicable.  8. Safety precautions based on patient history or medication use.    During Procedure: Verification of correct person, site, and procedure occurs any time the responsibility for care of the patient is transferred to another member of the care team.    The following medication was given:     MEDICATION:  Lidocaine 1%  ROUTE: Provider Administered  SITE: Provider Administered  DOSE: 10mL  LOT #: [id number]  :[employer]  EXPIRATION DATE: 02/24  NDC#: [address]-51  Was there drug waste? Yes  Amount of drug waste (10mL): 20mL.  Reason for waste:  As per MD    Prior to injection, verified patient identity using patient's name and date of birth.  Due to injection administration, patient instructed to remain in clinic for 15 minutes  afterwards, and to report any adverse reaction to me immediately.      Celestine Sainz, EMT  August 26, 2020

## 2020-08-26 NOTE — PATIENT INSTRUCTIONS
[facility]  Precautions Following a Prostate Biopsy    There are four conditions that you should watch for after a prostate biopsy:    1. Excessive pain  2. Bleeding irregularities (passing numerous  dime sized  clots or if your urine looks like cranberry juice)  3. Fever of 100 degrees or more  4. If you are unable to urinate        If any of these occur, call the Urology Clinic during normal business hours (M-F, 8:00-4:30) at [phone].  If you experience a problem after normal business hours, call our 24-hour phone number at [phone] and ask for the Urology Resident on call to be paged.      If you experience any discomfort following the biopsy, you may take Tylenol.  DO NOT TAKE ASPIRIN unless specified by your physician.   If the discomfort becomes severe or uncontrolled by medication, contact the Urology Clinic or Urology Resident (after normal business hours).      Do not be alarmed if you have some blood in your stool, in your urine, or ejaculate (semen).  This occurrence is normal and may last up to three (3) or four (4) days, usually intermittently.  Blood in the ejaculate (semen) may last several weeks, up to about a dozen ejaculations.  The blood in your ejaculate may appear as brown streaks, blood tinged, and immediately following a biopsy, it may appear bright red.      If you run a fever above 100 degrees, call the Urology Clinic or Urology Resident (after normal business hours) immediately.  If you are unable to reach your physician or the Resident on call, go to the nearest emergency room.  Explain that you have had a transrectal biopsy of your prostate and what problems you are experiencing.        You should attempt to urinate following your biopsy before you leave the clinic.  If you are unable to urinate four (4) to six (6) hours after you leave the clinic, you will need to contact the Urology Clinic or the Resident on call.  If you are unable to reach  your physician or the Resident on call, go to the nearest emergency room.            If you have any questions or concerns after your biopsy, feel free to contact the Urology Clinic at [phone] during M-F, 8:00-5pm business hours.  If you need to speak with someone after normal business hours, call [phone] and ask for the Resident on call to be paged.

## 2020-08-26 NOTE — PROGRESS NOTES
PREPROCEDURE DIAGNOSIS: Elevated PSA  POSTPROCEDURE DIAGNOSIS: Elevated PSA.   PROCEDURE: Transrectal ultrasound sizing and transrectal ultrasound guided prostatic needle biopsy, including MRI fusion targeting  for Prakash Cramer who is a 80 year old male.   SURGEON: Abebe Brannon  ANESTHESIA: 5 mL of 1% periprostatic block bilaterally   We previously obtained an MRI of the prostate and identified all PIRADS 3-5 lesions for targeting    Target Lesion #1 PIRADS 5 - left mid transition zone  Target Lesion #2 PIRADS 4 - right mid gland peripheral zone    DESCRIPTION OF PROCEDURE: The procedure, the outcome, the anesthesia, and the risks were discussed with the patient. Informed consent was obtained and signed and a timeout was completed prior to the procedure. Digital rectal examination was performed with the below findings noted. Anesthesia was administered as noted above and the transrectal ultrasound probe was inserted, sizing was performed, and the below findings were noted. Two core biopsies were taken from each of the MRI targets, and 12 core biopsies were taken as described below. The probe was then withdrawn. Patient tolerated the procedure well.   FINDINGS: Digital rectal exam reveals normal prostate. Total volume is 36 mL. Hypoechoic lesion bilaterally. US images were obtained and then fused with the MRI images.  The fused images were then used to guide the biopsy of the targeted lesions with 2 cores taken of each lesion.  We then performed random biopsies.  12 cores were taken with 6 on each side, base, mid and apex.  PLAN: Will follow-up next week to review results.  Abebe Brannon MD  Urology  [facility]

## 2020-08-26 NOTE — LETTER
8/26/2020       RE: Prakash Cramer  [address]  [place] [facility] [address]     Dear Colleague,    Thank you for referring your patient, Prakash Cramer, to the [facility] AND [facility] at [facility]. Please see a copy of my visit note below.    PREPROCEDURE DIAGNOSIS: Elevated PSA  POSTPROCEDURE DIAGNOSIS: Elevated PSA.   PROCEDURE: Transrectal ultrasound sizing and transrectal ultrasound guided prostatic needle biopsy, including MRI fusion targeting  for Prakash Cramer who is a 80 year old male.   SURGEON: Abebe Brannon  ANESTHESIA: 5 mL of 1% periprostatic block bilaterally   We previously obtained an MRI of the prostate and identified all PIRADS 3-5 lesions for targeting    Target Lesion #1 PIRADS 5 - left mid transition zone  Target Lesion #2 PIRADS 4 - right mid gland peripheral zone    DESCRIPTION OF PROCEDURE: The procedure, the outcome, the anesthesia, and the risks were discussed with the patient. Informed consent was obtained and signed and a timeout was completed prior to the procedure. Digital rectal examination was performed with the below findings noted. Anesthesia was administered as noted above and the transrectal ultrasound probe was inserted, sizing was performed, and the below findings were noted. Two core biopsies were taken from each of the MRI targets, and 12 core biopsies were taken as described below. The probe was then withdrawn. Patient tolerated the procedure well.   FINDINGS: Digital rectal exam reveals normal prostate. Total volume is 36 mL. Hypoechoic lesion bilaterally. US images were obtained and then fused with the MRI images.  The fused images were then used to guide the biopsy of the targeted lesions with 2 cores taken of each lesion.  We then performed random biopsies.  12 cores were taken with 6 on each side, base, mid and apex.  PLAN: Will follow-up next week to review results.  Abebe Brannon,  MD  Urology  [facility]

## 2020-08-27 DIAGNOSIS — E78.5 HYPERLIPIDEMIA LDL GOAL <100: ICD-10-CM

## 2020-08-27 DIAGNOSIS — D69.6 THROMBOCYTOPENIA (H): ICD-10-CM

## 2020-08-27 DIAGNOSIS — E11.21 TYPE 2 DIABETES MELLITUS WITH DIABETIC NEPHROPATHY, WITHOUT LONG-TERM CURRENT USE OF INSULIN (H): ICD-10-CM

## 2020-08-27 DIAGNOSIS — E03.9 HYPOTHYROIDISM, UNSPECIFIED TYPE: ICD-10-CM

## 2020-08-27 DIAGNOSIS — D64.9 ANEMIA, UNSPECIFIED TYPE: ICD-10-CM

## 2020-08-27 LAB
ALBUMIN SERPL-MCNC: 3.5 G/DL (ref 3.4–5)
ALP SERPL-CCNC: 141 U/L (ref 40–150)
ALT SERPL W P-5'-P-CCNC: 25 U/L (ref 0–70)
ANION GAP SERPL CALCULATED.3IONS-SCNC: 6 MMOL/L (ref 3–14)
AST SERPL W P-5'-P-CCNC: 14 U/L (ref 0–45)
BILIRUB SERPL-MCNC: 0.6 MG/DL (ref 0.2–1.3)
BUN SERPL-MCNC: 30 MG/DL (ref 7–30)
CALCIUM SERPL-MCNC: 8.6 MG/DL (ref 8.5–10.1)
CHLORIDE SERPL-SCNC: 103 MMOL/L (ref 94–109)
CHOLEST SERPL-MCNC: 259 MG/DL
CO2 SERPL-SCNC: 28 MMOL/L (ref 20–32)
CREAT SERPL-MCNC: 1.42 MG/DL (ref 0.66–1.25)
ERYTHROCYTE [DISTWIDTH] IN BLOOD BY AUTOMATED COUNT: 13 % (ref 10–15)
GFR SERPL CREATININE-BSD FRML MDRD: 46 ML/MIN/{1.73_M2}
GLUCOSE SERPL-MCNC: 174 MG/DL (ref 70–99)
HBA1C MFR BLD: 7.8 % (ref 0–5.6)
HCT VFR BLD AUTO: 38.3 % (ref 40–53)
HDLC SERPL-MCNC: 68 MG/DL
HGB BLD-MCNC: 13.2 G/DL (ref 13.3–17.7)
LDLC SERPL CALC-MCNC: 129 MG/DL
MCH RBC QN AUTO: 36.7 PG (ref 26.5–33)
MCHC RBC AUTO-ENTMCNC: 34.5 G/DL (ref 31.5–36.5)
MCV RBC AUTO: 106 FL (ref 78–100)
NONHDLC SERPL-MCNC: 191 MG/DL
PLATELET # BLD AUTO: 94 10E9/L (ref 150–450)
POTASSIUM SERPL-SCNC: 4.4 MMOL/L (ref 3.4–5.3)
PROT SERPL-MCNC: 7.4 G/DL (ref 6.8–8.8)
RBC # BLD AUTO: 3.6 10E12/L (ref 4.4–5.9)
SODIUM SERPL-SCNC: 137 MMOL/L (ref 133–144)
TRIGL SERPL-MCNC: 308 MG/DL
TSH SERPL DL<=0.005 MIU/L-ACNC: 3.43 MU/L (ref 0.4–4)
WBC # BLD AUTO: 5.1 10E9/L (ref 4–11)

## 2020-08-27 PROCEDURE — 80053 COMPREHEN METABOLIC PANEL: CPT | Performed by: INTERNAL MEDICINE

## 2020-08-27 PROCEDURE — 84443 ASSAY THYROID STIM HORMONE: CPT | Performed by: INTERNAL MEDICINE

## 2020-08-27 PROCEDURE — 85027 COMPLETE CBC AUTOMATED: CPT | Performed by: INTERNAL MEDICINE

## 2020-08-27 PROCEDURE — 36415 COLL VENOUS BLD VENIPUNCTURE: CPT | Performed by: INTERNAL MEDICINE

## 2020-08-27 PROCEDURE — 83036 HEMOGLOBIN GLYCOSYLATED A1C: CPT | Performed by: INTERNAL MEDICINE

## 2020-08-27 PROCEDURE — 80061 LIPID PANEL: CPT | Performed by: INTERNAL MEDICINE

## 2020-08-28 ENCOUNTER — TELEPHONE (OUTPATIENT)
Dept: INTERNAL MEDICINE | Facility: CLINIC | Age: 80
End: 2020-08-28

## 2020-08-28 LAB — COPATH REPORT: NORMAL

## 2020-08-28 NOTE — TELEPHONE ENCOUNTER
Patient calling to call in his blood sugars, says he is a couple days late.     Morning average is 162, taken fasting  Afternoon average 172 at bedtime    Was over the last 20 days about    Also says he had lab work done yesterday and would like results and advisement on that. Please also mail him results and advisement as well as calling. He notes he was supposed to do a urine test but could not go. There is future albumin urine order but expected date is 2021, is he supposed to have this done?

## 2020-09-03 ENCOUNTER — VIRTUAL VISIT (OUTPATIENT)
Dept: UROLOGY | Facility: CLINIC | Age: 80
End: 2020-09-03
Payer: COMMERCIAL

## 2020-09-03 DIAGNOSIS — C61 PROSTATE CANCER (H): Primary | ICD-10-CM

## 2020-09-03 ASSESSMENT — PAIN SCALES - GENERAL: PAINLEVEL: NO PAIN (0)

## 2020-09-03 NOTE — PROGRESS NOTES
"Prakash Cramer is a 80 year old male who is being evaluated via a billable telephone visit.      The patient has been notified of following:     \"This telephone visit will be conducted via a call between you and your physician/provider. We have found that certain health care needs can be provided without the need for a physical exam.  This service lets us provide the care you need with a short phone conversation.  If a prescription is necessary we can send it directly to your pharmacy.  If lab work is needed we can place an order for that and you can then stop by our lab to have the test done at a later time.    Telephone visits are billed at different rates depending on your insurance coverage. During this emergency period, for some insurers they may be billed the same as an in-person visit.  Please reach out to your insurance provider with any questions.    If during the course of the call the physician/provider feels a telephone visit is not appropriate, you will not be charged for this service.\"    Patient has given verbal consent for Telephone visit?  Yes     What phone number would you like to be contacted at? [phone]     How would you like to obtain your AVS? Mail a copy    Chief Complaint   It was my pleasure to speak with Prakash Cramer who is a 80 year old male for follow-up of Prostate Cancer    HPI  Prakash Cramer is a very pleasant 80 year old male who presents with a history of Elevated PSA (Prostate Specific Antigen).  This was noted on routine labs in December 2019 at which time his PSA was 16.2. This was rechecked in May 2020 and found to be 16.4 with free PSA of 9%.  He subsequently underwent MRI with PIRADS 5 lesion and TRUS biopsy. Here to review pathology. No complications since biopsy. Does have multiple cores of Brady 4+3=7 prostate cancer.    TRUS 8/26/2020  FINAL DIAGNOSIS:   A. Prostate, right base, needle biopsy:   -Prostatic adenocarcinoma   -Janice score 7 (3+4; 10% pattern 4) "   -Grade Group 2   -Extent: Involves 2/2 cores (6 mm 50%, 4 mm 25%)     B. Prostate, right mid, needle biopsy:   -Prostatic adenocarcinoma   -Willow score 6 (3+3)   -Grade Group 1   -Extent: Involves 2/2 cores (3 mm 20%, 2 mm 20%)     C. Prostate, right apex, needle biopsy:   -Prostatic adenocarcinoma   -Janice score 6 (3+3)   -Grade Group 1   -Extent: Involves 1/2 cores (3 mm 20%)     D. Prostate, left base, needle biopsy:   -Prostatic adenocarcinoma   -Janice score 7 (3+4)   -Grade Group 2   -Extent: Involves 1/2 cores (6 mm 40%)     E. Prostate, left mid , needle biopsy:   -Prostatic adenocarcinoma   -Willow score 6 (3+3)   -Grade Group 1   -Extent: Involves 2/2 cores (1 mm 10%, 3 mm 25%)     F. Prostate, left apex, needle biopsy:   -Prostatic adenocarcinoma   -Janice score 7 (4+3)   -Grade Group 3   -Extent: Involves 2/2 cores (2 mm 20%, 4 mm 30%)     G. Prostate, target 1, needle biopsy:   -Prostatic adenocarcinoma   -Willow score 7 (4+3)   -Grade Group 3   -Extent: Involves 2/2 cores (8 mm 80%, 11 mm 90%)     H. Prostate, target 2, needle biopsy:   -Prostatic adenocarcinoma   -Willow score 6 (3+3)   -Grade Group 1   -Extent: Involves 1/2 cores (1 mm 10%)        PSA 16.4 (9% free) May 2020  16.2 in 12/2019     MRI Prostate 7/29/2020  IMPRESSION:  1. Based on the most suspicious abnormality, this exam is  characterized as PIRADS 5 - Clinically significant cancer is highly  likely to be present. The most suspicious abnormality is located at  the anterior left transition zone (12 - 2:00 position) and there is  high suspicion of extraprostatic extension.  2. Additional PIRADS 4 lesion in the posterior right mid to apical  peripheral zone.   3. Suspicious lesions in the right acetabulum and L4 vertebral body.  4. Indeterminate but not highly suspicious sub-8 mm lymph nodes in the  bilateral external iliac/common femoral locations.    I have reviewed and updated the patient's Past Medical History, Social  History, Family History and Medication List.    ALLERGIES  Atorvastatin calcium; Crestor [rosuvastatin]; Diovan [valsartan]; Glimepiride; Invokana [canagliflozin]; Mold; Penicillins; and Seasonal allergies    ASSESSMENT and PLAN  80 year old male with new diagnosis of Janice 4+3 prostate cancer, PSA 16.4. Suspicious bone lesion and lymph nodes on MRI. .We had an extensive discussion about the significance of localized, prostate cancer. We discussed the options for treatment of a localized prostate cancer including active surveillance, brachytherapy, external beam radiation therapy, and surgical removal. We discussed that based on his Janice score he would not be an ideal candidate for active surveillance.       We discussed the relative merits of robotic assisted radical prostatectomy. We also discussed the advantages and disadvantages and roles of open surgery vs. laparoscopic (and Da Adrián assisted) surgery. The anticipated post-operative course was explained, including an anticipated 1-2 day hospital stay. Catheter will remain in place 10-14 days.      We discussed that there was no clear evidence of advantage between surgery and radiation with regard to risk of recurrence. Regarding radiation, we discussed we discussed risks including but not limited to cancer recurrence, exacerbation of voiding symptoms or hematuria, urinary retention, incontinence, stricture of the urinary tract, induction of second malignancy, and risks of rectal symptoms or bleeding.  We discussed fact that rectal symptoms may be more common with radiation than with other treatment modalities. With radiation therapy, we also discussed the difficulties in diagnosing recurrent disease at an early stage due to variability in PSA levels and the fact that most patients are not candidates for local salvage therapy when biochemical recurrence is declared.  We also discussed the significant morbidity of post-radiation local salvage therapy in  terms of perioperative complications, erectile dysfunction and urinary incontinence. With surgery, we also discussed the potential advantage over radiation therapy in that biochemical recurrence can be detected at a relatively earlier stage and that salvage radiotherapy is successful in controlling recurrent disease in a substantial proportion of patients.  We also discussed hat salvage radiotherapy was associated with a considerably more favorable morbidity profile compared to local salvage therapies for recurrent disease post-radiation.     We discussed option for further discussion with radiation oncology, and will likely pursue this in the near future, pending results of metastatic workup.      At this point, will plan for CT and bone scan, to better characterize the suspicious findings on MRI. If disease appears localized, he would be a candidate for radiation. If apparent metastasis, however, would consider systemic therapy and referral to medical oncology. He expresses understanding of this. Will plan to follow-up with me after completion of staging scans.    CT and bone scan - follow-up with virtual visit    Phone call duration: 15 minutes    Abebe Brannon MD  Urology  [facility]

## 2020-09-03 NOTE — PATIENT INSTRUCTIONS
Please get CT and bone scan and follow up with  virtual      It was a pleasure meeting with you today.  Thank you for allowing me and my team the privilege of caring for you today.  YOU are the reason we are here, and I truly hope we provided you with the excellent service you deserve.  Please let us know if there is anything else we can do for you so that we can be sure you are leaving completely satisfied with your care experience.

## 2020-09-07 PROBLEM — C61 PROSTATE CANCER (H): Status: ACTIVE | Noted: 2020-09-07

## 2020-09-17 ENCOUNTER — ANCILLARY PROCEDURE (OUTPATIENT)
Dept: NUCLEAR MEDICINE | Facility: CLINIC | Age: 80
End: 2020-09-17
Attending: UROLOGY
Payer: MEDICARE

## 2020-09-17 ENCOUNTER — ANCILLARY PROCEDURE (OUTPATIENT)
Dept: CT IMAGING | Facility: CLINIC | Age: 80
End: 2020-09-17
Attending: UROLOGY
Payer: MEDICARE

## 2020-09-17 DIAGNOSIS — C61 PROSTATE CANCER (H): ICD-10-CM

## 2020-09-17 PROCEDURE — 78306 BONE IMAGING WHOLE BODY: CPT

## 2020-09-17 PROCEDURE — A9503 TC99M MEDRONATE: HCPCS | Performed by: UROLOGY

## 2020-09-17 PROCEDURE — 74177 CT ABD & PELVIS W/CONTRAST: CPT | Performed by: RADIOLOGY

## 2020-09-17 RX ORDER — TC 99M MEDRONATE 20 MG/10ML
20-30 INJECTION, POWDER, LYOPHILIZED, FOR SOLUTION INTRAVENOUS ONCE
Status: COMPLETED | OUTPATIENT
Start: 2020-09-17 | End: 2020-09-17

## 2020-09-17 RX ORDER — IOPAMIDOL 755 MG/ML
127 INJECTION, SOLUTION INTRAVASCULAR ONCE
Status: COMPLETED | OUTPATIENT
Start: 2020-09-17 | End: 2020-09-17

## 2020-09-17 RX ADMIN — IOPAMIDOL 127 ML: 755 INJECTION, SOLUTION INTRAVASCULAR at 10:47

## 2020-09-17 RX ADMIN — TC 99M MEDRONATE 29 MCI.: 20 INJECTION, POWDER, LYOPHILIZED, FOR SOLUTION INTRAVENOUS at 10:30

## 2020-09-22 ENCOUNTER — TELEPHONE (OUTPATIENT)
Dept: UROLOGY | Facility: CLINIC | Age: 80
End: 2020-09-22

## 2020-09-22 ENCOUNTER — PRE VISIT (OUTPATIENT)
Dept: UROLOGY | Facility: CLINIC | Age: 80
End: 2020-09-22

## 2020-09-22 NOTE — TELEPHONE ENCOUNTER
Visit Type : Bone scan follow up     Records/Orders: imaging in epic     Pt Contacted: no    At Rooming: phone

## 2020-09-22 NOTE — TELEPHONE ENCOUNTER
[facility] Call Center    Phone Message    May a detailed message be left on voicemail: yes     Reason for Call: Requesting Results   Name/type of test: imaging tests  Date of test: 9/17/2020  Was test done at a location other than [facility] (Please fill in the location if not [facility])?: No      Please call pt, he does not use mychart, thanks    Action Taken: Message routed to:  [facility] ([facility] uro    Travel Screening: Not Applicable

## 2020-10-01 ENCOUNTER — VIRTUAL VISIT (OUTPATIENT)
Dept: UROLOGY | Facility: CLINIC | Age: 80
End: 2020-10-01
Payer: MEDICARE

## 2020-10-01 DIAGNOSIS — C61 PROSTATE CANCER (H): Primary | ICD-10-CM

## 2020-10-01 PROCEDURE — 99442 PR PHYSICIAN TELEPHONE EVALUATION 11-20 MIN: CPT | Mod: 95 | Performed by: UROLOGY

## 2020-10-01 ASSESSMENT — PAIN SCALES - GENERAL: PAINLEVEL: NO PAIN (0)

## 2020-10-01 NOTE — LETTER
"10/1/2020       RE: Prakash Cramer  [address]  [facility] [address]     Dear Colleague,    Thank you for referring your patient, Prakash Cramer, to the [facility] [place] at [facility]. Please see a copy of my visit note below.    Prakash Cramer is a 80 year old male who is being evaluated via a billable telephone visit.      The patient has been notified of following:     \"This telephone visit will be conducted via a call between you and your physician/provider. We have found that certain health care needs can be provided without the need for a physical exam.  This service lets us provide the care you need with a short phone conversation.  If a prescription is necessary we can send it directly to your pharmacy.  If lab work is needed we can place an order for that and you can then stop by our lab to have the test done at a later time.    Telephone visits are billed at different rates depending on your insurance coverage. During this emergency period, for some insurers they may be billed the same as an in-person visit.  Please reach out to your insurance provider with any questions.    If during the course of the call the physician/provider feels a telephone visit is not appropriate, you will not be charged for this service.\"    Patient has given verbal consent for Telephone visit?  Yes     What phone number would you like to be contacted at? [phone]     How would you like to obtain your AVS? Mail a copy    Chief Complaint   It was my pleasure to speak with Prakash Cramer who is a 80 year old male for follow-up of Prostate Cancer.      HPI  Prakash Cramer is a very pleasant 80 year old male who presents with a history of Elevated PSA (Prostate Specific Antigen).  This was noted on routine labs in December 2019 at which time his PSA was 16.2. This was rechecked in May 2020 and found to be 16.4 with free PSA of 9%.  He subsequently underwent " MRI with PIRADS 5 lesion and TRUS biopsy. Here to review pathology. No complications since biopsy. Does have multiple cores of [place] 4+3=7 prostate cancer. Metastatic workup without clear evidence of mets.    CT abd/pelvis 9/17/2020  Impression:   In this patient with prostate cancer:  1. No definite metastatic disease.   1a. No lymph nodes enlarged by size criteria. However, there are a few  small indeterminate lymph nodes along the right common iliac artery,  largest 11 mm, as well as multiple tiny lymph nodes in the pelvis.  If  clinically indicated an Axumin PET scan may be considered.      NM Bone Scan 9/17/2020  IMPRESSION:   1. No scintigraphic evidence of osseous metastatic disease.  2. Focal uptake to the anterior left 6th rib corresponding with  healing fracture on the same day CT.     TRUS 8/26/2020  FINAL DIAGNOSIS:   A. Prostate, right base, needle biopsy:   -Prostatic adenocarcinoma   -Janice score 7 (3+4; 10% pattern 4)   -Grade Group 2   -Extent: Involves 2/2 cores (6 mm 50%, 4 mm 25%)     B. Prostate, right mid, needle biopsy:   -Prostatic adenocarcinoma   -[place] score 6 (3+3)   -Grade Group 1   -Extent: Involves 2/2 cores (3 mm 20%, 2 mm 20%)     C. Prostate, right apex, needle biopsy:   -Prostatic adenocarcinoma   -[place] score 6 (3+3)   -Grade Group 1   -Extent: Involves 1/2 cores (3 mm 20%)     D. Prostate, left base, needle biopsy:   -Prostatic adenocarcinoma   -Janice score 7 (3+4)   -Grade Group 2   -Extent: Involves 1/2 cores (6 mm 40%)     E. Prostate, left mid , needle biopsy:   -Prostatic adenocarcinoma   -Janice score 6 (3+3)   -Grade Group 1   -Extent: Involves 2/2 cores (1 mm 10%, 3 mm 25%)     F. Prostate, left apex, needle biopsy:   -Prostatic adenocarcinoma   -Janice score 7 (4+3)   -Grade Group 3   -Extent: Involves 2/2 cores (2 mm 20%, 4 mm 30%)     G. Prostate, target 1, needle biopsy:   -Prostatic adenocarcinoma   -Janice score 7 (4+3)   -Grade Group 3   -Extent:  Involves 2/2 cores (8 mm 80%, 11 mm 90%)     H. Prostate, target 2, needle biopsy:   -Prostatic adenocarcinoma   -[place] score 6 (3+3)   -Grade Group 1   -Extent: Involves 1/2 cores (1 mm 10%)          PSA 16.4 (9% free) May 2020  16.2 in 12/2019     MRI Prostate 7/29/2020  IMPRESSION:  1. Based on the most suspicious abnormality, this exam is  characterized as PIRADS 5 - Clinically significant cancer is highly  likely to be present. The most suspicious abnormality is located at  the anterior left transition zone (12 - 2:00 position) and there is  high suspicion of extraprostatic extension.  2. Additional PIRADS 4 lesion in the posterior right mid to apical  peripheral zone.   3. Suspicious lesions in the right acetabulum and L4 vertebral body.  4. Indeterminate but not highly suspicious sub-8 mm lymph nodes in the  bilateral external iliac/common femoral locations.     I have reviewed and updated the patient's Past Medical History, Social History, Family History and Medication List.    ALLERGIES  Atorvastatin calcium, Crestor [rosuvastatin], Diovan [valsartan], Glimepiride, Invokana [canagliflozin], Mold, Penicillins, and Seasonal allergies    ASSESSMENT and PLAN  80 year old male with new diagnosis of [place] 4+3 prostate cancer, PSA 16.4. Suspicious bone lesion and lymph nodes on MRI. .We had an extensive discussion about the significance of localized, prostate cancer. We discussed the options for treatment of a localized prostate cancer including active surveillance, brachytherapy, external beam radiation therapy, and surgical removal. We discussed that based on his Janice score he would not be an ideal candidate for active surveillance.     We reviewed our previous discussion regarding the option of external beam radiotherapy. Give no clear evidence of metastasis, he is interested in meeting with radiation oncology. He will plan to do this in [place]. Will plan to follow-up with me in several months with  a PSA, but will return sooner if he has additional questions or concerns.     Rad onc referral  He will follow-up with me following radiation consultation and/or treatment    Phone call duration: 13 minutes    Abebe Brannon MD  Urology  [facility]

## 2020-10-01 NOTE — PATIENT INSTRUCTIONS
Please make a appointment with Radiation therapy      It was a pleasure meeting with you today.  Thank you for allowing me and my team the privilege of caring for you today.  YOU are the reason we are here, and I truly hope we provided you with the excellent service you deserve.  Please let us know if there is anything else we can do for you so that we can be sure you are leaving completely satisfied with your care experience.

## 2020-10-01 NOTE — PROGRESS NOTES
"Prakash Cramer is a 80 year old male who is being evaluated via a billable telephone visit.      The patient has been notified of following:     \"This telephone visit will be conducted via a call between you and your physician/provider. We have found that certain health care needs can be provided without the need for a physical exam.  This service lets us provide the care you need with a short phone conversation.  If a prescription is necessary we can send it directly to your pharmacy.  If lab work is needed we can place an order for that and you can then stop by our lab to have the test done at a later time.    Telephone visits are billed at different rates depending on your insurance coverage. During this emergency period, for some insurers they may be billed the same as an in-person visit.  Please reach out to your insurance provider with any questions.    If during the course of the call the physician/provider feels a telephone visit is not appropriate, you will not be charged for this service.\"    Patient has given verbal consent for Telephone visit?  Yes     What phone number would you like to be contacted at? [phone]     How would you like to obtain your AVS? Mail a copy    Chief Complaint   It was my pleasure to speak with Prakash Cramer who is a 80 year old male for follow-up of Prostate Cancer.      HPI  Prakash Cramer is a very pleasant 80 year old male who presents with a history of Elevated PSA (Prostate Specific Antigen).  This was noted on routine labs in December 2019 at which time his PSA was 16.2. This was rechecked in May 2020 and found to be 16.4 with free PSA of 9%.  He subsequently underwent MRI with PIRADS 5 lesion and TRUS biopsy. Here to review pathology. No complications since biopsy. Does have multiple cores of [place] 4+3=7 prostate cancer. Metastatic workup without clear evidence of mets.    CT abd/pelvis 9/17/2020  Impression:   In this patient with prostate cancer:  1. No " definite metastatic disease.   1a. No lymph nodes enlarged by size criteria. However, there are a few  small indeterminate lymph nodes along the right common iliac artery,  largest 11 mm, as well as multiple tiny lymph nodes in the pelvis.  If  clinically indicated an Axumin PET scan may be considered.      NM Bone Scan 9/17/2020  IMPRESSION:   1. No scintigraphic evidence of osseous metastatic disease.  2. Focal uptake to the anterior left 6th rib corresponding with  healing fracture on the same day CT.     TRUS 8/26/2020  FINAL DIAGNOSIS:   A. Prostate, right base, needle biopsy:   -Prostatic adenocarcinoma   -Janice score 7 (3+4; 10% pattern 4)   -Grade Group 2   -Extent: Involves 2/2 cores (6 mm 50%, 4 mm 25%)     B. Prostate, right mid, needle biopsy:   -Prostatic adenocarcinoma   -[place] score 6 (3+3)   -Grade Group 1   -Extent: Involves 2/2 cores (3 mm 20%, 2 mm 20%)     C. Prostate, right apex, needle biopsy:   -Prostatic adenocarcinoma   -[place] score 6 (3+3)   -Grade Group 1   -Extent: Involves 1/2 cores (3 mm 20%)     D. Prostate, left base, needle biopsy:   -Prostatic adenocarcinoma   -[place] score 7 (3+4)   -Grade Group 2   -Extent: Involves 1/2 cores (6 mm 40%)     E. Prostate, left mid , needle biopsy:   -Prostatic adenocarcinoma   -Janice score 6 (3+3)   -Grade Group 1   -Extent: Involves 2/2 cores (1 mm 10%, 3 mm 25%)     F. Prostate, left apex, needle biopsy:   -Prostatic adenocarcinoma   -Janice score 7 (4+3)   -Grade Group 3   -Extent: Involves 2/2 cores (2 mm 20%, 4 mm 30%)     G. Prostate, target 1, needle biopsy:   -Prostatic adenocarcinoma   -Janice score 7 (4+3)   -Grade Group 3   -Extent: Involves 2/2 cores (8 mm 80%, 11 mm 90%)     H. Prostate, target 2, needle biopsy:   -Prostatic adenocarcinoma   -Marion score 6 (3+3)   -Grade Group 1   -Extent: Involves 1/2 cores (1 mm 10%)          PSA 16.4 (9% free) May 2020  16.2 in 12/2019     MRI Prostate 7/29/2020  IMPRESSION:  1. Based on  the most suspicious abnormality, this exam is  characterized as PIRADS 5 - Clinically significant cancer is highly  likely to be present. The most suspicious abnormality is located at  the anterior left transition zone (12 - 2:00 position) and there is  high suspicion of extraprostatic extension.  2. Additional PIRADS 4 lesion in the posterior right mid to apical  peripheral zone.   3. Suspicious lesions in the right acetabulum and L4 vertebral body.  4. Indeterminate but not highly suspicious sub-8 mm lymph nodes in the  bilateral external iliac/common femoral locations.     I have reviewed and updated the patient's Past Medical History, Social History, Family History and Medication List.    ALLERGIES  Atorvastatin calcium, Crestor [rosuvastatin], Diovan [valsartan], Glimepiride, Invokana [canagliflozin], Mold, Penicillins, and Seasonal allergies    ASSESSMENT and PLAN  80 year old male with new diagnosis of Janice 4+3 prostate cancer, PSA 16.4. Suspicious bone lesion and lymph nodes on MRI. .We had an extensive discussion about the significance of localized, prostate cancer. We discussed the options for treatment of a localized prostate cancer including active surveillance, brachytherapy, external beam radiation therapy, and surgical removal. We discussed that based on his [place] score he would not be an ideal candidate for active surveillance.     We reviewed our previous discussion regarding the option of external beam radiotherapy. Give no clear evidence of metastasis, he is interested in meeting with radiation oncology. He will plan to do this in [place]. Will plan to follow-up with me in several months with a PSA, but will return sooner if he has additional questions or concerns.     Rad onc referral  He will follow-up with me following radiation consultation and/or treatment    Phone call duration: 13 minutes    Abebe Brannon MD  Urology  [facility]

## 2020-10-09 NOTE — PROGRESS NOTES
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     HISTORY OF PRESENT ILLNESS: Prakash Cramer is a 80 year old gentleman with  a recent diagnosis of unfavorable intermediate risk prostate cancer.  He was initially referred to Dr. Brannon in urology for elevated PSA noted on routine labs in December 2019.  On July 29, 2020 prostate MRI showed PI-RADS 5 in the left transition zone and PI-RADS 4 in the posterior right mid to apical peripheral zone.  There was a suspicious lesion in the right acetabulum and L4 vertebral body as well as an intermediate but not highly suspicious 8 mm lymph node in the bilateral external iliac common femoral locations.    On August 26, 2020 Dr. Guzmán completed a TRUS biopsy.  Digital rectal exam completed that same day reveals normal prostate. Total volume is 36 mL. Hypoechoic lesion bilaterally. US images were obtained and then fused with the MRI images.  The fused images were then used to guide the biopsy of the targeted lesions with 2 cores taken of each lesion. 12 cores were taken with 6 on each side, base, mid and apex. Pathology revealed prostate cancer involving more than 50% of the cores examined with either [place] 3+3 or Janice 4+3.    On 9/17/2020 abdominal pelvic CT showed no definite evidence of metastatic disease and no enlarged lymph nodes by size criteria although there were some indeterminate ones.  Same-day bone scan was negative.    Options were discussed and patient was thus referred to radiation oncology for definitive management.       PSA   Date Value Ref Range Status   12/12/2019 16.20 (H) 0 - 4 ug/L Final     Comment:     Assay Method:  Chemiluminescence using Siemens Vista analyzer   09/02/2008 1.78 0 - 4 ug/L Final   01/02/2008 2.56 0 - 4 ug/L Final   11/13/2002 1.3 0 - 4 ug/L Final        Today he states that he is feeling in his normal state of  "health. AUA score is 12 with frequency x 5, urgency x4, and nocturia x3. His ELIAS score is 8/25. He denies any pain. He has a good energy level. He has occasional dysuria and diarrhea.  Denies blood with bowel movements. Last colonoscopy was 1/20/2019.    REVIEW OF SYSTEMS: A 10-point review of systems was obtained. Pertinent findings are noted in the HPI and are otherwise unremarkable.      CHEMOTHERAPY HISTORY: none     RADIATION THERAPY HISTORY:  none     IBD/CTD/PACEMAKER: denies    Past Medical History:   Diagnosis Date     Aplastic anemia (H) 03/26/2018    thought secondary to reaction to Septra     BPH (benign prostatic hypertrophy)     failed  Flomax     C. difficile diarrhea 03/26/2018    treated with Flagyl     Cellulitis and abscess of leg, except foot 2004     Contact dermatitis and other eczema, due to unspecified cause      Diaphragmatic hernia without mention of obstruction or gangrene     hiatal hernia     Esophageal reflux      Hyperlipidemia LDL goal <100 3/11/2005     Hypothyroidism      Impotence of organic origin      Meningococcal encephalitis      Mumps      Proteinuria      RBBB      Type 2 diabetes mellitus with diabetic nephropathy  (goal A1C<7) 10/24/2015     Unspecified essential hypertension        Past Surgical History:   Procedure Laterality Date     ARTHROPLASTY KNEE Left 3/18/2015    Procedure: ARTHROPLASTY KNEE;  Surgeon: Abebe Schroeder MD;  Location: [facility] OR     ARTHROPLASTY KNEE Right 3/14/2016    Procedure: ARTHROPLASTY KNEE;  Surgeon: Abebe Schroeder MD;  Location:  OR      LAPAROSCOPY, SURGICAL; CHOLECYSTECTOMY  1998    Cholecystectomy, Laparoscopic     HC REMOVE TONSILS/ADENOIDS,13+ Y/O  1963    T & A 13+y.o.     ZZC NONSPECIFIC PROCEDURE  10/02    left knee meniscus tear repair       Family History   Problem Relation Age of Onset     Family History Negative Mother         d:age 89 of \"old age\"     Gastrointestinal Disease Father         d:age 79 liver failure after " taking excessive amounts of Tylenol over 5-6 yrs     Neurologic Disorder Brother         b:1932  hx cerebral aneurysm age 59       Social History     Tobacco Use     Smoking status: Never Smoker     Smokeless tobacco: Never Used   Substance Use Topics     Alcohol use: Yes     Comment: beer 4-5 a month     Current Outpatient Medications   Medication     amLODIPine (NORVASC) 10 MG tablet     bicalutamide (CASODEX) 50 MG tablet     blood glucose (ACCU-CHEK SMARTVIEW) test strip     blood glucose monitoring (ACCU-CHEK FASTCLIX) lancets     blood glucose monitoring (ACCU-CHEK MULTICLIX) lancets     Blood Glucose Monitoring Suppl MAGDALENO     Cyanocobalamin (B-12) 1000 MCG TBCR     ezetimibe (ZETIA) 10 MG tablet     famotidine (PEPCID) 20 MG tablet     levothyroxine (SYNTHROID/LEVOTHROID) 88 MCG tablet     pioglitazone (ACTOS) 30 MG tablet     sitagliptin (JANUVIA) 100 MG tablet     STATIN NOT PRESCRIBED (INTENTIONAL)     aspirin 81 MG tablet     No current facility-administered medications for this visit.         Allergies   Allergen Reactions     Atorvastatin Calcium      myalgias     Crestor [Rosuvastatin]      Myalgias     Diovan [Valsartan]      increase k+     Glimepiride      Pancytopenia       Invokana [Canagliflozin]      Joint pain     Mold Other (See Comments)     Eyes itchy,red and dry     Penicillins      Seasonal Allergies      Eyes itch     PHYSICAL EXAM:  /71 (BP Location: Left arm, Patient Position: Sitting, Cuff Size: Adult Regular)   Pulse 81   Temp 98.5  F (36.9  C) (Oral)   Resp 16   Wt 79.4 kg (175 lb)   SpO2 97%   BMI 27.41 kg/m    GEN: appears well, in no acute distress  HEENT: normocephalic and atraumatic, EOMI, anicteric sclerae  CV: no LE edema, no JVD, RRR  RESP: normal respiration on room air, no stridor, CTAB  ABDOMEN: soft, NT, ND  SKIN: normal color and turgor  MSK: moving all extremities well  RECTAL: penis no discharge. Testis no masses.  No scrotal skin lesion. Per Urology  digital rectal exam reveals normal prostate. Total volume is 36 mL.  NEURO: no focal neurologic deficit  PSYCH: appropriate mood, affect, and judgment     ECOG PERFORMANCE STATUS: 0     All pertinent laboratory, imaging, and pathology findings have been reviewed.      IMPRESSION AND RECOMMENDATIONS:  Prakash Cramer is a 80 year old gentleman with intermediate risk prostate cancer (GS 4+3, PSA 16.2) referred for definitive radiation therapy.  Today I discussed the natural history of his disease and the available management options including surgery, EBRT, and active surveillance. EBRT would involve approximately 6-8 weeks of daily treatments, with the possibility of fiducial marker placement +/- space OAR prior to treatment. I also discussed ADT and its associated side effects, as well as the length of ADT. In his case, I would recommend short term ADT.  I would not recommend active surveillance at this time.      The risks, benefits, alternatives, and logistics to radiation therapy were discussed in great detail. He is aware that the side effects of radiation therapy may be severe and permanent, although we expect that such risks would be low and that they are outweighed by the benefit of treatment. He was given the opportunity to ask questions, which were answered. After this discussion, he felt comfortable proceeding with hormonal therapy and EBRT.  However he stated that he would want to leave for [place] from December 2020 to April 2021.  I advised him to start hormones now and either stay in [place] for XRT or reconnect with his prior oncologist in [place] to initiate XRT in ~ 2 months time.      Finally I also explained that prior to starting XRT I'd like fiducials and possible space OAR to be placed by Urology.  Having gold seed fiducial markers helps us greatly in the delivery of radiation and enables us to use high quality image guided radiation therapy and smaller margins of error to decrease the dose of  radiation to normal tissue, which should translate to a better side effects profile. Space OAR helps us to spare the rectum from higher doses of radiation.     Additional problem list to be addressed in the following manner:  1. ADT: 2 months before during and after radiation therapy (plan is for 6 months of hormones) Casodex script sent to pharmacy today  2. fiducials to be placed by urology +/- space OAR.     There was ample time for questions and all were answered to the patient's satisfaction. Thank you for allowing me to participate in the care of this pleasant patient. If you have any questions, please do not hesitate to contact my office.     Sincerely,  Huyen Shoemaker MD

## 2020-10-12 ENCOUNTER — OFFICE VISIT (OUTPATIENT)
Dept: RADIATION ONCOLOGY | Facility: CLINIC | Age: 80
End: 2020-10-12
Attending: UROLOGY
Payer: MEDICARE

## 2020-10-12 VITALS
SYSTOLIC BLOOD PRESSURE: 127 MMHG | HEART RATE: 81 BPM | RESPIRATION RATE: 16 BRPM | DIASTOLIC BLOOD PRESSURE: 71 MMHG | OXYGEN SATURATION: 97 % | BODY MASS INDEX: 27.41 KG/M2 | WEIGHT: 175 LBS | TEMPERATURE: 98.5 F

## 2020-10-12 DIAGNOSIS — C61 PROSTATE CANCER (H): Primary | ICD-10-CM

## 2020-10-12 PROCEDURE — 99205 OFFICE O/P NEW HI 60 MIN: CPT | Performed by: RADIOLOGY

## 2020-10-12 RX ORDER — BICALUTAMIDE 50 MG/1
50 TABLET, FILM COATED ORAL DAILY
Qty: 16 TABLET | Refills: 1 | Status: SHIPPED | OUTPATIENT
Start: 2020-10-12 | End: 2021-05-03

## 2020-10-12 ASSESSMENT — PAIN SCALES - GENERAL: PAINLEVEL: NO PAIN (0)

## 2020-10-12 NOTE — PROGRESS NOTES
REASON FOR APPOINTMENT   Type of Cancer: Prostate cancer sarita 7  Location: Prostate  Date of Symptom Onset: 12/2019 Elevated PSA 16.2    TREATMENT TO-DATE FOR THIS CANCER  Surgery ? No   Chemotherapy ? no   Other Treatments for this Cancer ? no    PERSONAL HISTORY OF CANCER   Previous Cancer ? no   Prior Radiation ? no   Prior Chemotherapy ? no   Prior Hormonal Therapy ? no     RECENT IMAGING STUDIES  MRI (date: 7/29/2020, location: [facility])  CT (date: 9/17/2020, location: [facility] [place])    REFERRALS NEEDED  No    VITALS  /71 (BP Location: Left arm, Patient Position: Sitting, Cuff Size: Adult Regular)   Pulse 81   Temp 98.5  F (36.9  C) (Oral)   Resp 16   Wt 79.4 kg (175 lb)   SpO2 97%   BMI 27.41 kg/m      PACEMAKER/IMPLANTED CARDIAC DEVICE no    PAIN  Denies    PSYCHOSOCIAL  Marital Status:   Patient lives in home with alone.  Number of children: unknown  Working status: retired  Do you feel safe in your home? Yes    REVIEW OF SYSTEMS  Skin: negative  Eyes: positive for cataracts, glasses  Ears/Nose/Throat: negative  Respiratory: No shortness of breath, dyspnea on exertion, cough, or hemoptysis  Cardiovascular: negative  Gastrointestinal: negative  Genitourinary: positive for nocturia x 3, frequency and urgency  Musculoskeletal: positive for back pain  Neurologic: negative  Psychiatric: negative  Hematologic/Lymphatic/Immunologic: positive for thrombocytopenia  Endocrine: positive for thyroid disorder    Radiation Oncology Patient Teaching    Current Concern: Prostate cancer    Person involved with teaching: Patient  Patient asked Questions: Yes  Patient was cooperative: Yes  Patient was receptive (willing to accept information given): Yes    Education Assessment  Comprehension ability: Medium  Knowledge level: Medium  Factors affecting teaching: None    Educational Topics Discussed  Radiation therapy schedule for prostate cancer, side effects-  fatigue, diarrhea    Response To Teaching  Verbalizes understanding    Do you have an advanced directive or living will? Yes  Are you DNR/DNI? no    Consent to Communicate- Yes 5/8/2018  Fall Risk Assessment Completed- Yes

## 2020-10-12 NOTE — LETTER
10/12/2020         RE: Prakash Cramer  [address]  [facility] [address]        Dear Colleague,    Thank you for referring your patient, Prakash Cramer, to the [facility] RADIATION ONCOLOG[facility] [place]. Please see a copy of my visit note below.    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     HISTORY OF PRESENT ILLNESS: Prakash Cramer is a 80 year old gentleman with  a recent diagnosis of unfavorable intermediate risk prostate cancer.  He was initially referred to Dr. Brannon in urology for elevated PSA noted on routine labs in December 2019.  On July 29, 2020 prostate MRI showed PI-RADS 5 in the left transition zone and PI-RADS 4 in the posterior right mid to apical peripheral zone.  There was a suspicious lesion in the right acetabulum and L4 vertebral body as well as an intermediate but not highly suspicious 8 mm lymph node in the bilateral external iliac common femoral locations.    On August 26, 2020 Dr. Guzmán completed a TRUS biopsy.  Digital rectal exam completed that same day reveals normal prostate. Total volume is 36 mL. Hypoechoic lesion bilaterally. US images were obtained and then fused with the MRI images.  The fused images were then used to guide the biopsy of the targeted lesions with 2 cores taken of each lesion. 12 cores were taken with 6 on each side, base, mid and apex. Pathology revealed prostate cancer involving more than 50% of the cores examined with either [place] 3+3 or [place] 4+3.    On 9/17/2020 abdominal pelvic CT showed no definite evidence of metastatic disease and no enlarged lymph nodes by size criteria although there were some indeterminate ones.  Same-day bone scan was negative.    Options were discussed and patient was thus referred to radiation oncology for definitive management.       PSA   Date Value Ref Range Status   12/12/2019 16.20 (H) 0 - 4  ug/L Final     Comment:     Assay Method:  Chemiluminescence using Siemens Vista analyzer   09/02/2008 1.78 0 - 4 ug/L Final   01/02/2008 2.56 0 - 4 ug/L Final   11/13/2002 1.3 0 - 4 ug/L Final        Today he states that he is feeling in his normal state of health. AUA score is 12 with frequency x 5, urgency x4, and nocturia x3. His ELIAS score is 8/25. He denies any pain. He has a good energy level. He has occasional dysuria and diarrhea.  Denies blood with bowel movements. Last colonoscopy was 1/20/2019.    REVIEW OF SYSTEMS: A 10-point review of systems was obtained. Pertinent findings are noted in the HPI and are otherwise unremarkable.      CHEMOTHERAPY HISTORY: none     RADIATION THERAPY HISTORY:  none     IBD/CTD/PACEMAKER: denies    Past Medical History:   Diagnosis Date     Aplastic anemia (H) 03/26/2018    thought secondary to reaction to Septra     BPH (benign prostatic hypertrophy)     failed  Flomax     C. difficile diarrhea 03/26/2018    treated with Flagyl     Cellulitis and abscess of leg, except foot 2004     Contact dermatitis and other eczema, due to unspecified cause      Diaphragmatic hernia without mention of obstruction or gangrene     hiatal hernia     Esophageal reflux      Hyperlipidemia LDL goal <100 3/11/2005     Hypothyroidism      Impotence of organic origin      Meningococcal encephalitis      Mumps      Proteinuria      RBBB      Type 2 diabetes mellitus with diabetic nephropathy  (goal A1C<7) 10/24/2015     Unspecified essential hypertension        Past Surgical History:   Procedure Laterality Date     ARTHROPLASTY KNEE Left 3/18/2015    Procedure: ARTHROPLASTY KNEE;  Surgeon: Abebe Schroeder MD;  Location: [facility] OR     ARTHROPLASTY KNEE Right 3/14/2016    Procedure: ARTHROPLASTY KNEE;  Surgeon: Abebe Schroeder MD;  Location:  OR      LAPAROSCOPY, SURGICAL; CHOLECYSTECTOMY  1998    Cholecystectomy, Laparoscopic     HC REMOVE TONSILS/ADENOIDS,13+ Y/O  1963    T & A 13+y.o.      "ZZC NONSPECIFIC PROCEDURE  10/02    left knee meniscus tear repair       Family History   Problem Relation Age of Onset     Family History Negative Mother         d:age 89 of \"old age\"     Gastrointestinal Disease Father         d:age 79 liver failure after taking excessive amounts of Tylenol over 5-6 yrs     Neurologic Disorder Brother         b:1932  hx cerebral aneurysm age 59       Social History     Tobacco Use     Smoking status: Never Smoker     Smokeless tobacco: Never Used   Substance Use Topics     Alcohol use: Yes     Comment: beer 4-5 a month     Current Outpatient Medications   Medication     amLODIPine (NORVASC) 10 MG tablet     bicalutamide (CASODEX) 50 MG tablet     blood glucose (ACCU-CHEK SMARTVIEW) test strip     blood glucose monitoring (ACCU-CHEK FASTCLIX) lancets     blood glucose monitoring (ACCU-CHEK MULTICLIX) lancets     Blood Glucose Monitoring Suppl MAGDALENO     Cyanocobalamin (B-12) 1000 MCG TBCR     ezetimibe (ZETIA) 10 MG tablet     famotidine (PEPCID) 20 MG tablet     levothyroxine (SYNTHROID/LEVOTHROID) 88 MCG tablet     pioglitazone (ACTOS) 30 MG tablet     sitagliptin (JANUVIA) 100 MG tablet     STATIN NOT PRESCRIBED (INTENTIONAL)     aspirin 81 MG tablet     No current facility-administered medications for this visit.         Allergies   Allergen Reactions     Atorvastatin Calcium      myalgias     Crestor [Rosuvastatin]      Myalgias     Diovan [Valsartan]      increase k+     Glimepiride      Pancytopenia       Invokana [Canagliflozin]      Joint pain     Mold Other (See Comments)     Eyes itchy,red and dry     Penicillins      Seasonal Allergies      Eyes itch     PHYSICAL EXAM:  /71 (BP Location: Left arm, Patient Position: Sitting, Cuff Size: Adult Regular)   Pulse 81   Temp 98.5  F (36.9  C) (Oral)   Resp 16   Wt 79.4 kg (175 lb)   SpO2 97%   BMI 27.41 kg/m    GEN: appears well, in no acute distress  HEENT: normocephalic and atraumatic, EOMI, anicteric sclerae  CV: " no LE edema, no JVD, RRR  RESP: normal respiration on room air, no stridor, CTAB  ABDOMEN: soft, NT, ND  SKIN: normal color and turgor  MSK: moving all extremities well  RECTAL: penis no discharge. Testis no masses.  No scrotal skin lesion. Per Urology digital rectal exam reveals normal prostate. Total volume is 36 mL.  NEURO: no focal neurologic deficit  PSYCH: appropriate mood, affect, and judgment     ECOG PERFORMANCE STATUS: 0     All pertinent laboratory, imaging, and pathology findings have been reviewed.      IMPRESSION AND RECOMMENDATIONS:  Prakash Cramer is a 80 year old gentleman with intermediate risk prostate cancer (GS 4+3, PSA 16.2) referred for definitive radiation therapy.  Today I discussed the natural history of his disease and the available management options including surgery, EBRT, and active surveillance. EBRT would involve approximately 6-8 weeks of daily treatments, with the possibility of fiducial marker placement +/- space OAR prior to treatment. I also discussed ADT and its associated side effects, as well as the length of ADT. In his case, I would recommend short term ADT.  I would not recommend active surveillance at this time.      The risks, benefits, alternatives, and logistics to radiation therapy were discussed in great detail. He is aware that the side effects of radiation therapy may be severe and permanent, although we expect that such risks would be low and that they are outweighed by the benefit of treatment. He was given the opportunity to ask questions, which were answered. After this discussion, he felt comfortable proceeding with hormonal therapy and EBRT.  However he stated that he would want to leave for [place] from December 2020 to April 2021.  I advised him to start hormones now and either stay in [place] for XRT or reconnect with his prior oncologist in [place] to initiate XRT in ~ 2 months time.      Finally I also explained that prior to starting XRT I'd like fiducials  and possible space OAR to be placed by Urology.  Having gold seed fiducial markers helps us greatly in the delivery of radiation and enables us to use high quality image guided radiation therapy and smaller margins of error to decrease the dose of radiation to normal tissue, which should translate to a better side effects profile. Space OAR helps us to spare the rectum from higher doses of radiation.     Additional problem list to be addressed in the following manner:  1. ADT: 2 months before during and after radiation therapy (plan is for 6 months of hormones) Casodex script sent to pharmacy today  2. fiducials to be placed by urology +/- space OAR.     There was ample time for questions and all were answered to the patient's satisfaction. Thank you for allowing me to participate in the care of this pleasant patient. If you have any questions, please do not hesitate to contact my office.     Sincerely,  Huyen Shoemaker MD      REASON FOR APPOINTMENT   Type of Cancer: Prostate cancer sarita 7  Location: Prostate  Date of Symptom Onset: 12/2019 Elevated PSA 16.2    TREATMENT TO-DATE FOR THIS CANCER  Surgery ? No   Chemotherapy ? no   Other Treatments for this Cancer ? no    PERSONAL HISTORY OF CANCER   Previous Cancer ? no   Prior Radiation ? no   Prior Chemotherapy ? no   Prior Hormonal Therapy ? no     RECENT IMAGING STUDIES  MRI (date: 7/29/2020, location: [facility])  CT (date: 9/17/2020, location: [facility])    REFERRALS NEEDED  No    VITALS  /71 (BP Location: Left arm, Patient Position: Sitting, Cuff Size: Adult Regular)   Pulse 81   Temp 98.5  F (36.9  C) (Oral)   Resp 16   Wt 79.4 kg (175 lb)   SpO2 97%   BMI 27.41 kg/m      PACEMAKER/IMPLANTED CARDIAC DEVICE no    PAIN  Denies    PSYCHOSOCIAL  Marital Status:   Patient lives in home with alone.  Number of children: unknown  Working status: retired  Do you feel safe in your home? Yes    REVIEW OF  SYSTEMS  Skin: negative  Eyes: positive for cataracts, glasses  Ears/Nose/Throat: negative  Respiratory: No shortness of breath, dyspnea on exertion, cough, or hemoptysis  Cardiovascular: negative  Gastrointestinal: negative  Genitourinary: positive for nocturia x 3, frequency and urgency  Musculoskeletal: positive for back pain  Neurologic: negative  Psychiatric: negative  Hematologic/Lymphatic/Immunologic: positive for thrombocytopenia  Endocrine: positive for thyroid disorder    Radiation Oncology Patient Teaching    Current Concern: Prostate cancer    Person involved with teaching: Patient  Patient asked Questions: Yes  Patient was cooperative: Yes  Patient was receptive (willing to accept information given): Yes    Education Assessment  Comprehension ability: Medium  Knowledge level: Medium  Factors affecting teaching: None    Educational Topics Discussed  Radiation therapy schedule for prostate cancer, side effects- fatigue, diarrhea    Response To Teaching  Verbalizes understanding    Do you have an advanced directive or living will? Yes  Are you DNR/DNI? no    Consent to Communicate- Yes 5/8/2018  Fall Risk Assessment Completed- Yes         Again, thank you for allowing me to participate in the care of your patient.        Sincerely,        Huyen Shoemaker MD

## 2020-10-12 NOTE — PATIENT INSTRUCTIONS
Please contact [facility] RN with questions or concerns following today's appointment: [phone].    Thank you!

## 2020-10-13 ENCOUNTER — PATIENT OUTREACH (OUTPATIENT)
Dept: RADIATION ONCOLOGY | Facility: CLINIC | Age: 80
End: 2020-10-13

## 2020-10-13 NOTE — TELEPHONE ENCOUNTER
"Received notification that patient is requesting return call to review treatment plan and medications, patient seen for radiation oncology consultation for prostate cancer on Monday, 10/12/2020.  This RN reviewed plan with Dr. Shoemaker, Axumin PET scan cancelled as insurance will not cover per [facility] department.  Patient prescribed Casodex po for 16 days per Dr. Shoemaker with plans for Lupron injection to be received the week of 10/26/2020.  Dr. Shoemaker reports recommendation of patient receiving hormone therapy followed by start of radiation treatment in December, 2020.  This RN returned patient's call, patient reports he understands Dr. Shoemaker's recommendation, however, he will be traveling to [place] on 12/28/2020 and returning mid-April, 2021.  Patient reports he will not be starting radiation treatment in [place] and does not plan to have radiation treatment in [place] and requesting to start treatment upon return home to [place] in April, 2021.  Patient questions what dose of Lupron medication he needs to \"cover him\" until April, 2021.  Informed patient that this RN would review with Dr. Shoemaker and scheduling will contact patient to assist in scheduling Lupron injection at [facility].  Patient verbalized understanding that radiation treatment will not start immediately upon return home and when patient knows return date to contact clinic as clinic will assist in scheduling any imaging and procedures recommended by Dr. Shoemaker prior to radiation treatment planning.  Patient verbalized understanding of all information and had no further questions at this time.    Dr. Shoemaker updated by this RN, orders placed for Lupron 45 mg injection to be given the week of 10/26/2020 as patient reports he started taking Casodex today, 10/13/2020.  In-basket message sent to Paola Olmedo with request to assist patient in scheduling Lupron injection.    Deb Ugarte, RN BSN OCN    "

## 2020-10-15 ENCOUNTER — TELEPHONE (OUTPATIENT)
Dept: RADIATION ONCOLOGY | Facility: CLINIC | Age: 80
End: 2020-10-15

## 2020-10-15 NOTE — TELEPHONE ENCOUNTER
Patient scheduled and contacted for Lupron injection on 10/26/2020 at [address] [place] [address].      Paola Olmedo CMA

## 2020-10-20 DIAGNOSIS — R19.5 LOOSE STOOLS: ICD-10-CM

## 2020-10-20 RX ORDER — FAMOTIDINE 20 MG/1
TABLET, FILM COATED ORAL
Qty: 180 TABLET | Refills: 2 | Status: SHIPPED | OUTPATIENT
Start: 2020-10-20 | End: 2021-05-21

## 2020-10-26 ENCOUNTER — INFUSION THERAPY VISIT (OUTPATIENT)
Dept: INFUSION THERAPY | Facility: CLINIC | Age: 80
End: 2020-10-26
Payer: MEDICARE

## 2020-10-26 VITALS
BODY MASS INDEX: 27.49 KG/M2 | RESPIRATION RATE: 18 BRPM | HEART RATE: 93 BPM | OXYGEN SATURATION: 97 % | DIASTOLIC BLOOD PRESSURE: 92 MMHG | SYSTOLIC BLOOD PRESSURE: 154 MMHG | TEMPERATURE: 97.9 F | WEIGHT: 175.5 LBS

## 2020-10-26 DIAGNOSIS — C61 PROSTATE CANCER (H): Primary | ICD-10-CM

## 2020-10-26 PROCEDURE — 96402 CHEMO HORMON ANTINEOPL SQ/IM: CPT | Performed by: NURSE PRACTITIONER

## 2020-10-26 PROCEDURE — 99207 PR NO CHARGE NURSE ONLY: CPT

## 2020-10-26 NOTE — PROGRESS NOTES
Infusion Nursing Note:  Prakash Cramer presents today for Leuprolide injection (Eligard).    Patient seen by provider today: No   present during visit today: Not Applicable.    Note: Assessment performed by Avis AVILES RN prior to injection today. Patient denies symptoms/concerns following previous injection.    Intravenous Access:  No Intravenous access/labs at this visit.    Treatment Conditions:  Not Applicable.      Post Infusion Assessment:  Patient tolerated injection without incident.  Site patent and intact, free from redness, edema or discomfort.       Discharge Plan:   Patient discharged in stable condition accompanied by: self.  Departure Mode: Ambulatory.    Nithya Vega LPN

## 2020-10-26 NOTE — PROGRESS NOTES
Provided patient with a medication education hand out about Eligard. Reviewed purpose of medication and possible side effects. Offered to bring in a pharmacist to discuss further. Patient declined and verbalized understanding. Avis Pacheco RN

## 2020-11-03 ENCOUNTER — TELEPHONE (OUTPATIENT)
Dept: INTERNAL MEDICINE | Facility: CLINIC | Age: 80
End: 2020-11-03

## 2020-11-03 DIAGNOSIS — E78.5 HYPERLIPIDEMIA LDL GOAL <100: ICD-10-CM

## 2020-11-03 DIAGNOSIS — E03.9 HYPOTHYROIDISM, UNSPECIFIED TYPE: ICD-10-CM

## 2020-11-03 DIAGNOSIS — E11.21 TYPE 2 DIABETES MELLITUS WITH DIABETIC NEPHROPATHY, WITHOUT LONG-TERM CURRENT USE OF INSULIN (H): ICD-10-CM

## 2020-11-03 DIAGNOSIS — N18.31 STAGE 3A CHRONIC KIDNEY DISEASE (H): Primary | ICD-10-CM

## 2020-11-03 DIAGNOSIS — D69.6 THROMBOCYTOPENIA (H): ICD-10-CM

## 2020-11-03 RX ORDER — PRAVASTATIN SODIUM 20 MG
TABLET ORAL
Qty: 30 TABLET | Refills: 11 | Status: SHIPPED | OUTPATIENT
Start: 2020-11-03 | End: 2020-12-21

## 2020-11-03 NOTE — TELEPHONE ENCOUNTER
Reason for Call:  Other call back    Detailed comments: Pt called to request a generic for ezetimibe.  Please call pt back.    Phone Number Patient can be reached at: Home number on file [phone] (home)    Best Time: anytime    Can we leave a detailed message on this number? YES    Call taken on 11/3/2020 at 11:11 AM by MATTHIAS CUMMINGS

## 2020-11-03 NOTE — TELEPHONE ENCOUNTER
Spoke with patient.  Reviewed most recent labs from 8/27/2020.  Ezetimibe not effective.  Will discontinue.  Intolerant of 2 other statins when used  Daily.  Will try pravastatin 20 mg tablet, 1 tablet 3 times a week (Monday Wednesday Friday) and repeat fasting labs in 1 month as ordered.   Patient to continue other medications for now.  Prostate cancer being managed by urology and radiation oncology

## 2020-11-05 ENCOUNTER — TELEPHONE (OUTPATIENT)
Dept: ONCOLOGY | Facility: CLINIC | Age: 80
End: 2020-11-05

## 2020-11-05 NOTE — TELEPHONE ENCOUNTER
Patient would like to let his provider know he was possibly exposed to the corona virus and he will be getting tested on 11/9/2020.

## 2020-11-05 NOTE — TELEPHONE ENCOUNTER
Agree with pt being tested as scheduled through non-[facility] site.  Pt will need to quarantine for 14 days from time of exposure. If sx increase but no SOB, then schedule telephone virtual visit with me or partner.   If develops increasing SOB, then be seen in ER

## 2020-11-05 NOTE — TELEPHONE ENCOUNTER
Received telephone call from patient. States his son was exposed to Covid and has gotten tested but results are not back. He has been with his son and would like to get tested. States he has had a headache and slight cough; no fever or dyspnea. Advised to go to [email or web address] to set up an e visit. Patient states he does not have a computer or smart phone. Provided patient free covid testing site information in [facility]. Assisted patient with making an appointment. Advised patient to call his PCP to get his recommendations. Patient agreed but states he will keep appointment for the test in [place].  Betty Mo  RN, BSN, OCN

## 2020-12-03 DIAGNOSIS — E11.21 TYPE 2 DIABETES MELLITUS WITH DIABETIC NEPHROPATHY, WITHOUT LONG-TERM CURRENT USE OF INSULIN (H): ICD-10-CM

## 2020-12-03 RX ORDER — BLOOD SUGAR DIAGNOSTIC
STRIP MISCELLANEOUS
Qty: 100 STRIP | Refills: 0 | Status: SHIPPED | OUTPATIENT
Start: 2020-12-03 | End: 2021-04-22

## 2020-12-03 RX ORDER — LANCETS
EACH MISCELLANEOUS
Qty: 102 EACH | Refills: 0 | Status: SHIPPED | OUTPATIENT
Start: 2020-12-03 | End: 2021-04-22

## 2020-12-03 NOTE — TELEPHONE ENCOUNTER
Reason for Call:  Medication or medication refill:    Do you use a [facility]?  Name of the pharmacy and phone number for the current request:  [facility]   [place]    Name of the medication requested:blood glucose (ACCU-CHEK SMARTVIEW) test strip    Other request:   Patient is out of these test strips    Can we leave a detailed message on this number? YES    Phone number patient can be reached at: Other phone number:            [phone]                                                            Best Time:   anytime    Call taken on 12/3/2020 at 4:52 PM by NOHEMI WRIGHT

## 2020-12-03 NOTE — LETTER
[facility]  [address] [address]  [phone]            Prakash Cramer  [address] [address] [place] [address]        December 3, 2020    Dear Prakash,    While refilling your prescription today, we noticed that you are due to have labs drawn and see your provider.  We will refill your prescription for 30 days, but a follow-up appointment must be made before any additional refills can be approved.     Taking care of your health is important to us and we look forward to seeing you in the near future.  Please call us at [phone] or [phone] (or use [facility]) to schedule an appointment.     Please disregard this notice if you have already made an appointment.    Sincerely,        [facility]

## 2020-12-18 DIAGNOSIS — E11.21 TYPE 2 DIABETES MELLITUS WITH DIABETIC NEPHROPATHY, WITHOUT LONG-TERM CURRENT USE OF INSULIN (H): ICD-10-CM

## 2020-12-18 DIAGNOSIS — E03.9 HYPOTHYROIDISM, UNSPECIFIED TYPE: ICD-10-CM

## 2020-12-18 DIAGNOSIS — E78.5 HYPERLIPIDEMIA LDL GOAL <100: ICD-10-CM

## 2020-12-18 DIAGNOSIS — N18.31 STAGE 3A CHRONIC KIDNEY DISEASE (H): ICD-10-CM

## 2020-12-18 DIAGNOSIS — D69.6 THROMBOCYTOPENIA (H): ICD-10-CM

## 2020-12-18 LAB
ALBUMIN SERPL-MCNC: 3.5 G/DL (ref 3.4–5)
ALP SERPL-CCNC: 109 U/L (ref 40–150)
ALT SERPL W P-5'-P-CCNC: 24 U/L (ref 0–70)
ANION GAP SERPL CALCULATED.3IONS-SCNC: 5 MMOL/L (ref 3–14)
AST SERPL W P-5'-P-CCNC: 19 U/L (ref 0–45)
BILIRUB SERPL-MCNC: 0.4 MG/DL (ref 0.2–1.3)
BUN SERPL-MCNC: 32 MG/DL (ref 7–30)
CALCIUM SERPL-MCNC: 9.1 MG/DL (ref 8.5–10.1)
CHLORIDE SERPL-SCNC: 105 MMOL/L (ref 94–109)
CHOLEST SERPL-MCNC: 310 MG/DL
CK SERPL-CCNC: 54 U/L (ref 30–300)
CO2 SERPL-SCNC: 28 MMOL/L (ref 20–32)
CREAT SERPL-MCNC: 1.3 MG/DL (ref 0.66–1.25)
ERYTHROCYTE [DISTWIDTH] IN BLOOD BY AUTOMATED COUNT: 16.1 % (ref 10–15)
GFR SERPL CREATININE-BSD FRML MDRD: 52 ML/MIN/{1.73_M2}
GLUCOSE SERPL-MCNC: 176 MG/DL (ref 70–99)
HBA1C MFR BLD: 8.5 % (ref 0–5.6)
HCT VFR BLD AUTO: 37.4 % (ref 40–53)
HDLC SERPL-MCNC: 64 MG/DL
HGB BLD-MCNC: 12.7 G/DL (ref 13.3–17.7)
LDLC SERPL CALC-MCNC: 172 MG/DL
MCH RBC QN AUTO: 37.7 PG (ref 26.5–33)
MCHC RBC AUTO-ENTMCNC: 34 G/DL (ref 31.5–36.5)
MCV RBC AUTO: 111 FL (ref 78–100)
NONHDLC SERPL-MCNC: 246 MG/DL
PLATELET # BLD AUTO: 142 10E9/L (ref 150–450)
POTASSIUM SERPL-SCNC: 4.4 MMOL/L (ref 3.4–5.3)
PROT SERPL-MCNC: 7.4 G/DL (ref 6.8–8.8)
RBC # BLD AUTO: 3.37 10E12/L (ref 4.4–5.9)
SODIUM SERPL-SCNC: 138 MMOL/L (ref 133–144)
TRIGL SERPL-MCNC: 368 MG/DL
WBC # BLD AUTO: 4.4 10E9/L (ref 4–11)

## 2020-12-18 PROCEDURE — 80061 LIPID PANEL: CPT | Performed by: INTERNAL MEDICINE

## 2020-12-18 PROCEDURE — 80053 COMPREHEN METABOLIC PANEL: CPT | Performed by: INTERNAL MEDICINE

## 2020-12-18 PROCEDURE — 83036 HEMOGLOBIN GLYCOSYLATED A1C: CPT | Performed by: INTERNAL MEDICINE

## 2020-12-18 PROCEDURE — 85027 COMPLETE CBC AUTOMATED: CPT | Performed by: INTERNAL MEDICINE

## 2020-12-18 PROCEDURE — 36415 COLL VENOUS BLD VENIPUNCTURE: CPT | Performed by: INTERNAL MEDICINE

## 2020-12-18 PROCEDURE — 82550 ASSAY OF CK (CPK): CPT | Performed by: INTERNAL MEDICINE

## 2020-12-21 ENCOUNTER — OFFICE VISIT (OUTPATIENT)
Dept: INTERNAL MEDICINE | Facility: CLINIC | Age: 80
End: 2020-12-21
Payer: MEDICARE

## 2020-12-21 VITALS
SYSTOLIC BLOOD PRESSURE: 136 MMHG | DIASTOLIC BLOOD PRESSURE: 74 MMHG | HEART RATE: 87 BPM | HEIGHT: 67 IN | WEIGHT: 169.2 LBS | BODY MASS INDEX: 26.56 KG/M2 | TEMPERATURE: 98.3 F | OXYGEN SATURATION: 97 %

## 2020-12-21 DIAGNOSIS — E78.5 HYPERLIPIDEMIA LDL GOAL <100: ICD-10-CM

## 2020-12-21 DIAGNOSIS — D69.6 THROMBOCYTOPENIA (H): ICD-10-CM

## 2020-12-21 DIAGNOSIS — E11.21 TYPE 2 DIABETES MELLITUS WITH DIABETIC NEPHROPATHY, WITHOUT LONG-TERM CURRENT USE OF INSULIN (H): ICD-10-CM

## 2020-12-21 DIAGNOSIS — N18.31 STAGE 3A CHRONIC KIDNEY DISEASE (H): ICD-10-CM

## 2020-12-21 DIAGNOSIS — E03.9 HYPOTHYROIDISM, UNSPECIFIED TYPE: ICD-10-CM

## 2020-12-21 DIAGNOSIS — C61 PROSTATE CANCER (H): ICD-10-CM

## 2020-12-21 DIAGNOSIS — Z86.0100 HISTORY OF COLONIC POLYPS: ICD-10-CM

## 2020-12-21 DIAGNOSIS — I10 ESSENTIAL HYPERTENSION: ICD-10-CM

## 2020-12-21 DIAGNOSIS — Z00.00 MEDICARE ANNUAL WELLNESS VISIT, SUBSEQUENT: ICD-10-CM

## 2020-12-21 PROCEDURE — G0439 PPPS, SUBSEQ VISIT: HCPCS | Performed by: INTERNAL MEDICINE

## 2020-12-21 PROCEDURE — 99207 PR FOOT EXAM NO CHARGE: CPT | Mod: 25 | Performed by: INTERNAL MEDICINE

## 2020-12-21 PROCEDURE — 90662 IIV NO PRSV INCREASED AG IM: CPT | Performed by: INTERNAL MEDICINE

## 2020-12-21 PROCEDURE — 99213 OFFICE O/P EST LOW 20 MIN: CPT | Mod: 25 | Performed by: INTERNAL MEDICINE

## 2020-12-21 PROCEDURE — G0008 ADMIN INFLUENZA VIRUS VAC: HCPCS | Performed by: INTERNAL MEDICINE

## 2020-12-21 RX ORDER — PRAVASTATIN SODIUM 20 MG
TABLET ORAL
Qty: 30 TABLET | Refills: 11 | Status: SHIPPED | OUTPATIENT
Start: 2020-12-21 | End: 2021-12-06

## 2020-12-21 RX ORDER — METFORMIN HCL 500 MG
1000 TABLET, EXTENDED RELEASE 24 HR ORAL DAILY
Qty: 60 TABLET | Refills: 11 | Status: SHIPPED | OUTPATIENT
Start: 2020-12-21 | End: 2021-12-06

## 2020-12-21 ASSESSMENT — MIFFLIN-ST. JEOR: SCORE: 1441.12

## 2020-12-21 ASSESSMENT — ACTIVITIES OF DAILY LIVING (ADL): CURRENT_FUNCTION: NO ASSISTANCE NEEDED

## 2020-12-21 NOTE — PATIENT INSTRUCTIONS
"Metformin ER 500mg tab, 1 tab daily with supper for 2 weeks. Then if stools OK, increase  to 2 tabs at supper daily.  If looser stool, OK to use extra fiber like Metamucil, Citrucel, etc to \"thicken\" the stools   Increase Pravastatin to 20mg tab, 1 tab daily for cholesterol   If new muscle achiness with dose increase, let me know via [facility] message   Be sure to taker aspirin 81mg daily for heart protection. If pain, then use Tylenol  Repeat fasting labs at nearest [facility] lab in late March 2021 when return from [place]  Reduce carbohydrate (sugars, starchy foods such as bread, rice, potato, pasta, etc) intake  in your diet and increase the amount of color on your plate with fruits, vegetables and lean meats.    Flu vaccine today  Continue other medications  Colonoscopy follow-up while in [place] for history of colon polyps  Follow-up with Oncology per their instructions  Pt was informed regarding extra E&M billing for management of new or established medical issues not related to today's wellness visit    "

## 2020-12-21 NOTE — PROGRESS NOTES
"SUBJECTIVE:   Prakash Cramer is a 80 year old male who presents for Preventive Visit along with established medical issues including diabetes, hyperlipidemia, CKD, hypertension, hypothyroidism,  prostate cancer, thrombocytopenia.      Patient has been advised of split billing requirements and indicates understanding: Yes   Are you in the first 12 months of your Medicare coverage?  No    Healthy Habits:     In general, how would you rate your overall health?  Good    Duration of exercise:  15-30 minutes    Do you usually eat at least 4 servings of fruit and vegetables a day, include whole grains    & fiber and avoid regularly eating high fat or \"junk\" foods?  No    Taking medications regularly:  Yes    Barriers to taking medications:  None    Medication side effects:  None    Ability to successfully perform activities of daily living:  No assistance needed    Home Safety:  No safety concerns identified    Hearing Impairment:  No hearing concerns    In the past 6 months, have you been bothered by leaking of urine? Yes    In general, how would you rate your overall mental or emotional health?  Good      PHQ-2 Total Score: 0    Additional concerns today:  No    Do you feel safe in your environment? YES    Have you ever done Advance Care Planning? (For example, a Health Directive, POLST, or a discussion with a medical provider or your loved ones about your wishes): Yes, advance care planning is on file.      Fall risk  Fallen 2 or more times in the past year?: Yes  Any fall with injury in the past year?: No  Timed Up and Go Test (>13.5 is fall risk; contact physician) : 11    Cognitive Screening   1) Repeat 3 items (Leader, Season, Table)    2) Clock draw: NORMAL  3) 3 item recall: Recalls 3 objects  Results: 3 items recalled: COGNITIVE IMPAIRMENT LESS LIKELY    Mini-CogTM Copyright GUILLERMO Hernández. Licensed by the author for use in [facility]; reprinted with permission (leonides@.[facility]). All rights reserved.      Do " you have sleep apnea, excessive snoring or daytime drowsiness?: no    Reviewed and updated as needed this visit by clinical staff  Tobacco  Allergies  Meds   Med Hx  Surg Hx  Fam Hx  Soc Hx        Reviewed and updated as needed this visit by Provider                Social History     Tobacco Use     Smoking status: Never Smoker     Smokeless tobacco: Never Used   Substance Use Topics     Alcohol use: Yes     Comment: beer 4-5 a month     If you drink alcohol do you typically have >3 drinks per day or >7 drinks per week? No    Alcohol Use 12/20/2019   Prescreen: >3 drinks/day or >7 drinks/week? No   Prescreen: >3 drinks/day or >7 drinks/week? -   No flowsheet data found.            Current providers sharing in care for this patient include:   Patient Care Team:  Zhou Meraz MD as PCP - General (Internal Medicine)  Xuan Armijo RN as Nurse Coordinator (Oncology)  Terra Salazar MD as MD (Hematology & Oncology)  Zhou Meraz MD as Assigned PCP  Abebe Brannon MD as Assigned Cancer Care Provider    The following health maintenance items are reviewed in Epic and correct as of today:  Health Maintenance   Topic Date Due     EYE EXAM  1940     DIABETIC FOOT EXAM  12/19/2019     COLORECTAL CANCER SCREENING  12/24/2019     MICROALBUMIN  05/15/2020     INFLUENZA VACCINE (1) 09/01/2020     MEDICARE ANNUAL WELLNESS VISIT  12/20/2020     PHQ-9  01/13/2021     A1C  06/18/2021     TSH W/FREE T4 REFLEX  08/27/2021     FALL RISK ASSESSMENT  10/12/2021     ALT  12/18/2021     BMP  12/18/2021     LIPID  12/18/2021     CREATININE  12/18/2021     DTAP/TDAP/TD IMMUNIZATION (3 - Td) 02/06/2023     ADVANCE CARE PLANNING  01/01/2025     HEPATITIS C SCREENING  Completed     Pneumococcal Vaccine: 65+ Years  Completed     ZOSTER IMMUNIZATION  Completed     Pneumococcal Vaccine: Pediatrics (0 to 5 Years) and At-Risk Patients (6 to 64 Years)  Aged Out     IPV IMMUNIZATION  Aged Out     MENINGITIS IMMUNIZATION  Aged  Out     Labs reviewed in EPIC      Component      Latest Ref Rng & Units 5/19/2020 8/27/2020 12/18/2020   Sodium      133 - 144 mmol/L  137 138   Potassium      3.4 - 5.3 mmol/L  4.4 4.4   Chloride      94 - 109 mmol/L  103 105   Carbon Dioxide      20 - 32 mmol/L  28 28   Anion Gap      3 - 14 mmol/L  6 5   Glucose      70 - 99 mg/dL  174 (H) 176 (H)   Urea Nitrogen      7 - 30 mg/dL  30 32 (H)   Creatinine      0.66 - 1.25 mg/dL  1.42 (H) 1.30 (H)   GFR Estimate      >60 mL/min/1.73:m2  46 (L) 52 (L)   GFR Estimate If Black      >60 mL/min/1.73:m2  54 (L) 60 (L)   Calcium      8.5 - 10.1 mg/dL  8.6 9.1   Bilirubin Total      0.2 - 1.3 mg/dL  0.6 0.4   Albumin      3.4 - 5.0 g/dL  3.5 3.5   Protein Total      6.8 - 8.8 g/dL  7.4 7.4   Alkaline Phosphatase      40 - 150 U/L  141 109   ALT      0 - 70 U/L  25 24   AST      0 - 45 U/L  14 19   WBC      4.0 - 11.0 10e9/L   4.4   RBC Count      4.4 - 5.9 10e12/L   3.37 (L)   Hemoglobin      13.3 - 17.7 g/dL   12.7 (L)   Hematocrit      40.0 - 53.0 %   37.4 (L)   MCV      78 - 100 fl   111 (H)   MCH      26.5 - 33.0 pg   37.7 (H)   MCHC      31.5 - 36.5 g/dL   34.0   RDW      10.0 - 15.0 %   16.1 (H)   Platelet Count      150 - 450 10e9/L   142 (L)   Cholesterol      <200 mg/dL 258 (H) 259 (H) 310 (H)   Triglycerides      <150 mg/dL 316 (H) 308 (H) 368 (H)   HDL Cholesterol      >39 mg/dL 55 68 64   LDL Cholesterol Calculated      <100 mg/dL 140 (H) 129 (H) 172 (H)   Non HDL Cholesterol      <130 mg/dL 203 (H) 191 (H) 246 (H)   Hemoglobin A1C      0 - 5.6 %  7.8 (H) 8.5 (H)   CK Total      30 - 300 U/L   54           Review of Systems  CONSTITUTIONAL: NEGATIVE for fever, chills now. Had Covid infection early Nov 2020.  Weight down 6 pounds  INTEGUMENTARY/SKIN: NEGATIVE for worrisome rashes, moles or lesions  EYES: NEGATIVE for vision changes or irritation. Eye exam Sept 2020  ENT/MOUTH: NEGATIVE for ear, mouth and throat problems  RESP: NEGATIVE for significant cough or  "SOB  CV: NEGATIVE for chest pain, palpitations or peripheral edema  GI: NEGATIVE for nausea, abdominal pain, heartburn, or change in bowel habits. 5 polyps removed in [place] Jan 2020 after positive FIT stool test and 2 were precancerous so he [place].  : NEGATIVE for  dysuria, or hematuria. On treatment for prostate CA with Lupron therapy and chemo. Will have future radiation therapy. Slight incontinence  MUSCULOSKELETAL: NEGATIVE for significant arthralgias or myalgia with starting low dose Pravastatin 3x/week.   NEURO: NEGATIVE for weakness, dizziness or paresthesias  ENDOCRINE: NEGATIVE for temperature intolerance.  DM and lipids as above. DM diet has worsened  Recently. AM sugars 150-16. Bedtime 190-220.  Had some problems previously with immediate release Metformin but would like to try extended release Metformin to see if can help his diabetic control without causing loose stools  HEME: NEGATIVE for bleeding problems  PSYCHIATRIC: NEGATIVE for changes in mood or affect    OBJECTIVE:   /74   Pulse 87   Temp 98.3  F (36.8  C) (Temporal)   Ht 1.702 m (5' 7\")   Wt 76.7 kg (169 lb 3.2 oz)   SpO2 97%   BMI 26.50 kg/m   Estimated body mass index is 27.49 kg/m  as calculated from the following:    Height as of 7/30/20: 1.702 m (5' 7\").    Weight as of 10/26/20: 79.6 kg (175 lb 8 oz).  Physical Exam  General appearance - healthy, alert, no distress  Skin - No rashes or lesions.  Head - normocephalic, atraumatic  Eyes - BATSHEVA, EOMI, fundi exam with nondilated pupils negative.  Ears - External ears normal. Canals clear. TM's normal.  Nose/Sinuses - Nares normal. Septum midline. Mucosa normal. No drainage or sinus tenderness.  Oropharynx - No erythema, no adenopathy, no exudates.  Neck - Supple without adenopathy or thyromegaly. No bruits.  Lungs - Clear to auscultation without wheezes/rhonchi.  Heart - Regular rate and rhythm without murmurs, clicks, or gallops.  Nodes " - No supraclavicular, axillary, or inguinal adenopathy palpable.  Abdomen - Abdomen soft, non-tender. BS normal. No masses or hepatosplenomegaly palpable. No bruits.  Extremities -No cyanosis, clubbing. Minimal BLE edema. No foot lesions  Musculoskeletal - Spine ROM normal. Muscular strength intact.   Peripheral pulses - radial=4/4, femoral=4/4, posterior tibial=4/4, dorsalis pedis=4/4,  Neuro - Gait normal. Reflexes normal and symmetric. Sensation grossly WNL including light touch sensation distal bilateral lower extremity.   G/R - deferred. Managed by Urology      ASSESSMENT / PLAN:   1. Medicare annual wellness visit, subsequent  flu vaccine today. Other healthcare maintenance up-to-date    2. Prostate cancer (H)  now on Casodex and Lupron. Future radiation treatment. Management per oncology/urology    3. Type 2 diabetes mellitus with diabetic nephropathy, without long-term current use of insulin (H)  needs improved control. We will add Metformin extended release if able to tolerate from a GI standpoint. Continue other medications. Repeat labs in 3 months. CKD at level where  Metformin still safe to use. Future urine microalbumin level ordered previously  - metFORMIN (GLUCOPHAGE-XR) 500 MG 24 hr tablet; Take 2 tablets (1,000 mg) by mouth daily  Dispense: 60 tablet; Refill: 11  - Comprehensive metabolic panel; Future  - Hemoglobin A1c; Future  - FOOT EXAM    4. Thrombocytopenia (H)  improved. White count normal. Minimal anemia. 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 Denies seeing blood in stools. Risk for mild anemia with prostate cancer chronic disease and CKD. Recheck CBC in 3 months  - CBC with platelets; Future    5. Hyperlipidemia LDL goal <100  patient has been able to tolerate pravastatin 3 times a week. Will try increasing to daily and recheck lipids in 3 months when returns from [place]. If tolerating, will then continue to titrate dose upward  - pravastatin (PRAVACHOL) 20 MG  "tablet; Take 1 tab by mouth daily in PM for cholesterol  Dispense: 30 tablet; Refill: 11  - Comprehensive metabolic panel; Future  - Lipid panel reflex to direct LDL Fasting; Future    6. History of colonic polyps  5 polyps removed with colonoscopy in [place] January 2020. Patient will have repeat colonoscopy there next month. Prefers to have done in [place] as will be leaving [place] soon for the winter    7. Stage 3a chronic kidney disease  improved. Recheck lab 3 months  - Comprehensive metabolic panel; Future    8. Hypothyroidism, unspecified type  controlled. Continue levothyroxine. Recent TSH at goal. Future lab already ordered    9. Essential hypertension  controlled. Continue current medication  - amLODIPine (NORVASC) 10 MG tablet; Take 1 tablet (10 mg) by mouth daily  Dispense: 90 tablet; Refill: 3      Patient has been advised of split billing requirements and indicates understanding: Yes       COUNSELING:  Reviewed preventive health counseling, as reflected in patient instructions    Estimated body mass index is 27.49 kg/m  as calculated from the following:    Height as of 7/30/20: 1.702 m (5' 7\").    Weight as of 10/26/20: 79.6 kg (175 lb 8 oz).        He reports that he has never smoked. He has never used smokeless tobacco.      Appropriate preventive services were discussed with this patient, including applicable screening as appropriate for cardiovascular disease, diabetes, osteopenia/osteoporosis, and glaucoma.  As appropriate for age/gender, discussed screening for colorectal cancer, prostate cancer, breast cancer, and cervical cancer. Checklist reviewing preventive services available has been given to the patient.    Reviewed patients plan of care and provided an AVS. The Basic Care Plan (routine screening as documented in Health Maintenance) for Prakash meets the Care Plan requirement. This Care Plan has been established and reviewed with the Patient.    Counseling Resources:  ATP IV " "Guidelines  Pooled Cohorts Equation Calculator  Breast Cancer Risk Calculator  Breast Cancer: Medication to Reduce Risk  FRAX Risk Assessment  ICSI Preventive Guidelines  Dietary Guidelines for Americans, 2010  USDA's MyPlate  ASA Prophylaxis  Lung CA Screening      PLAN:  Metformin ER 500mg tab, 1 tab daily with supper for 2 weeks. Then if stools OK, increase  to 2 tabs at supper daily.  If looser stool, OK to use extra fiber like Metamucil, Citrucel, etc to \"thicken\" the stools   Increase Pravastatin to 20mg tab, 1 tab daily for cholesterol   If new muscle achiness with dose increase, let me know via [employer] message   Be sure to taker aspirin 81mg daily for heart protection. If pain, then use Tylenol  Repeat fasting labs at nearest [facility] lab in late March 2021 when return from [place]  Reduce carbohydrate (sugars, starchy foods such as bread, rice, potato, pasta, etc) intake  in your diet and increase the amount of color on your plate with fruits, vegetables and lean meats.    Flu vaccine today  Continue other medications  Colonoscopy follow-up while in [place] for history of colon polyps  Follow-up with Oncology per their instructions  Pt was informed regarding extra E&M billing for management of new or established medical issues not related to today's wellness visit    Zhou Meraz MD  [facility]    Identified Health Risks:  "

## 2020-12-31 PROBLEM — Z86.0100 HISTORY OF COLONIC POLYPS: Status: ACTIVE | Noted: 2020-01-01

## 2020-12-31 RX ORDER — AMLODIPINE BESYLATE 10 MG/1
10 TABLET ORAL DAILY
Qty: 90 TABLET | Refills: 3 | Status: SHIPPED | OUTPATIENT
Start: 2020-12-31 | End: 2021-12-06

## 2021-01-12 ENCOUNTER — TELEPHONE (OUTPATIENT)
Dept: INTERNAL MEDICINE | Facility: CLINIC | Age: 81
End: 2021-01-12

## 2021-01-12 NOTE — TELEPHONE ENCOUNTER
Dr. Meraz- Please see phone message. Will you approve doing the order? Please advise.     Sanam Mcpherson  Referral Coordinator

## 2021-01-12 NOTE — TELEPHONE ENCOUNTER
I am not able to refer pts for any care outside of [place].  Also not aware of medicare covering acupuncture. If pt wishing to receive a referral, he will have to talk with a provider in [place] to see if they are able to refer pt

## 2021-01-12 NOTE — TELEPHONE ENCOUNTER
Pt called Medicare and he can do 12 treatments of acupuncture for his back pain  but needs a referral. He has an at at the [facility].The phone number to the clinic is  [phone].

## 2021-03-08 ENCOUNTER — TRANSFERRED RECORDS (OUTPATIENT)
Dept: HEALTH INFORMATION MANAGEMENT | Facility: CLINIC | Age: 81
End: 2021-03-08

## 2021-03-08 DIAGNOSIS — C61 PROSTATE CANCER (H): Primary | ICD-10-CM

## 2021-03-09 ENCOUNTER — PATIENT OUTREACH (OUTPATIENT)
Dept: RADIATION ONCOLOGY | Facility: CLINIC | Age: 81
End: 2021-03-09

## 2021-03-09 NOTE — TELEPHONE ENCOUNTER
This RN contacted patient to review scheduled appointments as patient will be returning to [place] from [place] on Monday, 4/19/2021.  Patient reports he would like to begin radiation treatment as soon as possible after return home.  Patient verbalized understanding of need for gold seed markers to be placed prior to radiation treatment planning, patient's urologist is at [facility], however, patient requests urology care to be completed at [facility] and this RN has contacted Keyonna Quiroz RN and reviewed with Dr. Mohan who is agreeable to complete placement of fiducials.    Reviewed the following schedule with patient:    Thursday, 4/22/2021 at 1300: Gold seed markers with Dr. Mohan at [facility], second floor, check-in desk D.    Friday, 4/30/2021 at 1100:  MRI Prostate at [facility].    Monday, 5/3/2021 at 0900: Return visit with Dr. Shoemaker to review radiation treatment plan, CT simulation for radiation treatment planning at 1015 and Eligard injection at 1130.  All appointments on 5/3/2021 at [facility] lower level cancer.      Advised patient to empty bladder upon arrival to clinic on 5/3/2021 as will then bring patient water to drink for bladder filling for treatment planning.  Patient will also continue taking OTC [employer] brand fiber capsules, patient reports bowel regularity with taking three capsules in the morning and three capsules in the evening.    Informed patient that Dr. Mohan's team will be contacting patient with instructions for appointment on 4/22/2021.    Patient requesting appointment calendar and instructions to be mailed to his residence in [place] and this RN has completed.    [address]  [place] [address]    Patient verbalized understanding of all information and had no questions at this time.  Direct contact information for this RN provided ([phone]).    Deb SHAW  Shanel RN BSN OCN CBCN

## 2021-03-19 ENCOUNTER — TELEPHONE (OUTPATIENT)
Dept: UROLOGY | Facility: CLINIC | Age: 81
End: 2021-03-19

## 2021-03-19 DIAGNOSIS — C61 PROSTATE CANCER (H): Primary | ICD-10-CM

## 2021-03-19 RX ORDER — CIPROFLOXACIN 500 MG/1
500 TABLET, FILM COATED ORAL 2 TIMES DAILY
Qty: 6 TABLET | Refills: 0 | Status: SHIPPED | OUTPATIENT
Start: 2021-04-21 | End: 2021-04-24

## 2021-03-19 NOTE — TELEPHONE ENCOUNTER
"Patient scheduled for gold seed fiducial placement with Dr. Mohan on 4/22/21 at 1:00pm.     Called and spoke to patient who is aware of the procedure date, time, and location. Informed patient that antibiotics are needed prior to the procedure. Patient requested for antibiotics to be sent to [facility] in [place]. Ciprofloxacin 500mg po BID for 6 doses ordered per protocol and sent to [facility] in [place] per patient request.     Informed patient that general instructions for the procedure can be mailed to him. Patient would like instructions mailed to his residence in [place] and this was completed.     [address]  [place] [address]      Patient reports that he has his PSA lab completed in [place] and stated, \"It was 4 something. It was normal and I am wondering if I should still be going through all of this?\" Informed patient that a message will be sent to Dr. Shoemaker's team regarding this question.    Keyonna Quiroz RN, BSN      "

## 2021-03-19 NOTE — LETTER
Patient:  Prakash Cramer  :   1940  MRN:     [id number]        Mr.James PRAMOD Cramer  [address] [facility] [place] [address]        2021    Dear ,    Included below are the general instructions in preparation for your upcoming procedure on 21 at 1:00pm with Dr. Mohan at [facility]. Please call [phone] if you have any questions.      Thank you,    Your Urology Care Team        Preparation for Prostate Ultrasound and Gold Seed Fiducial Placement    You have been scheduled for a prostate ultrasound with biopsies. A lubricated probe will be inserted into your rectum by your doctor. This equipment uses sound waves to produce an image or picture of the inside of the prostate gland. The ultrasound will provide guidance and the gold seed fiducials will be placed. During this procedure the prostate will be injected with a numbing medication. This medication will not make you sleepy nor affect your ability to drive.    How to Prepare for the Procedure    On the day of the procedure eat and drink normally. Please do not fast or skip meals on the day of your procedure.    Please bring a list of your current medications to your appointment and take all regular medications as normal.    Do not take any of the following medications 7 days before your procedure:    Anacin  Bufferin  Excedrin  Ibuprofen  Ecotrin  Motrin  Naprosyn  Feldene  Plavix    Any other aspirin based products.  Any other anti-inflammatory medications.    If you are taking any anticoagulation medications such as Coumadin, Jantoven, Warfarin, special instructions will need to be discussed. Please discuss with your primary doctor and INR nurse for direction.    You will be prescribed an antibiotic which you will get from your pharmacy. Please make sure you take the medication as prescribed and finish the entire prescription.    Do a Fleets Enema 2 hours prior to your procedure (this can be bought  over-the-counter at your pharmacy. Ask a pharmacist for assistance if you have trouble finding this).    The procedure takes about 15-20 minutes and will be done in the office. Following the procedure you will be given verbal and written post procedure instructions.    If you have any questions or concerns, feel free to contact the Urology Clinic at [phone] during M-F, 8:00-5:00 business hours.

## 2021-03-22 ENCOUNTER — PATIENT OUTREACH (OUTPATIENT)
Dept: RADIATION ONCOLOGY | Facility: CLINIC | Age: 81
End: 2021-03-22

## 2021-03-22 NOTE — TELEPHONE ENCOUNTER
"Received notification from urology RN that patient questions if he is to still proceed with radiation treatment as he recently had a PSA drawn in [place] and it was \"4 something\".  This RN reviewed plan of care with Dr. Shoemaker, plan remains the same to proceed with fiducial placement when patient returns from [place], MRI prostate and return visit and CT simulation with Dr. Shoemaker.  This RN contacted patient, patient reports his PSA was \"normal\".  Reviewed recommended plan of care for radiation treatment as originally recommended during consultation visit, patient verbalized understanding and confirms he has no additional questions at this time.    Deb Ugarte, RN BSN OCN CBCN    "

## 2021-04-19 ENCOUNTER — TELEPHONE (OUTPATIENT)
Dept: UROLOGY | Facility: CLINIC | Age: 81
End: 2021-04-19

## 2021-04-19 NOTE — TELEPHONE ENCOUNTER
Patient scheduled for gold seed fiducial placement with Dr. Mohan on 4/22/21 at 1:00pm.     Called and spoke to patient who is aware of the above information. Patient reports that he received the instructions in the mail and has no further questions at this time. Patient aware to start the ciprofloxacin antibiotic on 4/21/21 and will pick it up at [facility] pharmacy. Informed patient to call with any questions or concerns.    Keyonna Quiroz RN, BSN

## 2021-04-21 DIAGNOSIS — E11.21 TYPE 2 DIABETES MELLITUS WITH DIABETIC NEPHROPATHY, WITHOUT LONG-TERM CURRENT USE OF INSULIN (H): ICD-10-CM

## 2021-04-22 ENCOUNTER — OFFICE VISIT (OUTPATIENT)
Dept: UROLOGY | Facility: CLINIC | Age: 81
End: 2021-04-22
Payer: MEDICARE

## 2021-04-22 VITALS — HEART RATE: 85 BPM | DIASTOLIC BLOOD PRESSURE: 85 MMHG | SYSTOLIC BLOOD PRESSURE: 163 MMHG

## 2021-04-22 DIAGNOSIS — C61 PROSTATE CANCER (H): Primary | ICD-10-CM

## 2021-04-22 DIAGNOSIS — E11.21 TYPE 2 DIABETES MELLITUS WITH DIABETIC NEPHROPATHY, WITHOUT LONG-TERM CURRENT USE OF INSULIN (H): Primary | ICD-10-CM

## 2021-04-22 PROCEDURE — 55876 PLACE RT DEVICE/MARKER PROS: CPT | Performed by: UROLOGY

## 2021-04-22 PROCEDURE — A4648 IMPLANTABLE TISSUE MARKER: HCPCS | Performed by: UROLOGY

## 2021-04-22 PROCEDURE — 76942 ECHO GUIDE FOR BIOPSY: CPT | Performed by: UROLOGY

## 2021-04-22 RX ORDER — GENTAMICIN 40 MG/ML
80 INJECTION, SOLUTION INTRAMUSCULAR; INTRAVENOUS ONCE
Status: COMPLETED | OUTPATIENT
Start: 2021-04-22 | End: 2021-04-22

## 2021-04-22 RX ORDER — BLOOD SUGAR DIAGNOSTIC
STRIP MISCELLANEOUS
Qty: 100 STRIP | Refills: 3 | Status: SHIPPED | OUTPATIENT
Start: 2021-04-22 | End: 2021-06-16

## 2021-04-22 RX ORDER — BLOOD SUGAR DIAGNOSTIC
STRIP MISCELLANEOUS
Qty: 100 STRIP | Refills: 3 | Status: SHIPPED | OUTPATIENT
Start: 2021-04-22 | End: 2024-09-27

## 2021-04-22 RX ORDER — LANCETS
EACH MISCELLANEOUS
Qty: 102 EACH | Refills: 3 | Status: SHIPPED | OUTPATIENT
Start: 2021-04-22 | End: 2021-06-16

## 2021-04-22 RX ORDER — LANCETS
EACH MISCELLANEOUS
Qty: 100 EACH | Refills: 3 | Status: SHIPPED | OUTPATIENT
Start: 2021-04-22 | End: 2021-06-16

## 2021-04-22 RX ORDER — BLOOD-GLUCOSE METER
1 EACH MISCELLANEOUS DAILY
Qty: 1 KIT | Refills: 0 | Status: SHIPPED | OUTPATIENT
Start: 2021-04-22

## 2021-04-22 RX ADMIN — GENTAMICIN 80 MG: 40 INJECTION, SOLUTION INTRAMUSCULAR; INTRAVENOUS at 12:57

## 2021-04-22 NOTE — PROGRESS NOTES
Prakash Cramer is a 81 year old male who is in for prostate gold fiducial marker implantation.    History of Janice 4+3=7 prostate cancer planning for EBRT      Lab Results   Component Value Date    PSA 16.20 12/12/2019    PSA 1.78 09/02/2008    PSA 2.56 01/02/2008        His prostate measures approx. 23.8 ccm  SVs:  Normal   Calcifications: None   Hypoechoic areas: None     Transrectal ultrasound was inserted transrectally.   3 markers were placed; at the right base, left mid-anterior prostate and at the right apex.    No complications were noted. The patient was told to report any unusual bleeding, fever,chills.     Impression:  Prostate cancer  Plan:  He may follow up with myself or Dr. Brannon following radiation therapy     Hawk Mohan M.D.

## 2021-04-22 NOTE — NURSING NOTE
Prakash Cramer's goals for this visit include:   Chief Complaint   Patient presents with     Procedure     Gold seed fiduciary placement       He requests these members of his care team be copied on today's visit information:     PCP: Zhou Meraz    Referring Provider:  No referring provider defined for this encounter.    BP (!) 163/85 (BP Location: Right arm, Patient Position: Sitting, Cuff Size: Adult Regular)   Pulse 85     Do you need any medication refills at today's visit?     Deysi Olvera LPN on 4/22/2021 at 12:56 PM

## 2021-04-30 ENCOUNTER — HOSPITAL ENCOUNTER (OUTPATIENT)
Dept: MRI IMAGING | Facility: CLINIC | Age: 81
Discharge: HOME | End: 2021-04-30
Attending: RADIOLOGY | Admitting: RADIOLOGY
Payer: MEDICARE

## 2021-04-30 DIAGNOSIS — C61 PROSTATE CANCER (H): ICD-10-CM

## 2021-04-30 DIAGNOSIS — C61 PROSTATE CANCER (H): Primary | ICD-10-CM

## 2021-04-30 LAB
CREAT BLD-MCNC: 1.5 MG/DL (ref 0.66–1.25)
GFR SERPL CREATININE-BSD FRML MDRD: 45 ML/MIN/{1.73_M2}

## 2021-04-30 PROCEDURE — 82565 ASSAY OF CREATININE: CPT

## 2021-04-30 PROCEDURE — G1004 CDSM NDSC: HCPCS | Mod: GC | Performed by: RADIOLOGY

## 2021-04-30 PROCEDURE — 255N000002 HC RX 255 OP 636: Performed by: RADIOLOGY

## 2021-04-30 PROCEDURE — A9585 GADOBUTROL INJECTION: HCPCS | Performed by: RADIOLOGY

## 2021-04-30 PROCEDURE — G1004 CDSM NDSC: HCPCS

## 2021-04-30 PROCEDURE — 72197 MRI PELVIS W/O & W/DYE: CPT | Mod: ME

## 2021-04-30 PROCEDURE — 72197 MRI PELVIS W/O & W/DYE: CPT | Mod: 26 | Performed by: RADIOLOGY

## 2021-04-30 RX ORDER — GADOBUTROL 604.72 MG/ML
7.5 INJECTION INTRAVENOUS ONCE
Status: COMPLETED | OUTPATIENT
Start: 2021-04-30 | End: 2021-04-30

## 2021-04-30 RX ADMIN — GADOBUTROL 7 ML: 604.72 INJECTION INTRAVENOUS at 12:34

## 2021-05-02 NOTE — PROGRESS NOTES
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     HISTORY OF PRESENT ILLNESS: Prakash Cramer is a 81 year old gentleman with unfavorable intermediate risk prostate cancer.  He was initially referred to Dr. Brannon in urology for elevated PSA noted on routine labs in December 2019.  On July 29, 2020 prostate MRI showed PI-RADS 5 in the left transition zone and PI-RADS 4 in the posterior right mid to apical peripheral zone.  There was a suspicious lesion in the right acetabulum and L4 vertebral body as well as an intermediate but not highly suspicious 8 mm lymph node in the bilateral external iliac common femoral locations.    On August 26, 2020 Dr. Guzmán completed a TRUS biopsy.  Digital rectal exam completed that same day reveals normal prostate. Total volume is 36 mL. Hypoechoic lesion bilaterally. US images were obtained and then fused with the MRI images.  The fused images were then used to guide the biopsy of the targeted lesions with 2 cores taken of each lesion. 12 cores were taken with 6 on each side, base, mid and apex. Pathology revealed prostate cancer involving more than 50% of the cores examined with either [place] 3+3 or Janice 4+3.    On 9/17/2020 abdominal pelvic CT showed no definite evidence of metastatic disease and no enlarged lymph nodes by size criteria although there were some indeterminate ones.  Same-day bone scan was negative.    Options were discussed and patient was thus referred to radiation oncology for definitive management.  per pt psa completed in [place] ~4.     PSA   Date Value Ref Range Status   12/12/2019 16.20 (H) 0 - 4 ug/L Final     Comment:     Assay Method:  Chemiluminescence using Siemens Vista analyzer   09/02/2008 1.78 0 - 4 ug/L Final   01/02/2008 2.56 0 - 4 ug/L Final   11/13/2002 1.3 0 - 4 ug/L Final        He originally saw me in consultation in October  2020 and I recommended radiation with 6 months of ADT.  After our discussion, he felt comfortable proceeding with hormonal therapy and EBRT.  However he stated that he would want to leave for [place] from December 2020 to April 2021.  I initially advised him to start hormones in October 2020 and either stay in [place] for XRT or reconnect with his prior oncologist in [place] to initiate XRT in ~ 2 months time.  He started ADT on 10/26/2020 and received a 6 month infection. He recently relocated back to [place] as planned and is here in follow-up to initiate radiation therapy.  He notes no significant side effects from his ADT.  He has no complaints from it.  Today AUA score is 17 with incomplete emptying x1, frequency x3, intermittency x3, urgency x3, weak stream x2, and straining x1.  He also has nocturia x4. His ELIAS score is 2/25.   Slight value change since when he was seen in October.  However he denies any pain. He has a good energy level.  Patient states he had complaints of urgency at outside hospital, was put on a trial medication which caused him to have increase in lower extremity edema.  Patient was stopped and this is now resolved.  Denies blood with bowel movements.  He takes fiber capsules twice a day to help regulate bowel movements, has longstanding 1-2/10 low back pain and walks with a walker.  Last colonoscopy was 1/20/2019.      REVIEW OF SYSTEMS: A 10-point review of systems was obtained. Pertinent findings are noted in the HPI and are otherwise unremarkable.      CHEMOTHERAPY HISTORY: none     RADIATION THERAPY HISTORY:  none     IBD/CTD/PACEMAKER: denies    Past Medical History:   Diagnosis Date     Aplastic anemia (H) 03/26/2018    thought secondary to reaction to Septra     BPH (benign prostatic hypertrophy)     failed  Flomax     C. difficile diarrhea 03/26/2018    treated with Flagyl     Cellulitis and abscess of leg, except foot 2004     Contact dermatitis and other eczema, due to  "unspecified cause      Diaphragmatic hernia without mention of obstruction or gangrene     hiatal hernia     Esophageal reflux      History of colonic polyps 01/2020    5 polyps removed and 2 were precancerous so he will have another one in Jan 2021.     Hyperlipidemia LDL goal <100 03/11/2005     Hypothyroidism      Impotence of organic origin      Meningococcal encephalitis      Mumps      Proteinuria      RBBB      Type 2 diabetes mellitus with diabetic nephropathy  (goal A1C<7) 10/24/2015     Unspecified essential hypertension        Past Surgical History:   Procedure Laterality Date     ARTHROPLASTY KNEE Left 3/18/2015    Procedure: ARTHROPLASTY KNEE;  Surgeon: Abebe Schroeder MD;  Location: [facility]     ARTHROPLASTY KNEE Right 3/14/2016    Procedure: ARTHROPLASTY KNEE;  Surgeon: Abebe Schroeder MD;  Location: [facility] OR     HC LAPAROSCOPY, SURGICAL; CHOLECYSTECTOMY  1998    Cholecystectomy, Laparoscopic     HC REMOVE TONSILS/ADENOIDS,12+ Y/O  1963    T & A 13+y.o.     ZZC NONSPECIFIC PROCEDURE  10/02    left knee meniscus tear repair       Family History   Problem Relation Age of Onset     Family History Negative Mother         d:age 89 of \"old age\"     Gastrointestinal Disease Father         d:age 79 liver failure after taking excessive amounts of Tylenol over 5-6 yrs     Neurologic Disorder Brother         b:1932  hx cerebral aneurysm age 59       Social History     Tobacco Use     Smoking status: Never Smoker     Smokeless tobacco: Never Used   Substance Use Topics     Alcohol use: Yes     Comment: beer 4-5 a month     Current Outpatient Medications   Medication     amLODIPine (NORVASC) 10 MG tablet     aspirin 81 MG tablet     bicalutamide (CASODEX) 50 MG tablet     blood glucose (ACCU-CHEK GUIDE) test strip     blood glucose (ACCU-CHEK SMARTVIEW) test strip     blood glucose monitoring (ACCU-CHEK FASTCLIX) lancets     blood glucose monitoring (ACCU-CHEK MULTICLIX) lancets     blood glucose monitoring " (SOFTCLIX) lancets     Blood Glucose Monitoring Suppl (ACCU-CHEK GUIDE ME) w/Device KIT     Blood Glucose Monitoring Suppl MAGDALENO     Cyanocobalamin (B-12) 1000 MCG TBCR     famotidine (PEPCID) 20 MG tablet     levothyroxine (SYNTHROID/LEVOTHROID) 88 MCG tablet     metFORMIN (GLUCOPHAGE-XR) 500 MG 24 hr tablet     pioglitazone (ACTOS) 30 MG tablet     pravastatin (PRAVACHOL) 20 MG tablet     sitagliptin (JANUVIA) 100 MG tablet     No current facility-administered medications for this visit.         Allergies   Allergen Reactions     Atorvastatin Calcium      myalgias     Crestor [Rosuvastatin]      Myalgias     Diovan [Valsartan]      increase k+     Glimepiride      Pancytopenia       Invokana [Canagliflozin]      Joint pain     Mold Other (See Comments)     Eyes itchy,red and dry     Penicillins      Seasonal Allergies      Eyes itch     PHYSICAL EXAM:  /82 (BP Location: Left arm, Patient Position: Chair, Cuff Size: Adult Regular)   Pulse 84   Temp 98.4  F (36.9  C) (Oral)   Resp 18   Wt 81.6 kg (180 lb)   SpO2 97%   BMI 28.19 kg/m    GEN: appears well, in no acute distress  HEENT: normocephalic and atraumatic, EOMI, anicteric sclerae  CV: subtle LE edema, no JVD, RRR  RESP: normal respiration on room air, no stridor, CTAB  ABDOMEN: soft, NT, ND  SKIN: normal color and turgor  MSK: moving all extremities well  RECTAL: penis no discharge. Testis no masses.  No scrotal skin lesion. Per Urology digital rectal exam reveals normal prostate. Total volume is 36 mL.  NEURO: no focal neurologic deficit  PSYCH: appropriate mood, affect, and judgment     ECOG PERFORMANCE STATUS: 0     All pertinent laboratory, imaging, and pathology findings have been reviewed.      IMPRESSION AND RECOMMENDATIONS:  Prakash Cramer is a 81 year old gentleman with intermediate risk prostate cancer (GS 4+3, PSA 16.2) previously referred for definitive radiation therapy.  The natural history of his disease and the available management  options including surgery, EBRT, and active surveillance were previously discussed. Pt opted for EBRT and short term ADT.  ADT was started on 10/26/2020 but pt did not start EBRT after 2 months as he remained in [place] over the past 5 months. I explained to him today that ADT should be given 2 months before, during and after EBRT.  He agreed to move forward with this plan. He is s/p fiducial marker placement.    On our last visit I previously discussed ADT and its associated side effects, which include (but are not limited to) hot flashes, decreased bone mineral density, weight gain, muscle loss, loss of libido and erectile function, gynecomastia, emotional lability, and effects on hypertension, cholesterol, coronary artery disease and diabetes that may put him at increased risk of cardiac events. Today I reiterated the possible side effects of radiation that include, but are not limited to, urinary symptoms including frequency, urgency, intermittency, dysuria, and incontinence, loose stools or diarrhea, erectile dysfunction, and radiation injury to the rectum or bladder resulting in bleeding in the stool or urine, or bowel obstruction or perforation.  Pt was consented and will have a CT simulation completed today.     Additional problem list to be addressed in the following manner:  1. ADT: 2 months before during and after radiation therapy. Pt to receive 4 month shot today. Order placed.  2. S/p fiducials already.    There was ample time for questions and all were answered to the patient's satisfaction. Thank you for allowing me to participate in the care of this pleasant patient. If you have any questions, please do not hesitate to contact my office.     Sincerely,  Huyen Shoemaker MD

## 2021-05-03 ENCOUNTER — APPOINTMENT (OUTPATIENT)
Dept: RADIATION ONCOLOGY | Facility: CLINIC | Age: 81
End: 2021-05-03
Payer: MEDICARE

## 2021-05-03 ENCOUNTER — OFFICE VISIT (OUTPATIENT)
Dept: RADIATION ONCOLOGY | Facility: CLINIC | Age: 81
End: 2021-05-03
Payer: MEDICARE

## 2021-05-03 VITALS
SYSTOLIC BLOOD PRESSURE: 130 MMHG | WEIGHT: 180 LBS | RESPIRATION RATE: 18 BRPM | DIASTOLIC BLOOD PRESSURE: 82 MMHG | TEMPERATURE: 98.4 F | HEART RATE: 84 BPM | OXYGEN SATURATION: 97 % | BODY MASS INDEX: 28.19 KG/M2

## 2021-05-03 DIAGNOSIS — C61 PROSTATE CANCER (H): Primary | ICD-10-CM

## 2021-05-03 PROCEDURE — 99215 OFFICE O/P EST HI 40 MIN: CPT | Mod: 25 | Performed by: RADIOLOGY

## 2021-05-03 PROCEDURE — 77334 RADIATION TREATMENT AID(S): CPT | Performed by: RADIOLOGY

## 2021-05-03 PROCEDURE — 77263 THER RADIOLOGY TX PLNG CPLX: CPT | Performed by: RADIOLOGY

## 2021-05-03 RX ORDER — ACETAMINOPHEN 500 MG
500-1000 TABLET ORAL EVERY 6 HOURS PRN
COMMUNITY

## 2021-05-03 SDOH — HEALTH STABILITY: MENTAL HEALTH: HOW MANY STANDARD DRINKS CONTAINING ALCOHOL DO YOU HAVE ON A TYPICAL DAY?: NOT ASKED

## 2021-05-03 SDOH — HEALTH STABILITY: MENTAL HEALTH: HOW OFTEN DO YOU HAVE 6 OR MORE DRINKS ON ONE OCCASION?: NOT ASKED

## 2021-05-03 SDOH — HEALTH STABILITY: MENTAL HEALTH: HOW OFTEN DO YOU HAVE A DRINK CONTAINING ALCOHOL?: NOT ASKED

## 2021-05-03 ASSESSMENT — PAIN SCALES - GENERAL: PAINLEVEL: MILD PAIN (2)

## 2021-05-03 NOTE — NURSING NOTE
"  RADIATION ONCOLOGY RETURN CONSULT    Patient Name: Prakash Cramer      : 1940     Age: 81 year old        ______________________________________________________________________________       Chief Complaint   Patient presents with     Cancer     Radiation oncology return consultation with Dr. Shoemaker     /82 (BP Location: Left arm, Patient Position: Chair, Cuff Size: Adult Regular)   Pulse 84   Temp 98.4  F (36.9  C) (Oral)   Resp 18   Wt 81.6 kg (180 lb)   SpO2 97%   BMI 28.19 kg/m       Patient presents to clinic for return visit with Dr. Shoemaker, review radiation treatment plan, sign consent and CT Simulation.    Previous Radiation: No    Cardiac Pacemaker: No    Androgen Deprivation Therapy: Patient received Eligard 45 mg injection received 10/26/2020, patient scheduled for next injection today, 5/3/2021.    Pain:  Current history of pain associated with this visit:   Intensity: 2/10  Current: dull and aching  Location: low back \"it's been there for the past two to three years\"  Treatment: patient reports taking Tylenol 500 mg po one tablet two to three times daily    Imaging:  MRI: 2021    Labs:  Other Labs: No      PSA:   PSA   Date Value Ref Range Status   2019 16.20 (H) 0 - 4 ug/L Final     Comment:     Assay Method:  Chemiluminescence using Siemens Vista analyzer   2008 1.78 0 - 4 ug/L Final   2008 2.56 0 - 4 ug/L Final   2002 1.3 0 - 4 ug/L Final     Testosterone Level: No results found for: TESTOSTTOTAL        On-Study AUA Symptom Score (PQ)  AUA (American Urological Association) Symptom Score  1. Over the past month, how often have you had a sensation of not emptying your bladder completely after you finished urinating?: Less than 1 time in 5  2. Over the past month, how often have you had to urinate again less than two hours after you finished urinating?: About half the time  3. Over the past month, how often have you found you stopped and started again " several times when you urinated?: About half the time  4. Over the past month, how often have you found it difficult to postpone urination?: About half the time  5. Over the past month, how often have you had a weak urine stream?: Less than half the time  6. Over the past month, how often have you had to push or strain to begin urinating?: Less than 1 time in 5  7. Over the past month, how many times did you typically get up to urinate from the time you went to bed at night until the time you got up in the morning?: 4 times  The Disease-specific Quality of Life Question: If you were to spend the rest of your life with your urinary condition just the way it is now, how would you feel about that? (highlight or underline): Mostly satisfied    AUA Score: 17  ELIAS Score: 2        Diarrhea:   0- None    Constipation:   0- None    Nausea/Vomiting:  No    Patient reports he is taking [employer] brand fiber supplement three capsules two times daily and confirms he will continue to do this during course of radiation treatment.  Patient reports last bowel movement was last evening, 5/2/2021.  Patient reports getting up four times per night to urinate, reports he occasionally wears Depends when out in public due to urgency.      Nurse face-to-face time: Level 4:  15 min face to face time.    Deb Ugarte RN BSN OCN CBCN

## 2021-05-03 NOTE — LETTER
5/3/2021         RE: Prakash Cramer  [address]  [facility] [address]        Dear Colleague,    Thank you for referring your patient, Prakash Cramer, to the [facility] RADIATION ONCOLOGY [place]. Please see a copy of my visit note below.    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     HISTORY OF PRESENT ILLNESS: Prakash Cramer is a 81 year old gentleman with unfavorable intermediate risk prostate cancer.  He was initially referred to Dr. Brannon in urology for elevated PSA noted on routine labs in December 2019.  On July 29, 2020 prostate MRI showed PI-RADS 5 in the left transition zone and PI-RADS 4 in the posterior right mid to apical peripheral zone.  There was a suspicious lesion in the right acetabulum and L4 vertebral body as well as an intermediate but not highly suspicious 8 mm lymph node in the bilateral external iliac common femoral locations.    On August 26, 2020 Dr. Guzmán completed a TRUS biopsy.  Digital rectal exam completed that same day reveals normal prostate. Total volume is 36 mL. Hypoechoic lesion bilaterally. US images were obtained and then fused with the MRI images.  The fused images were then used to guide the biopsy of the targeted lesions with 2 cores taken of each lesion. 12 cores were taken with 6 on each side, base, mid and apex. Pathology revealed prostate cancer involving more than 50% of the cores examined with either Janice 3+3 or Janice 4+3.    On 9/17/2020 abdominal pelvic CT showed no definite evidence of metastatic disease and no enlarged lymph nodes by size criteria although there were some indeterminate ones.  Same-day bone scan was negative.    Options were discussed and patient was thus referred to radiation oncology for definitive management.  per pt psa completed in [place] ~4.     PSA   Date Value Ref Range Status   12/12/2019 16.20  (H) 0 - 4 ug/L Final     Comment:     Assay Method:  Chemiluminescence using Siemens Vista analyzer   09/02/2008 1.78 0 - 4 ug/L Final   01/02/2008 2.56 0 - 4 ug/L Final   11/13/2002 1.3 0 - 4 ug/L Final        He originally saw me in consultation in October 2020 and I recommended radiation with 6 months of ADT.  After our discussion, he felt comfortable proceeding with hormonal therapy and EBRT.  However he stated that he would want to leave for [place] from December 2020 to April 2021.  I initially advised him to start hormones in October 2020 and either stay in [place] for XRT or reconnect with his prior oncologist in [place] to initiate XRT in ~ 2 months time.  He started ADT on 10/26/2020 and received a 6 month infection. He recently relocated back to [place] as planned and is here in follow-up to initiate radiation therapy.  He notes no significant side effects from his ADT.  He has no complaints from it.  Today AUA score is 17 with incomplete emptying x1, frequency x3, intermittency x3, urgency x3, weak stream x2, and straining x1.  He also has nocturia x4. His ELIAS score is 2/25.   Slight value change since when he was seen in October.  However he denies any pain. He has a good energy level.  Patient states he had complaints of urgency at outside hospital, was put on a trial medication which caused him to have increase in lower extremity edema.  Patient was stopped and this is now resolved.  Denies blood with bowel movements.  He takes fiber capsules twice a day to help regulate bowel movements, has longstanding 1-2/10 low back pain and walks with a walker.  Last colonoscopy was 1/20/2019.      REVIEW OF SYSTEMS: A 10-point review of systems was obtained. Pertinent findings are noted in the HPI and are otherwise unremarkable.      CHEMOTHERAPY HISTORY: none     RADIATION THERAPY HISTORY:  none     IBD/CTD/PACEMAKER: denies    Past Medical History:   Diagnosis Date     Aplastic anemia (H) 03/26/2018    thought  "secondary to reaction to Septra     BPH (benign prostatic hypertrophy)     failed  Flomax     C. difficile diarrhea 03/26/2018    treated with Flagyl     Cellulitis and abscess of leg, except foot 2004     Contact dermatitis and other eczema, due to unspecified cause      Diaphragmatic hernia without mention of obstruction or gangrene     hiatal hernia     Esophageal reflux      History of colonic polyps 01/2020    5 polyps removed and 2 were precancerous so he will have another one in Jan 2021.     Hyperlipidemia LDL goal <100 03/11/2005     Hypothyroidism      Impotence of organic origin      Meningococcal encephalitis      Mumps      Proteinuria      RBBB      Type 2 diabetes mellitus with diabetic nephropathy  (goal A1C<7) 10/24/2015     Unspecified essential hypertension        Past Surgical History:   Procedure Laterality Date     ARTHROPLASTY KNEE Left 3/18/2015    Procedure: ARTHROPLASTY KNEE;  Surgeon: Abebe Schroeder MD;  Location: [facility] OR     ARTHROPLASTY KNEE Right 3/14/2016    Procedure: ARTHROPLASTY KNEE;  Surgeon: Abebe Schroeder MD;  Location: [facility] OR     HC LAPAROSCOPY, SURGICAL; CHOLECYSTECTOMY  1998    Cholecystectomy, Laparoscopic     HC REMOVE TONSILS/ADENOIDS,12+ Y/O  1963    T & A 13+y.o.     ZZC NONSPECIFIC PROCEDURE  10/02    left knee meniscus tear repair       Family History   Problem Relation Age of Onset     Family History Negative Mother         d:age 89 of \"old age\"     Gastrointestinal Disease Father         d:age 79 liver failure after taking excessive amounts of Tylenol over 5-6 yrs     Neurologic Disorder Brother         b:1932  hx cerebral aneurysm age 59       Social History     Tobacco Use     Smoking status: Never Smoker     Smokeless tobacco: Never Used   Substance Use Topics     Alcohol use: Yes     Comment: beer 4-5 a month     Current Outpatient Medications   Medication     amLODIPine (NORVASC) 10 MG tablet     aspirin 81 MG tablet     bicalutamide (CASODEX) 50 MG tablet "     blood glucose (ACCU-CHEK GUIDE) test strip     blood glucose (ACCU-CHEK SMARTVIEW) test strip     blood glucose monitoring (ACCU-CHEK FASTCLIX) lancets     blood glucose monitoring (ACCU-CHEK MULTICLIX) lancets     blood glucose monitoring (SOFTCLIX) lancets     Blood Glucose Monitoring Suppl (ACCU-CHEK GUIDE ME) w/Device KIT     Blood Glucose Monitoring Suppl MAGDALENO     Cyanocobalamin (B-12) 1000 MCG TBCR     famotidine (PEPCID) 20 MG tablet     levothyroxine (SYNTHROID/LEVOTHROID) 88 MCG tablet     metFORMIN (GLUCOPHAGE-XR) 500 MG 24 hr tablet     pioglitazone (ACTOS) 30 MG tablet     pravastatin (PRAVACHOL) 20 MG tablet     sitagliptin (JANUVIA) 100 MG tablet     No current facility-administered medications for this visit.         Allergies   Allergen Reactions     Atorvastatin Calcium      myalgias     Crestor [Rosuvastatin]      Myalgias     Diovan [Valsartan]      increase k+     Glimepiride      Pancytopenia       Invokana [Canagliflozin]      Joint pain     Mold Other (See Comments)     Eyes itchy,red and dry     Penicillins      Seasonal Allergies      Eyes itch     PHYSICAL EXAM:  /82 (BP Location: Left arm, Patient Position: Chair, Cuff Size: Adult Regular)   Pulse 84   Temp 98.4  F (36.9  C) (Oral)   Resp 18   Wt 81.6 kg (180 lb)   SpO2 97%   BMI 28.19 kg/m    GEN: appears well, in no acute distress  HEENT: normocephalic and atraumatic, EOMI, anicteric sclerae  CV: subtle LE edema, no JVD, RRR  RESP: normal respiration on room air, no stridor, CTAB  ABDOMEN: soft, NT, ND  SKIN: normal color and turgor  MSK: moving all extremities well  RECTAL: penis no discharge. Testis no masses.  No scrotal skin lesion. Per Urology digital rectal exam reveals normal prostate. Total volume is 36 mL.  NEURO: no focal neurologic deficit  PSYCH: appropriate mood, affect, and judgment     ECOG PERFORMANCE STATUS: 0     All pertinent laboratory, imaging, and pathology findings have been reviewed.       IMPRESSION AND RECOMMENDATIONS:  Prakash Cramer is a 81 year old gentleman with intermediate risk prostate cancer (GS 4+3, PSA 16.2) previously referred for definitive radiation therapy.  The natural history of his disease and the available management options including surgery, EBRT, and active surveillance were previously discussed. Pt opted for EBRT and short term ADT.  ADT was started on 10/26/2020 but pt did not start EBRT after 2 months as he remained in [place] over the past 5 months. I explained to him today that ADT should be given 2 months before, during and after EBRT.  He agreed to move forward with this plan. He is s/p fiducial marker placement.    On our last visit I previously discussed ADT and its associated side effects, which include (but are not limited to) hot flashes, decreased bone mineral density, weight gain, muscle loss, loss of libido and erectile function, gynecomastia, emotional lability, and effects on hypertension, cholesterol, coronary artery disease and diabetes that may put him at increased risk of cardiac events. Today I reiterated the possible side effects of radiation that include, but are not limited to, urinary symptoms including frequency, urgency, intermittency, dysuria, and incontinence, loose stools or diarrhea, erectile dysfunction, and radiation injury to the rectum or bladder resulting in bleeding in the stool or urine, or bowel obstruction or perforation.  Pt was consented and will have a CT simulation completed today.     Additional problem list to be addressed in the following manner:  1. ADT: 2 months before during and after radiation therapy. Pt to receive 4 month shot today. Order placed.  2. S/p fiducials already.    There was ample time for questions and all were answered to the patient's satisfaction. Thank you for allowing me to participate in the care of this pleasant patient. If you have any questions, please do not hesitate to contact my office.      Sincerely,  Huyen Shoemaker MD        Again, thank you for allowing me to participate in the care of your patient.        Sincerely,        Tanvi Shoemaker MD

## 2021-05-03 NOTE — PATIENT INSTRUCTIONS
What to expect at your Simulation visit:    You will meet with a Radiation Therapist and other team members who will be doing a planning session called a  simulation  with you. This process will determine your daily treatment.    ~ You will lie on a flat table and have a treatment planning CT scan.  It is important during the scan to hold very still and breathe normally.    ~ Your therapist may construct a body mold to help you hold still for your treatments.    ~ If you are having treatment to the head or neck area you will be fitted with a plastic mesh mask that fits very snugly over your face and neck.     ~ Your therapist will be taking some digital photos that will go in your treatment chart.      ~Your therapist will make marks on your skin and take measurements. Your therapist may ask you about making small tattoos (a permanent small dot) over these marks.  These marks are used to position you daily for your radiation therapy treatments. Please do not wash off any marks until all of your radiation therapy treatments are complete unless you are instructed to do so by your therapist.    ~ Once the simulation is completed it can take from 3 to 10 business days before you start radiation therapy treatments.    ~ You may meet with a nurse who will go over management of treatment side effects and self care during your treatments. The nurse will help to plan care with other departments and physicians if needed.    Additional information for simulation of the pelvis area   ~ It is important to have the same amount of urine in your bladder for each treatment.  In order to prepare for your simulation and for daily treatments, please:  Empty your bladder two hours prior to your scheduled appointment time.  Then drink about 2 to 4 cups of liquid to fill your bladder again.  It is important to drink the same amount of liquid each day.  Don t drink too much- you should be comfortable during your simulation and  treatment.  You should not empty your bladder again until the simulation or treatment appointment is complete.  ~ You may have some fluid injected into the tip of your penis for a few minutes during the CT scan to help your doctor see your internal organs.     ~ Please speak with your physician, nurse or therapist if you have any questions regarding the above information.  If you feel you may have difficulties with this preparation, please let us know.    Please contact [facility] RN with questions or concerns following today's appointment: [phone].    Thank you!

## 2021-05-04 DIAGNOSIS — C61 PROSTATE CANCER (H): Primary | ICD-10-CM

## 2021-05-07 DIAGNOSIS — E11.21 TYPE 2 DIABETES MELLITUS WITH DIABETIC NEPHROPATHY, WITHOUT LONG-TERM CURRENT USE OF INSULIN (H): ICD-10-CM

## 2021-05-07 NOTE — TELEPHONE ENCOUNTER
Not RN protocol.    12/1/2020 last OV. Dr. Meraz.    Lab Results   Component Value Date    A1C 8.5 12/18/2020    A1C 7.8 08/27/2020     Creatinine   Date Value Ref Range Status   12/18/2020 1.30 (H) 0.66 - 1.25 mg/dL Final     Please refill as appropriate.  Andreina Pitts RN  [facility]  .

## 2021-05-07 NOTE — TELEPHONE ENCOUNTER
Patient overdue for fasting labs regarding diabetes and appointment with me.  Patient to have fasting labs done in the next 3 weeks followed by an appointment with me a few days later to review lab results and her recent blood sugars. Check blood sugars twice a day (before breakfast and bedtime) for 4 days prior to the appointment with me, write them down and have them available to review with the appointment  Januvia refilled for 30 days from now.  Will address more long-term refills of medication as appropriate after patient was seen back in clinic in case medication changes are necessary.  Assist patient with scheduling appointments

## 2021-05-11 ENCOUNTER — APPOINTMENT (OUTPATIENT)
Dept: RADIATION ONCOLOGY | Facility: CLINIC | Age: 81
End: 2021-05-11
Payer: MEDICARE

## 2021-05-11 PROCEDURE — 77301 RADIOTHERAPY DOSE PLAN IMRT: CPT | Performed by: RADIOLOGY

## 2021-05-11 PROCEDURE — 77338 DESIGN MLC DEVICE FOR IMRT: CPT | Performed by: RADIOLOGY

## 2021-05-11 PROCEDURE — 77300 RADIATION THERAPY DOSE PLAN: CPT | Performed by: RADIOLOGY

## 2021-05-12 DIAGNOSIS — E11.21 TYPE 2 DIABETES MELLITUS WITH DIABETIC NEPHROPATHY, WITHOUT LONG-TERM CURRENT USE OF INSULIN (H): ICD-10-CM

## 2021-05-12 DIAGNOSIS — D69.6 THROMBOCYTOPENIA (H): ICD-10-CM

## 2021-05-12 DIAGNOSIS — D64.9 ANEMIA, UNSPECIFIED TYPE: ICD-10-CM

## 2021-05-12 DIAGNOSIS — N18.31 STAGE 3A CHRONIC KIDNEY DISEASE (H): ICD-10-CM

## 2021-05-12 DIAGNOSIS — E78.5 HYPERLIPIDEMIA LDL GOAL <100: ICD-10-CM

## 2021-05-12 DIAGNOSIS — E03.9 HYPOTHYROIDISM, UNSPECIFIED TYPE: ICD-10-CM

## 2021-05-12 LAB
ALBUMIN SERPL-MCNC: 3.8 G/DL (ref 3.4–5)
ALP SERPL-CCNC: 90 U/L (ref 40–150)
ALT SERPL W P-5'-P-CCNC: 30 U/L (ref 0–70)
ANION GAP SERPL CALCULATED.3IONS-SCNC: 7 MMOL/L (ref 3–14)
AST SERPL W P-5'-P-CCNC: 20 U/L (ref 0–45)
BILIRUB SERPL-MCNC: 0.7 MG/DL (ref 0.2–1.3)
BUN SERPL-MCNC: 25 MG/DL (ref 7–30)
CALCIUM SERPL-MCNC: 9 MG/DL (ref 8.5–10.1)
CHLORIDE SERPL-SCNC: 106 MMOL/L (ref 94–109)
CHOLEST SERPL-MCNC: 286 MG/DL
CO2 SERPL-SCNC: 25 MMOL/L (ref 20–32)
CREAT SERPL-MCNC: 1.37 MG/DL (ref 0.66–1.25)
ERYTHROCYTE [DISTWIDTH] IN BLOOD BY AUTOMATED COUNT: 13.5 % (ref 10–15)
GFR SERPL CREATININE-BSD FRML MDRD: 48 ML/MIN/{1.73_M2}
GLUCOSE SERPL-MCNC: 174 MG/DL (ref 70–99)
HBA1C MFR BLD: 7.4 % (ref 0–5.6)
HCT VFR BLD AUTO: 39.3 % (ref 40–53)
HDLC SERPL-MCNC: 68 MG/DL
HGB BLD-MCNC: 13.2 G/DL (ref 13.3–17.7)
LDLC SERPL CALC-MCNC: 147 MG/DL
MCH RBC QN AUTO: 36.7 PG (ref 26.5–33)
MCHC RBC AUTO-ENTMCNC: 33.6 G/DL (ref 31.5–36.5)
MCV RBC AUTO: 109 FL (ref 78–100)
NONHDLC SERPL-MCNC: 218 MG/DL
PLATELET # BLD AUTO: 130 10E9/L (ref 150–450)
POTASSIUM SERPL-SCNC: 4.5 MMOL/L (ref 3.4–5.3)
PROT SERPL-MCNC: 7.4 G/DL (ref 6.8–8.8)
RBC # BLD AUTO: 3.6 10E12/L (ref 4.4–5.9)
SODIUM SERPL-SCNC: 138 MMOL/L (ref 133–144)
TRIGL SERPL-MCNC: 355 MG/DL
WBC # BLD AUTO: 4.5 10E9/L (ref 4–11)

## 2021-05-12 PROCEDURE — 83036 HEMOGLOBIN GLYCOSYLATED A1C: CPT | Performed by: INTERNAL MEDICINE

## 2021-05-12 PROCEDURE — 85027 COMPLETE CBC AUTOMATED: CPT | Performed by: INTERNAL MEDICINE

## 2021-05-12 PROCEDURE — 36415 COLL VENOUS BLD VENIPUNCTURE: CPT | Performed by: INTERNAL MEDICINE

## 2021-05-12 PROCEDURE — 80053 COMPREHEN METABOLIC PANEL: CPT | Performed by: INTERNAL MEDICINE

## 2021-05-12 PROCEDURE — 80061 LIPID PANEL: CPT | Performed by: INTERNAL MEDICINE

## 2021-05-13 ENCOUNTER — APPOINTMENT (OUTPATIENT)
Dept: RADIATION ONCOLOGY | Facility: CLINIC | Age: 81
End: 2021-05-13
Payer: MEDICARE

## 2021-05-13 DIAGNOSIS — E11.21 TYPE 2 DIABETES MELLITUS WITH DIABETIC NEPHROPATHY, WITHOUT LONG-TERM CURRENT USE OF INSULIN (H): ICD-10-CM

## 2021-05-13 PROCEDURE — 77385 PR IMRT TREATMENT DELIVERY, SIMPLE: CPT | Performed by: RADIOLOGY

## 2021-05-13 PROCEDURE — 77014 PR CT GUIDE FOR PLACEMENT RADIATION THERAPY FIELDS: CPT | Performed by: RADIOLOGY

## 2021-05-14 ENCOUNTER — APPOINTMENT (OUTPATIENT)
Dept: RADIATION ONCOLOGY | Facility: CLINIC | Age: 81
End: 2021-05-14
Payer: MEDICARE

## 2021-05-14 PROCEDURE — 77385 PR IMRT TREATMENT DELIVERY, SIMPLE: CPT | Performed by: RADIOLOGY

## 2021-05-14 PROCEDURE — 77014 PR CT GUIDE FOR PLACEMENT RADIATION THERAPY FIELDS: CPT | Mod: TC | Performed by: RADIOLOGY

## 2021-05-14 RX ORDER — PIOGLITAZONEHYDROCHLORIDE 30 MG/1
TABLET ORAL
Qty: 90 TABLET | Refills: 0 | OUTPATIENT
Start: 2021-05-14

## 2021-05-14 NOTE — TELEPHONE ENCOUNTER
Pt has f/u appt with me 5/18/21 in clinic but RF request os fir [facility] in [place] when pt worrell. Call pt to check if wants Rx to go to a [facility] of other pharmacy in [place] instead and then route back to me

## 2021-05-14 NOTE — TELEPHONE ENCOUNTER
Triage spoke with patient. He is home in [place]. Pharmacy corrected. Please sign script for Acto    Patient wondering since he is undergoing cancer treatment in [place], [place] does he need to keep upcoming appointment for in clinic versus virtual? Since he has afternoon treatment the same day? He often is extremely fatigued in the mornings but if needs to he can drive down. He was also wondering if he can have a paper copy of a 90day supply of Januvia, that he can send to a [place] pharmacy?    Nadia BOON, RN, PHN

## 2021-05-17 ENCOUNTER — APPOINTMENT (OUTPATIENT)
Dept: RADIATION ONCOLOGY | Facility: CLINIC | Age: 81
End: 2021-05-17
Payer: MEDICARE

## 2021-05-17 PROCEDURE — 77014 PR CT GUIDE FOR PLACEMENT RADIATION THERAPY FIELDS: CPT | Performed by: RADIOLOGY

## 2021-05-17 PROCEDURE — 77385 PR IMRT TREATMENT DELIVERY, SIMPLE: CPT | Performed by: RADIOLOGY

## 2021-05-18 ENCOUNTER — APPOINTMENT (OUTPATIENT)
Dept: RADIATION ONCOLOGY | Facility: CLINIC | Age: 81
End: 2021-05-18
Payer: MEDICARE

## 2021-05-18 PROCEDURE — 77385 PR IMRT TREATMENT DELIVERY, SIMPLE: CPT | Performed by: RADIOLOGY

## 2021-05-18 PROCEDURE — 77014 PR CT GUIDE FOR PLACEMENT RADIATION THERAPY FIELDS: CPT | Performed by: RADIOLOGY

## 2021-05-19 ENCOUNTER — APPOINTMENT (OUTPATIENT)
Dept: RADIATION ONCOLOGY | Facility: CLINIC | Age: 81
End: 2021-05-19
Payer: MEDICARE

## 2021-05-19 ENCOUNTER — INFUSION THERAPY VISIT (OUTPATIENT)
Dept: INFUSION THERAPY | Facility: CLINIC | Age: 81
End: 2021-05-19
Attending: RADIOLOGY
Payer: MEDICARE

## 2021-05-19 ENCOUNTER — OFFICE VISIT (OUTPATIENT)
Dept: RADIATION ONCOLOGY | Facility: CLINIC | Age: 81
End: 2021-05-19
Payer: MEDICARE

## 2021-05-19 VITALS
DIASTOLIC BLOOD PRESSURE: 75 MMHG | BODY MASS INDEX: 27.78 KG/M2 | HEIGHT: 67 IN | WEIGHT: 177 LBS | OXYGEN SATURATION: 98 % | SYSTOLIC BLOOD PRESSURE: 129 MMHG | RESPIRATION RATE: 16 BRPM | HEART RATE: 76 BPM | TEMPERATURE: 97.2 F

## 2021-05-19 VITALS
SYSTOLIC BLOOD PRESSURE: 129 MMHG | BODY MASS INDEX: 27.74 KG/M2 | RESPIRATION RATE: 16 BRPM | HEART RATE: 82 BPM | OXYGEN SATURATION: 97 % | DIASTOLIC BLOOD PRESSURE: 75 MMHG | TEMPERATURE: 98 F | WEIGHT: 177.1 LBS

## 2021-05-19 DIAGNOSIS — E11.21 TYPE 2 DIABETES MELLITUS WITH DIABETIC NEPHROPATHY, WITHOUT LONG-TERM CURRENT USE OF INSULIN (H): ICD-10-CM

## 2021-05-19 DIAGNOSIS — C61 PROSTATE CANCER (H): Primary | ICD-10-CM

## 2021-05-19 PROCEDURE — 77427 RADIATION TX MANAGEMENT X5: CPT | Performed by: STUDENT IN AN ORGANIZED HEALTH CARE EDUCATION/TRAINING PROGRAM

## 2021-05-19 PROCEDURE — 99207 PR NO CHARGE NURSE ONLY: CPT

## 2021-05-19 PROCEDURE — 77336 RADIATION PHYSICS CONSULT: CPT | Performed by: STUDENT IN AN ORGANIZED HEALTH CARE EDUCATION/TRAINING PROGRAM

## 2021-05-19 PROCEDURE — 77385 PR IMRT TREATMENT DELIVERY, SIMPLE: CPT | Performed by: STUDENT IN AN ORGANIZED HEALTH CARE EDUCATION/TRAINING PROGRAM

## 2021-05-19 PROCEDURE — 99207 PR DROP WITH A PROCEDURE: CPT | Performed by: STUDENT IN AN ORGANIZED HEALTH CARE EDUCATION/TRAINING PROGRAM

## 2021-05-19 PROCEDURE — 77014 PR CT GUIDE FOR PLACEMENT RADIATION THERAPY FIELDS: CPT | Mod: TC | Performed by: STUDENT IN AN ORGANIZED HEALTH CARE EDUCATION/TRAINING PROGRAM

## 2021-05-19 PROCEDURE — 96402 CHEMO HORMON ANTINEOPL SQ/IM: CPT | Performed by: NURSE PRACTITIONER

## 2021-05-19 ASSESSMENT — PAIN SCALES - GENERAL
PAINLEVEL: NO PAIN (0)
PAINLEVEL: NO PAIN (0)

## 2021-05-19 ASSESSMENT — MIFFLIN-ST. JEOR: SCORE: 1471.5

## 2021-05-19 NOTE — PATIENT INSTRUCTIONS
Please contact [facility] RN with questions or concerns following today's appointment: [phone].    Thank you!

## 2021-05-19 NOTE — PROGRESS NOTES
[facility] PHYSICIANS  SPECIALIZING IN BREAKTHROUGHS  Radiation Oncology- [facility]    On Treatment Visit Note      Prakash Cramer      Date: May 19, 2021   MRN: [id number]   : 1940    Diagnosis: Prostate Janice 4+3=7    Reason for Visit:  On Radiation Treatment Visit     Treatment Summary to Date  Treatment Site: Prostate + SV Current Dose: 1250/7000 cGy Fractions:       Chemotherapy  Chemo concurrent with radx?: No    Subjective:   Mr. Cramer is doing well with some increased fatigue. He continue to have diarrhea which is a chronic issue that he takes Metamucil for. He is concerned regarding his overall prognosis.     Nursing ROS:   Nutrition Alteration  Diet Type: Patient's Preference  Skin  Skin Reaction: 0 - No changes      Cardiovascular  Respiratory effort: 1 - Normal - without distress  Gastrointestinal  Diarrhea: 1 - Abdominal cramping, two or less soft or liquid bowel movements  GI Note: Patient had episode of incontinent diarrhea yesterday prior to treatment. Patient has a history of diarrhea, taking Imodium PRN. Patient reports he was instructed to take Metamucil capsules everyday by the GI doctor to help manage diarrhea. Patient continues this bowel regimen- metamucil 3 capsules in the morning and 3 capsules in the evening. Managing diarrhea handout provided to patient- reviewed foods to avoid and Imodium use.  Genitourinary   Note: Patient reports up at night x 4 to urinate  Psychosocial  Pyschosocial Note: Patient reports some fatigue  Pain Assessment  0-10 Pain Scale: 0    Treatment Breaks: None   ED visits / Hospitalizations: None     Objective:   /75 (BP Location: Left arm, Patient Position: Sitting, Cuff Size: Adult Regular)   Pulse 82   Temp 98  F (36.7  C) (Oral)   Resp 16   Wt 80.3 kg (177 lb 1.6 oz)   SpO2 97%   BMI 27.74 kg/m    Gen: Appears well, in no acute distress  Neuro: walks with walker     Labs:  Last CBC with Diff  WBC   Date Value  Ref Range Status   05/12/2021 4.5 4.0 - 11.0 10e9/L Final     RBC Count   Date Value Ref Range Status   05/12/2021 3.60 (L) 4.4 - 5.9 10e12/L Final     Hemoglobin   Date Value Ref Range Status   05/12/2021 13.2 (L) 13.3 - 17.7 g/dL Final     Hematocrit   Date Value Ref Range Status   05/12/2021 39.3 (L) 40.0 - 53.0 % Final     MCV   Date Value Ref Range Status   05/12/2021 109 (H) 78 - 100 fl Final     MCH   Date Value Ref Range Status   05/12/2021 36.7 (H) 26.5 - 33.0 pg Final     MCHC   Date Value Ref Range Status   05/12/2021 33.6 31.5 - 36.5 g/dL Final     RDW   Date Value Ref Range Status   05/12/2021 13.5 10.0 - 15.0 % Final     Platelet Count   Date Value Ref Range Status   05/12/2021 130 (L) 150 - 450 10e9/L Final     Diff Method   Date Value Ref Range Status   06/23/2020 Automated Method  Final     % Neutrophils   Date Value Ref Range Status   06/23/2020 64.0 % Final     % Lymphocytes   Date Value Ref Range Status   06/23/2020 25.6 % Final     % Monocytes   Date Value Ref Range Status   06/23/2020 7.9 % Final     % Eosinophils   Date Value Ref Range Status   06/23/2020 1.9 % Final     % Basophils   Date Value Ref Range Status   06/23/2020 0.4 % Final     Absolute Neutrophil   Date Value Ref Range Status   06/23/2020 3.1 1.6 - 8.3 10e9/L Final     Absolute Lymphocytes   Date Value Ref Range Status   06/23/2020 1.2 0.8 - 5.3 10e9/L Final     Absolute Monocytes   Date Value Ref Range Status   06/23/2020 0.4 0.0 - 1.3 10e9/L Final        Last Comprehensive Metabolic Panel:  Sodium   Date Value Ref Range Status   05/12/2021 138 133 - 144 mmol/L Final     Potassium   Date Value Ref Range Status   05/12/2021 4.5 3.4 - 5.3 mmol/L Final     Chloride   Date Value Ref Range Status   05/12/2021 106 94 - 109 mmol/L Final     Carbon Dioxide   Date Value Ref Range Status   05/12/2021 25 20 - 32 mmol/L Final     Anion Gap   Date Value Ref Range Status   05/12/2021 7 3 - 14 mmol/L Final     Glucose   Date Value Ref Range  Status   05/12/2021 174 (H) 70 - 99 mg/dL Final     Comment:     Fasting specimen     Urea Nitrogen   Date Value Ref Range Status   05/12/2021 25 7 - 30 mg/dL Final     Creatinine   Date Value Ref Range Status   05/12/2021 1.37 (H) 0.66 - 1.25 mg/dL Final     GFR Estimate   Date Value Ref Range Status   05/12/2021 48 (L) >60 mL/min/[1.73_m2] Final     Comment:     Non  GFR Calc  Starting 12/18/2018, serum creatinine based estimated GFR (eGFR) will be   calculated using the Chronic Kidney Disease Epidemiology Collaboration   (CKD-EPI) equation.       Calcium   Date Value Ref Range Status   05/12/2021 9.0 8.5 - 10.1 mg/dL Final      Assessment:    Tolerating radiation therapy well.  All questions and concerns addressed.    Toxicities:  Fatigue: Grade 1    Plan:   1. Continue current therapy.    2. Continue ADT   3. Continue to monitor for treatment-related gastrointestinal symptoms in the setting of chronic diarrhea     Mosaiq chart and setup information reviewed  IGRT images checked    Medication Review  Med list reviewed with patient?: Yes    Educational Topic Discussed  Education Instructions: Managing diarrhea, bowel regimen, fatigue, monitoring urination changes    Sammie Scales MD, PhD     Department of Radiation Oncology  [facility]

## 2021-05-19 NOTE — PROGRESS NOTES
Infusion Nursing Note:  Prakash PRAMOD Cramer presents today for   Chief Complaint   Patient presents with     Allied Health Visit     Lupron injection   .    Patient seen by provider today: YES, Radiation/Oncology, Sammie Scales MD   present during visit today: Not Applicable.    Note: Patient assessed today by Betty Mo RN, prior to injection.       Intravenous Access:  No Intravenous access/labs at this visit.    Treatment Conditions:  Not Applicable.      Post Infusion Assessment:  Patient tolerated injection without incident.  Site patent and intact, free from redness, edema or discomfort.       Discharge Plan:   Patient discharged in stable condition accompanied by: self.  Departure Mode: Walker.    Cecilia Roblero, CMA

## 2021-05-19 NOTE — LETTER
2021         RE: Prakash Cramer  [address]  [place] [address]        Dear Colleague,    Thank you for referring your patient, Prakash Cramer, to the [facility] [place]. Please see a copy of my visit note below.    [facility] PHYSICIANS  SPECIALIZING IN BREAKTHROUGHS  Radiation Oncology- [facility]    On Treatment Visit Note      Prakash Cramer      Date: May 19, 2021   MRN: [id number]   : 1940    Diagnosis: Prostate Janice 4+3=7    Reason for Visit:  On Radiation Treatment Visit     Treatment Summary to Date  Treatment Site: Prostate + SV Current Dose: 1250/7000 cGy Fractions:       Chemotherapy  Chemo concurrent with radx?: No    Subjective:   Mr. Cramer is doing well with some increased fatigue. He continue to have diarrhea which is a chronic issue that he takes Metamucil for. He is concerned regarding his overall prognosis.     Nursing ROS:   Nutrition Alteration  Diet Type: Patient's Preference  Skin  Skin Reaction: 0 - No changes      Cardiovascular  Respiratory effort: 1 - Normal - without distress  Gastrointestinal  Diarrhea: 1 - Abdominal cramping, two or less soft or liquid bowel movements  GI Note: Patient had episode of incontinent diarrhea yesterday prior to treatment. Patient has a history of diarrhea, taking Imodium PRN. Patient reports he was instructed to take Metamucil capsules everyday by the GI doctor to help manage diarrhea. Patient continues this bowel regimen- metamucil 3 capsules in the morning and 3 capsules in the evening. Managing diarrhea handout provided to patient- reviewed foods to avoid and Imodium use.  Genitourinary   Note: Patient reports up at night x 4 to urinate  Psychosocial  Pyschosocial Note: Patient reports some fatigue  Pain Assessment  0-10 Pain Scale: 0    Treatment Breaks: None   ED visits / Hospitalizations: None     Objective:   /75 (BP Location: Left arm, Patient Position:  Sitting, Cuff Size: Adult Regular)   Pulse 82   Temp 98  F (36.7  C) (Oral)   Resp 16   Wt 80.3 kg (177 lb 1.6 oz)   SpO2 97%   BMI 27.74 kg/m    Gen: Appears well, in no acute distress  Neuro: walks with walker     Labs:  Last CBC with Diff  WBC   Date Value Ref Range Status   05/12/2021 4.5 4.0 - 11.0 10e9/L Final     RBC Count   Date Value Ref Range Status   05/12/2021 3.60 (L) 4.4 - 5.9 10e12/L Final     Hemoglobin   Date Value Ref Range Status   05/12/2021 13.2 (L) 13.3 - 17.7 g/dL Final     Hematocrit   Date Value Ref Range Status   05/12/2021 39.3 (L) 40.0 - 53.0 % Final     MCV   Date Value Ref Range Status   05/12/2021 109 (H) 78 - 100 fl Final     MCH   Date Value Ref Range Status   05/12/2021 36.7 (H) 26.5 - 33.0 pg Final     MCHC   Date Value Ref Range Status   05/12/2021 33.6 31.5 - 36.5 g/dL Final     RDW   Date Value Ref Range Status   05/12/2021 13.5 10.0 - 15.0 % Final     Platelet Count   Date Value Ref Range Status   05/12/2021 130 (L) 150 - 450 10e9/L Final     Diff Method   Date Value Ref Range Status   06/23/2020 Automated Method  Final     % Neutrophils   Date Value Ref Range Status   06/23/2020 64.0 % Final     % Lymphocytes   Date Value Ref Range Status   06/23/2020 25.6 % Final     % Monocytes   Date Value Ref Range Status   06/23/2020 7.9 % Final     % Eosinophils   Date Value Ref Range Status   06/23/2020 1.9 % Final     % Basophils   Date Value Ref Range Status   06/23/2020 0.4 % Final     Absolute Neutrophil   Date Value Ref Range Status   06/23/2020 3.1 1.6 - 8.3 10e9/L Final     Absolute Lymphocytes   Date Value Ref Range Status   06/23/2020 1.2 0.8 - 5.3 10e9/L Final     Absolute Monocytes   Date Value Ref Range Status   06/23/2020 0.4 0.0 - 1.3 10e9/L Final        Last Comprehensive Metabolic Panel:  Sodium   Date Value Ref Range Status   05/12/2021 138 133 - 144 mmol/L Final     Potassium   Date Value Ref Range Status   05/12/2021 4.5 3.4 - 5.3 mmol/L Final     Chloride    Date Value Ref Range Status   05/12/2021 106 94 - 109 mmol/L Final     Carbon Dioxide   Date Value Ref Range Status   05/12/2021 25 20 - 32 mmol/L Final     Anion Gap   Date Value Ref Range Status   05/12/2021 7 3 - 14 mmol/L Final     Glucose   Date Value Ref Range Status   05/12/2021 174 (H) 70 - 99 mg/dL Final     Comment:     Fasting specimen     Urea Nitrogen   Date Value Ref Range Status   05/12/2021 25 7 - 30 mg/dL Final     Creatinine   Date Value Ref Range Status   05/12/2021 1.37 (H) 0.66 - 1.25 mg/dL Final     GFR Estimate   Date Value Ref Range Status   05/12/2021 48 (L) >60 mL/min/[1.73_m2] Final     Comment:     Non  GFR Calc  Starting 12/18/2018, serum creatinine based estimated GFR (eGFR) will be   calculated using the Chronic Kidney Disease Epidemiology Collaboration   (CKD-EPI) equation.       Calcium   Date Value Ref Range Status   05/12/2021 9.0 8.5 - 10.1 mg/dL Final      Assessment:    Tolerating radiation therapy well.  All questions and concerns addressed.    Toxicities:  Fatigue: Grade 1    Plan:   1. Continue current therapy.    2. Continue ADT   3. Continue to monitor for treatment-related gastrointestinal symptoms in the setting of chronic diarrhea     Mosaiq chart and setup information reviewed  IGRT images checked    Medication Review  Med list reviewed with patient?: Yes    Educational Topic Discussed  Education Instructions: Managing diarrhea, bowel regimen, fatigue, monitoring urination changes    Sammie Scales MD, PhD     Department of Radiation Oncology  [facility]       Again, thank you for allowing me to participate in the care of your patient.        Sincerely,        Sammie Scales MD

## 2021-05-20 ENCOUNTER — APPOINTMENT (OUTPATIENT)
Dept: RADIATION ONCOLOGY | Facility: CLINIC | Age: 81
End: 2021-05-20
Payer: MEDICARE

## 2021-05-20 PROCEDURE — 77385 PR IMRT TREATMENT DELIVERY, SIMPLE: CPT | Performed by: RADIOLOGY

## 2021-05-20 PROCEDURE — 77014 PR CT GUIDE FOR PLACEMENT RADIATION THERAPY FIELDS: CPT | Performed by: RADIOLOGY

## 2021-05-21 ENCOUNTER — APPOINTMENT (OUTPATIENT)
Dept: RADIATION ONCOLOGY | Facility: CLINIC | Age: 81
End: 2021-05-21
Payer: MEDICARE

## 2021-05-21 DIAGNOSIS — R19.5 LOOSE STOOLS: ICD-10-CM

## 2021-05-21 PROCEDURE — 77385 PR IMRT TREATMENT DELIVERY, SIMPLE: CPT | Performed by: RADIOLOGY

## 2021-05-21 PROCEDURE — 77014 PR CT GUIDE FOR PLACEMENT RADIATION THERAPY FIELDS: CPT | Performed by: RADIOLOGY

## 2021-05-21 RX ORDER — FAMOTIDINE 20 MG/1
20 TABLET, FILM COATED ORAL 2 TIMES DAILY
Qty: 180 TABLET | Refills: 0 | Status: SHIPPED | OUTPATIENT
Start: 2021-05-21 | End: 2021-12-16

## 2021-05-24 ENCOUNTER — APPOINTMENT (OUTPATIENT)
Dept: RADIATION ONCOLOGY | Facility: CLINIC | Age: 81
End: 2021-05-24
Payer: MEDICARE

## 2021-05-24 DIAGNOSIS — E11.21 TYPE 2 DIABETES MELLITUS WITH DIABETIC NEPHROPATHY, WITHOUT LONG-TERM CURRENT USE OF INSULIN (H): ICD-10-CM

## 2021-05-24 PROCEDURE — 77014 PR CT GUIDE FOR PLACEMENT RADIATION THERAPY FIELDS: CPT | Performed by: RADIOLOGY

## 2021-05-24 PROCEDURE — 77385 PR IMRT TREATMENT DELIVERY, SIMPLE: CPT | Performed by: RADIOLOGY

## 2021-05-24 RX ORDER — PIOGLITAZONEHYDROCHLORIDE 30 MG/1
30 TABLET ORAL DAILY
Qty: 90 TABLET | Refills: 3 | Status: SHIPPED | OUTPATIENT
Start: 2021-05-24 | End: 2022-06-06

## 2021-05-24 RX ORDER — PIOGLITAZONEHYDROCHLORIDE 30 MG/1
30 TABLET ORAL DAILY
Qty: 90 TABLET | Refills: 3 | Status: SHIPPED | OUTPATIENT
Start: 2021-05-24 | End: 2021-05-24

## 2021-05-24 NOTE — TELEPHONE ENCOUNTER
Paper copy of Rxc= for Januvia printed and in [place] basket. Please mail to pt as requested. Pioglitazone Rx electronically sent to his local pharmacy. Pt had recent in clinic appt but missed it. Recommend pt have virtual video appt with me (or telephone as second option if doesn't have smart phone or computer with video). Please assist with scheduling

## 2021-05-24 NOTE — TELEPHONE ENCOUNTER
Signed script mailed to patients home address and telephone ([employer]) appt made for Tuesday 5/25 at 2:20.

## 2021-05-25 ENCOUNTER — VIRTUAL VISIT (OUTPATIENT)
Dept: INTERNAL MEDICINE | Facility: CLINIC | Age: 81
End: 2021-05-25
Payer: MEDICARE

## 2021-05-25 DIAGNOSIS — M54.50 CHRONIC BILATERAL LOW BACK PAIN WITHOUT SCIATICA: ICD-10-CM

## 2021-05-25 DIAGNOSIS — D61.9 APLASTIC ANEMIA (H): ICD-10-CM

## 2021-05-25 DIAGNOSIS — E11.21 TYPE 2 DIABETES MELLITUS WITH DIABETIC NEPHROPATHY, WITHOUT LONG-TERM CURRENT USE OF INSULIN (H): Primary | ICD-10-CM

## 2021-05-25 DIAGNOSIS — C61 PROSTATE CANCER (H): ICD-10-CM

## 2021-05-25 DIAGNOSIS — G89.29 CHRONIC BILATERAL LOW BACK PAIN WITHOUT SCIATICA: ICD-10-CM

## 2021-05-25 DIAGNOSIS — E78.5 HYPERLIPIDEMIA LDL GOAL <100: ICD-10-CM

## 2021-05-25 DIAGNOSIS — E03.9 HYPOTHYROIDISM, UNSPECIFIED TYPE: ICD-10-CM

## 2021-05-25 DIAGNOSIS — N18.31 STAGE 3A CHRONIC KIDNEY DISEASE (H): ICD-10-CM

## 2021-05-25 PROCEDURE — 99442 PR PHYSICIAN TELEPHONE EVALUATION 11-20 MIN: CPT | Mod: 95 | Performed by: INTERNAL MEDICINE

## 2021-05-25 NOTE — PROGRESS NOTES
Prakash is a 81 year old who is being evaluated via a billable telephone visit.      What phone number would you like to be contacted at? [phone]  How would you like to obtain your AVS? [employer]    ASSESSMENT:   1. Type 2 diabetes mellitus with diabetic nephropathy, without long-term current use of insulin (H)  Improved and A1c at control for age.  Continue current medications and repeat labs 3 months  - Hemoglobin A1c; Future  - Basic metabolic panel; Future    2. Prostate cancer (H)  Being managed by radiation oncology and urology.  Will continue radiation treatments through June as scheduled    3. Aplastic anemia (H)  Stable.  Recheck lab 3 months  - CBC with platelets; Future    4. Stage 3a chronic kidney disease  Stable.  Recheck lab 3 months  - Basic metabolic panel; Future  - Vitamin D Deficiency; Future  - Phosphorus; Future  - Parathyroid Hormone Intact; Future    5. Hyperlipidemia LDL goal <100  Intolerant of multiple statin trials besides pravastatin and has not been tolerant of higher dose.  Continue current pravastatin dosage  - Lipid panel reflex to direct LDL Fasting; Future    6. Hypothyroidism, unspecified type  On levothyroxine.  Due for recheck TSH as previously ordered in 3 months    7. Chronic bilateral low back pain without sciatica  Has been followed by [facility] patient states MRIs have been done there of the lumbar spine.  The results available to review.  Patient will finish radiation therapy and then follow-up with Ortho afterward.  States he has not improved with previous physical therapy.  Declines need for additional pain medication.        PLAN:  Continue current meds  Call  [phone] or use [employer] to schedule a future lab appointment  non-fasting in 3 months.   Continue radiation therapy per Oncology/Urology  Follow-unit(s) with Dr Renae at [facility] if back issues persist to discuss whether he still feels you are a possible candidate for  possible epidural steroid injection       Phone  call contact time  Call Started at 2:29pm initial phone call to pt but NA. LM that I would call again 5 minutes later. Recalled 2:41pm  Call Ended at 2:58 pm  Total minutes: 17 min    (Chart documentation was completed, in part, with [employer] voice-recognition software. Even though reviewed, some grammatical, spelling, and word errors may remain.)    Zhou Meraz MD  Internal Medicine Department  [facility]         Brandt Randall is a 81 year old who presents for the following health issues     HPI     Diabetes Follow-up    How often are you checking your blood sugar? One time  BID. AM sugars 158-192. PM before bedtime  180-190s  What time of day are you checking your blood sugars (select all that apply)?  Varies  Have you had any blood sugars above 200?  No  Have you had any blood sugars below 70?  No    What symptoms do you notice when your blood sugar is low?  None    What concerns do you have today about your diabetes? None     Do you have any of these symptoms? (Select all that apply)  No numbness or tingling in feet.  No redness, sores or blisters on feet.  No complaints of excessive thirst.  No reports of blurry vision.  No significant changes to weight.      BP Readings from Last 2 Encounters:   05/19/21 129/75   05/19/21 129/75     Hemoglobin A1C (%)   Date Value   05/12/2021 7.4 (H)   12/18/2020 8.5 (H)     LDL Cholesterol Calculated (mg/dL)   Date Value   05/12/2021 147 (H)   12/18/2020 172 (H)                 Most recent lab results reviewed with pt.      Component      Latest Ref Rng & Units 12/18/2020 5/12/2021   Sodium      133 - 144 mmol/L 138 138   Potassium      3.4 - 5.3 mmol/L 4.4 4.5   Chloride      94 - 109 mmol/L 105 106   Carbon Dioxide      20 - 32 mmol/L 28 25   Anion Gap      3 - 14 mmol/L 5 7   Glucose      70 - 99 mg/dL 176 (H) 174 (H)   Urea Nitrogen      7 - 30 mg/dL 32 (H) 25   Creatinine      0.66 - 1.25 mg/dL 1.30 (H) 1.37 (H)   GFR Estimate       ">60 mL/min/1.73:m2 52 (L) 48 (L)   GFR Estimate If Black      >60 mL/min/1.73:m2 60 (L) 56 (L)   Calcium      8.5 - 10.1 mg/dL 9.1 9.0   Bilirubin Total      0.2 - 1.3 mg/dL 0.4 0.7   Albumin      3.4 - 5.0 g/dL 3.5 3.8   Protein Total      6.8 - 8.8 g/dL 7.4 7.4   Alkaline Phosphatase      40 - 150 U/L 109 90   ALT      0 - 70 U/L 24 30   AST      0 - 45 U/L 19 20   WBC      4.0 - 11.0 10e9/L 4.4 4.5   RBC Count      4.4 - 5.9 10e12/L 3.37 (L) 3.60 (L)   Hemoglobin      13.3 - 17.7 g/dL 12.7 (L) 13.2 (L)   Hematocrit      40.0 - 53.0 % 37.4 (L) 39.3 (L)   MCV      78 - 100 fl 111 (H) 109 (H)   MCH      26.5 - 33.0 pg 37.7 (H) 36.7 (H)   MCHC      31.5 - 36.5 g/dL 34.0 33.6   RDW      10.0 - 15.0 % 16.1 (H) 13.5   Platelet Count      150 - 450 10e9/L 142 (L) 130 (L)   Cholesterol      <200 mg/dL 310 (H) 286 (H)   Triglycerides      <150 mg/dL 368 (H) 355 (H)   HDL Cholesterol      >39 mg/dL 64 68   LDL Cholesterol Calculated      <100 mg/dL 172 (H) 147 (H)   Non HDL Cholesterol      <130 mg/dL 246 (H) 218 (H)   Hemoglobin A1C      0 - 5.6 % 8.5 (H) 7.4 (H)         Currently undergoing radiation therapy for prostate CA.   Rad Oncology note from 1 week ago reviewed. Part of the note describing recent sx as below\":    Cardiovascular  Respiratory effort: 1 - Normal - without distress  Gastrointestinal  Diarrhea: 1 - Abdominal cramping, two or less soft or liquid bowel movements  GI Note: Patient had episode of incontinent diarrhea yesterday prior to treatment. Patient has a history of diarrhea, taking Imodium PRN. Patient reports he was instructed to take Metamucil capsules everyday by the GI doctor to help manage diarrhea. Patient continues this bowel regimen- metamucil 3 capsules in the morning and 3 capsules in the evening. Managing diarrhea handout provided to patient- reviewed foods to avoid and Imodium use.  Genitourinary   Note: Patient reports up at night x 4 to urinate  Psychosocial  Pyschosocial Note: " Patient reports some fatigue       Has been doing radiology infusion for 2 weeks and having loose stools now just once a day and rare  Use Immodium rarely along with the Metamucil  Has tried stopping Metformin previously without much change in his stool pattern.   No chest pain or shortness of breath.   Has some chronic fatigue  Stable back pain. 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 Using Tylenol.  Walking but occ use of cane or rarely walker  (when grocery stopping)      Additional ROS:   Constitutional, HEENT, Cardiovascular, Pulmonary, GI and , Neuro, MSK and Psych review of systems/symptoms are otherwise negative or unchanged from previous, except as noted above.           Objective:  Any reported vitals from today were reviewed in chart. See nursing documentation for details    RESP: No cough, no audible wheezing, able to talk in full sentences  GEN: Normal affect. Normal though content/speech  Remainder of exam unable to be completed due to telephone visits

## 2021-05-26 ENCOUNTER — APPOINTMENT (OUTPATIENT)
Dept: RADIATION ONCOLOGY | Facility: CLINIC | Age: 81
End: 2021-05-26
Payer: MEDICARE

## 2021-05-26 ENCOUNTER — OFFICE VISIT (OUTPATIENT)
Dept: RADIATION ONCOLOGY | Facility: CLINIC | Age: 81
End: 2021-05-26
Payer: MEDICARE

## 2021-05-26 VITALS
DIASTOLIC BLOOD PRESSURE: 77 MMHG | RESPIRATION RATE: 18 BRPM | SYSTOLIC BLOOD PRESSURE: 127 MMHG | TEMPERATURE: 98.4 F | WEIGHT: 178.3 LBS | BODY MASS INDEX: 27.93 KG/M2 | OXYGEN SATURATION: 98 % | HEART RATE: 86 BPM

## 2021-05-26 DIAGNOSIS — C61 PROSTATE CANCER (H): Primary | ICD-10-CM

## 2021-05-26 PROBLEM — G89.29 CHRONIC BILATERAL LOW BACK PAIN WITHOUT SCIATICA: Status: ACTIVE | Noted: 2021-05-26

## 2021-05-26 PROBLEM — M54.50 CHRONIC BILATERAL LOW BACK PAIN WITHOUT SCIATICA: Status: ACTIVE | Noted: 2021-05-26

## 2021-05-26 PROCEDURE — 99207 PR DROP WITH A PROCEDURE: CPT | Performed by: RADIOLOGY

## 2021-05-26 PROCEDURE — 77385 PR IMRT TREATMENT DELIVERY, SIMPLE: CPT | Performed by: RADIOLOGY

## 2021-05-26 PROCEDURE — 77014 PR CT GUIDE FOR PLACEMENT RADIATION THERAPY FIELDS: CPT | Performed by: RADIOLOGY

## 2021-05-26 ASSESSMENT — PAIN SCALES - GENERAL: PAINLEVEL: NO PAIN (0)

## 2021-05-26 NOTE — PATIENT INSTRUCTIONS
Please contact [facility] RN with questions or concerns following today's appointment: [phone].    Thank you!

## 2021-05-26 NOTE — LETTER
2021         RE: Prakash Cramer  [address]  [facility] [address]        Dear Colleague,    Thank you for referring your patient, Prakash Cramer, to the [facility] RADIATION ONCOLOGY [place]. Please see a copy of my visit note below.    [facility] PHYSICIANS  SPECIALIZING IN BREAKTHROUGHS  Radiation Oncology    On Treatment Visit Note      Prakash Cramer      Date: 2021   MRN: [id number]   : 1940  Diagnosis: prostate cancer      Reason for Visit:  On Radiation Treatment Visit     Treatment Summary to Date  Treatment Site: prostate + SV Current Dose: 2250/7000 cGy Fractions:       Chemotherapy  Chemo concurrent with radx?: No    Subjective:     Has some soft stools and is taking metamucil.  Otherwise doing well with no complaints.    Nursing ROS:   Nutrition Alteration  Diet Type: Patient's Preference  Skin  Skin Reaction: 0 - No changes           Gastrointestinal  Nausea: 0 - None  Vomitin - No vomiting (ons)  Diarrhea: 1 - Abdominal cramping, two or less soft or liquid bowel movements  GI Note: patient reports taking Metamucil po three capsules BID and reports taking Imodium po one tablet as needed each day  Genitourinary   Note: patient reports getting up four times per night to urinate at baseline  Psychosocial  Mood - Anxiety: 0 - Normal  Mood - Depression: 0 - Normal  Pyschosocial Note: energy level at baseline  Pain Assessment  0-10 Pain Scale: 0      Objective:   /77 (BP Location: Left arm, Patient Position: Chair, Cuff Size: Adult Regular)   Pulse 86   Temp 98.4  F (36.9  C) (Oral)   Resp 18   Wt 80.9 kg (178 lb 4.8 oz)   SpO2 98%   BMI 27.93 kg/m    Gen: Appears well, in no acute distress  Skin: No erythema    Labs:  CBC RESULTS:   Recent Labs   Lab Test 21  0907   WBC 4.5   RBC 3.60*   HGB 13.2*   HCT 39.3*   *   MCH 36.7*   MCHC 33.6   RDW 13.5   *     ELECTROLYTES:  Recent Labs   Lab Test 21  0907       POTASSIUM 4.5   CHLORIDE 106   SARA 9.0   CO2 25   BUN 25   CR 1.37*   *       Assessment:    Tolerating radiation therapy well.  All questions and concerns addressed.    Toxicities:  Diarrhea: Grade 1: Increase of <4 stools per day over baseline; mild increase in ostomy output compared to baseline    Plan:   1. Continue current therapy.    2. Continue with metamucil regimen as previously directed  3. Started on ADT 10/26/2020 with 6 month injection. Then received additional injection on 5/19/2021 (30 mg/4 month injection).  4.       Mosaiq chart and setup information reviewed  MVCT/IGRT images checked    Medication Review  Med list reviewed with patient?: Yes  Med list printed and given: Offered and declined    Educational Topic Discussed  Education Instructions: radiation therapy side effects reviewed        Huyen Shoemaker MD    Please do not send letter to referring physician.          Again, thank you for allowing me to participate in the care of your patient.        Sincerely,        Tanvi Shoemaker MD

## 2021-05-26 NOTE — PROGRESS NOTES
[facility] PHYSICIANS  SPECIALIZING IN BREAKTHROUGHS  Radiation Oncology    On Treatment Visit Note      Prakash Cramer      Date: 2021   MRN: [id number]   : 1940  Diagnosis: prostate cancer      Reason for Visit:  On Radiation Treatment Visit     Treatment Summary to Date  Treatment Site: prostate + SV Current Dose: 2250/7000 cGy Fractions:       Chemotherapy  Chemo concurrent with radx?: No    Subjective:     Has some soft stools and is taking metamucil.  Otherwise doing well with no complaints.    Nursing ROS:   Nutrition Alteration  Diet Type: Patient's Preference  Skin  Skin Reaction: 0 - No changes           Gastrointestinal  Nausea: 0 - None  Vomitin - No vomiting (ons)  Diarrhea: 1 - Abdominal cramping, two or less soft or liquid bowel movements  GI Note: patient reports taking Metamucil po three capsules BID and reports taking Imodium po one tablet as needed each day  Genitourinary   Note: patient reports getting up four times per night to urinate at baseline  Psychosocial  Mood - Anxiety: 0 - Normal  Mood - Depression: 0 - Normal  Pyschosocial Note: energy level at baseline  Pain Assessment  0-10 Pain Scale: 0      Objective:   /77 (BP Location: Left arm, Patient Position: Chair, Cuff Size: Adult Regular)   Pulse 86   Temp 98.4  F (36.9  C) (Oral)   Resp 18   Wt 80.9 kg (178 lb 4.8 oz)   SpO2 98%   BMI 27.93 kg/m    Gen: Appears well, in no acute distress  Skin: No erythema    Labs:  CBC RESULTS:   Recent Labs   Lab Test 21  0907   WBC 4.5   RBC 3.60*   HGB 13.2*   HCT 39.3*   *   MCH 36.7*   MCHC 33.6   RDW 13.5   *     ELECTROLYTES:  Recent Labs   Lab Test 21  0907      POTASSIUM 4.5   CHLORIDE 106   SARA 9.0   CO2 25   BUN 25   CR 1.37*   *       Assessment:    Tolerating radiation therapy well.  All questions and concerns addressed.    Toxicities:  Diarrhea: Grade 1: Increase of <4 stools per day over baseline; mild  increase in ostomy output compared to baseline    Plan:   1. Continue current therapy.    2. Continue with metamucil regimen as previously directed  3. Started on ADT 10/26/2020 with 6 month injection. Then received additional injection on 5/19/2021 (30 mg/4 month injection).  4.       Mosaiq chart and setup information reviewed  MVCT/IGRT images checked    Medication Review  Med list reviewed with patient?: Yes  Med list printed and given: Offered and declined    Educational Topic Discussed  Education Instructions: radiation therapy side effects reviewed        Huyen Shoemaker MD    Please do not send letter to referring physician.

## 2021-05-27 ENCOUNTER — APPOINTMENT (OUTPATIENT)
Dept: RADIATION ONCOLOGY | Facility: CLINIC | Age: 81
End: 2021-05-27
Payer: MEDICARE

## 2021-05-27 PROCEDURE — 77336 RADIATION PHYSICS CONSULT: CPT | Performed by: RADIOLOGY

## 2021-05-27 PROCEDURE — 77385 PR IMRT TREATMENT DELIVERY, SIMPLE: CPT | Performed by: RADIOLOGY

## 2021-05-27 PROCEDURE — 77014 PR CT GUIDE FOR PLACEMENT RADIATION THERAPY FIELDS: CPT | Performed by: RADIOLOGY

## 2021-05-27 PROCEDURE — 77427 RADIATION TX MANAGEMENT X5: CPT | Performed by: RADIOLOGY

## 2021-05-28 ENCOUNTER — APPOINTMENT (OUTPATIENT)
Dept: RADIATION ONCOLOGY | Facility: CLINIC | Age: 81
End: 2021-05-28
Payer: MEDICARE

## 2021-05-28 PROCEDURE — 77385 PR IMRT TREATMENT DELIVERY, SIMPLE: CPT | Performed by: RADIOLOGY

## 2021-06-01 ENCOUNTER — APPOINTMENT (OUTPATIENT)
Dept: RADIATION ONCOLOGY | Facility: CLINIC | Age: 81
End: 2021-06-01
Payer: MEDICARE

## 2021-06-01 PROCEDURE — 77014 PR CT GUIDE FOR PLACEMENT RADIATION THERAPY FIELDS: CPT | Performed by: RADIOLOGY

## 2021-06-01 PROCEDURE — 77385 PR IMRT TREATMENT DELIVERY, SIMPLE: CPT | Performed by: RADIOLOGY

## 2021-06-02 ENCOUNTER — APPOINTMENT (OUTPATIENT)
Dept: RADIATION ONCOLOGY | Facility: CLINIC | Age: 81
End: 2021-06-02
Payer: MEDICARE

## 2021-06-02 ENCOUNTER — PATIENT OUTREACH (OUTPATIENT)
Dept: RADIATION ONCOLOGY | Facility: CLINIC | Age: 81
End: 2021-06-02

## 2021-06-02 ENCOUNTER — OFFICE VISIT (OUTPATIENT)
Dept: RADIATION ONCOLOGY | Facility: CLINIC | Age: 81
End: 2021-06-02
Payer: MEDICARE

## 2021-06-02 VITALS
SYSTOLIC BLOOD PRESSURE: 134 MMHG | HEART RATE: 78 BPM | WEIGHT: 176.6 LBS | DIASTOLIC BLOOD PRESSURE: 74 MMHG | RESPIRATION RATE: 16 BRPM | BODY MASS INDEX: 27.66 KG/M2 | OXYGEN SATURATION: 97 % | TEMPERATURE: 98.4 F

## 2021-06-02 DIAGNOSIS — R30.0 DYSURIA: ICD-10-CM

## 2021-06-02 DIAGNOSIS — C61 PROSTATE CANCER (H): Primary | ICD-10-CM

## 2021-06-02 LAB
ALBUMIN UR-MCNC: 10 MG/DL
APPEARANCE UR: CLEAR
BILIRUB UR QL STRIP: NEGATIVE
COLOR UR AUTO: YELLOW
GLUCOSE UR STRIP-MCNC: 30 MG/DL
HGB UR QL STRIP: NEGATIVE
KETONES UR STRIP-MCNC: NEGATIVE MG/DL
LEUKOCYTE ESTERASE UR QL STRIP: NEGATIVE
NITRATE UR QL: NEGATIVE
NON-SQ EPI CELLS #/AREA URNS LPF: NORMAL /LPF
PH UR STRIP: 5.5 PH (ref 5–7)
RBC #/AREA URNS AUTO: NORMAL /HPF
SOURCE: ABNORMAL
SP GR UR STRIP: 1.02 (ref 1–1.03)
UROBILINOGEN UR STRIP-MCNC: NORMAL MG/DL (ref 0–2)
WBC #/AREA URNS AUTO: NORMAL /HPF

## 2021-06-02 PROCEDURE — 99207 PR DROP WITH A PROCEDURE: CPT | Performed by: RADIOLOGY

## 2021-06-02 PROCEDURE — 77385 PR IMRT TREATMENT DELIVERY, SIMPLE: CPT | Performed by: RADIOLOGY

## 2021-06-02 PROCEDURE — 77014 PR CT GUIDE FOR PLACEMENT RADIATION THERAPY FIELDS: CPT | Performed by: RADIOLOGY

## 2021-06-02 PROCEDURE — 81001 URINALYSIS AUTO W/SCOPE: CPT | Performed by: RADIOLOGY

## 2021-06-02 RX ORDER — TAMSULOSIN HYDROCHLORIDE 0.4 MG/1
0.4 CAPSULE ORAL DAILY
Qty: 30 CAPSULE | Refills: 1 | Status: SHIPPED | OUTPATIENT
Start: 2021-06-02 | End: 2021-12-29

## 2021-06-02 ASSESSMENT — PAIN SCALES - GENERAL: PAINLEVEL: MILD PAIN (2)

## 2021-06-02 NOTE — PATIENT INSTRUCTIONS
Please contact [facility] RN with questions or concerns following today's appointment: [phone].    Thank you!

## 2021-06-02 NOTE — LETTER
2021         RE: Prakash Cramer  [address]  [facility] [address]        Dear Colleague,    Thank you for referring your patient, Prakash Cramer, to the [facility] RADIATION ONCOLOGY [place]. Please see a copy of my visit note below.    [facility] PHYSICIANS  SPECIALIZING IN BREAKTHROUGHS  Radiation Oncology    On Treatment Visit Note      Prakash Cramer      Date: 2021   MRN: [id number]   : 1940  Diagnosis: prostate cancer      Reason for Visit:  On Radiation Treatment Visit     Treatment Summary to Date  Treatment Site: prostate + SV Current Dose: 3250/7000 cGy Fractions:       Chemotherapy  Chemo concurrent with radx?: No    Subjective:     Patient having increased nocturia and urinary urgency.  Has had ongoing urinary discomfort since prior to starting radiotherapy.  Reports loose stools which are stable.    Nursing ROS:   Nutrition Alteration  Diet Type: Patient's Preference  Skin  Skin Reaction: 0 - No changes        Cardiovascular  Respiratory effort: 1 - Normal - without distress  Gastrointestinal  Nausea: 0 - None  Vomitin - No vomiting (ons)  Diarrhea: 1 - Abdominal cramping, two or less soft or liquid bowel movements  GI Note: Patient reports taking GasX before treatment daily, Metamucil po three capsules BID and reports taking Imodium po one tablet daily  Genitourinary   Note: Patient reports increased night time urination- getting up 8 times per night. UA sent to lab  Psychosocial  Mood - Anxiety: 0 - Normal  Mood - Depression: 0 - Normal  Pyschosocial Note: energy level at baseline  Pain Assessment  0-10 Pain Scale: 2  Pain Treatment: Tylenol as needed for irriation with urination      Objective:   /74 (BP Location: Left arm, Patient Position: Sitting, Cuff Size: Adult Regular)   Pulse 78   Temp 98.4  F (36.9  C) (Oral)   Resp 16   Wt 80.1 kg (176 lb 9.6 oz)   SpO2 97%   BMI 27.66 kg/m    Gen: Appears well, in no acute  distress    Labs:  CBC RESULTS:   Recent Labs   Lab Test 05/12/21  0907   WBC 4.5   RBC 3.60*   HGB 13.2*   HCT 39.3*   *   MCH 36.7*   MCHC 33.6   RDW 13.5   *     ELECTROLYTES:  Recent Labs   Lab Test 05/12/21  0907      POTASSIUM 4.5   CHLORIDE 106   SARA 9.0   CO2 25   BUN 25   CR 1.37*   *       Assessment:    Tolerating radiation therapy well.  All questions and concerns addressed.    Toxicities:  Diarrhea: Grade 1: Increase of <4 stools per day over baseline; mild increase in ostomy output compared to baseline  Urinary frequency: Grade 1: Present  Urinary tract pain: Grade 1: Mild pain  Urinary urgency: Grade 1: Present    Plan:   1. Continue current therapy.    2. Check UA today   3. if negative continue with Tylenol every 6.  Per the patient he was instructed not to take ibuprofen or Aleve.  4. Will start Flomax if UA is negative.  Discussed possible side effects of treatment in his cardiovascular disease.      Mosaiq chart and setup information reviewed  MVCT/IGRT images checked    Medication Review  Med list reviewed with patient?: Yes  Med list printed and given: Offered and declined    Educational Topic Discussed  Additional Instructions: UA  Education Instructions: radiation therapy side effects reviewed        Huyen Shoemaker MD    Please do not send letter to referring physician.            Again, thank you for allowing me to participate in the care of your patient.        Sincerely,        Tanvi Shoemaker MD

## 2021-06-02 NOTE — PROGRESS NOTES
[facility] PHYSICIANS  SPECIALIZING IN BREAKTHROUGHS  Radiation Oncology    On Treatment Visit Note      Prakash Cramer      Date: 2021   MRN: [id number]   : 1940  Diagnosis: prostate cancer      Reason for Visit:  On Radiation Treatment Visit     Treatment Summary to Date  Treatment Site: prostate + SV Current Dose: 3250/7000 cGy Fractions:       Chemotherapy  Chemo concurrent with radx?: No    Subjective:     Patient having increased nocturia and urinary urgency.  Has had ongoing urinary discomfort since prior to starting radiotherapy.  Reports loose stools which are stable.    Nursing ROS:   Nutrition Alteration  Diet Type: Patient's Preference  Skin  Skin Reaction: 0 - No changes        Cardiovascular  Respiratory effort: 1 - Normal - without distress  Gastrointestinal  Nausea: 0 - None  Vomitin - No vomiting (ons)  Diarrhea: 1 - Abdominal cramping, two or less soft or liquid bowel movements  GI Note: Patient reports taking GasX before treatment daily, Metamucil po three capsules BID and reports taking Imodium po one tablet daily  Genitourinary   Note: Patient reports increased night time urination- getting up 8 times per night. UA sent to lab  Psychosocial  Mood - Anxiety: 0 - Normal  Mood - Depression: 0 - Normal  Pyschosocial Note: energy level at baseline  Pain Assessment  0-10 Pain Scale: 2  Pain Treatment: Tylenol as needed for irriation with urination      Objective:   /74 (BP Location: Left arm, Patient Position: Sitting, Cuff Size: Adult Regular)   Pulse 78   Temp 98.4  F (36.9  C) (Oral)   Resp 16   Wt 80.1 kg (176 lb 9.6 oz)   SpO2 97%   BMI 27.66 kg/m    Gen: Appears well, in no acute distress    Labs:  CBC RESULTS:   Recent Labs   Lab Test 21  0907   WBC 4.5   RBC 3.60*   HGB 13.2*   HCT 39.3*   *   MCH 36.7*   MCHC 33.6   RDW 13.5   *     ELECTROLYTES:  Recent Labs   Lab Test 21  0907      POTASSIUM 4.5   CHLORIDE 106    SARA 9.0   CO2 25   BUN 25   CR 1.37*   *       Assessment:    Tolerating radiation therapy well.  All questions and concerns addressed.    Toxicities:  Diarrhea: Grade 1: Increase of <4 stools per day over baseline; mild increase in ostomy output compared to baseline  Urinary frequency: Grade 1: Present  Urinary tract pain: Grade 1: Mild pain  Urinary urgency: Grade 1: Present    Plan:   1. Continue current therapy.    2. Check UA today   3. if negative continue with Tylenol every 6.  Per the patient he was instructed not to take ibuprofen or Aleve.  4. Will start Flomax if UA is negative.  Discussed possible side effects of treatment in his cardiovascular disease.      Mosaiq chart and setup information reviewed  MVCT/IGRT images checked    Medication Review  Med list reviewed with patient?: Yes  Med list printed and given: Offered and declined    Educational Topic Discussed  Additional Instructions: UA  Education Instructions: radiation therapy side effects reviewed        Huyen Shoemaker MD    Please do not send letter to referring physician.

## 2021-06-02 NOTE — TELEPHONE ENCOUNTER
Phone call made to patient to inform him that his UA results from today were negative. Informed patient that Dr. Shoemaker has prescribed Flomax 0.4 mg 1 capsule daily for his urinary symptoms. Discussed that it has been sent to the [facility]. Patient plans to pick it up tomorrow when he comes for Radiation Therapy.    Brittany Hahn RN

## 2021-06-03 ENCOUNTER — APPOINTMENT (OUTPATIENT)
Dept: RADIATION ONCOLOGY | Facility: CLINIC | Age: 81
End: 2021-06-03
Payer: MEDICARE

## 2021-06-03 PROCEDURE — 77385 PR IMRT TREATMENT DELIVERY, SIMPLE: CPT | Performed by: RADIOLOGY

## 2021-06-03 PROCEDURE — 77014 PR CT GUIDE FOR PLACEMENT RADIATION THERAPY FIELDS: CPT | Performed by: RADIOLOGY

## 2021-06-04 ENCOUNTER — APPOINTMENT (OUTPATIENT)
Dept: RADIATION ONCOLOGY | Facility: CLINIC | Age: 81
End: 2021-06-04
Payer: MEDICARE

## 2021-06-04 PROCEDURE — 77336 RADIATION PHYSICS CONSULT: CPT | Performed by: RADIOLOGY

## 2021-06-04 PROCEDURE — 77385 PR IMRT TREATMENT DELIVERY, SIMPLE: CPT | Performed by: RADIOLOGY

## 2021-06-04 PROCEDURE — 77014 PR CT GUIDE FOR PLACEMENT RADIATION THERAPY FIELDS: CPT | Mod: TC | Performed by: RADIOLOGY

## 2021-06-04 PROCEDURE — 77427 RADIATION TX MANAGEMENT X5: CPT | Performed by: RADIOLOGY

## 2021-06-07 ENCOUNTER — APPOINTMENT (OUTPATIENT)
Dept: RADIATION ONCOLOGY | Facility: CLINIC | Age: 81
End: 2021-06-07
Payer: MEDICARE

## 2021-06-07 PROCEDURE — 77385 PR IMRT TREATMENT DELIVERY, SIMPLE: CPT | Performed by: RADIOLOGY

## 2021-06-07 PROCEDURE — 77014 PR CT GUIDE FOR PLACEMENT RADIATION THERAPY FIELDS: CPT | Performed by: RADIOLOGY

## 2021-06-08 ENCOUNTER — APPOINTMENT (OUTPATIENT)
Dept: RADIATION ONCOLOGY | Facility: CLINIC | Age: 81
End: 2021-06-08
Payer: MEDICARE

## 2021-06-08 PROCEDURE — 77014 PR CT GUIDE FOR PLACEMENT RADIATION THERAPY FIELDS: CPT | Performed by: RADIOLOGY

## 2021-06-08 PROCEDURE — 77385 PR IMRT TREATMENT DELIVERY, SIMPLE: CPT | Performed by: RADIOLOGY

## 2021-06-09 ENCOUNTER — APPOINTMENT (OUTPATIENT)
Dept: RADIATION ONCOLOGY | Facility: CLINIC | Age: 81
End: 2021-06-09
Payer: MEDICARE

## 2021-06-09 ENCOUNTER — OFFICE VISIT (OUTPATIENT)
Dept: RADIATION ONCOLOGY | Facility: CLINIC | Age: 81
End: 2021-06-09
Payer: MEDICARE

## 2021-06-09 VITALS
HEART RATE: 84 BPM | SYSTOLIC BLOOD PRESSURE: 137 MMHG | DIASTOLIC BLOOD PRESSURE: 73 MMHG | BODY MASS INDEX: 27.57 KG/M2 | RESPIRATION RATE: 18 BRPM | OXYGEN SATURATION: 96 % | TEMPERATURE: 98.4 F | WEIGHT: 176 LBS

## 2021-06-09 DIAGNOSIS — C61 PROSTATE CANCER (H): Primary | ICD-10-CM

## 2021-06-09 PROCEDURE — 99207 PR DROP WITH A PROCEDURE: CPT | Performed by: RADIOLOGY

## 2021-06-09 PROCEDURE — 77385 PR IMRT TREATMENT DELIVERY, SIMPLE: CPT | Performed by: RADIOLOGY

## 2021-06-09 ASSESSMENT — PAIN SCALES - GENERAL: PAINLEVEL: NO PAIN (0)

## 2021-06-09 NOTE — PROGRESS NOTES
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 (GS 4+3=7, PSA 16.2, [id number], 36cc)    ADT: Eligard 45mg(10/26/2020), Lupron 30mg(5/19/2021)    SUBJECTIVE:    CTC V5.0 Toxicity Criteria  Fatigue: Grade 1: Fatigue relieved by rest  Pain Score: 0 /10     :   Urinary Frequency/Urgency: Grade 1: Increase in frequency or nocturia up to 2X normal  Urinary Incontinence: Grade 2: Spontaneous, pads indicated (wears 1 pad/day)  Dysuria:Grade 0: No change from baseline  Nocturia: 5(baseline 4)  Comment: on Flomax    GI:  Diarrhea:Grade 0  No change over baseline  Proctitis: Grade 0 No symptom  Comment:      OBJECTIVE:/73 (BP Location: Left arm, Patient Position: Chair, Cuff Size: Adult Regular)   Pulse 84   Temp 98.4  F (36.9  C) (Oral)   Resp 18   Wt 79.8 kg (176 lb)   SpO2 96%   BMI 27.57 kg/m    Wt Readings from Last 2 Encounters:   06/09/21 79.8 kg (176 lb)   06/02/21 80.1 kg (176 lb 9.6 oz)         IMPRESSION: The patient is tolerating the treatment.  The patient set up, dose, and cone beam CT images were reviewed.      PLAN: Continue radiotherapy    PAIN MANAGEMENT PLAN: The patient does not require pain management    COLIN Carmichael M.D.  Department of Radiation Oncology  [facility]

## 2021-06-09 NOTE — LETTER
6/9/2021         RE: Prakash Cramer  [address]  [facility] [address]        Dear Colleague,    Thank you for referring your patient, Prakash Cramer, to the [facility] RADIATION ONCOLOGY [place]. Please see a copy of my visit note below.    WEEKLY MANAGEMENT NOTE  Radiation Oncology          Patient Name: Prakash Cramer  MRN: [id number]      Treatment Site: prostate + SV Current Dose: 4500/7000 cGy Fractions: 18/28          DAILY DOSE:     250  cGy/ day,  5 times/week          DISEASE UNDER TREATMENT: Adenocarcinoma of Prostate  intermediate risk prostate cancer (GS 4+3=7, PSA 16.2, [id number], 36cc)    ADT: Eligard 45mg(10/26/2020), Lupron 30mg(5/19/2021)    SUBJECTIVE:    CTC V5.0 Toxicity Criteria  Fatigue: Grade 1: Fatigue relieved by rest  Pain Score: 0 /10     :   Urinary Frequency/Urgency: Grade 1: Increase in frequency or nocturia up to 2X normal  Urinary Incontinence: Grade 2: Spontaneous, pads indicated (wears 1 pad/day)  Dysuria:Grade 0: No change from baseline  Nocturia: 5(baseline 4)  Comment: on Flomax    GI:  Diarrhea:Grade 0  No change over baseline  Proctitis: Grade 0 No symptom  Comment:      OBJECTIVE:/73 (BP Location: Left arm, Patient Position: Chair, Cuff Size: Adult Regular)   Pulse 84   Temp 98.4  F (36.9  C) (Oral)   Resp 18   Wt 79.8 kg (176 lb)   SpO2 96%   BMI 27.57 kg/m    Wt Readings from Last 2 Encounters:   06/09/21 79.8 kg (176 lb)   06/02/21 80.1 kg (176 lb 9.6 oz)         IMPRESSION: The patient is tolerating the treatment.  The patient set up, dose, and cone beam CT images were reviewed.      PLAN: Continue radiotherapy    PAIN MANAGEMENT PLAN: The patient does not require pain management    COLIN Carmichael M.D.  Department of Radiation Oncology  [facility]

## 2021-06-09 NOTE — PATIENT INSTRUCTIONS
Please contact [facility] RN with questions or concerns following today's appointment: [phone].    Thank you!

## 2021-06-10 ENCOUNTER — APPOINTMENT (OUTPATIENT)
Dept: RADIATION ONCOLOGY | Facility: CLINIC | Age: 81
End: 2021-06-10
Payer: MEDICARE

## 2021-06-10 PROCEDURE — 77385 PR IMRT TREATMENT DELIVERY, SIMPLE: CPT | Performed by: RADIOLOGY

## 2021-06-10 PROCEDURE — 77014 PR CT GUIDE FOR PLACEMENT RADIATION THERAPY FIELDS: CPT | Performed by: RADIOLOGY

## 2021-06-11 ENCOUNTER — APPOINTMENT (OUTPATIENT)
Dept: RADIATION ONCOLOGY | Facility: CLINIC | Age: 81
End: 2021-06-11
Payer: MEDICARE

## 2021-06-11 PROCEDURE — 77385 PR IMRT TREATMENT DELIVERY, SIMPLE: CPT | Performed by: RADIOLOGY

## 2021-06-11 PROCEDURE — 77427 RADIATION TX MANAGEMENT X5: CPT | Performed by: RADIOLOGY

## 2021-06-11 PROCEDURE — 77336 RADIATION PHYSICS CONSULT: CPT | Performed by: RADIOLOGY

## 2021-06-14 ENCOUNTER — APPOINTMENT (OUTPATIENT)
Dept: RADIATION ONCOLOGY | Facility: CLINIC | Age: 81
End: 2021-06-14
Payer: MEDICARE

## 2021-06-14 ENCOUNTER — PATIENT OUTREACH (OUTPATIENT)
Dept: RADIATION ONCOLOGY | Facility: CLINIC | Age: 81
End: 2021-06-14

## 2021-06-14 PROCEDURE — 77385 PR IMRT TREATMENT DELIVERY, SIMPLE: CPT | Performed by: RADIOLOGY

## 2021-06-14 NOTE — TELEPHONE ENCOUNTER
Received voice message from patient today reporting that a few year back, his bone marrow quit working and he had been following with Dr. Salazar.  Patient reports at that time Dr. Salazar told him that he could no longer take Aleve.    Patient reports that Aleve is the only thing that takes away back pain for him and he questions if now that his bone marrow is working again, if it is okay from Dr. Salazar if he takes Aleve as needed.    In-basket message sent to Xuan Armijo RN coordinator with Dr. Salazar with request to contact patient.    Patient was notified today during radiation treatment visit that Dr. Salazar's team has been notified of patient's question and this RN has requested Dr. Salazar's team contact patient for further review.    Deb Ugarte RN BSN OCN CBCN

## 2021-06-15 ENCOUNTER — APPOINTMENT (OUTPATIENT)
Dept: RADIATION ONCOLOGY | Facility: CLINIC | Age: 81
End: 2021-06-15
Payer: MEDICARE

## 2021-06-15 ENCOUNTER — PATIENT OUTREACH (OUTPATIENT)
Dept: ONCOLOGY | Facility: CLINIC | Age: 81
End: 2021-06-15

## 2021-06-15 PROCEDURE — 77014 PR CT GUIDE FOR PLACEMENT RADIATION THERAPY FIELDS: CPT | Performed by: RADIOLOGY

## 2021-06-15 PROCEDURE — 77385 PR IMRT TREATMENT DELIVERY, SIMPLE: CPT | Performed by: RADIOLOGY

## 2021-06-15 NOTE — PROGRESS NOTES
Patient called regarding question about can he take Aleve due to his bone marrow issue.  Dr. Salazar replied:    From the bone marrow aspect, he can take Aleve.     But I see that he also has some chronic kidney issues so I believe that he should talk to his doctors who are right now managing his care about taking Aleve.     Message was left on patient's voicemail and to call back with any questions.

## 2021-06-16 ENCOUNTER — OFFICE VISIT (OUTPATIENT)
Dept: RADIATION ONCOLOGY | Facility: CLINIC | Age: 81
End: 2021-06-16
Payer: MEDICARE

## 2021-06-16 ENCOUNTER — APPOINTMENT (OUTPATIENT)
Dept: RADIATION ONCOLOGY | Facility: CLINIC | Age: 81
End: 2021-06-16
Payer: MEDICARE

## 2021-06-16 VITALS
SYSTOLIC BLOOD PRESSURE: 136 MMHG | OXYGEN SATURATION: 99 % | HEART RATE: 81 BPM | DIASTOLIC BLOOD PRESSURE: 77 MMHG | TEMPERATURE: 98.5 F | WEIGHT: 178.9 LBS | BODY MASS INDEX: 28.02 KG/M2 | RESPIRATION RATE: 16 BRPM

## 2021-06-16 DIAGNOSIS — C61 PROSTATE CANCER (H): Primary | ICD-10-CM

## 2021-06-16 PROCEDURE — 99207 PR DROP WITH A PROCEDURE: CPT | Performed by: RADIOLOGY

## 2021-06-16 PROCEDURE — 77385 PR IMRT TREATMENT DELIVERY, SIMPLE: CPT | Performed by: RADIOLOGY

## 2021-06-16 PROCEDURE — 77014 PR CT GUIDE FOR PLACEMENT RADIATION THERAPY FIELDS: CPT | Performed by: RADIOLOGY

## 2021-06-16 ASSESSMENT — PAIN SCALES - GENERAL: PAINLEVEL: NO PAIN (0)

## 2021-06-16 NOTE — PROGRESS NOTES
[facility] PHYSICIANS  SPECIALIZING IN BREAKTHROUGHS  Radiation Oncology    On Treatment Visit Note      Prakash Cramer      Date: 2021   MRN: [id number]   : 1940  Diagnosis: prostate cancer      Reason for Visit:  On Radiation Treatment Visit     Treatment Summary to Date  Treatment Site: prostate + SV Current Dose: 5750/7000 cGy Fractions:       Chemotherapy  Chemo concurrent with radx?: No    Subjective:     Urinary symptoms, urgency slightly improved with Flomax.  Has noticed subtle swelling in his legs relieved with elevation.  This previously happened when he was on Flomax as well.    Nursing ROS:   Nutrition Alteration  Diet Type: Patient's Preference  Skin  Skin Reaction: 0 - No changes        Cardiovascular  Respiratory effort: 1 - Normal - without distress  Gastrointestinal  Nausea: 0 - None  Vomitin - No vomiting (ons)  Diarrhea: 0 - None  GI Note: Patient reports taking GasX before treatment daily, Metamucil po three capsules BID and reports taking Imodium po one tablet daily  Genitourinary  Urinary Status: 1 - Increase in frequency or nocturia up to 2x normal   Note: patient reports slight improvement with urinary frequency since starting Flomax daily. Reports up at night 5 times instead of 8-9.  Psychosocial  Mood - Anxiety: 0 - Normal  Mood - Depression: 0 - Normal  Pyschosocial Note: Patient reports increased fatigue  Pain Assessment  0-10 Pain Scale: 0  Pain Treatment: patient reports Tylenol as needed for chronic back pain      Objective:   /77 (BP Location: Left arm, Patient Position: Sitting, Cuff Size: Adult Regular)   Pulse 81   Temp 98.5  F (36.9  C) (Oral)   Resp 16   Wt 81.1 kg (178 lb 14.4 oz)   SpO2 99%   BMI 28.02 kg/m    Gen: Appears well, in no acute distress      Labs:  CBC RESULTS:   Recent Labs   Lab Test 21  0907   WBC 4.5   RBC 3.60*   HGB 13.2*   HCT 39.3*   *   MCH 36.7*   MCHC 33.6   RDW 13.5   *      ELECTROLYTES:  Recent Labs   Lab Test 05/12/21  0907      POTASSIUM 4.5   CHLORIDE 106   SARA 9.0   CO2 25   BUN 25   CR 1.37*   *       Assessment:    Tolerating radiation therapy well.  All questions and concerns addressed.    Toxicities:  Diarrhea: Grade 0: No toxicity  Urinary frequency: Grade 1: Present  Urinary tract pain: Grade 1: Mild pain  Urinary urgency: Grade 1: Present    Plan:   1. Continue current therapy.    2. Continue with Flomax daily.  Discussed staying on this medication until 1 month after he completes XRT.  It gives him some relief from XRT induced side effects with minimal side effects of its own.  Patient agrees.      Mosaiq chart and setup information reviewed  MVCT/IGRT images checked    Medication Review  Med list reviewed with patient?: Yes  Med list printed and given: Offered and declined    Educational Topic Discussed  Education Instructions: reviewed        Huyen Shoemaker MD    Please do not send letter to referring physician.

## 2021-06-16 NOTE — LETTER
2021         RE: Prakash Cramer  [address]  [facility] [address]        Dear Colleague,    Thank you for referring your patient, Prakash Cramer, to the [facility] RADIATION ONCOLOGY [place]. Please see a copy of my visit note below.    [facility] PHYSICIANS  SPECIALIZING IN BREAKTHROUGHS  Radiation Oncology    On Treatment Visit Note      Prakash Cramer      Date: 2021   MRN: [id number]   : 1940  Diagnosis: prostate cancer      Reason for Visit:  On Radiation Treatment Visit     Treatment Summary to Date  Treatment Site: prostate + SV Current Dose: 5750/7000 cGy Fractions:       Chemotherapy  Chemo concurrent with radx?: No    Subjective:     Urinary symptoms, urgency slightly improved with Flomax.  Has noticed subtle swelling in his legs relieved with elevation.  This previously happened when he was on Flomax as well.    Nursing ROS:   Nutrition Alteration  Diet Type: Patient's Preference  Skin  Skin Reaction: 0 - No changes        Cardiovascular  Respiratory effort: 1 - Normal - without distress  Gastrointestinal  Nausea: 0 - None  Vomitin - No vomiting (ons)  Diarrhea: 0 - None  GI Note: Patient reports taking GasX before treatment daily, Metamucil po three capsules BID and reports taking Imodium po one tablet daily  Genitourinary  Urinary Status: 1 - Increase in frequency or nocturia up to 2x normal   Note: patient reports slight improvement with urinary frequency since starting Flomax daily. Reports up at night 5 times instead of 8-9.  Psychosocial  Mood - Anxiety: 0 - Normal  Mood - Depression: 0 - Normal  Pyschosocial Note: Patient reports increased fatigue  Pain Assessment  0-10 Pain Scale: 0  Pain Treatment: patient reports Tylenol as needed for chronic back pain      Objective:   /77 (BP Location: Left arm, Patient Position: Sitting, Cuff Size: Adult Regular)   Pulse 81   Temp 98.5  F (36.9  C) (Oral)   Resp 16   Wt 81.1 kg (178 lb  14.4 oz)   SpO2 99%   BMI 28.02 kg/m    Gen: Appears well, in no acute distress      Labs:  CBC RESULTS:   Recent Labs   Lab Test 05/12/21  0907   WBC 4.5   RBC 3.60*   HGB 13.2*   HCT 39.3*   *   MCH 36.7*   MCHC 33.6   RDW 13.5   *     ELECTROLYTES:  Recent Labs   Lab Test 05/12/21  0907      POTASSIUM 4.5   CHLORIDE 106   SARA 9.0   CO2 25   BUN 25   CR 1.37*   *       Assessment:    Tolerating radiation therapy well.  All questions and concerns addressed.    Toxicities:  Diarrhea: Grade 0: No toxicity  Urinary frequency: Grade 1: Present  Urinary tract pain: Grade 1: Mild pain  Urinary urgency: Grade 1: Present    Plan:   1. Continue current therapy.    2. Continue with Flomax daily.  Discussed staying on this medication until 1 month after he completes XRT.  It gives him some relief from XRT induced side effects with minimal side effects of its own.  Patient agrees.      Mosaiq chart and setup information reviewed  MVCT/IGRT images checked    Medication Review  Med list reviewed with patient?: Yes  Med list printed and given: Offered and declined    Educational Topic Discussed  Education Instructions: reviewed        Huyen Shoemaker MD    Please do not send letter to referring physician.          Again, thank you for allowing me to participate in the care of your patient.        Sincerely,        Tanvi Shoemaker MD

## 2021-06-17 ENCOUNTER — APPOINTMENT (OUTPATIENT)
Dept: RADIATION ONCOLOGY | Facility: CLINIC | Age: 81
End: 2021-06-17
Payer: MEDICARE

## 2021-06-17 PROCEDURE — 77385 PR IMRT TREATMENT DELIVERY, SIMPLE: CPT | Performed by: RADIOLOGY

## 2021-06-17 PROCEDURE — 77014 PR CT GUIDE FOR PLACEMENT RADIATION THERAPY FIELDS: CPT | Performed by: RADIOLOGY

## 2021-06-18 ENCOUNTER — APPOINTMENT (OUTPATIENT)
Dept: RADIATION ONCOLOGY | Facility: CLINIC | Age: 81
End: 2021-06-18
Payer: MEDICARE

## 2021-06-18 PROCEDURE — 77427 RADIATION TX MANAGEMENT X5: CPT | Performed by: RADIOLOGY

## 2021-06-18 PROCEDURE — 77336 RADIATION PHYSICS CONSULT: CPT | Performed by: RADIOLOGY

## 2021-06-18 PROCEDURE — 77014 PR CT GUIDE FOR PLACEMENT RADIATION THERAPY FIELDS: CPT | Performed by: RADIOLOGY

## 2021-06-18 PROCEDURE — 77385 PR IMRT TREATMENT DELIVERY, SIMPLE: CPT | Performed by: RADIOLOGY

## 2021-06-21 ENCOUNTER — APPOINTMENT (OUTPATIENT)
Dept: RADIATION ONCOLOGY | Facility: CLINIC | Age: 81
End: 2021-06-21
Payer: MEDICARE

## 2021-06-21 PROCEDURE — 77385 PR IMRT TREATMENT DELIVERY, SIMPLE: CPT | Performed by: RADIOLOGY

## 2021-06-21 PROCEDURE — 77014 PR CT GUIDE FOR PLACEMENT RADIATION THERAPY FIELDS: CPT | Performed by: RADIOLOGY

## 2021-06-22 ENCOUNTER — APPOINTMENT (OUTPATIENT)
Dept: RADIATION ONCOLOGY | Facility: CLINIC | Age: 81
End: 2021-06-22
Payer: MEDICARE

## 2021-06-22 PROCEDURE — 77385 PR IMRT TREATMENT DELIVERY, SIMPLE: CPT | Performed by: RADIOLOGY

## 2021-06-22 PROCEDURE — 77014 PR CT GUIDE FOR PLACEMENT RADIATION THERAPY FIELDS: CPT | Performed by: RADIOLOGY

## 2021-06-23 ENCOUNTER — OFFICE VISIT (OUTPATIENT)
Dept: RADIATION ONCOLOGY | Facility: CLINIC | Age: 81
End: 2021-06-23
Payer: MEDICARE

## 2021-06-23 ENCOUNTER — APPOINTMENT (OUTPATIENT)
Dept: RADIATION ONCOLOGY | Facility: CLINIC | Age: 81
End: 2021-06-23
Payer: MEDICARE

## 2021-06-23 VITALS
SYSTOLIC BLOOD PRESSURE: 138 MMHG | DIASTOLIC BLOOD PRESSURE: 81 MMHG | TEMPERATURE: 98.5 F | RESPIRATION RATE: 16 BRPM | OXYGEN SATURATION: 100 % | WEIGHT: 179.3 LBS | HEART RATE: 82 BPM | BODY MASS INDEX: 28.08 KG/M2

## 2021-06-23 DIAGNOSIS — C61 PROSTATE CANCER (H): Primary | ICD-10-CM

## 2021-06-23 PROCEDURE — 77336 RADIATION PHYSICS CONSULT: CPT | Performed by: RADIOLOGY

## 2021-06-23 PROCEDURE — 77385 PR IMRT TREATMENT DELIVERY, SIMPLE: CPT | Performed by: RADIOLOGY

## 2021-06-23 PROCEDURE — 99207 PR DROP WITH A PROCEDURE: CPT | Performed by: RADIOLOGY

## 2021-06-23 PROCEDURE — 77427 RADIATION TX MANAGEMENT X5: CPT | Performed by: RADIOLOGY

## 2021-06-23 PROCEDURE — 77014 PR CT GUIDE FOR PLACEMENT RADIATION THERAPY FIELDS: CPT | Performed by: RADIOLOGY

## 2021-06-23 ASSESSMENT — PAIN SCALES - GENERAL: PAINLEVEL: MILD PAIN (2)

## 2021-06-23 NOTE — PROGRESS NOTES
[facility] PHYSICIANS  SPECIALIZING IN BREAKTHROUGHS  Radiation Oncology    On Treatment Visit Note      Prakash Cramer      Date: 2021   MRN: [id number]   : 1940  Diagnosis: prostate cancer      Reason for Visit:  On Radiation Treatment Visit     Treatment Summary to Date  Treatment Site: prostate + SV Current Dose: 7000/7000 cGy Fractions:       Chemotherapy  Chemo concurrent with radx?: No    Subjective:     Last day of treatment today.  symptoms are stable and not bothersome.  Denies GI side effects    Nursing ROS:   Nutrition Alteration  Diet Type: Patient's Preference  Skin  Skin Reaction: 0 - No changes        Cardiovascular  Respiratory effort: 1 - Normal - without distress  Gastrointestinal  Nausea: 0 - None  Vomitin - No vomiting (ons)  Diarrhea: 0 - None  GI Note: Patient reports taking GasX before treatment daily, Metamucil po three capsules BID and reports taking Imodium po one tablet daily  Genitourinary  Urinary Status: 1 - Increase in frequency or nocturia up to 2x normal   Note: patient reports slight improvement with urinary frequency since starting Flomax daily. Reports up at night 5-6 times instead of 8-9.  Psychosocial  Mood - Anxiety: 0 - Normal  Mood - Depression: 0 - Normal  Pyschosocial Note: Patient reports increased fatigue  Pain Assessment  0-10 Pain Scale: 2  Pain Treatment: patient reports some discomfort with urination- no change since starting treatment      Objective:   /81 (BP Location: Left arm, Patient Position: Sitting, Cuff Size: Adult Regular)   Pulse 82   Temp 98.5  F (36.9  C) (Oral)   Resp 16   Wt 81.3 kg (179 lb 4.8 oz)   SpO2 100%   BMI 28.08 kg/m    Gen: Appears well, in no acute distress      Labs:  CBC RESULTS:   Recent Labs   Lab Test 21  09   WBC 4.5   RBC 3.60*   HGB 13.2*   HCT 39.3*   *   MCH 36.7*   MCHC 33.6   RDW 13.5   *     ELECTROLYTES:  Recent Labs   Lab Test 21  09       POTASSIUM 4.5   CHLORIDE 106   SARA 9.0   CO2 25   BUN 25   CR 1.37*   *       Assessment:      Tolerating radiation therapy well.  All questions and concerns addressed.    Toxicities:  Fatigue: Grade 1: Fatigue relieved by rest  Nausea: Grade 0: No toxicity  Diarrhea: Grade 0: No toxicity  Urinary frequency: Grade 1: Present  Urinary tract pain: Grade 1: Mild pain  Urinary urgency: Grade 1: Present    Plan:   1. Last day of XRT today.  2. Continue with GI and  care per above.  3. flomax QAM  4. Alleve for urinary tract pain prn (per pt discomfort is not bothersome)  5. Follow up with RN in 1 month  6. Pt needs at least 2 more months of ADT.      Mosaiq chart and setup information reviewed  MVCT/IGRT images checked    Medication Review  Med list reviewed with patient?: Yes  Med list printed and given: Offered and declined    Educational Topic Discussed  Additional Instructions: follow up with Dr. Shoemaker in 1 month to discuss urinary symptoms, patient to continue flomax. Follow up with PSA and Dr. Shoemaker in 3.5 months  Education Instructions: reviewed        Huyen Shoemaker MD    Please do not send letter to referring physician.

## 2021-06-23 NOTE — PATIENT INSTRUCTIONS
Please contact [facility] RN with questions or concerns following today's appointment: [phone].    Thank you!

## 2021-06-23 NOTE — LETTER
2021         RE: Prakash Cramer  [address]  [place] [address]        Dear Colleague,    Thank you for referring your patient, Prakash Cramer, to the [facility] RADIATION ONCOLOGY [place]. Please see a copy of my visit note below.    [facility] PHYSICIANS  SPECIALIZING IN BREAKTHROUGHS  Radiation Oncology    On Treatment Visit Note      Prakash Cramer      Date: 2021   MRN: [id number]   : 1940  Diagnosis: prostate cancer      Reason for Visit:  On Radiation Treatment Visit     Treatment Summary to Date  Treatment Site: prostate + SV Current Dose: 7000/7000 cGy Fractions:       Chemotherapy  Chemo concurrent with radx?: No    Subjective:     Last day of treatment today.  symptoms are stable and not bothersome.  Denies GI side effects    Nursing ROS:   Nutrition Alteration  Diet Type: Patient's Preference  Skin  Skin Reaction: 0 - No changes        Cardiovascular  Respiratory effort: 1 - Normal - without distress  Gastrointestinal  Nausea: 0 - None  Vomitin - No vomiting (ons)  Diarrhea: 0 - None  GI Note: Patient reports taking GasX before treatment daily, Metamucil po three capsules BID and reports taking Imodium po one tablet daily  Genitourinary  Urinary Status: 1 - Increase in frequency or nocturia up to 2x normal   Note: patient reports slight improvement with urinary frequency since starting Flomax daily. Reports up at night 5-6 times instead of 8-9.  Psychosocial  Mood - Anxiety: 0 - Normal  Mood - Depression: 0 - Normal  Pyschosocial Note: Patient reports increased fatigue  Pain Assessment  0-10 Pain Scale: 2  Pain Treatment: patient reports some discomfort with urination- no change since starting treatment      Objective:   /81 (BP Location: Left arm, Patient Position: Sitting, Cuff Size: Adult Regular)   Pulse 82   Temp 98.5  F (36.9  C) (Oral)   Resp 16   Wt 81.3 kg (179 lb 4.8 oz)   SpO2 100%   BMI 28.08 kg/m    Gen: Appears  well, in no acute distress      Labs:  CBC RESULTS:   Recent Labs   Lab Test 05/12/21  0907   WBC 4.5   RBC 3.60*   HGB 13.2*   HCT 39.3*   *   MCH 36.7*   MCHC 33.6   RDW 13.5   *     ELECTROLYTES:  Recent Labs   Lab Test 05/12/21  0907      POTASSIUM 4.5   CHLORIDE 106   SARA 9.0   CO2 25   BUN 25   CR 1.37*   *       Assessment:      Tolerating radiation therapy well.  All questions and concerns addressed.    Toxicities:  Fatigue: Grade 1: Fatigue relieved by rest  Nausea: Grade 0: No toxicity  Diarrhea: Grade 0: No toxicity  Urinary frequency: Grade 1: Present  Urinary tract pain: Grade 1: Mild pain  Urinary urgency: Grade 1: Present    Plan:   1. Last day of XRT today.  2. Continue with GI and  care per above.  3. flomax QAM  4. Alleve for urinary tract pain prn (per pt discomfort is not bothersome)  5. Follow up with RN in 1 month  6. Pt needs at least 2 more months of ADT.      Mosaiq chart and setup information reviewed  MVCT/IGRT images checked    Medication Review  Med list reviewed with patient?: Yes  Med list printed and given: Offered and declined    Educational Topic Discussed  Additional Instructions: follow up with Dr. Shoemaker in 1 month to discuss urinary symptoms, patient to continue flomax. Follow up with PSA and Dr. Shoemaker in 3.5 months  Education Instructions: reviewed        Huyen Shoemaker MD    Please do not send letter to referring physician.          Again, thank you for allowing me to participate in the care of your patient.        Sincerely,        Tanvi Shoemaker MD

## 2021-06-24 NOTE — TELEPHONE ENCOUNTER
Would recommend virtual visit with me next week to discuss medical issues   Patient: Bessie Michael  LEFT KNEE UNICOMPARTMENTAL ARTHROPLASTY  Anesthesia type: spinal    Patient location: PACU  Last vitals:   Vitals:    02/25/19 1520   BP: 139/61   Pulse: 80   Resp: 27   Temp:    SpO2: 97%     Post vital signs: stable  Level of consciousness: awake and responds to simple questions  Post-anesthesia pain: pain controlled  Post-anesthesia nausea and vomiting: no  Pulmonary: unassisted, return to baseline  Cardiovascular: stable and blood pressure at baseline  Hydration: adequate  Anesthetic events: no    QCDR Measures:  ASA# 11 - Edwige-op Cardiac Arrest: ASA11B - Patient did NOT experience unanticipated cardiac arrest  ASA# 12 - Edwige-op Mortality Rate: ASA12B - Patient did NOT die  ASA# 13 - PACU Re-Intubation Rate: NA - No ETT / LMA used for case  ASA# 10 - Composite Anes Safety: ASA10A - No serious adverse event    Additional Notes:

## 2021-06-25 ENCOUNTER — DOCUMENTATION ONLY (OUTPATIENT)
Dept: RADIATION ONCOLOGY | Facility: CLINIC | Age: 81
End: 2021-06-25
Payer: MEDICARE

## 2021-07-21 ENCOUNTER — OFFICE VISIT (OUTPATIENT)
Dept: RADIATION ONCOLOGY | Facility: CLINIC | Age: 81
End: 2021-07-21
Payer: MEDICARE

## 2021-07-21 VITALS
RESPIRATION RATE: 18 BRPM | DIASTOLIC BLOOD PRESSURE: 69 MMHG | BODY MASS INDEX: 27.57 KG/M2 | WEIGHT: 176 LBS | OXYGEN SATURATION: 99 % | SYSTOLIC BLOOD PRESSURE: 123 MMHG | HEART RATE: 80 BPM | TEMPERATURE: 97.5 F

## 2021-07-21 DIAGNOSIS — C61 PROSTATE CANCER (H): Primary | ICD-10-CM

## 2021-07-21 PROCEDURE — 99024 POSTOP FOLLOW-UP VISIT: CPT | Performed by: RADIOLOGY

## 2021-07-21 ASSESSMENT — PAIN SCALES - GENERAL: PAINLEVEL: NO PAIN (0)

## 2021-07-21 NOTE — NURSING NOTE
FOLLOW-UP VISIT    Patient Name: Prakash Cramer      : 1940     Age: 81 year old        ______________________________________________________________________________     Chief Complaint   Patient presents with     Radiation Therapy     Return appointment with Dr. Shoemaker     /69 (BP Location: Left arm, Patient Position: Sitting, Cuff Size: Adult Regular)   Pulse 80   Temp 97.5  F (36.4  C) (Oral)   Resp 18   Wt 79.8 kg (176 lb)   SpO2 99%   BMI 27.57 kg/m       Date Radiation Completed: 2021    Pain  Denies    Meds  Current Med List Reviewed: Yes  Medication Note: Patient has completed ADT, Eligard 45 mg given on 10/26/2021, Lupron 30 mg given on 2021. Patient reports hot flashes from ADT.     AUA: AUA Score: 26 (21 1000)  ELIAS: ELIAS Score: 2 (21 1000)    PSA   Date Value Ref Range Status   2019 16.20 (H) 0 - 4 ug/L Final     Comment:     Assay Method:  Chemiluminescence using Siemens Vista analyzer   2008 1.78 0 - 4 ug/L Final   2008 2.56 0 - 4 ug/L Final   2002 1.3 0 - 4 ug/L Final       Bowel: patient reports diarrhea x 2 in last 30 days. Taking Imodium every 1-2 days, continues metamucil. Patient has hx of diarrhea prior to starting radiation therapy.     Bladder: Patient reports urinary symptoms improving, taking Flomax daily. Patient reports less urgency and frequency, improved urine stream.  Nocturia: 4 - Once every 2 hours    Energy Level: patient reports some fatigue, improving    Appointments:   Urologist:       Other Notes: Follow up PSA on 10/11/2021 and follow up with Dr. Shoemaker on 10/12/2021.     Brittany Hahn RN

## 2021-07-21 NOTE — LETTER
7/21/2021         RE: Prakash Cramer  [address]  [facility] [address]        Dear Colleague,    Thank you for referring your patient, Prakash Cramer, to the [facility] RADIATION ONCOLOG[facility] [place]. Please see a copy of my visit note below.    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    INTERVAL HISTORY:  Prakash Cramer was last seen in our clinic during his last week of treatment. While on treatment he had complaints of mild fatigue which was relieved by rest (grade 1), increase in urinary frequency, tract pain and urgency. Post treatment he is now here in follow up for a symptom check. He returns today in follow up and states his fatigue and diarrhea symptoms are slowly improving.  He continues on Flomax once a day and he feels that his urinary symptoms are also improving.  Less burning with urination.  There is lower extremity edema only in the evenings.  This is a side effect of the Flomax that he previously had when he was on the medication previously.  Specifically denies n/v/ha/sob/cp/new GI or  symptoms.    REVIEW OF SYSTEMS: As per HPI, a 14-point review of systems is otherwise negative.    Past Medical History:   Diagnosis Date     Androgen deprivation therapy     Eligard 45 mg injection received 10/26/2020     Aplastic anemia (H) 03/26/2018    thought secondary to reaction to Septra     BPH (benign prostatic hypertrophy)     failed  Flomax     C. difficile diarrhea 03/26/2018    treated with Flagyl     Cellulitis and abscess of leg, except foot 2004     Contact dermatitis and other eczema, due to unspecified cause      Diaphragmatic hernia without mention of obstruction or gangrene     hiatal hernia     Esophageal reflux      History of colonic polyps 01/2020    5 polyps removed and 2 were precancerous so he will  "have another one in Jan 2021.     Hyperlipidemia LDL goal <100 03/11/2005     Hypothyroidism      Impotence of organic origin      Meningococcal encephalitis      Mumps      Prostate cancer (H) 08/26/2020     Proteinuria      RBBB      Type 2 diabetes mellitus with diabetic nephropathy  (goal A1C<7) 10/24/2015     Unspecified essential hypertension        Past Surgical History:   Procedure Laterality Date     ARTHROPLASTY KNEE Left 03/18/2015    Procedure: ARTHROPLASTY KNEE;  Surgeon: Abebe Schroeder MD;  Location: [facility] OR     ARTHROPLASTY KNEE Right 03/14/2016    Procedure: ARTHROPLASTY KNEE;  Surgeon: Abebe Schroeder MD;  Location: [facility] OR     BIOPSY PROSTATE TRANSRECTAL  08/26/2020     HC LAPAROSCOPY, SURGICAL; CHOLECYSTECTOMY  1998    Cholecystectomy, Laparoscopic     HC REMOVE TONSILS/ADENOIDS,12+ Y/O  1963    T & A 13+y.o.     ZZC NONSPECIFIC PROCEDURE  10/2002    left knee meniscus tear repair       Family History   Problem Relation Age of Onset     Family History Negative Mother         d:age 89 of \"old age\"     Gastrointestinal Disease Father         d:age 79 liver failure after taking excessive amounts of Tylenol over 5-6 yrs     Neurologic Disorder Brother         b:1932  hx cerebral aneurysm age 59     Cancer No family hx of        Social History     Tobacco Use     Smoking status: Never Smoker     Smokeless tobacco: Never Used   Substance Use Topics     Alcohol use: Not Currently       Current Outpatient Medications   Medication     acetaminophen (TYLENOL) 500 MG tablet     amLODIPine (NORVASC) 10 MG tablet     blood glucose (ACCU-CHEK GUIDE) test strip     blood glucose monitoring (ACCU-CHEK MULTICLIX) lancets     Blood Glucose Monitoring Suppl (ACCU-CHEK GUIDE ME) w/Device KIT     Cyanocobalamin (B-12) 1000 MCG TBCR     famotidine (PEPCID) 20 MG tablet     levothyroxine (SYNTHROID/LEVOTHROID) 88 MCG tablet     metFORMIN (GLUCOPHAGE-XR) 500 MG 24 hr tablet     pioglitazone (ACTOS) 30 MG tablet     " pravastatin (PRAVACHOL) 20 MG tablet     sitagliptin (JANUVIA) 100 MG tablet     tamsulosin (FLOMAX) 0.4 MG capsule     aspirin 81 MG tablet     No current facility-administered medications for this visit.          Allergies   Allergen Reactions     Crestor [Rosuvastatin]      Myalgias     Diovan [Valsartan]      increase k+     Glimepiride      Pancytopenia       Invokana [Canagliflozin]      Joint pain     Lipitor [Atorvastatin Calcium]      myalgias     Mold Other (See Comments)     Eyes itchy,red and dry     Penicillins      Seasonal Allergies      Eyes itch         PHYSICAL EXAM:  /69 (BP Location: Left arm, Patient Position: Sitting, Cuff Size: Adult Regular)   Pulse 80   Temp 97.5  F (36.4  C) (Oral)   Resp 18   Wt 79.8 kg (176 lb)   SpO2 99%   BMI 27.57 kg/m    GEN: appears well, in no acute distress  HEENT: normocephalic and atraumatic, EOMI, anicteric sclerae  CV: !+LE edema, no JVD  RESP: normal respiration on room air, no stridor  SKIN: normal color and turgor  MSK: moving all extremities well  NEURO: no focal neurologic deficit  PSYCH: appropriate mood, affect, and judgment    All pertinent laboratory, imaging, and pathology findings have been reviewed.     IMPRESSION/RECOMMENDATION:  81 year old gentleman with intermediate risk prostate cancer (GS 4+3, PSA 16.2) s/p definitive radiation therapy completed 6/23/21. He is doing well with improvement in radiation toxicity. He will return for follow up in 3 months with repeat PSA.  He was instructed to call our clinic with any questions or concerns in the interim.    Additional problem list to be addressed in the following manner:    1. Flomax daily.  Patient has recently refilled his prescription.  If he desires to continue with this medication he will consult his primary care physician.  He was previously on Flomax through his primary care physician previously    2. He had eligard on 10/26/2020 45mg (6 months) and lupron 30mg (4 months)  5/19/21.  My recommendation was for short-term ADT.  There was a delay in starting his radiation treatment [place].    Thank you for allowing me to participate in the care of this pleasant patient. If you have any questions, please do not hesitate to contact my office.    Huyen Shoemaker MD  Attending Physician  Radiation Oncology          Again, thank you for allowing me to participate in the care of your patient.        Sincerely,        Tanvi Shoemaker MD

## 2021-07-21 NOTE — PATIENT INSTRUCTIONS
Please contact [facility] RN with questions or concerns following today's appointment: [phone].    Thank you!

## 2021-07-21 NOTE — PROGRESS NOTES
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    INTERVAL HISTORY:  Prakash Cramer was last seen in our clinic during his last week of treatment. While on treatment he had complaints of mild fatigue which was relieved by rest (grade 1), increase in urinary frequency, tract pain and urgency. Post treatment he is now here in follow up for a symptom check. He returns today in follow up and states his fatigue and diarrhea symptoms are slowly improving.  He continues on Flomax once a day and he feels that his urinary symptoms are also improving.  Less burning with urination.  There is lower extremity edema only in the evenings.  This is a side effect of the Flomax that he previously had when he was on the medication previously.  Specifically denies n/v/ha/sob/cp/new GI or  symptoms.    REVIEW OF SYSTEMS: As per HPI, a 14-point review of systems is otherwise negative.    Past Medical History:   Diagnosis Date     Androgen deprivation therapy     Eligard 45 mg injection received 10/26/2020     Aplastic anemia (H) 03/26/2018    thought secondary to reaction to Septra     BPH (benign prostatic hypertrophy)     failed  Flomax     C. difficile diarrhea 03/26/2018    treated with Flagyl     Cellulitis and abscess of leg, except foot 2004     Contact dermatitis and other eczema, due to unspecified cause      Diaphragmatic hernia without mention of obstruction or gangrene     hiatal hernia     Esophageal reflux      History of colonic polyps 01/2020    5 polyps removed and 2 were precancerous so he will have another one in Jan 2021.     Hyperlipidemia LDL goal <100 03/11/2005     Hypothyroidism      Impotence of organic origin      Meningococcal encephalitis      Mumps      Prostate cancer (H) 08/26/2020     Proteinuria      RBBB      Type 2 diabetes mellitus  "with diabetic nephropathy  (goal A1C<7) 10/24/2015     Unspecified essential hypertension        Past Surgical History:   Procedure Laterality Date     ARTHROPLASTY KNEE Left 03/18/2015    Procedure: ARTHROPLASTY KNEE;  Surgeon: Abebe Schroeder MD;  Location: [facility] OR     ARTHROPLASTY KNEE Right 03/14/2016    Procedure: ARTHROPLASTY KNEE;  Surgeon: Abebe Schroeder MD;  Location: [facility] OR     BIOPSY PROSTATE TRANSRECTAL  08/26/2020     HC LAPAROSCOPY, SURGICAL; CHOLECYSTECTOMY  1998    Cholecystectomy, Laparoscopic     HC REMOVE TONSILS/ADENOIDS,12+ Y/O  1963    T & A 13+y.o.     ZZC NONSPECIFIC PROCEDURE  10/2002    left knee meniscus tear repair       Family History   Problem Relation Age of Onset     Family History Negative Mother         d:age 89 of \"old age\"     Gastrointestinal Disease Father         d:age 79 liver failure after taking excessive amounts of Tylenol over 5-6 yrs     Neurologic Disorder Brother         b:1932  hx cerebral aneurysm age 59     Cancer No family hx of        Social History     Tobacco Use     Smoking status: Never Smoker     Smokeless tobacco: Never Used   Substance Use Topics     Alcohol use: Not Currently       Current Outpatient Medications   Medication     acetaminophen (TYLENOL) 500 MG tablet     amLODIPine (NORVASC) 10 MG tablet     blood glucose (ACCU-CHEK GUIDE) test strip     blood glucose monitoring (ACCU-CHEK MULTICLIX) lancets     Blood Glucose Monitoring Suppl (ACCU-CHEK GUIDE ME) w/Device KIT     Cyanocobalamin (B-12) 1000 MCG TBCR     famotidine (PEPCID) 20 MG tablet     levothyroxine (SYNTHROID/LEVOTHROID) 88 MCG tablet     metFORMIN (GLUCOPHAGE-XR) 500 MG 24 hr tablet     pioglitazone (ACTOS) 30 MG tablet     pravastatin (PRAVACHOL) 20 MG tablet     sitagliptin (JANUVIA) 100 MG tablet     tamsulosin (FLOMAX) 0.4 MG capsule     aspirin 81 MG tablet     No current facility-administered medications for this visit.          Allergies   Allergen Reactions     Crestor " [Rosuvastatin]      Myalgias     Diovan [Valsartan]      increase k+     Glimepiride      Pancytopenia       Invokana [Canagliflozin]      Joint pain     Lipitor [Atorvastatin Calcium]      myalgias     Mold Other (See Comments)     Eyes itchy,red and dry     Penicillins      Seasonal Allergies      Eyes itch         PHYSICAL EXAM:  /69 (BP Location: Left arm, Patient Position: Sitting, Cuff Size: Adult Regular)   Pulse 80   Temp 97.5  F (36.4  C) (Oral)   Resp 18   Wt 79.8 kg (176 lb)   SpO2 99%   BMI 27.57 kg/m    GEN: appears well, in no acute distress  HEENT: normocephalic and atraumatic, EOMI, anicteric sclerae  CV: !+LE edema, no JVD  RESP: normal respiration on room air, no stridor  SKIN: normal color and turgor  MSK: moving all extremities well  NEURO: no focal neurologic deficit  PSYCH: appropriate mood, affect, and judgment    All pertinent laboratory, imaging, and pathology findings have been reviewed.     IMPRESSION/RECOMMENDATION:  81 year old gentleman with intermediate risk prostate cancer (GS 4+3, PSA 16.2) s/p definitive radiation therapy completed 6/23/21. He is doing well with improvement in radiation toxicity. He will return for follow up in 3 months with repeat PSA.  He was instructed to call our clinic with any questions or concerns in the interim.    Additional problem list to be addressed in the following manner:    1. Flomax daily.  Patient has recently refilled his prescription.  If he desires to continue with this medication he will consult his primary care physician.  He was previously on Flomax through his primary care physician previously    2. He had eligard on 10/26/2020 45mg (6 months) and lupron 30mg (4 months) 5/19/21.  My recommendation was for short-term ADT.  There was a delay in starting his radiation treatment because [place] was [place].    Thank you for allowing me to participate in the care of this pleasant patient. If you have any questions,  please do not hesitate to contact my office.    Huyen Shoemaker MD  Attending Physician  Radiation Oncology

## 2021-07-30 DIAGNOSIS — E03.9 HYPOTHYROIDISM, UNSPECIFIED TYPE: ICD-10-CM

## 2021-07-30 RX ORDER — LEVOTHYROXINE SODIUM 88 UG/1
88 TABLET ORAL DAILY
Qty: 90 TABLET | Refills: 0 | Status: SHIPPED | OUTPATIENT
Start: 2021-07-30 | End: 2021-10-25

## 2021-07-30 NOTE — TELEPHONE ENCOUNTER
Medication is being filled for 1 time refill only due to:  Patient needs labs due for thyroid labs in August. Betty Caicedo RN

## 2021-08-02 ENCOUNTER — TELEPHONE (OUTPATIENT)
Dept: INTERNAL MEDICINE | Facility: CLINIC | Age: 81
End: 2021-08-02

## 2021-08-02 NOTE — TELEPHONE ENCOUNTER
Pt calling to see if his Levothyroxine has been refilled.  Let pt know a 90 day supply had been called in and that he is due to schedule a lab only appointment. Pt states he will call back to schedule.     Sachi Iglesias RN

## 2021-09-01 ENCOUNTER — TRANSFERRED RECORDS (OUTPATIENT)
Dept: HEALTH INFORMATION MANAGEMENT | Facility: CLINIC | Age: 81
End: 2021-09-01

## 2021-09-01 LAB — RETINOPATHY: NORMAL

## 2021-09-04 ENCOUNTER — HEALTH MAINTENANCE LETTER (OUTPATIENT)
Age: 81
End: 2021-09-04

## 2021-10-11 ENCOUNTER — LAB (OUTPATIENT)
Dept: LAB | Facility: CLINIC | Age: 81
End: 2021-10-11
Payer: MEDICARE

## 2021-10-11 ENCOUNTER — OFFICE VISIT (OUTPATIENT)
Dept: RADIATION ONCOLOGY | Facility: CLINIC | Age: 81
End: 2021-10-11
Payer: MEDICARE

## 2021-10-11 VITALS
HEART RATE: 95 BPM | OXYGEN SATURATION: 97 % | RESPIRATION RATE: 16 BRPM | WEIGHT: 171.9 LBS | BODY MASS INDEX: 26.92 KG/M2 | DIASTOLIC BLOOD PRESSURE: 79 MMHG | TEMPERATURE: 98.3 F | SYSTOLIC BLOOD PRESSURE: 119 MMHG

## 2021-10-11 DIAGNOSIS — C61 PROSTATE CANCER (H): Primary | ICD-10-CM

## 2021-10-11 DIAGNOSIS — D61.9 APLASTIC ANEMIA (H): ICD-10-CM

## 2021-10-11 DIAGNOSIS — E11.21 TYPE 2 DIABETES MELLITUS WITH DIABETIC NEPHROPATHY, WITHOUT LONG-TERM CURRENT USE OF INSULIN (H): ICD-10-CM

## 2021-10-11 DIAGNOSIS — D64.9 ANEMIA, UNSPECIFIED TYPE: ICD-10-CM

## 2021-10-11 DIAGNOSIS — D69.6 THROMBOCYTOPENIA (H): ICD-10-CM

## 2021-10-11 DIAGNOSIS — E78.5 HYPERLIPIDEMIA LDL GOAL <100: ICD-10-CM

## 2021-10-11 DIAGNOSIS — N18.31 STAGE 3A CHRONIC KIDNEY DISEASE (H): ICD-10-CM

## 2021-10-11 DIAGNOSIS — C61 PROSTATE CANCER (H): ICD-10-CM

## 2021-10-11 DIAGNOSIS — E03.9 HYPOTHYROIDISM, UNSPECIFIED TYPE: ICD-10-CM

## 2021-10-11 LAB
ANION GAP SERPL CALCULATED.3IONS-SCNC: 9 MMOL/L (ref 3–14)
BUN SERPL-MCNC: 27 MG/DL (ref 7–30)
CALCIUM SERPL-MCNC: 9.2 MG/DL (ref 8.5–10.1)
CHLORIDE BLD-SCNC: 103 MMOL/L (ref 94–109)
CHOLEST SERPL-MCNC: 233 MG/DL
CO2 SERPL-SCNC: 25 MMOL/L (ref 20–32)
CREAT SERPL-MCNC: 1.3 MG/DL (ref 0.66–1.25)
DEPRECATED CALCIDIOL+CALCIFEROL SERPL-MC: 40 UG/L (ref 20–75)
ERYTHROCYTE [DISTWIDTH] IN BLOOD BY AUTOMATED COUNT: 13.2 % (ref 10–15)
FASTING STATUS PATIENT QL REPORTED: YES
GFR SERPL CREATININE-BSD FRML MDRD: 52 ML/MIN/1.73M2
GLUCOSE BLD-MCNC: 202 MG/DL (ref 70–99)
HBA1C MFR BLD: 6.6 % (ref 0–5.6)
HCT VFR BLD AUTO: 40.3 % (ref 40–53)
HDLC SERPL-MCNC: 71 MG/DL
HGB BLD-MCNC: 13.8 G/DL (ref 13.3–17.7)
LDLC SERPL CALC-MCNC: 89 MG/DL
MCH RBC QN AUTO: 37.6 PG (ref 26.5–33)
MCHC RBC AUTO-ENTMCNC: 34.2 G/DL (ref 31.5–36.5)
MCV RBC AUTO: 110 FL (ref 78–100)
NONHDLC SERPL-MCNC: 162 MG/DL
PHOSPHATE SERPL-MCNC: 3.2 MG/DL (ref 2.5–4.5)
PLATELET # BLD AUTO: 125 10E3/UL (ref 150–450)
POTASSIUM BLD-SCNC: 4.4 MMOL/L (ref 3.4–5.3)
PSA SERPL-MCNC: 0.02 UG/L (ref 0–4)
PTH-INTACT SERPL-MCNC: 39 PG/ML (ref 18–80)
RBC # BLD AUTO: 3.67 10E6/UL (ref 4.4–5.9)
SODIUM SERPL-SCNC: 137 MMOL/L (ref 133–144)
TRIGL SERPL-MCNC: 365 MG/DL
TSH SERPL DL<=0.005 MIU/L-ACNC: 1.82 MU/L (ref 0.4–4)
WBC # BLD AUTO: 4.6 10E3/UL (ref 4–11)

## 2021-10-11 PROCEDURE — 80061 LIPID PANEL: CPT

## 2021-10-11 PROCEDURE — 82306 VITAMIN D 25 HYDROXY: CPT

## 2021-10-11 PROCEDURE — 84153 ASSAY OF PSA TOTAL: CPT

## 2021-10-11 PROCEDURE — 80048 BASIC METABOLIC PNL TOTAL CA: CPT

## 2021-10-11 PROCEDURE — 83036 HEMOGLOBIN GLYCOSYLATED A1C: CPT

## 2021-10-11 PROCEDURE — 84100 ASSAY OF PHOSPHORUS: CPT

## 2021-10-11 PROCEDURE — 36415 COLL VENOUS BLD VENIPUNCTURE: CPT

## 2021-10-11 PROCEDURE — 83970 ASSAY OF PARATHORMONE: CPT

## 2021-10-11 PROCEDURE — 84443 ASSAY THYROID STIM HORMONE: CPT

## 2021-10-11 PROCEDURE — 99213 OFFICE O/P EST LOW 20 MIN: CPT | Performed by: RADIOLOGY

## 2021-10-11 PROCEDURE — 85027 COMPLETE CBC AUTOMATED: CPT

## 2021-10-11 RX ORDER — LOPERAMIDE HYDROCHLORIDE 2 MG/1
2 TABLET ORAL 4 TIMES DAILY PRN
COMMUNITY

## 2021-10-11 ASSESSMENT — PAIN SCALES - GENERAL: PAINLEVEL: NO PAIN (0)

## 2021-10-11 NOTE — PROGRESS NOTES
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    INTERVAL HISTORY:  Prakash Cramer was last seen in our clinic 3 months ago. While on treatment he had complaints of mild fatigue which was relieved by rest (grade 1), increase in urinary frequency, tract pain and urgency. Post treatment he is now here in follow up. Energy level is improving.  His diarrhea symptoms are back to baseline. He takes occasional imodium which is normal for him and Metamucil 3 caps 2 times per day. He stopped Flomax once a day and feels his urination is erratic. Nocturia 4-5 times per day most days.  There is lower extremity edema only in the evenings (baseline).  Specifically denies n/v/ha/sob/cp/new GI or  symptoms.    REVIEW OF SYSTEMS: As per HPI, a 14-point review of systems is otherwise negative.    Past Medical History:   Diagnosis Date     Androgen deprivation therapy     Eligard 45 mg injection received 10/26/2020     Aplastic anemia (H) 03/26/2018    thought secondary to reaction to Septra     BPH (benign prostatic hypertrophy)     failed  Flomax     C. difficile diarrhea 03/26/2018    treated with Flagyl     Cellulitis and abscess of leg, except foot 2004     Contact dermatitis and other eczema, due to unspecified cause      Diaphragmatic hernia without mention of obstruction or gangrene     hiatal hernia     Esophageal reflux      History of colonic polyps 01/2020    5 polyps removed and 2 were precancerous so he will have another one in Jan 2021.     Hyperlipidemia LDL goal <100 03/11/2005     Hypothyroidism      Impotence of organic origin      Meningococcal encephalitis      Mumps      Prostate cancer (H) 08/26/2020     Proteinuria      RBBB      S/P radiation therapy     7,000 cGy to prostate + SV completed on 6/23/2021 [facility]      "Type 2 diabetes mellitus with diabetic nephropathy  (goal A1C<7) 10/24/2015     Unspecified essential hypertension        Past Surgical History:   Procedure Laterality Date     ARTHROPLASTY KNEE Left 03/18/2015    Procedure: ARTHROPLASTY KNEE;  Surgeon: Abebe Schroeder MD;  Location: [facility] OR     ARTHROPLASTY KNEE Right 03/14/2016    Procedure: ARTHROPLASTY KNEE;  Surgeon: Abebe Schroeder MD;  Location: [facility] OR     BIOPSY PROSTATE TRANSRECTAL  08/26/2020     HC LAPAROSCOPY, SURGICAL; CHOLECYSTECTOMY  1998    Cholecystectomy, Laparoscopic     HC REMOVE TONSILS/ADENOIDS,12+ Y/O  1963    T & A 13+y.o.     ZZC NONSPECIFIC PROCEDURE  10/2002    left knee meniscus tear repair       Family History   Problem Relation Age of Onset     Family History Negative Mother         d:age 89 of \"old age\"     Gastrointestinal Disease Father         d:age 79 liver failure after taking excessive amounts of Tylenol over 5-6 yrs     Neurologic Disorder Brother         b:1932  hx cerebral aneurysm age 59     Cancer No family hx of        Social History     Tobacco Use     Smoking status: Never Smoker     Smokeless tobacco: Never Used   Substance Use Topics     Alcohol use: Not Currently       Current Outpatient Medications   Medication     acetaminophen (TYLENOL) 500 MG tablet     amLODIPine (NORVASC) 10 MG tablet     blood glucose (ACCU-CHEK GUIDE) test strip     blood glucose monitoring (ACCU-CHEK MULTICLIX) lancets     Blood Glucose Monitoring Suppl (ACCU-CHEK GUIDE ME) w/Device KIT     Cyanocobalamin (B-12) 1000 MCG TBCR     famotidine (PEPCID) 20 MG tablet     levothyroxine (SYNTHROID/LEVOTHROID) 88 MCG tablet     loperamide (IMODIUM A-D) 2 MG tablet     metFORMIN (GLUCOPHAGE-XR) 500 MG 24 hr tablet     pioglitazone (ACTOS) 30 MG tablet     pravastatin (PRAVACHOL) 20 MG tablet     psyllium (METAMUCIL/KONSYL) capsule     sitagliptin (JANUVIA) 100 MG tablet     aspirin 81 MG tablet     tamsulosin (FLOMAX) 0.4 MG capsule     No current " facility-administered medications for this visit.          Allergies   Allergen Reactions     Crestor [Rosuvastatin]      Myalgias     Diovan [Valsartan]      increase k+     Glimepiride      Pancytopenia       Invokana [Canagliflozin]      Joint pain     Lipitor [Atorvastatin Calcium]      myalgias     Mold Other (See Comments)     Eyes itchy,red and dry     Penicillins      Seasonal Allergies      Eyes itch         PHYSICAL EXAM:  /79 (BP Location: Left arm, Patient Position: Sitting, Cuff Size: Adult Regular)   Pulse 95   Temp 98.3  F (36.8  C) (Oral)   Resp 16   Wt 78 kg (171 lb 14.4 oz)   SpO2 97%   BMI 26.92 kg/m    GEN: appears well, in no acute distress  HEENT: normocephalic and atraumatic, EOMI, anicteric sclerae  CV: !+LE edema, no JVD  RESP: normal respiration on room air, no stridor  SKIN: normal color and turgor  MSK: moving all extremities well  NEURO: no focal neurologic deficit  PSYCH: appropriate mood, affect, and judgment    All pertinent laboratory, imaging, and pathology findings have been reviewed.     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 He is doing well with improvement in radiation toxicity. He will return for follow up in 6 months with repeat PSA. In the interim he will be living in [place].  He was instructed to call our clinic with any questions or concerns in the interim.    Additional problem list to be addressed in the following manner:    1. Urinary frequency.  Back to baseline.  Recommend restarting  Flomax daily through his primary care physician.  He was previously on Flomax through his primary care physician previously.    2. He had eligard on 10/26/2020 45mg (6 months) and lupron 30mg (4 months) 5/19/21.  My recommendation was for short-term ADT.  There was a delay in starting his radiation treatment because [place] was [place].  He has now completed short-term ADT.    Thank you for allowing me to participate in the care of this pleasant patient. If you have any questions, please do not hesitate to contact my office.    Huyen Shoemaker MD  Attending Physician  Radiation Oncology

## 2021-10-11 NOTE — LETTER
10/11/2021         RE: Prakash Cramer  [address]  [place] [facility] [address]        Dear Colleague,    Thank you for referring your patient, Prakash Cramer, to the [facility] [place]. Please see a copy of my visit note below.    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    INTERVAL HISTORY:  Prakash Cramer was last seen in our clinic 3 months ago. While on treatment he had complaints of mild fatigue which was relieved by rest (grade 1), increase in urinary frequency, tract pain and urgency. Post treatment he is now here in follow up. Energy level is improving.  His diarrhea symptoms are back to baseline. He takes occasional imodium which is normal for him and Metamucil 3 caps 2 times per day. He stopped Flomax once a day and feels his urination is erratic. Nocturia 4-5 times per day most days.  There is lower extremity edema only in the evenings (baseline).  Specifically denies n/v/ha/sob/cp/new GI or  symptoms.    REVIEW OF SYSTEMS: As per HPI, a 14-point review of systems is otherwise negative.    Past Medical History:   Diagnosis Date     Androgen deprivation therapy     Eligard 45 mg injection received 10/26/2020     Aplastic anemia (H) 03/26/2018    thought secondary to reaction to Septra     BPH (benign prostatic hypertrophy)     failed  Flomax     C. difficile diarrhea 03/26/2018    treated with Flagyl     Cellulitis and abscess of leg, except foot 2004     Contact dermatitis and other eczema, due to unspecified cause      Diaphragmatic hernia without mention of obstruction or gangrene     hiatal hernia     Esophageal reflux      History of colonic polyps 01/2020    5 polyps removed and 2 were precancerous so he will have another one in Jan 2021.     Hyperlipidemia LDL goal <100 03/11/2005      "Hypothyroidism      Impotence of organic origin      Meningococcal encephalitis      Mumps      Prostate cancer (H) 08/26/2020     Proteinuria      RBBB      S/P radiation therapy     7,000 cGy to prostate + SV completed on 6/23/2021 [facility]     Type 2 diabetes mellitus with diabetic nephropathy  (goal A1C<7) 10/24/2015     Unspecified essential hypertension        Past Surgical History:   Procedure Laterality Date     ARTHROPLASTY KNEE Left 03/18/2015    Procedure: ARTHROPLASTY KNEE;  Surgeon: Abebe Schroeder MD;  Location: [facility] OR     ARTHROPLASTY KNEE Right 03/14/2016    Procedure: ARTHROPLASTY KNEE;  Surgeon: Abebe Schroeder MD;  Location: [facility] OR     BIOPSY PROSTATE TRANSRECTAL  08/26/2020     HC LAPAROSCOPY, SURGICAL; CHOLECYSTECTOMY  1998    Cholecystectomy, Laparoscopic     HC REMOVE TONSILS/ADENOIDS,12+ Y/O  1963    T & A 13+y.o.     ZZC NONSPECIFIC PROCEDURE  10/2002    left knee meniscus tear repair       Family History   Problem Relation Age of Onset     Family History Negative Mother         d:age 89 of \"old age\"     Gastrointestinal Disease Father         d:age 79 liver failure after taking excessive amounts of Tylenol over 5-6 yrs     Neurologic Disorder Brother         b:1932  hx cerebral aneurysm age 59     Cancer No family hx of        Social History     Tobacco Use     Smoking status: Never Smoker     Smokeless tobacco: Never Used   Substance Use Topics     Alcohol use: Not Currently       Current Outpatient Medications   Medication     acetaminophen (TYLENOL) 500 MG tablet     amLODIPine (NORVASC) 10 MG tablet     blood glucose (ACCU-CHEK GUIDE) test strip     blood glucose monitoring (ACCU-CHEK MULTICLIX) lancets     Blood Glucose Monitoring Suppl (ACCU-CHEK GUIDE ME) w/Device KIT     Cyanocobalamin (B-12) 1000 MCG TBCR     famotidine (PEPCID) 20 MG tablet     levothyroxine (SYNTHROID/LEVOTHROID) 88 MCG tablet     loperamide (IMODIUM A-D) 2 MG tablet     metFORMIN " (GLUCOPHAGE-XR) 500 MG 24 hr tablet     pioglitazone (ACTOS) 30 MG tablet     pravastatin (PRAVACHOL) 20 MG tablet     psyllium (METAMUCIL/KONSYL) capsule     sitagliptin (JANUVIA) 100 MG tablet     aspirin 81 MG tablet     tamsulosin (FLOMAX) 0.4 MG capsule     No current facility-administered medications for this visit.          Allergies   Allergen Reactions     Crestor [Rosuvastatin]      Myalgias     Diovan [Valsartan]      increase k+     Glimepiride      Pancytopenia       Invokana [Canagliflozin]      Joint pain     Lipitor [Atorvastatin Calcium]      myalgias     Mold Other (See Comments)     Eyes itchy,red and dry     Penicillins      Seasonal Allergies      Eyes itch         PHYSICAL EXAM:  /79 (BP Location: Left arm, Patient Position: Sitting, Cuff Size: Adult Regular)   Pulse 95   Temp 98.3  F (36.8  C) (Oral)   Resp 16   Wt 78 kg (171 lb 14.4 oz)   SpO2 97%   BMI 26.92 kg/m    GEN: appears well, in no acute distress  HEENT: normocephalic and atraumatic, EOMI, anicteric sclerae  CV: !+LE edema, no JVD  RESP: normal respiration on room air, no stridor  SKIN: normal color and turgor  MSK: moving all extremities well  NEURO: no focal neurologic deficit  PSYCH: appropriate mood, affect, and judgment    All pertinent laboratory, imaging, and pathology findings have been reviewed.     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 He is doing well with improvement in radiation toxicity. He will return for follow up in 6 months with repeat  PSA. In the interim he will be living in [place].  He was instructed to call our clinic with any questions or concerns in the interim.    Additional problem list to be addressed in the following manner:    1. Urinary frequency.  Back to baseline.  Recommend restarting Flomax daily through his primary care physician.  He was previously on Flomax through his primary care physician previously.    2. He had eligard on 10/26/2020 45mg (6 months) and lupron 30mg (4 months) 5/19/21.  My recommendation was for short-term ADT.  There was a delay in starting his radiation treatment because [place] was [place] of [place].  He has now completed short-term ADT.    Thank you for allowing me to participate in the care of this pleasant patient. If you have any questions, please do not hesitate to contact my office.    Huyen Shoemaker MD  Attending Physician  Radiation Oncology              Again, thank you for allowing me to participate in the care of your patient.        Sincerely,        GRACIA Shoemaker MD

## 2021-10-11 NOTE — NURSING NOTE
FOLLOW-UP VISIT    Patient Name: Prakash Cramer      : 1940     Age: 81 year old        ______________________________________________________________________________     Chief Complaint   Patient presents with     Radiation Therapy     Return appointment with Dr. Shoemaker     /79 (BP Location: Left arm, Patient Position: Sitting, Cuff Size: Adult Regular)   Pulse 95   Temp 98.3  F (36.8  C) (Oral)   Resp 16   Wt 78 kg (171 lb 14.4 oz)   SpO2 97%   BMI 26.92 kg/m       Date Radiation Completed: 7,000 cGy to Prostate + SV 2021    Pain  Denies    Meds  Current Med List Reviewed: Yes  Medication Note:     AUA: AUA Score: 21 (10/11/21 1200)  ELIAS: ELIAS Score: 1 (10/11/21 1200)    PSA   Date Value Ref Range Status   2019 16.20 (H) 0 - 4 ug/L Final     Comment:     Assay Method:  Chemiluminescence using Siemens Vista analyzer   2008 1.78 0 - 4 ug/L Final   2008 2.56 0 - 4 ug/L Final   2002 1.3 0 - 4 ug/L Final     PSA Tumor Marker   Date Value Ref Range Status   10/11/2021 0.02 0.00 - 4.00 ug/L Final       Bowel: Patient has baseline occasional diarrhea- taking Imodium PRN and Metamucil 3 capsules BID    Bladder: nocturia, frequency, urgency, weak stream, and occasional leakage. Patient has not taken Flomax lately, although reports he may restart to see if it improves his urination symptoms.   Nocturia: 4- 5 - every 1-2 hours    Energy Level: improving, patient reports he has been exercising and has lost some weight as well to improve his activity endurance.     Appointments:   Urologist:       Other Notes: Follow up with Dr. Shoemaker in 6 months with PSA.    Brittany Hahn RN

## 2021-10-11 NOTE — PATIENT INSTRUCTIONS
Please contact [facility] RN with questions or concerns following today's appointment: [phone].    Thank you!

## 2021-10-25 DIAGNOSIS — E03.9 HYPOTHYROIDISM, UNSPECIFIED TYPE: ICD-10-CM

## 2021-10-25 RX ORDER — LEVOTHYROXINE SODIUM 88 UG/1
88 TABLET ORAL DAILY
Qty: 90 TABLET | Refills: 1 | Status: SHIPPED | OUTPATIENT
Start: 2021-10-25 | End: 2022-04-29

## 2021-10-26 ENCOUNTER — OFFICE VISIT (OUTPATIENT)
Dept: URBAN - METROPOLITAN AREA CLINIC 121 | Facility: CLINIC | Age: 81
End: 2021-10-26

## 2021-12-02 DIAGNOSIS — E78.5 HYPERLIPIDEMIA LDL GOAL <100: ICD-10-CM

## 2021-12-02 DIAGNOSIS — E11.21 TYPE 2 DIABETES MELLITUS WITH DIABETIC NEPHROPATHY, WITHOUT LONG-TERM CURRENT USE OF INSULIN (H): ICD-10-CM

## 2021-12-02 DIAGNOSIS — I10 ESSENTIAL HYPERTENSION: ICD-10-CM

## 2021-12-02 NOTE — TELEPHONE ENCOUNTER
Reason for Call:  Medication or medication refill:    Do you use a [facility]?  Name of the pharmacy and phone number for the current request:  [facility] - [phone] [place], [address] - [phone]    Name of the medication requested: All medications need checkup / refill /renewal    Other request: Leaving the state 1/1/2022    Can we leave a detailed message on this number? YES    Phone number patient can be reached at: Cell number on file:    Telephone Information:   Mobile [phone]       Best Time: Anytime    Call taken on 12/2/2021 at 9:57 AM by Silverio Davis

## 2021-12-06 RX ORDER — METFORMIN HCL 500 MG
1000 TABLET, EXTENDED RELEASE 24 HR ORAL DAILY
Qty: 180 TABLET | Refills: 3 | Status: SHIPPED | OUTPATIENT
Start: 2021-12-06 | End: 2022-09-27

## 2021-12-06 RX ORDER — PRAVASTATIN SODIUM 20 MG
TABLET ORAL
Qty: 90 TABLET | Refills: 3 | Status: SHIPPED | OUTPATIENT
Start: 2021-12-06 | End: 2022-09-27

## 2021-12-06 RX ORDER — AMLODIPINE BESYLATE 10 MG/1
10 TABLET ORAL DAILY
Qty: 90 TABLET | Refills: 3 | Status: SHIPPED | OUTPATIENT
Start: 2021-12-06 | End: 2022-09-27

## 2021-12-07 NOTE — TELEPHONE ENCOUNTER
Medications refilled. Pt to see me in clinic in the  Spring when returns from [place]. Recent labs from October at goal or abnormal but stable and pt to continue same meds   Detail Level: Zone

## 2021-12-14 DIAGNOSIS — R19.5 LOOSE STOOLS: ICD-10-CM

## 2021-12-16 RX ORDER — FAMOTIDINE 20 MG/1
20 TABLET, FILM COATED ORAL 2 TIMES DAILY
Qty: 180 TABLET | Refills: 1 | Status: SHIPPED | OUTPATIENT
Start: 2021-12-16 | End: 2021-12-22

## 2021-12-20 DIAGNOSIS — R19.5 LOOSE STOOLS: ICD-10-CM

## 2021-12-22 RX ORDER — FAMOTIDINE 20 MG/1
TABLET, FILM COATED ORAL
Qty: 180 TABLET | Refills: 0 | Status: SHIPPED | OUTPATIENT
Start: 2021-12-22 | End: 2022-03-29

## 2021-12-27 ENCOUNTER — APPOINTMENT (OUTPATIENT)
Dept: LAB | Facility: CLINIC | Age: 81
End: 2021-12-27
Attending: INTERNAL MEDICINE
Payer: MEDICARE

## 2021-12-28 PROBLEM — N18.31 STAGE 3A CHRONIC KIDNEY DISEASE (H): Status: ACTIVE | Noted: 2021-12-28

## 2021-12-28 PROBLEM — Z78.9 STATIN INTOLERANCE: Status: ACTIVE | Noted: 2021-12-28

## 2021-12-28 PROBLEM — E78.2 MIXED HYPERLIPIDEMIA: Status: ACTIVE | Noted: 2021-12-28

## 2021-12-29 ENCOUNTER — OFFICE VISIT (OUTPATIENT)
Dept: INTERNAL MEDICINE | Facility: CLINIC | Age: 81
End: 2021-12-29
Payer: MEDICARE

## 2021-12-29 VITALS
HEART RATE: 97 BPM | RESPIRATION RATE: 16 BRPM | HEIGHT: 67 IN | OXYGEN SATURATION: 97 % | BODY MASS INDEX: 27 KG/M2 | DIASTOLIC BLOOD PRESSURE: 78 MMHG | SYSTOLIC BLOOD PRESSURE: 134 MMHG | TEMPERATURE: 97.9 F | WEIGHT: 172 LBS

## 2021-12-29 DIAGNOSIS — N18.31 STAGE 3A CHRONIC KIDNEY DISEASE (H): ICD-10-CM

## 2021-12-29 DIAGNOSIS — E11.21 TYPE 2 DIABETES MELLITUS WITH DIABETIC NEPHROPATHY, WITHOUT LONG-TERM CURRENT USE OF INSULIN (H): ICD-10-CM

## 2021-12-29 DIAGNOSIS — E03.9 HYPOTHYROIDISM, UNSPECIFIED TYPE: ICD-10-CM

## 2021-12-29 DIAGNOSIS — Z78.9 STATIN INTOLERANCE: ICD-10-CM

## 2021-12-29 DIAGNOSIS — E78.2 MIXED HYPERLIPIDEMIA: ICD-10-CM

## 2021-12-29 DIAGNOSIS — C61 PROSTATE CANCER (H): ICD-10-CM

## 2021-12-29 DIAGNOSIS — D69.6 THROMBOCYTOPENIA (H): ICD-10-CM

## 2021-12-29 DIAGNOSIS — Z00.00 ENCOUNTER FOR ANNUAL WELLNESS EXAM IN MEDICARE PATIENT: Primary | ICD-10-CM

## 2021-12-29 DIAGNOSIS — K21.9 GASTROESOPHAGEAL REFLUX DISEASE WITHOUT ESOPHAGITIS: ICD-10-CM

## 2021-12-29 PROCEDURE — G0439 PPPS, SUBSEQ VISIT: HCPCS | Performed by: INTERNAL MEDICINE

## 2021-12-29 ASSESSMENT — ACTIVITIES OF DAILY LIVING (ADL): CURRENT_FUNCTION: NO ASSISTANCE NEEDED

## 2021-12-29 ASSESSMENT — MIFFLIN-ST. JEOR: SCORE: 1448.82

## 2021-12-29 NOTE — PROGRESS NOTES
"SUBJECTIVE:   Prakash Cramer is a 81 year old male who presents for Preventive Visit.      Patient has been advised of split billing requirements and indicates understanding: Yes   Are you in the first 12 months of your Medicare coverage?  No    Healthy Habits:    In general, how would you rate your overall health?  Good    Frequency of exercise:  1 day/week    Duration of exercise:  Less than 15 minutes    Do you usually eat at least 4 servings of fruit and vegetables a day, include whole grains    & fiber and avoid regularly eating high fat or \"junk\" foods?  Yes    Taking medications regularly:  Yes    Barriers to taking medications:  None    Medication side effects:  Other (diarrhea)    Ability to successfully perform activities of daily living:  No assistance needed    Home Safety:  No safety concerns identified    Hearing Impairment:  No hearing concerns    In the past 6 months, have you been bothered by leaking of urine? Yes    In general, how would you rate your overall mental or emotional health?  Good      PHQ-2 Total Score:    Additional concerns today:  Yes    Do you feel safe in your environment? Yes    Have you ever done Advance Care Planning? (For example, a Health Directive, POLST, or a discussion with a medical provider or your loved ones about your wishes): Yes, advance care planning is on file.       Fall risk      Cognitive Screening   1) Repeat 3 items (Leader, Season, Table)    2) Clock draw: NORMAL  3) 3 item recall: Recalls 3 objects  Results: 3 items recalled: COGNITIVE IMPAIRMENT LESS LIKELY    Mini-CogTM Copyright GUILLERMO Hernández. Licensed by the author for use in [facility]; reprinted with permission (leonides@.[facility]). All rights reserved.          Reviewed and updated as needed this visit by clinical staff                Reviewed and updated as needed this visit by Provider               Social History     Tobacco Use     Smoking status: Never Smoker     Smokeless tobacco: Never Used "   Substance Use Topics     Alcohol use: Not Currently               This patient is here for a wellness exam.            He does not want COVID booster or flu vaccine today.      He is leaving for [place] in 2 days, and he plans to get those vaccines there.             He had extensive lab work in October of this year.             He saw his urologist recently in follow-up of prostate cancer.            He had radiation therapy.      He does not take Flomax any longer.  It did not seem to provide any benefit.            He takes Imodium approximately every other day.        He attributes occasional loose stools to Metformin therapy.            His recent A1c was down to 6.6.  He takes Metformin, sitagliptin, and pioglitazone.     He checks glucoses    almost every day.                    He no longer takes aspirin.                     He has some balance problems and uses a cane.  He has some chronic low back pain.           He was a high school .      He takes famotidine daily.  He no longer takes omeprazole.    Current providers sharing in care for this patient include:   Patient Care Team:  Zhou Meraz MD as PCP - General (Internal Medicine)  Xuan Armijo, RN as Nurse Coordinator (Oncology)  Terra Salazar MD as MD (Hematology & Oncology)  Zhou Meraz MD as Assigned PCP  Tanvi Shoemaker MD as MD (Radiation Oncology)  Deb Harrison, RN as Specialty Care Coordinator (Radiation Oncology)  Tanvi Shoemaker MD as Assigned Cancer Care Provider  Hawk Mohan MD as Assigned Surgical Provider    The following health maintenance items are reviewed in Epic and correct as of today:  Health Maintenance Due   Topic Date Due     ANNUAL REVIEW OF HM ORDERS  Never done     COLORECTAL CANCER SCREENING  12/24/2019     MICROALBUMIN  05/15/2020     COVID-19 Vaccine (3 - Moderna risk 4-dose series) 04/22/2021     INFLUENZA VACCINE (1) 09/01/2021     EYE EXAM  09/30/2021     FALL RISK ASSESSMENT  12/21/2021      MEDICARE ANNUAL WELLNESS VISIT  12/21/2021     DIABETIC FOOT EXAM  12/31/2021     Patient Active Problem List    Diagnosis Date Noted     Prostate cancer (H) 09/07/2020     Priority: High     CKD (chronic kidney disease) stage 3, GFR 30-59 ml/min (H) 07/02/2020     Priority: High     Thrombocytopenia (H) 12/04/2018     Priority: High     Aplastic anemia (H) 03/26/2018     Priority: High     thought secondary to reaction to Septra       Type 2 diabetes mellitus with diabetic nephropathy, without long-term current use of insulin (H) 11/09/2016     Priority: High     Essential hypertension 03/11/2005     Priority: High     Problem list name updated by automated process. Provider to review       Statin intolerance 12/28/2021     Priority: Medium     Mixed hyperlipidemia 12/28/2021     Priority: Medium     Stage 3a chronic kidney disease (H) 12/28/2021     Priority: Medium     Chronic bilateral low back pain without sciatica 05/26/2021     Priority: Medium     History of colonic polyps 01/2020     Priority: Medium     5 polyps removed and 2 were precancerous so he will have another one in Jan 2021.       Status post total right knee replacement 03/14/2016     Priority: Medium     Hypothyroidism 12/19/2015     Priority: Medium     RBBB      Priority: Medium     Proteinuria      Priority: Medium     BPH (benign prostatic hypertrophy)      Priority: Medium     Esophageal reflux 03/11/2005     Priority: Medium     Diaphragmatic hernia 03/11/2005     Priority: Medium     Problem list name updated by automated process. Provider to review       Impotence of organic origin 03/11/2005     Priority: Medium     Hyperlipidemia LDL goal <100 03/11/2005     Priority: Medium     Past Surgical History:   Procedure Laterality Date     ARTHROPLASTY KNEE Left 03/18/2015    Procedure: ARTHROPLASTY KNEE;  Surgeon: Abebe Schroeder MD;  Location: [facility] OR     ARTHROPLASTY KNEE Right 03/14/2016    Procedure: ARTHROPLASTY KNEE;  Surgeon:  Abebe Schroeder MD;  Location: [facility] OR     BIOPSY PROSTATE TRANSRECTAL  08/26/2020     HC LAPAROSCOPY, SURGICAL; CHOLECYSTECTOMY  1998    Cholecystectomy, Laparoscopic     HC REMOVE TONSILS/ADENOIDS,13+ Y/O  1963    T & A 13+y.o.     ZZC NONSPECIFIC PROCEDURE  10/2002    left knee meniscus tear repair     Social History     Socioeconomic History     Marital status:      Spouse name: Not on file     Number of children: 2     Years of education: Not on file     Highest education level: Not on file   Occupational History     Occupation: teacher     Employer: [email or web address]   Tobacco Use     Smoking status: Never Smoker     Smokeless tobacco: Never Used   Substance and Sexual Activity     Alcohol use: Not Currently     Drug use: No     Sexual activity: Yes     Partners: Female   Other Topics Concern     Parent/sibling w/ CABG, MI or angioplasty before 65F 55M? Not Asked      Service Not Asked     Blood Transfusions Not Asked     Caffeine Concern Not Asked     Occupational Exposure Not Asked     Hobby Hazards Not Asked     Sleep Concern Not Asked     Stress Concern Not Asked     Weight Concern Not Asked     Special Diet Yes     Comment: diabetic diet      Back Care Not Asked     Exercise No     Comment: nothing regular since bilateral knee surgeries      Bike Helmet Not Asked     Seat Belt Not Asked     Self-Exams Not Asked   Social History Narrative     Not on file     Social Determinants of Health     Financial Resource Strain: Not on file   Food Insecurity: Not on file   Transportation Needs: Not on file   Physical Activity: Not on file   Stress: Not on file   Social Connections: Not on file   Intimate Partner Violence: Not on file   Housing Stability: Not on file     Immunization History   Administered Date(s) Administered     COVID-19,PF,Moderna 02/25/2021, 03/25/2021     Influenza (High Dose) 3 valent vaccine 01/10/2013, 12/06/2013, 10/27/2014, 11/06/2017, 11/10/2018, 12/05/2019     Influenza  (IIV3) PF 01/01/2013     Influenza, Quad, High Dose, Pf, 65yr+ (Fluzone HD) 12/21/2020     Pneumo Conj 13-V (2010&after) 12/01/2016     Pneumococcal 23 valent 12/03/2003, 09/09/2009     TD (ADULT, 7+) 01/01/1998     TDAP Vaccine (Adacel) 02/06/2013     Zoster vaccine recombinant adjuvanted (SHINGRIX) 07/22/2019, 10/22/2019     Zoster vaccine, live 01/01/2011, 01/18/2012       BP Readings from Last 3 Encounters:   12/29/21 134/78   10/11/21 119/79   07/21/21 123/69    Wt Readings from Last 3 Encounters:   12/29/21 78 kg (172 lb)   10/11/21 78 kg (171 lb 14.4 oz)   07/21/21 79.8 kg (176 lb)                  Current Outpatient Medications   Medication Sig Dispense Refill     acetaminophen (TYLENOL) 500 MG tablet Take 500-1,000 mg by mouth every 6 hours as needed for mild pain        amLODIPine (NORVASC) 10 MG tablet Take 1 tablet (10 mg) by mouth daily 90 tablet 3     blood glucose (ACCU-CHEK GUIDE) test strip Use to test blood sugar one times daily or as directed. 100 strip 3     blood glucose monitoring (ACCU-CHEK MULTICLIX) lancets Use to test blood sugar 1 time daily or as directed. 1 Box 11     Blood Glucose Monitoring Suppl (ACCU-CHEK GUIDE ME) w/Device KIT 1 each daily 1 kit 0     Cyanocobalamin (B-12) 1000 MCG TBCR Take 1,000 mcg by mouth daily 100 tablet 11     famotidine (PEPCID) 20 MG tablet Take 1 tablet by mouth twice daily 180 tablet 0     levothyroxine (SYNTHROID/LEVOTHROID) 88 MCG tablet Take 1 tablet (88 mcg) by mouth daily 90 tablet 1     loperamide (IMODIUM A-D) 2 MG tablet Take 2 mg by mouth 4 times daily as needed for diarrhea       metFORMIN (GLUCOPHAGE-XR) 500 MG 24 hr tablet Take 2 tablets (1,000 mg) by mouth daily 180 tablet 3     pioglitazone (ACTOS) 30 MG tablet Take 1 tablet (30 mg) by mouth daily 90 tablet 3     pravastatin (PRAVACHOL) 20 MG tablet Take 1 tab by mouth daily in PM for cholesterol 90 tablet 3     psyllium (METAMUCIL/KONSYL) capsule Take 3 capsules by mouth 2 times daily        "sitagliptin (JANUVIA) 100 MG tablet Take 1 tablet (100 mg) by mouth daily 90 tablet 3     aspirin 81 MG tablet Take by mouth daily (Patient not taking: Reported on 7/21/2021) 30 tablet      tamsulosin (FLOMAX) 0.4 MG capsule Take 1 capsule (0.4 mg) by mouth daily (Patient not taking: Reported on 10/11/2021) 30 capsule 1     Allergies   Allergen Reactions     Crestor [Rosuvastatin]      Myalgias     Diovan [Valsartan]      increase k+     Glimepiride      Pancytopenia       Invokana [Canagliflozin]      Joint pain     Lipitor [Atorvastatin Calcium]      myalgias     Mold Other (See Comments)     Eyes itchy,red and dry     Penicillins      Seasonal Allergies      Eyes itch                   OBJECTIVE:   /78 (BP Location: Right arm, Patient Position: Chair, Cuff Size: Adult Regular)   Pulse 97   Temp 97.9  F (36.6  C) (Temporal)   Resp 16   Ht 1.702 m (5' 7\")   Wt 78 kg (172 lb)   SpO2 97%   BMI 26.94 kg/m   Estimated body mass index is 26.92 kg/m  as calculated from the following:    Height as of 5/19/21: 1.702 m (5' 7\").    Weight as of 10/11/21: 78 kg (171 lb 14.4 oz).  Physical Exam  GENERAL APPEARANCE: healthy, alert and no distress  RESP: lungs clear to auscultation - no rales, rhonchi or wheezes  CV: regular rates and rhythm, normal S1 S2, no S3 or S4 and no murmur, click or rub; 1+ peripheral edema  NEURO: mentation intact, speech normal and gait abnormal ; uses a cane  DIABETIC FOOT EXAM: normal DP and PT pulses and no trophic changes or ulcerative lesions    Diagnostic Test Results:  Labs reviewed in Epic    ASSESSMENT / PLAN:   Prakash was seen today for wellness visit.    Diagnoses and all orders for this visit:    Encounter for annual wellness exam in Medicare patient    Type 2 diabetes mellitus with diabetic nephropathy, without long-term current use of insulin (H)    Stage 3a chronic kidney disease (H)    Mixed hyperlipidemia    Statin intolerance    Thrombocytopenia (H)    Prostate cancer " "(H)    Hypothyroidism, unspecified type    Gastroesophageal reflux disease without esophagitis     Summary and implications:  We reviewed multiple issues.           We reviewed all of the issues on the diagnoses list.                    His health is stable.          Meds reviewed in detail.       Med list updated.          Patient Instructions   You are planning to get the COVID booster, and the flu vaccine.            Keep taking the same medications.         Have a good, safe winter!                   Return in about 6 months (around 6/29/2022) for follow up of several issues.      Patient has been advised of split billing requirements and indicates understanding: Yes  COUNSELING:  Reviewed preventive health counseling, as reflected in patient instructions  Special attention given to:       Regular exercise       Healthy diet/nutrition    Estimated body mass index is 26.92 kg/m  as calculated from the following:    Height as of 5/19/21: 1.702 m (5' 7\").    Weight as of 10/11/21: 78 kg (171 lb 14.4 oz).        He reports that he has never smoked. He has never used smokeless tobacco.      Appropriate preventive services were discussed with this patient, including applicable screening as appropriate for cardiovascular disease, diabetes, osteopenia/osteoporosis, and glaucoma.  As appropriate for age/gender, discussed screening for colorectal cancer, prostate cancer, breast cancer, and cervical cancer. Checklist reviewing preventive services available has been given to the patient.    Reviewed patients plan of care and provided an AVS. The Basic Care Plan (routine screening as documented in Health Maintenance) for Prakash meets the Care Plan requirement. This Care Plan has been established and reviewed with the Patient.    Counseling Resources:  ATP IV Guidelines  Pooled Cohorts Equation Calculator  Breast Cancer Risk Calculator  Breast Cancer: Medication to Reduce Risk  FRAX Risk Assessment  ICSI Preventive " Guidelines  Dietary Guidelines for Americans, 2010  USDA's MyPlate  ASA Prophylaxis  Lung CA Screening    Troy Nieto MD  [facility]    Identified Health Risks:

## 2021-12-29 NOTE — PATIENT INSTRUCTIONS
You are planning to get the COVID booster, and the flu vaccine.            Keep taking the same medications.         Have a good, safe winter!

## 2022-01-10 ENCOUNTER — TRANSFERRED RECORDS (OUTPATIENT)
Dept: HEALTH INFORMATION MANAGEMENT | Facility: CLINIC | Age: 82
End: 2022-01-10
Payer: MEDICARE

## 2022-01-10 LAB — PHQ9 SCORE: <5

## 2022-02-02 NOTE — PROGRESS NOTES
Radiotherapy Treatment Summary              PATIENT: Prakash Cramer  MEDICAL RECORD NO: [id number]   : 1940    PATHOLOGY/STAGE: 81 year old gentleman with intermediate risk prostate cancer (GS 4+3, PSA 16.2) who completed definitive radiation therapy as stated below.    INTENT OF RADIOTHERAPY: Curative    CONCURRENT SYSTEMIC THERAPY: Eligard and Lupron      SITE OF TREATMENT: Prostate plus seminal vesicles    DATES  OF TREATMENT: May 13, 2021 to 2021    TOTAL DOSE OF TREATMENT: 7000 cGy    DOSE PER FRACTION OF TREATMENT: 250 cGy       COMMENT/TOXICITY:  While on treatment he had complaints of mild fatigue which was relieved by rest (grade 1), increase in urinary frequency, tract pain and urgency.The  symptoms were managed with Flomax once a day.               PAIN MANAGEMENT:  See above                        FOLLOW UP PLAN:  Patient will follow up with Dr. Shoemaker in 1 month and 3.5 months  Patient will continue close follow up with Urology     Huyen Shoemaker MD  Department of Radiation Oncology  [facility]  Patient Care Team:  Zhou Meraz MD as PCP - General (Internal Medicine)  Xuan Armijo, RODRIGUE as Nurse Coordinator (Oncology)  Terra Salazar MD as MD (Hematology & Oncology)  Zhou Meraz MD as Assigned PCP  Tanvi Shoemaker MD as MD (Radiation Oncology)  Deb Harrison, RN as Specialty Care Coordinator (Radiation Oncology)  Tanvi Shoemaker MD as Assigned Cancer Care Provider  Hawk Mohan MD as Assigned Surgical Provider

## 2022-02-22 ENCOUNTER — OFFICE VISIT (OUTPATIENT)
Dept: URBAN - METROPOLITAN AREA CLINIC 63 | Facility: CLINIC | Age: 82
End: 2022-02-22

## 2022-02-28 ENCOUNTER — TRANSFERRED RECORDS (OUTPATIENT)
Dept: HEALTH INFORMATION MANAGEMENT | Facility: CLINIC | Age: 82
End: 2022-02-28
Payer: MEDICARE

## 2022-02-28 LAB — PHQ9 SCORE: <5

## 2022-03-01 ENCOUNTER — OFFICE VISIT (OUTPATIENT)
Dept: URBAN - METROPOLITAN AREA SURGERY CENTER 4 | Facility: SURGERY CENTER | Age: 82
End: 2022-03-01

## 2022-03-25 ENCOUNTER — OFFICE VISIT (OUTPATIENT)
Dept: URBAN - METROPOLITAN AREA CLINIC 63 | Facility: CLINIC | Age: 82
End: 2022-03-25

## 2022-03-29 DIAGNOSIS — R19.5 LOOSE STOOLS: ICD-10-CM

## 2022-03-30 RX ORDER — FAMOTIDINE 20 MG/1
20 TABLET, FILM COATED ORAL 2 TIMES DAILY
Qty: 180 TABLET | Refills: 0 | Status: SHIPPED | OUTPATIENT
Start: 2022-03-30 | End: 2022-09-27

## 2022-03-30 NOTE — TELEPHONE ENCOUNTER
Medication filled 1 time as pt is due for a follow-up in clinic. Pharmacy has been notified to inform patient to call clinic and schedule appointment.     Shalonda Stovall RN

## 2022-04-28 DIAGNOSIS — E03.9 HYPOTHYROIDISM, UNSPECIFIED TYPE: ICD-10-CM

## 2022-04-29 RX ORDER — LEVOTHYROXINE SODIUM 88 UG/1
88 TABLET ORAL DAILY
Qty: 90 TABLET | Refills: 1 | Status: SHIPPED | OUTPATIENT
Start: 2022-04-29 | End: 2022-09-27

## 2022-04-29 NOTE — TELEPHONE ENCOUNTER
Prescription approved per [facility] Refill Protocol.    Noa Fajardo RN on 4/29/2022 at 2:19 PM

## 2022-05-02 ENCOUNTER — OFFICE VISIT (OUTPATIENT)
Dept: RADIATION ONCOLOGY | Facility: CLINIC | Age: 82
End: 2022-05-02
Payer: MEDICARE

## 2022-05-02 ENCOUNTER — LAB (OUTPATIENT)
Dept: LAB | Facility: CLINIC | Age: 82
End: 2022-05-02

## 2022-05-02 VITALS
HEART RATE: 93 BPM | RESPIRATION RATE: 16 BRPM | SYSTOLIC BLOOD PRESSURE: 141 MMHG | WEIGHT: 171.1 LBS | DIASTOLIC BLOOD PRESSURE: 87 MMHG | BODY MASS INDEX: 26.8 KG/M2 | OXYGEN SATURATION: 97 % | TEMPERATURE: 97.9 F

## 2022-05-02 DIAGNOSIS — C61 PROSTATE CANCER (H): Primary | ICD-10-CM

## 2022-05-02 DIAGNOSIS — C61 PROSTATE CANCER (H): ICD-10-CM

## 2022-05-02 LAB — PSA SERPL-MCNC: 0.03 UG/L (ref 0–4)

## 2022-05-02 PROCEDURE — 36415 COLL VENOUS BLD VENIPUNCTURE: CPT

## 2022-05-02 PROCEDURE — 84153 ASSAY OF PSA TOTAL: CPT

## 2022-05-02 PROCEDURE — 99213 OFFICE O/P EST LOW 20 MIN: CPT | Performed by: RADIOLOGY

## 2022-05-02 ASSESSMENT — PAIN SCALES - GENERAL: PAINLEVEL: MILD PAIN (2)

## 2022-05-02 NOTE — NURSING NOTE
FOLLOW-UP VISIT    Patient Name: Prakash Cramer      : 1940     Age: 82 year old        ______________________________________________________________________________     Chief Complaint   Patient presents with     Radiation Therapy     Return appointment with Dr. Shoemaker     BP (!) 141/87 (BP Location: Left arm, Patient Position: Sitting, Cuff Size: Adult Regular)   Pulse 93   Temp 97.9  F (36.6  C) (Oral)   Resp 16   Wt 77.6 kg (171 lb 1.6 oz)   SpO2 97%   BMI 26.80 kg/m       Date Radiation Completed: 2021    Pain  Denies pain related to visit. Patient reports low back pain.    Meds  Current Med List Reviewed: Yes  Medication Note:     AUA: AUA Score: 19 (22 1200)  ELIAS: ELIAS Score: 1 (22 1200)    PSA   Date Value Ref Range Status   2019 16.20 (H) 0 - 4 ug/L Final     Comment:     Assay Method:  Chemiluminescence using Siemens Vista analyzer   2008 1.78 0 - 4 ug/L Final   2008 2.56 0 - 4 ug/L Final   2002 1.3 0 - 4 ug/L Final     PSA Tumor Marker   Date Value Ref Range Status   2022 0.03 0.00 - 4.00 ug/L Final   10/11/2021 0.02 0.00 - 4.00 ug/L Final       Bowel: Patient reports occasional diarrhea, using Imodium PRN. Patient stopped taking Metamucil or fiber capsules, reports did not help.    Bladder: nocturia, frequency and intermittency  Nocturia: 4 - Once every 2 hours    Energy Level: low    Appointments:   Urologist:       Other Notes: Follow up with Rad Onc in 6 months with PSA.    Brittany Hahn RN

## 2022-05-02 NOTE — LETTER
5/2/2022         RE: Prakash Cramer  [address]  [facility] [address]        Dear Colleague,    Thank you for referring your patient, Prakash Cramer, to the [facility] RAD[facility]COL[facility] [place]. Please see a copy of my visit note below.    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    INTERVAL HISTORY:  Prakash Cramer is well known to our clinic. While on treatment he had complaints of mild fatigue which was relieved by rest (grade 1), increase in urinary frequency, tract pain and urgency. Post treatment he is now here in follow up. Energy level is improving.  His diarrhea symptoms are back to baseline. He takes occasional imodium which is normal for him and stopped taking Metamucil. He stopped Flomax once a day and feels his urination is erratic. He did not think it was beneficial.  Nocturia 4-5 times per day most days.  There is lower extremity edema only in the evenings (baseline).  Specifically denies current n/v/ha/sob/cp/new GI or  symptoms.  However in 3/2022 when he was in [place] he had one episode of rectal bleeding.  He sought medical attention and per the patient a partial colonoscopy was completed.  He was recommended to take medical enemas for 30 days.  His llast enema was 4/15/2022 and he has not had any rectal bleeding since that first episode.      REVIEW OF SYSTEMS: As per HPI, a 14-point review of systems is otherwise negative.    Past Medical History:   Diagnosis Date     Androgen deprivation therapy     Eligard 45 mg injection received 10/26/2020     Aplastic anemia (H) 03/26/2018    thought secondary to reaction to Septra     BPH (benign prostatic hypertrophy)     failed  Flomax     C. difficile diarrhea 03/26/2018    treated with Flagyl     Cellulitis and abscess of leg, except foot 2004  "    Contact dermatitis and other eczema, due to unspecified cause      Diaphragmatic hernia without mention of obstruction or gangrene     hiatal hernia     Esophageal reflux      History of colonic polyps 01/2020    5 polyps removed and 2 were precancerous so he will have another one in Jan 2021.     Hyperlipidemia LDL goal <100 03/11/2005     Hypothyroidism      Impotence of organic origin      Meningococcal encephalitis      Mumps      Prostate cancer (H) 08/26/2020     Proteinuria      RBBB      S/P radiation therapy     7,000 cGy to prostate + SV completed on 6/23/2021 [facility]     Type 2 diabetes mellitus with diabetic nephropathy  (goal A1C<7) 10/24/2015     Unspecified essential hypertension        Past Surgical History:   Procedure Laterality Date     ARTHROPLASTY KNEE Left 03/18/2015    Procedure: ARTHROPLASTY KNEE;  Surgeon: Abebe Schroeder MD;  Location: [facility] OR     ARTHROPLASTY KNEE Right 03/14/2016    Procedure: ARTHROPLASTY KNEE;  Surgeon: Abebe Schroeder MD;  Location: [facility] OR     BIOPSY PROSTATE TRANSRECTAL  08/26/2020     HC LAPAROSCOPY, SURGICAL; CHOLECYSTECTOMY  1998    Cholecystectomy, Laparoscopic     HC REMOVE TONSILS/ADENOIDS,12+ Y/O  1963    T & A 13+y.o.     ZZC NONSPECIFIC PROCEDURE  10/2002    left knee meniscus tear repair       Family History   Problem Relation Age of Onset     Family History Negative Mother         d:age 89 of \"old age\"     Gastrointestinal Disease Father         d:age 79 liver failure after taking excessive amounts of Tylenol over 5-6 yrs     Neurologic Disorder Brother         b:1932  hx cerebral aneurysm age 59     Cancer No family hx of        Social History     Tobacco Use     Smoking status: Never Smoker     Smokeless tobacco: Never Used   Substance Use Topics     Alcohol use: Not Currently       Current Outpatient Medications   Medication     acetaminophen (TYLENOL) 500 MG tablet     amLODIPine (NORVASC) 10 MG tablet     blood glucose " (ACCU-CHEK GUIDE) test strip     blood glucose monitoring (ACCU-CHEK MULTICLIX) lancets     Blood Glucose Monitoring Suppl (ACCU-CHEK GUIDE ME) w/Device KIT     Cyanocobalamin (B-12) 1000 MCG TBCR     famotidine (PEPCID) 20 MG tablet     levothyroxine (SYNTHROID/LEVOTHROID) 88 MCG tablet     loperamide (IMODIUM A-D) 2 MG tablet     metFORMIN (GLUCOPHAGE-XR) 500 MG 24 hr tablet     pioglitazone (ACTOS) 30 MG tablet     pravastatin (PRAVACHOL) 20 MG tablet     sitagliptin (JANUVIA) 100 MG tablet     psyllium (METAMUCIL/KONSYL) capsule     No current facility-administered medications for this visit.          Allergies   Allergen Reactions     Crestor [Rosuvastatin]      Myalgias     Diovan [Valsartan]      increase k+     Glimepiride      Pancytopenia       Invokana [Canagliflozin]      Joint pain     Lipitor [Atorvastatin Calcium]      myalgias     Mold Other (See Comments)     Eyes itchy,red and dry     Penicillins      Seasonal Allergies      Eyes itch         PHYSICAL EXAM:  BP (!) 141/87 (BP Location: Left arm, Patient Position: Sitting, Cuff Size: Adult Regular)   Pulse 93   Temp 97.9  F (36.6  C) (Oral)   Resp 16   Wt 77.6 kg (171 lb 1.6 oz)   SpO2 97%   BMI 26.80 kg/m    GEN: appears well, in no acute distress  HEENT: normocephalic and atraumatic, EOMI, anicteric sclerae  CV: 1+LE edema, no JVD  RESP: normal respiration on room air, no stridor  LYMPH: No supraclavicular, infraclavicular or axillary lymphadenopathy appreciated bilaterally  SKIN: normal color and turgor  MSK: moving all extremities well  NEURO: no focal neurologic deficit  PSYCH: appropriate mood, affect, and judgment    All pertinent laboratory, imaging, and pathology findings have been reviewed.     PSA   Date Value Ref Range Status   12/12/2019 16.20 (H) 0 - 4 ug/L Final     Comment:     Assay Method:  Chemiluminescence using Siemens Vista analyzer   09/02/2008 1.78 0 - 4 ug/L Final   01/02/2008 2.56 0 - 4 ug/L Final   11/13/2002 1.3 0 -  4 ug/L Final     PSA Tumor Marker   Date Value Ref Range Status   05/02/2022 0.03 0.00 - 4.00 ug/L Final   10/11/2021 0.02 0.00 - 4.00 ug/L Final       IMPRESSION/RECOMMENDATION:  82 year old gentleman with intermediate risk prostate cancer (GS 4+3, PSA 16.2) s/p definitive radiation therapy completed 6/23/21. He is doing well with improvement in radiation toxicity. He had an episode of minor rectal bleeding ~6weeks ago and completed a course of medical enema.  He is doing well now without any new complaints.  He will return for follow up in 6 months with repeat PSA. He was instructed to call our clinic with any questions or concerns in the interim.    Additional problem list to be addressed in the following manner:    1. Urinary frequency.  Back to baseline. As stated at previous appointment. Recommend restarting Flomax daily through his primary care physician.  He was previously on Flomax through his primary care physician.  Patient declines at this time.    2. He had eligard on 10/26/2020 45mg (6 months) and lupron 30mg (4 months) 5/19/21.  My recommendation was for short-term ADT.  There was a delay in starting his radiation treatment [place].  He has now completed short-term ADT.    3. Follow up in 6 months with repeat PSA.    Thank you for allowing me to participate in the care of this pleasant patient. If you have any questions, please do not hesitate to contact my office.    Huyen Shoemaker MD  Attending Physician  Radiation Oncology              Again, thank you for allowing me to participate in the care of your patient.        Sincerely,        GRACIA Shoemaker MD

## 2022-05-02 NOTE — PROGRESS NOTES
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    INTERVAL HISTORY:  Prakash Cramer is well known to our clinic. While on treatment he had complaints of mild fatigue which was relieved by rest (grade 1), increase in urinary frequency, tract pain and urgency. Post treatment he is now here in follow up. Energy level is improving.  His diarrhea symptoms are back to baseline. He takes occasional imodium which is normal for him and stopped taking Metamucil. He stopped Flomax once a day and feels his urination is erratic. He did not think it was beneficial.  Nocturia 4-5 times per day most days.  There is lower extremity edema only in the evenings (baseline).  His AUA score is 19: Incomplete emptying x1, frequency x5, intermittency x5, urgency x1, weak stream x2, straining x1, nocturia x4. Specifically denies current n/v/ha/sob/cp/new GI or  symptoms.  However in 3/2022 when he was in [place] he had one episode of rectal bleeding.  He sought medical attention and per the patient a partial colonoscopy was completed.  He was recommended to take medical enemas for 30 days.  His last enema was 4/15/2022 and he has not had any rectal bleeding since that first episode.  ELIAS score is 1.      REVIEW OF SYSTEMS: As per HPI, a 14-point review of systems is otherwise negative.    Past Medical History:   Diagnosis Date     Androgen deprivation therapy     Eligard 45 mg injection received 10/26/2020     Aplastic anemia (H) 03/26/2018    thought secondary to reaction to Septra     BPH (benign prostatic hypertrophy)     failed  Flomax     C. difficile diarrhea 03/26/2018    treated with Flagyl     Cellulitis and abscess of leg, except foot 2004     Contact dermatitis and other eczema, due to unspecified cause      Diaphragmatic hernia without mention of  "obstruction or gangrene     hiatal hernia     Esophageal reflux      History of colonic polyps 01/2020    5 polyps removed and 2 were precancerous so he will have another one in Jan 2021.     Hyperlipidemia LDL goal <100 03/11/2005     Hypothyroidism      Impotence of organic origin      Meningococcal encephalitis      Mumps      Prostate cancer (H) 08/26/2020     Proteinuria      RBBB      S/P radiation therapy     7,000 cGy to prostate + SV completed on 6/23/2021 [facility]     Type 2 diabetes mellitus with diabetic nephropathy  (goal A1C<7) 10/24/2015     Unspecified essential hypertension        Past Surgical History:   Procedure Laterality Date     ARTHROPLASTY KNEE Left 03/18/2015    Procedure: ARTHROPLASTY KNEE;  Surgeon: Abebe Schroeder MD;  [facility]     ARTHROPLASTY KNEE Right 03/14/2016    Procedure: ARTHROPLASTY KNEE;  Surgeon: Abebe Schroeder MD;  Location: [facility] OR     BIOPSY PROSTATE TRANSRECTAL  08/26/2020     HC LAPAROSCOPY, SURGICAL; CHOLECYSTECTOMY  1998    Cholecystectomy, Laparoscopic     HC REMOVE TONSILS/ADENOIDS,12+ Y/O  1963    T & A 13+y.o.     ZZC NONSPECIFIC PROCEDURE  10/2002    left knee meniscus tear repair       Family History   Problem Relation Age of Onset     Family History Negative Mother         d:age 89 of \"old age\"     Gastrointestinal Disease Father         d:age 79 liver failure after taking excessive amounts of Tylenol over 5-6 yrs     Neurologic Disorder Brother         b:1932  hx cerebral aneurysm age 59     Cancer No family hx of        Social History     Tobacco Use     Smoking status: Never Smoker     Smokeless tobacco: Never Used   Substance Use Topics     Alcohol use: Not Currently       Current Outpatient Medications   Medication     acetaminophen (TYLENOL) 500 MG tablet     amLODIPine (NORVASC) 10 MG tablet     blood glucose (ACCU-CHEK GUIDE) test strip     blood glucose monitoring (ACCU-CHEK MULTICLIX) lancets     Blood Glucose " Monitoring Suppl (ACCU-CHEK GUIDE ME) w/Device KIT     Cyanocobalamin (B-12) 1000 MCG TBCR     famotidine (PEPCID) 20 MG tablet     levothyroxine (SYNTHROID/LEVOTHROID) 88 MCG tablet     loperamide (IMODIUM A-D) 2 MG tablet     metFORMIN (GLUCOPHAGE-XR) 500 MG 24 hr tablet     pioglitazone (ACTOS) 30 MG tablet     pravastatin (PRAVACHOL) 20 MG tablet     sitagliptin (JANUVIA) 100 MG tablet     psyllium (METAMUCIL/KONSYL) capsule     No current facility-administered medications for this visit.          Allergies   Allergen Reactions     Crestor [Rosuvastatin]      Myalgias     Diovan [Valsartan]      increase k+     Glimepiride      Pancytopenia       Invokana [Canagliflozin]      Joint pain     Lipitor [Atorvastatin Calcium]      myalgias     Mold Other (See Comments)     Eyes itchy,red and dry     Penicillins      Seasonal Allergies      Eyes itch         PHYSICAL EXAM:  BP (!) 141/87 (BP Location: Left arm, Patient Position: Sitting, Cuff Size: Adult Regular)   Pulse 93   Temp 97.9  F (36.6  C) (Oral)   Resp 16   Wt 77.6 kg (171 lb 1.6 oz)   SpO2 97%   BMI 26.80 kg/m    GEN: appears well, in no acute distress  HEENT: normocephalic and atraumatic, EOMI, anicteric sclerae  CV: 1+LE edema, no JVD  RESP: normal respiration on room air, no stridor  LYMPH: No supraclavicular, infraclavicular or axillary lymphadenopathy appreciated bilaterally  SKIN: normal color and turgor  MSK: moving all extremities well  NEURO: no focal neurologic deficit  PSYCH: appropriate mood, affect, and judgment    All pertinent laboratory, imaging, and pathology findings have been reviewed.     PSA   Date Value Ref Range Status   12/12/2019 16.20 (H) 0 - 4 ug/L Final     Comment:     Assay Method:  Chemiluminescence using Siemens Vista analyzer   09/02/2008 1.78 0 - 4 ug/L Final   01/02/2008 2.56 0 - 4 ug/L Final   11/13/2002 1.3 0 - 4 ug/L Final     PSA Tumor Marker   Date Value Ref Range Status   05/02/2022 0.03 0.00 - 4.00 ug/L Final    10/11/2021 0.02 0.00 - 4.00 ug/L Final       IMPRESSION/RECOMMENDATION:  82 year old gentleman with intermediate risk prostate cancer (GS 4+3, PSA 16.2) s/p definitive radiation therapy completed 6/23/21. He is doing well with improvement in radiation toxicity. He had an episode of minor rectal bleeding ~6weeks ago and completed a course of medical enema.  He is doing well now without any new complaints.  He will return for follow up in 6 months with repeat PSA. He was instructed to call our clinic with any questions or concerns in the interim.    Additional problem list to be addressed in the following manner:    1. Urinary frequency and AUA score of 19.  Back to baseline. As stated at previous appointment. Recommend restarting Flomax daily through his primary care physician.  He was previously on Flomax through his primary care physician.  Patient declines at this time.    2. He had eligard on 10/26/2020 45mg (6 months) and lupron 30mg (4 months) 5/19/21.  My recommendation was for short-term ADT.  There was a delay in starting his radiation treatment because [place].  He has now completed short-term ADT.    3. Follow up in 6 months with repeat PSA.    Thank you for allowing me to participate in the care of this pleasant patient. If you have any questions, please do not hesitate to contact my office.    Huyen Shoemaker MD  Attending Physician  Radiation Oncology

## 2022-05-17 ENCOUNTER — NURSE TRIAGE (OUTPATIENT)
Dept: INTERNAL MEDICINE | Facility: CLINIC | Age: 82
End: 2022-05-17
Payer: MEDICARE

## 2022-05-17 NOTE — TELEPHONE ENCOUNTER
Pt experiencing cough and sinus congestion symptoms. Per protocol pt should be seen today or tomorrow in office. Pt states he does not have a computer and prefers to be seen in person. Offered to forward message to provider to see if pt can be scheduled in-person tomorrow with another provider. Pt states he decided to go into  today that is closer to him.     Reason for Disposition    Patient wants to be seen    Additional Information    Negative: Bluish (or gray) lips or face    Negative: Severe difficulty breathing (e.g., struggling for each breath, speaks in single words)    Negative: Rapid onset of cough and has hives    Negative: Coughing started suddenly after medicine, an allergic food or bee sting    Negative: Difficulty breathing after exposure to flames, smoke, or fumes    Negative: Sounds like a life-threatening emergency to the triager    Negative: Previous asthma attacks and this feels like asthma attack    Negative: Chest pain present when not coughing    Negative: Difficulty breathing    Negative: Passed out (i.e., fainted, collapsed and was not responding)    Negative: Patient sounds very sick or weak to the triager    Negative: Coughed up > 1 tablespoon (15 ml) blood (Exception: blood-tinged sputum)    Negative: Fever > 103 F (39.4 C)    Negative: Fever > 101 F (38.3 C) and over 60 years of age    Negative: Fever > 100.0 F (37.8 C) and has diabetes mellitus or a weak immune system (e.g., HIV positive, cancer chemotherapy, organ transplant, splenectomy, chronic steroids)    Negative: Fever > 100.0 F (37.8 C) and bedridden (e.g., nursing home patient, stroke, chronic illness, recovering from surgery)    Negative: Increasing ankle swelling    Negative: Wheezing is present    Negative: SEVERE coughing spells (e.g., whooping sound after coughing, vomiting after coughing)    Negative: Coughing up hanna-colored (reddish-brown) or blood-tinged sputum    Negative: Fever present > 3 days (72 hours)     "Negative: Fever returns after gone for over 24 hours and symptoms worse or not improved    Negative: Using nasal washes and pain medicine > 24 hours and sinus pain persists    Negative: Known COPD or other severe lung disease (i.e., bronchiectasis, cystic fibrosis, lung surgery) and worsening symptoms (i.e., increased sputum purulence or amount, increased breathing difficulty)    Answer Assessment - Initial Assessment Questions  1. ONSET: \"When did the cough begin?\"         Started about 10 days ago.     2. SEVERITY: \"How bad is the cough today?\"         About the same as it has been, intermittent. Has sinus congestion as well. Sounds hoarse.     3. RESPIRATORY DISTRESS: \"Describe your breathing.\"         He states once in a while after coughing he does notice wheezing. No difficulty breathing or SOB.     4. FEVER: \"Do you have a fever?\" If so, ask: \"What is your temperature, how was it measured, and when did it start?\"        No    5. HEMOPTYSIS: \"Are you coughing up any blood?\" If so ask: \"How much?\" (flecks, streaks, tablespoons, etc.)        No    6. TREATMENT: \"What have you done so far to treat the cough?\" (e.g., meds, fluids, humidifier)        No, just taken Tylenol.     7. CARDIAC HISTORY: \"Do you have any history of heart disease?\" (e.g., heart attack, congestive heart failure)         No    8. LUNG HISTORY: \"Do you have any history of lung disease?\"  (e.g., pulmonary embolus, asthma, emphysema)        No    9. PE RISK FACTORS: \"Do you have a history of blood clots?\" (or: recent major surgery, recent prolonged travel, bedridden)        No    10. OTHER SYMPTOMS: \"Do you have any other symptoms? (e.g., runny nose, wheezing, chest pain)          Intermittent wheezing after coughing, runny nose and drainage, he states this has settled in his lungs.     11. PREGNANCY: \"Is there any chance you are pregnant?\" \"When was your last menstrual period?\"          NA    12. TRAVEL: \"Have you traveled out of the country " "in the last month?\" (e.g., travel history, exposures)          No    Protocols used: COUGH-A-OH    SEE TODAY OR TOMORROW IN OFFICE: You need to be examined. Let me give you an appointment.        Patient/Caregiver understands and will follow care advice? Other, see documentation     Yani SIMONS RN  [facility]   "

## 2022-06-03 DIAGNOSIS — E11.21 TYPE 2 DIABETES MELLITUS WITH DIABETIC NEPHROPATHY, WITHOUT LONG-TERM CURRENT USE OF INSULIN (H): ICD-10-CM

## 2022-06-05 ENCOUNTER — HEALTH MAINTENANCE LETTER (OUTPATIENT)
Age: 82
End: 2022-06-05

## 2022-06-06 RX ORDER — PIOGLITAZONEHYDROCHLORIDE 30 MG/1
30 TABLET ORAL DAILY
Qty: 90 TABLET | Refills: 0 | Status: SHIPPED | OUTPATIENT
Start: 2022-06-06 | End: 2022-09-08

## 2022-06-06 NOTE — TELEPHONE ENCOUNTER
Routing refill request to provider for review/approval because:  Was last seen for annual exam on 12/29/21 with Dr. Nieto.

## 2022-07-09 ENCOUNTER — TELEPHONE ENCOUNTER (OUTPATIENT)
Dept: URBAN - METROPOLITAN AREA CLINIC 121 | Facility: CLINIC | Age: 82
End: 2022-07-09

## 2022-07-09 RX ORDER — SITAGLIPTIN PHOSPHATE 100 MG
TABLET ORAL ONCE A DAY
Refills: 0 | OUTPATIENT
Start: 2020-02-11 | End: 2022-02-22

## 2022-07-09 RX ORDER — FAMOTIDINE 20 MG/1
TABLET ORAL TWICE A DAY
Refills: 0 | OUTPATIENT
Start: 2020-02-11 | End: 2022-02-22

## 2022-07-09 RX ORDER — FAMOTIDINE 20 MG/1
TABLET ORAL TWICE A DAY
Refills: 0 | OUTPATIENT
Start: 2020-01-20 | End: 2020-02-11

## 2022-07-09 RX ORDER — FAMOTIDINE 20 MG/1
TABLET ORAL TWICE A DAY
Refills: 0 | OUTPATIENT
Start: 2022-02-22 | End: 2022-03-25

## 2022-07-09 RX ORDER — LEVOTHYROXINE SODIUM 75 UG/1
TABLET ORAL ONCE A DAY
Refills: 0 | OUTPATIENT
Start: 2020-01-20 | End: 2020-02-11

## 2022-07-09 RX ORDER — LEVOTHYROXINE SODIUM 75 UG/1
TABLET ORAL ONCE A DAY
Refills: 0 | OUTPATIENT
Start: 2022-02-22 | End: 2022-03-25

## 2022-07-09 RX ORDER — LEVOTHYROXINE SODIUM 75 UG/1
TABLET ORAL ONCE A DAY
Refills: 0 | OUTPATIENT
Start: 2020-02-11 | End: 2022-02-22

## 2022-07-09 RX ORDER — AMLODIPINE BESYLATE 10 MG/1
TABLET ORAL ONCE A DAY
Refills: 0 | OUTPATIENT
Start: 2022-02-22 | End: 2022-03-25

## 2022-07-09 RX ORDER — SITAGLIPTIN PHOSPHATE 100 MG
TABLET ORAL ONCE A DAY
Refills: 0 | OUTPATIENT
Start: 2020-01-20 | End: 2020-02-11

## 2022-07-09 RX ORDER — SITAGLIPTIN PHOSPHATE 100 MG
TABLET ORAL ONCE A DAY
Refills: 0 | OUTPATIENT
Start: 2022-02-22 | End: 2022-03-25

## 2022-07-09 RX ORDER — PIOGLITAZONE 30 MG/1
TABLET ORAL ONCE A DAY
Refills: 0 | OUTPATIENT
Start: 2022-02-22 | End: 2022-03-25

## 2022-07-09 RX ORDER — CYANOCOBALAMIN (VITAMIN B-12) 2500 MCG
TABLET, SUBLINGUAL SUBLINGUAL ONCE A DAY
Refills: 0 | OUTPATIENT
Start: 2022-02-22 | End: 2022-03-25

## 2022-07-09 RX ORDER — AMLODIPINE BESYLATE 10 MG/1
TABLET ORAL ONCE A DAY
Refills: 0 | OUTPATIENT
Start: 2020-02-11 | End: 2022-02-22

## 2022-07-09 RX ORDER — AMLODIPINE BESYLATE 10 MG/1
TABLET ORAL ONCE A DAY
Refills: 0 | OUTPATIENT
Start: 2020-01-20 | End: 2020-02-11

## 2022-07-10 ENCOUNTER — TELEPHONE ENCOUNTER (OUTPATIENT)
Dept: URBAN - METROPOLITAN AREA CLINIC 121 | Facility: CLINIC | Age: 82
End: 2022-07-10

## 2022-07-10 RX ORDER — SITAGLIPTIN PHOSPHATE 100 MG
TABLET ORAL ONCE A DAY
Refills: 0 | Status: ACTIVE | COMMUNITY
Start: 2022-03-25

## 2022-07-10 RX ORDER — CYANOCOBALAMIN (VITAMIN B-12) 2500 MCG
TABLET, SUBLINGUAL SUBLINGUAL ONCE A DAY
Refills: 0 | Status: ACTIVE | COMMUNITY
Start: 2022-03-25

## 2022-07-10 RX ORDER — FAMOTIDINE 20 MG/1
TABLET ORAL TWICE A DAY
Refills: 0 | Status: ACTIVE | COMMUNITY
Start: 2022-03-25

## 2022-07-10 RX ORDER — AMLODIPINE BESYLATE 10 MG/1
TABLET ORAL ONCE A DAY
Refills: 0 | Status: ACTIVE | COMMUNITY
Start: 2022-03-25

## 2022-07-10 RX ORDER — PIOGLITAZONE 30 MG/1
TABLET ORAL ONCE A DAY
Refills: 0 | Status: ACTIVE | COMMUNITY
Start: 2022-03-25

## 2022-07-10 RX ORDER — SUCRALFATE 1 G/10ML
ONCE A DAY60ML AS RECTAL ENEMA ONCE DAILY SUSPENSION ORAL ONCE A DAY
Refills: 0 | Status: ACTIVE | COMMUNITY
Start: 2022-03-01

## 2022-07-10 RX ORDER — LEVOTHYROXINE SODIUM 75 UG/1
TABLET ORAL ONCE A DAY
Refills: 0 | Status: ACTIVE | COMMUNITY
Start: 2022-03-25

## 2022-08-26 DIAGNOSIS — N18.31 STAGE 3A CHRONIC KIDNEY DISEASE (H): Primary | ICD-10-CM

## 2022-08-26 DIAGNOSIS — E78.5 HYPERLIPIDEMIA LDL GOAL <100: ICD-10-CM

## 2022-08-26 DIAGNOSIS — E11.21 TYPE 2 DIABETES MELLITUS WITH DIABETIC NEPHROPATHY, WITHOUT LONG-TERM CURRENT USE OF INSULIN (H): ICD-10-CM

## 2022-08-26 DIAGNOSIS — D69.6 THROMBOCYTOPENIA (H): ICD-10-CM

## 2022-08-26 NOTE — TELEPHONE ENCOUNTER
Patient is requesting a signed script sohe can send to [facility].  Please mail script to patients home address.

## 2022-08-30 NOTE — TELEPHONE ENCOUNTER
Patient last seen in clinic by Dr. Nieto in December 2021.  Last virtual visit with me May 2021   and last saw me in clinic December 2020.  Overdue for diabetic, kidney, lipid, platelet labs in addition.  Januvia/sitagliptin refill approved for 3 months.  Paper prescription in [employer] basket to mail to patient to be filled at pharmacy of his preference.  Call patient and get a fasting blood and urine lab done in the next 1 to 2 weeks and a follow-up appointment with me in clinic in the next 6 weeks.  May use same-day appointment slot for next day appointment slot for appointment. Check blood sugars twice a day (before breakfast and bedtime) for 4 days prior to the appointment with me, write them down and have them available to review with the appointment.  Assist with scheduling appointments.  We will address more long-term refills of medications as appropriate after labs reviewed and patient seen back in clinic

## 2022-09-07 DIAGNOSIS — E11.21 TYPE 2 DIABETES MELLITUS WITH DIABETIC NEPHROPATHY, WITHOUT LONG-TERM CURRENT USE OF INSULIN (H): ICD-10-CM

## 2022-09-08 RX ORDER — PIOGLITAZONEHYDROCHLORIDE 30 MG/1
TABLET ORAL
Qty: 90 TABLET | Refills: 0 | Status: SHIPPED | OUTPATIENT
Start: 2022-09-08 | End: 2022-09-27

## 2022-09-08 NOTE — TELEPHONE ENCOUNTER
Routing refill request to provider for review/approval because:   Patient has a normal ALT within the past 12 mos.    Patient has a normal AST within the past 12 mos.     Patient has documented A1c within the specified period of time.    Patient has a normal serum Creatinine in the past 12 months     Future Appointments 9/8/2022 - 3/7/2023        Date Visit Type Length Department Provider     9/19/2022 10:30 AM LAB 15 min  LABORATORY [facility] LAB    Location Instructions:     The clinic is located at [address]; in the [facility] in [place].&nbsp; We offer free parking in our on-site lot.              9/27/2022 10:30 AM OFFICE VISIT 30 min  INTERNAL MEDICINE Zhou Meraz MD              10/20/2022 10:00 AM LAB 10 min [facility] LABORATORY LAB FIRST FLOOR [facility]    Location Instructions:     The clinic is located at [address] in [place].&nbsp; We offer free parking in our on-site lot.              10/24/2022 12:00 PM RETURN 30 min [facility] RAD ONC Tanvi Shoemaker MD Hira Nazeer, RN  [facility] Triage Nurse

## 2022-09-19 ENCOUNTER — LAB (OUTPATIENT)
Dept: LAB | Facility: CLINIC | Age: 82
End: 2022-09-19
Payer: MEDICARE

## 2022-09-19 DIAGNOSIS — N18.31 STAGE 3A CHRONIC KIDNEY DISEASE (H): ICD-10-CM

## 2022-09-19 DIAGNOSIS — E78.5 HYPERLIPIDEMIA LDL GOAL <100: ICD-10-CM

## 2022-09-19 DIAGNOSIS — D69.6 THROMBOCYTOPENIA (H): ICD-10-CM

## 2022-09-19 DIAGNOSIS — E11.21 TYPE 2 DIABETES MELLITUS WITH DIABETIC NEPHROPATHY, WITHOUT LONG-TERM CURRENT USE OF INSULIN (H): ICD-10-CM

## 2022-09-19 LAB
ALBUMIN SERPL-MCNC: 3.8 G/DL (ref 3.4–5)
ALP SERPL-CCNC: 95 U/L (ref 40–150)
ALT SERPL W P-5'-P-CCNC: 23 U/L (ref 0–70)
ANION GAP SERPL CALCULATED.3IONS-SCNC: 7 MMOL/L (ref 3–14)
AST SERPL W P-5'-P-CCNC: 19 U/L (ref 0–45)
BILIRUB SERPL-MCNC: 0.8 MG/DL (ref 0.2–1.3)
BUN SERPL-MCNC: 32 MG/DL (ref 7–30)
CALCIUM SERPL-MCNC: 9.6 MG/DL (ref 8.5–10.1)
CHLORIDE BLD-SCNC: 104 MMOL/L (ref 94–109)
CHOLEST SERPL-MCNC: 216 MG/DL
CO2 SERPL-SCNC: 26 MMOL/L (ref 20–32)
CREAT SERPL-MCNC: 1.41 MG/DL (ref 0.66–1.25)
ERYTHROCYTE [DISTWIDTH] IN BLOOD BY AUTOMATED COUNT: 13.7 % (ref 10–15)
FASTING STATUS PATIENT QL REPORTED: YES
GFR SERPL CREATININE-BSD FRML MDRD: 50 ML/MIN/1.73M2
GLUCOSE BLD-MCNC: 167 MG/DL (ref 70–99)
HBA1C MFR BLD: 6.8 % (ref 0–5.6)
HCT VFR BLD AUTO: 40.6 % (ref 40–53)
HDLC SERPL-MCNC: 79 MG/DL
HGB BLD-MCNC: 13.3 G/DL (ref 13.3–17.7)
LDLC SERPL CALC-MCNC: 68 MG/DL
MCH RBC QN AUTO: 36.2 PG (ref 26.5–33)
MCHC RBC AUTO-ENTMCNC: 32.8 G/DL (ref 31.5–36.5)
MCV RBC AUTO: 111 FL (ref 78–100)
NONHDLC SERPL-MCNC: 137 MG/DL
PLATELET # BLD AUTO: 157 10E3/UL (ref 150–450)
POTASSIUM BLD-SCNC: 4.3 MMOL/L (ref 3.4–5.3)
PROT SERPL-MCNC: 7.6 G/DL (ref 6.8–8.8)
RBC # BLD AUTO: 3.67 10E6/UL (ref 4.4–5.9)
SODIUM SERPL-SCNC: 137 MMOL/L (ref 133–144)
TRIGL SERPL-MCNC: 345 MG/DL
WBC # BLD AUTO: 4.9 10E3/UL (ref 4–11)

## 2022-09-19 PROCEDURE — 83036 HEMOGLOBIN GLYCOSYLATED A1C: CPT

## 2022-09-19 PROCEDURE — 36415 COLL VENOUS BLD VENIPUNCTURE: CPT

## 2022-09-19 PROCEDURE — 80053 COMPREHEN METABOLIC PANEL: CPT

## 2022-09-19 PROCEDURE — 85027 COMPLETE CBC AUTOMATED: CPT

## 2022-09-19 PROCEDURE — 80061 LIPID PANEL: CPT

## 2022-09-26 DIAGNOSIS — R19.5 LOOSE STOOLS: ICD-10-CM

## 2022-09-27 ENCOUNTER — OFFICE VISIT (OUTPATIENT)
Dept: INTERNAL MEDICINE | Facility: CLINIC | Age: 82
End: 2022-09-27
Payer: MEDICARE

## 2022-09-27 VITALS
TEMPERATURE: 97.4 F | OXYGEN SATURATION: 98 % | HEART RATE: 83 BPM | HEIGHT: 67 IN | SYSTOLIC BLOOD PRESSURE: 110 MMHG | BODY MASS INDEX: 26.02 KG/M2 | DIASTOLIC BLOOD PRESSURE: 64 MMHG | WEIGHT: 165.8 LBS

## 2022-09-27 DIAGNOSIS — D69.6 THROMBOCYTOPENIA (H): ICD-10-CM

## 2022-09-27 DIAGNOSIS — I10 ESSENTIAL HYPERTENSION: ICD-10-CM

## 2022-09-27 DIAGNOSIS — E78.5 HYPERLIPIDEMIA LDL GOAL <100: ICD-10-CM

## 2022-09-27 DIAGNOSIS — G89.29 CHRONIC BILATERAL LOW BACK PAIN WITHOUT SCIATICA: Primary | ICD-10-CM

## 2022-09-27 DIAGNOSIS — E11.21 TYPE 2 DIABETES MELLITUS WITH DIABETIC NEPHROPATHY, WITHOUT LONG-TERM CURRENT USE OF INSULIN (H): ICD-10-CM

## 2022-09-27 DIAGNOSIS — M54.50 CHRONIC BILATERAL LOW BACK PAIN WITHOUT SCIATICA: Primary | ICD-10-CM

## 2022-09-27 DIAGNOSIS — N18.31 STAGE 3A CHRONIC KIDNEY DISEASE (H): ICD-10-CM

## 2022-09-27 DIAGNOSIS — K21.9 GASTROESOPHAGEAL REFLUX DISEASE WITHOUT ESOPHAGITIS: ICD-10-CM

## 2022-09-27 DIAGNOSIS — E03.9 HYPOTHYROIDISM, UNSPECIFIED TYPE: ICD-10-CM

## 2022-09-27 LAB — TSH SERPL DL<=0.005 MIU/L-ACNC: 0.84 MU/L (ref 0.4–4)

## 2022-09-27 PROCEDURE — 36415 COLL VENOUS BLD VENIPUNCTURE: CPT | Performed by: INTERNAL MEDICINE

## 2022-09-27 PROCEDURE — 84443 ASSAY THYROID STIM HORMONE: CPT | Performed by: INTERNAL MEDICINE

## 2022-09-27 PROCEDURE — 99214 OFFICE O/P EST MOD 30 MIN: CPT | Performed by: INTERNAL MEDICINE

## 2022-09-27 RX ORDER — FAMOTIDINE 20 MG/1
20 TABLET, FILM COATED ORAL 2 TIMES DAILY
Qty: 180 TABLET | Refills: 3 | Status: SHIPPED | OUTPATIENT
Start: 2022-09-27 | End: 2023-05-02

## 2022-09-27 RX ORDER — PIOGLITAZONEHYDROCHLORIDE 30 MG/1
30 TABLET ORAL DAILY
Qty: 90 TABLET | Refills: 3 | Status: SHIPPED | OUTPATIENT
Start: 2022-09-27 | End: 2022-12-13

## 2022-09-27 RX ORDER — METFORMIN HCL 500 MG
1000 TABLET, EXTENDED RELEASE 24 HR ORAL DAILY
Qty: 180 TABLET | Refills: 3 | Status: SHIPPED | OUTPATIENT
Start: 2022-09-27 | End: 2023-08-23

## 2022-09-27 RX ORDER — PIOGLITAZONEHYDROCHLORIDE 30 MG/1
30 TABLET ORAL DAILY
Qty: 90 TABLET | Refills: 3 | Status: SHIPPED | OUTPATIENT
Start: 2022-09-27 | End: 2022-09-27

## 2022-09-27 RX ORDER — PRAVASTATIN SODIUM 20 MG
TABLET ORAL
Qty: 90 TABLET | Refills: 3 | Status: SHIPPED | OUTPATIENT
Start: 2022-09-27 | End: 2022-12-28

## 2022-09-27 RX ORDER — LEVOTHYROXINE SODIUM 88 UG/1
88 TABLET ORAL DAILY
Qty: 90 TABLET | Refills: 3 | Status: SHIPPED | OUTPATIENT
Start: 2022-09-27 | End: 2023-08-28

## 2022-09-27 RX ORDER — AMLODIPINE BESYLATE 10 MG/1
10 TABLET ORAL DAILY
Qty: 90 TABLET | Refills: 3 | Status: SHIPPED | OUTPATIENT
Start: 2022-09-27 | End: 2023-08-28

## 2022-09-27 ASSESSMENT — PATIENT HEALTH QUESTIONNAIRE - PHQ9
10. IF YOU CHECKED OFF ANY PROBLEMS, HOW DIFFICULT HAVE THESE PROBLEMS MADE IT FOR YOU TO DO YOUR WORK, TAKE CARE OF THINGS AT HOME, OR GET ALONG WITH OTHER PEOPLE: NOT DIFFICULT AT ALL
SUM OF ALL RESPONSES TO PHQ QUESTIONS 1-9: 1
SUM OF ALL RESPONSES TO PHQ QUESTIONS 1-9: 1

## 2022-09-27 NOTE — PATIENT INSTRUCTIONS
Continue current medications  Prescriptions refilled.    Labs today as ordered for thyroid  Call  [phone] or use [facility] to schedule a future lab appointment  non-fasting in   April 2023 when return to [place] from [place]   Eye appt as scheduled early next month.  Please ask the eye clinic to fax us a report of your eye exam to [phone], attention Dr Meraz  I would recommend you receive an influenza (flu) vaccine  this Fall (mid/late October)   I would recommend a covid booster vaccination. You may have it done at any pharmacy   Call [facility] [phone] with date of your 3rd covid vaccine given  in approx Jan 2022  Continue low back pain management as per recommendations of [facility]

## 2022-09-27 NOTE — PROGRESS NOTES
ASSESSMENT:    1. Type 2 diabetes mellitus with diabetic nephropathy, without long-term current use of insulin (H)  Controlled for age.  Continue current medication.  Repeat lab 6 months  - metFORMIN (GLUCOPHAGE XR) 500 MG 24 hr tablet; Take 2 tablets (1,000 mg) by mouth daily  Dispense: 180 tablet; Refill: 3  - pioglitazone (ACTOS) 30 MG tablet; Take 1 tablet (30 mg) by mouth daily  Dispense: 90 tablet; Refill: 3  - Basic metabolic panel; Future  - Hemoglobin A1c; Future  - sitagliptin (JANUVIA) 100 MG tablet; Take 1 tablet (100 mg) by mouth daily  Dispense: 90 tablet; Refill: 3  - Comprehensive metabolic panel; Future  - Albumin Random Urine Quantitative with Creat Ratio; Future    2. Stage 3a chronic kidney disease (H)  GFR minimally worsened.  Recheck lab 6 months  - Comprehensive metabolic panel; Future    3. Thrombocytopenia (H)  Chronic history thrombocytopenia though platelet count.now low normal with most recent lab.  Recheck 6 months  - CBC with platelets; Future    4. Hypothyroidism, unspecified type  Previously controlled.  Continue current medication.  Lab today as ordered  - TSH with free T4 reflex; Future  - levothyroxine (SYNTHROID/LEVOTHROID) 88 MCG tablet; Take 1 tablet (88 mcg) by mouth daily  Dispense: 90 tablet; Refill: 3  - TSH with free T4 reflex    5. Hyperlipidemia LDL goal <100  Controlled except for elevated triglycerides.  Not to the point requiring additional therapy.  Will continue diabetic blood sugar management and current medications and repeat labs 1 year  - pravastatin (PRAVACHOL) 20 MG tablet; Take 1 tab by mouth daily in PM for cholesterol  Dispense: 90 tablet; Refill: 3  - Comprehensive metabolic panel; Future  - Lipid panel reflex to direct LDL Fasting; Future    6. Essential hypertension  Controlled.  Continue current medication  - amLODIPine (NORVASC) 10 MG tablet; Take 1 tablet (10 mg) by mouth daily  Dispense: 90 tablet; Refill: 3  - Comprehensive metabolic panel;  Future    7. Gastroesophageal reflux disease without esophagitis  Controlled.  Has recurrence of symptoms off of H2 blocker.  Continue famotidine  - famotidine (PEPCID) 20 MG tablet; Take 1 tablet (20 mg) by mouth 2 times daily  Dispense: 180 tablet; Refill: 3    8. Chronic bilateral low back pain without sciatica  Stable.  Not using any NSAIDs.  Continue management per Ortho      PLAN:  Continue current medications  Prescriptions refilled.    Labs today as ordered for thyroid  Call  [phone] or use [employer] to schedule a future lab appointment  non-fasting in   April 2023 when return to [place] from [place]   Eye appt as scheduled early next month.  Please ask the eye clinic to fax us a report of your eye exam to [phone], attention Dr Meraz  I would recommend you receive an influenza (flu) vaccine  this Fall (mid/late October)   I would recommend a covid booster vaccination. You may have it done at any pharmacy (pt declines today)  Call [facility] [phone] with date of your 3rd covid vaccine given  in approx Jan 2022  Continue low back pain management as per recommendations of [facility]      (Chart documentation was completed, in part, with [employer] voice-recognition software. Even though reviewed, some grammatical, spelling, and word errors may remain.)    Zhou Meraz MD  Internal Medicine Department  [facility]      Brandt Randall is a 82 year old, presenting for the following health issues:  No chief complaint on file.  [id number]  Hypothyroid-CKD       History of Present Illness       Back Pain:  He presents for follow up of back pain. Patient's back pain is a chronic problem.  Location of back pain:  Right lower back and left lower back  Description of back pain: dull ache  Back pain spreads: nowhere    Since patient first noticed back pain, pain is: unchanged  Does back pain interfere with his job:  Not applicable      CKD: He uses over the counter pain medication,  including tylenol, two times daily.    Diabetes:   He presents for follow up of diabetes.  He is checking home blood glucose a few times a week. He checks blood glucose before meals and at bedtime.  Blood glucose is never over 200 and never under 70. When his blood glucose is low, the patient is asymptomatic for confusion, blurred vision, lethargy and reports not feeling dizzy, shaky, or weak.  He has no concerns regarding his diabetes at this time.  He is not experiencing numbness or burning in feet, excessive thirst, blurry vision, weight changes or redness, sores or blisters on feet. The patient has had a diabetic eye exam in the last 12 months. Eye exam performed on 09-1-2021. Location of last eye exam [facility].        Hypertension: He presents for follow up of hypertension.  He does not check blood pressure  regularly outside of the clinic. Outpatient blood pressures have not been over 140/90. He does not follow a low salt diet.     Hypothyroidism:     Since last visit, patient describes the following symptoms::  Fatigue     Today's PHQ-9         PHQ-9 Total Score: 1    PHQ-9 Q9 Thoughts of better off dead/self-harm past 2 weeks :   Not at all    How difficult have these problems made it for you to do your work, take care of things at home, or get along with other people: Not difficult at all          Most recent lab results reviewed with pt.      Component      Latest Ref Rng & Units 10/11/2021 5/2/2022 9/19/2022   Sodium      133 - 144 mmol/L 137  137   Potassium      3.4 - 5.3 mmol/L 4.4  4.3   Chloride      94 - 109 mmol/L 103  104   Carbon Dioxide      20 - 32 mmol/L 25  26   Anion Gap      3 - 14 mmol/L 9  7   Urea Nitrogen      7 - 30 mg/dL 27  32 (H)   Creatinine      0.66 - 1.25 mg/dL 1.30 (H)  1.41 (H)   Calcium      8.5 - 10.1 mg/dL 9.2  9.6   Glucose      70 - 99 mg/dL 202 (H)  167 (H)   Alkaline Phosphatase      40 - 150 U/L   95   AST      0 - 45 U/L   19   ALT      0 - 70 U/L   23   Protein  "Total      6.8 - 8.8 g/dL   7.6   Albumin      3.4 - 5.0 g/dL   3.8   Bilirubin Total      0.2 - 1.3 mg/dL   0.8   GFR Estimate      >60 mL/min/1.73m2 52 (L)  50 (L)   WBC      4.0 - 11.0 10e3/uL 4.6  4.9   RBC Count      4.40 - 5.90 10e6/uL 3.67 (L)  3.67 (L)   Hemoglobin      13.3 - 17.7 g/dL 13.8  13.3   Hematocrit      40.0 - 53.0 % 40.3  40.6   MCV      78 - 100 fL 110 (H)  111 (H)   MCH      26.5 - 33.0 pg 37.6 (H)  36.2 (H)   MCHC      31.5 - 36.5 g/dL 34.2  32.8   RDW      10.0 - 15.0 % 13.2  13.7   Platelet Count      150 - 450 10e3/uL 125 (L)  157   Cholesterol      <200 mg/dL 233 (H)  216 (H)   Triglycerides      <150 mg/dL 365 (H)  345 (H)   HDL Cholesterol      >=40 mg/dL 71  79   LDL Cholesterol Calculated      <=100 mg/dL 89  68   Non HDL Cholesterol      <130 mg/dL 162 (H)  137 (H)   Patient Fasting > 8hrs?       Yes  Yes   TSH      0.40 - 4.00 mU/L 1.82     Hemoglobin A1C      0.0 - 5.6 % 6.6 (H)  6.8 (H)   Parathyroid Hormone Intact      18 - 80 pg/mL 39     PSA      0.00 - 4.00 ug/L  0.03      Checking sugars 3x/week. Average 140 AM and 170 PM    Has been  through therapy for chronic back issues. Followed by [facility] and Dr Renae.  Improved with  Tylenol   ES.  Per patient, Ortho not recommending surgical treatment.  Denies lower extremity weakness or acute bowel/bladder incontinence  Has eye appointment scheduled at [facility] to be done 10/1/22  Denies chest pain, shortness of breath or abdominal pain.  GERD sx controlled with medication therapy.  Has recurrence of GERD off of medication and missing doses    Additional ROS:   Constitutional, HEENT, Cardiovascular, Pulmonary, GI and , Neuro, MSK and Psych review of systems/symptoms are otherwise negative or unchanged from previous, except as noted above.      OBJECTIVE:  /64   Pulse 83   Temp 97.4  F (36.3  C) (Tympanic)   Ht 1.702 m (5' 7\")   Wt 75.2 kg (165 lb 12.8 oz)   SpO2 98%   BMI 25.97 kg/m     Estimated body mass index is " "25.97 kg/m  as calculated from the following:    Height as of this encounter: 1.702 m (5' 7\").    Weight as of this encounter: 75.2 kg (165 lb 12.8 oz).     Neck: no adenopathy. Thyroid normal to palpation. No bruits  Pulm: Lungs clear to auscultation   CV: Regular rates and rhythm  GI: Soft, nontender, Normal active bowel sounds, No hepatosplenomegaly or masses palpable  Ext: Peripheral pulses intact. No edema.  Neuro: Normal strength and tone, sensory exam grossly normal  Back: Tenderness to palpation bilateral paralumbar musculature.  Negative straight leg raise test bilaterally             "

## 2022-09-28 NOTE — TELEPHONE ENCOUNTER
Routing refill request to provider for review/approval because:  New script sent by Dr. Meraz yesterday 9/27/22. Please refuse old refill request.    Elvia Thomson RN

## 2022-09-29 RX ORDER — FAMOTIDINE 20 MG/1
TABLET, FILM COATED ORAL
Qty: 180 TABLET | Refills: 0 | OUTPATIENT
Start: 2022-09-29

## 2022-10-09 PROBLEM — D61.9 APLASTIC ANEMIA (H): Status: RESOLVED | Noted: 2018-03-26 | Resolved: 2022-10-09

## 2022-10-18 ENCOUNTER — LAB (OUTPATIENT)
Dept: LAB | Facility: CLINIC | Age: 82
End: 2022-10-18
Payer: MEDICARE

## 2022-10-18 DIAGNOSIS — C61 PROSTATE CANCER (H): ICD-10-CM

## 2022-10-18 LAB — PSA SERPL-MCNC: 0.02 UG/L (ref 0–4)

## 2022-10-18 PROCEDURE — 36415 COLL VENOUS BLD VENIPUNCTURE: CPT

## 2022-10-18 PROCEDURE — 84153 ASSAY OF PSA TOTAL: CPT

## 2022-10-24 ENCOUNTER — OFFICE VISIT (OUTPATIENT)
Dept: RADIATION ONCOLOGY | Facility: CLINIC | Age: 82
End: 2022-10-24
Payer: MEDICARE

## 2022-10-24 VITALS
OXYGEN SATURATION: 97 % | SYSTOLIC BLOOD PRESSURE: 142 MMHG | RESPIRATION RATE: 16 BRPM | WEIGHT: 166 LBS | TEMPERATURE: 98.4 F | DIASTOLIC BLOOD PRESSURE: 70 MMHG | HEART RATE: 87 BPM | BODY MASS INDEX: 26 KG/M2

## 2022-10-24 DIAGNOSIS — C61 PROSTATE CANCER (H): Primary | ICD-10-CM

## 2022-10-24 PROCEDURE — 99213 OFFICE O/P EST LOW 20 MIN: CPT | Performed by: RADIOLOGY

## 2022-10-24 RX ORDER — GLUCOSA SU 2KCL/CHONDROITIN SU 500-400 MG
1 CAPSULE ORAL DAILY
COMMUNITY
End: 2023-08-23

## 2022-10-24 RX ORDER — DOXYCYCLINE HYCLATE 100 MG
TABLET ORAL
COMMUNITY
Start: 2022-05-19 | End: 2023-05-02

## 2022-10-24 RX ORDER — AMOXICILLIN 500 MG/1
CAPSULE ORAL
COMMUNITY
Start: 2022-10-18 | End: 2023-05-02

## 2022-10-24 NOTE — LETTER
10/24/2022         RE: Prakash Cramer  [address]  [facility] [address]        Dear Colleague,    Thank you for referring your patient, Prakash Cramer, to the [facility] [place]. Please see a copy of my visit note below.    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    INTERVAL HISTORY:  Prakash Cramer is well known to our clinic. While on treatment he had complaints of mild fatigue which was relieved by rest (grade 1), increase in urinary frequency, tract pain and urgency. Post treatment he is now here in follow up. Energy level and urinary symptoms are back to their baseline. He has baseline diarrhea and it is currently well controlled.  He takes occasional imodium which is normal for him and 6 fiber pills per day. He stopped Flomax once a day and feels his urination is erratic but back to his baseline. He did not think it was beneficial and is reluctant to try any other medications.  There is lower extremity edema only in the evenings (baseline).  His AUA score is 14 (previously 19): Incomplete emptying x2, frequency x5, intermittency x1 (5), urgency x1, weak stream x1 (2), straining x1, nocturia x3 (4). Specifically denies current n/v/ha/sob/cp/new GI or  symptoms.   ELIAS score is 1.      REVIEW OF SYSTEMS: As per HPI, a 14-point review of systems is otherwise negative.    Past Medical History:   Diagnosis Date     Androgen deprivation therapy     Eligard 45 mg injection received 10/26/2020     Aplastic anemia (H) 03/26/2018    thought secondary to reaction to Septra     BPH (benign prostatic hypertrophy)     failed  Flomax     C. difficile diarrhea 03/26/2018    treated with Flagyl     Cellulitis and abscess of leg, except foot 2004     Contact dermatitis and other eczema, due to  "unspecified cause      Diaphragmatic hernia without mention of obstruction or gangrene     hiatal hernia     Esophageal reflux      History of colonic polyps 01/2020    5 polyps removed and 2 were precancerous so he will have another one in Jan 2021.     Hyperlipidemia LDL goal <100 03/11/2005     Hypothyroidism      Impotence of organic origin      Meningococcal encephalitis      Mumps      Prostate cancer (H) 08/26/2020     Proteinuria      RBBB      S/P radiation therapy     7,000 cGy to prostate + SV completed on 6/23/2021 [facility]     Type 2 diabetes mellitus with diabetic nephropathy  (goal A1C<7) 10/24/2015     Unspecified essential hypertension        Past Surgical History:   Procedure Laterality Date     ARTHROPLASTY KNEE Left 03/18/2015    Procedure: ARTHROPLASTY KNEE;  Surgeon: Abebe Schroeder MD;  Location: [facility] OR     ARTHROPLASTY KNEE Right 03/14/2016    Procedure: ARTHROPLASTY KNEE;  Surgeon: Abebe Schroeder MD;  Location: [facility] OR     BIOPSY PROSTATE TRANSRECTAL  08/26/2020     HC LAPAROSCOPY, SURGICAL; CHOLECYSTECTOMY  1998    Cholecystectomy, Laparoscopic     HC REMOVE TONSILS/ADENOIDS,12+ Y/O  1963    T & A 13+y.o.     ZZC NONSPECIFIC PROCEDURE  10/2002    left knee meniscus tear repair       Family History   Problem Relation Age of Onset     Family History Negative Mother         d:age 89 of \"old age\"     Gastrointestinal Disease Father         d:age 79 liver failure after taking excessive amounts of Tylenol over 5-6 yrs     Neurologic Disorder Brother         b:1932  hx cerebral aneurysm age 59     Cancer No family hx of        Social History     Tobacco Use     Smoking status: Never     Smokeless tobacco: Never   Substance Use Topics     Alcohol use: Not Currently       Current Outpatient Medications   Medication     acetaminophen (TYLENOL) 500 MG tablet     amLODIPine (NORVASC) 10 MG tablet     amoxicillin (AMOXIL) 500 MG capsule     blood glucose (ACCU-CHEK GUIDE) test " strip     blood glucose monitoring (ACCU-CHEK MULTICLIX) lancets     Blood Glucose Monitoring Suppl (ACCU-CHEK GUIDE ME) w/Device KIT     Cyanocobalamin (B-12) 1000 MCG TBCR     doxycycline hyclate (VIBRA-TABS) 100 MG tablet     famotidine (PEPCID) 20 MG tablet     levothyroxine (SYNTHROID/LEVOTHROID) 88 MCG tablet     loperamide (IMODIUM A-D) 2 MG tablet     metFORMIN (GLUCOPHAGE XR) 500 MG 24 hr tablet     Multiple Vitamins-Minerals (THERAPEUTIC-M) TABS     omeprazole (PRILOSEC) 20 MG DR capsule     pioglitazone (ACTOS) 30 MG tablet     pravastatin (PRAVACHOL) 20 MG tablet     psyllium (METAMUCIL/KONSYL) capsule     sitagliptin (JANUVIA) 100 MG tablet     No current facility-administered medications for this visit.          Allergies   Allergen Reactions     Sulfonylureas Other (See Comments)     amaryl caused aplastic anemia verified by bone marrow biopsy 3/30/18     Crestor [Rosuvastatin]      Myalgias     Diovan [Valsartan]      increase k+     Glimepiride      Pancytopenia       Invokana [Canagliflozin]      Joint pain     Lipitor [Atorvastatin Calcium]      myalgias     Mold Other (See Comments)     Eyes itchy,red and dry     Penicillins      Seasonal Allergies      Eyes itch         PHYSICAL EXAM:  BP (!) 142/70   Pulse 87   Temp 98.4  F (36.9  C) (Oral)   Resp 16   Wt 75.3 kg (166 lb)   SpO2 97%   BMI 26.00 kg/m    GEN: appears well, in no acute distress, walks with a walker which is his baseline  HEENT: normocephalic and atraumatic, EOMI, anicteric sclerae  CV: 1+LE edema bilaterally, no JVD  RESP: normal respiration on room air, no stridor  LYMPH: No supraclavicular, infraclavicular lymphadenopathy appreciated bilaterally  SKIN: normal color and turgor  MSK: moving all extremities well  NEURO: no focal neurologic deficit  PSYCH: appropriate mood, affect, and judgment    All pertinent laboratory, imaging, and pathology findings have been reviewed.     PSA   Date Value Ref Range Status   12/12/2019  16.20 (H) 0 - 4 ug/L Final     Comment:     Assay Method:  Chemiluminescence using Siemens Vista analyzer   09/02/2008 1.78 0 - 4 ug/L Final   01/02/2008 2.56 0 - 4 ug/L Final   11/13/2002 1.3 0 - 4 ug/L Final     PSA Tumor Marker   Date Value Ref Range Status   10/18/2022 0.02 0.00 - 4.00 ug/L Final   05/02/2022 0.03 0.00 - 4.00 ug/L Final   10/11/2021 0.02 0.00 - 4.00 ug/L Final       IMPRESSION/RECOMMENDATION:  82 year old gentleman with intermediate risk prostate cancer (GS 4+3, PSA 16.2) s/p definitive radiation therapy completed 6/23/21. He is doing well with improvement in radiation toxicity. He had an episode of minor rectal bleeding ~6.5 months ago and completed a course of medical enema. He has not had any GI symptoms since this time.  He is doing well now without any new complaints.  He will return for follow up in 6 months with repeat PSA. He worrell in [place].  He was instructed to call our clinic with any questions or concerns in the interim.    Additional problem list to be addressed in the following manner:    1. Urinary frequency and AUA score of 14.  Back to baseline. Does not feel that Flomax helped him in the past and reluctant to try anything else.    2. He had eligard on 10/26/2020 45mg (6 months) and lupron 30mg (4 months) 5/19/21.  My recommendation was for short-term ADT.  There was a delay in starting his radiation treatment [place].  He has now completed short-term ADT.    3. Follow up in 6 months with repeat PSA with in person follow up.    Thank you for allowing me to participate in the care of this pleasant patient. If you have any questions, please do not hesitate to contact my office.    Huyen Shoemaker MD  Attending Physician  Radiation Oncology              Again, thank you for allowing me to participate in the care of your patient.        Sincerely,        GRACIA Shoemaker MD

## 2022-10-24 NOTE — PROGRESS NOTES
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    INTERVAL HISTORY:  Prakash Cramer is well known to our clinic. While on treatment he had complaints of mild fatigue which was relieved by rest (grade 1), increase in urinary frequency, tract pain and urgency. Post treatment he is now here in follow up. Energy level and urinary symptoms are back to their baseline. He has baseline diarrhea and it is currently well controlled.  He takes occasional imodium which is normal for him and 6 fiber pills per day. He stopped Flomax once a day and feels his urination is erratic but back to his baseline. He did not think it was beneficial and is reluctant to try any other medications.  There is lower extremity edema only in the evenings (baseline).  His AUA score is 14 (previously 19): Incomplete emptying x2, frequency x5, intermittency x1 (5), urgency x1, weak stream x1 (2), straining x1, nocturia x3 (4). Specifically denies current n/v/ha/sob/cp/new GI or  symptoms.   ELIAS score is 1.      REVIEW OF SYSTEMS: As per HPI, a 14-point review of systems is otherwise negative.    Past Medical History:   Diagnosis Date     Androgen deprivation therapy     Eligard 45 mg injection received 10/26/2020     Aplastic anemia (H) 03/26/2018    thought secondary to reaction to Septra     BPH (benign prostatic hypertrophy)     failed  Flomax     C. difficile diarrhea 03/26/2018    treated with Flagyl     Cellulitis and abscess of leg, except foot 2004     Contact dermatitis and other eczema, due to unspecified cause      Diaphragmatic hernia without mention of obstruction or gangrene     hiatal hernia     Esophageal reflux      History of colonic polyps 01/2020    5 polyps removed and 2 were precancerous so he will have another one in Jan 2021.     Hyperlipidemia LDL  "goal <100 03/11/2005     Hypothyroidism      Impotence of organic origin      Meningococcal encephalitis      Mumps      Prostate cancer (H) 08/26/2020     Proteinuria      RBBB      S/P radiation therapy     7,000 cGy to prostate + SV completed on 6/23/2021 [facility]     Type 2 diabetes mellitus with diabetic nephropathy  (goal A1C<7) 10/24/2015     Unspecified essential hypertension        Past Surgical History:   Procedure Laterality Date     ARTHROPLASTY KNEE Left 03/18/2015    Procedure: ARTHROPLASTY KNEE;  Surgeon: Abebe Schroeder MD;  Location: [facility] OR     ARTHROPLASTY KNEE Right 03/14/2016    Procedure: ARTHROPLASTY KNEE;  Surgeon: Abebe Schroeder MD;  Location: [facility] OR     BIOPSY PROSTATE TRANSRECTAL  08/26/2020     HC LAPAROSCOPY, SURGICAL; CHOLECYSTECTOMY  1998    Cholecystectomy, Laparoscopic     HC REMOVE TONSILS/ADENOIDS,12+ Y/O  1963    T & A 13+y.o.     ZZC NONSPECIFIC PROCEDURE  10/2002    left knee meniscus tear repair       Family History   Problem Relation Age of Onset     Family History Negative Mother         d:age 89 of \"old age\"     Gastrointestinal Disease Father         d:age 79 liver failure after taking excessive amounts of Tylenol over 5-6 yrs     Neurologic Disorder Brother         b:1932  hx cerebral aneurysm age 59     Cancer No family hx of        Social History     Tobacco Use     Smoking status: Never     Smokeless tobacco: Never   Substance Use Topics     Alcohol use: Not Currently       Current Outpatient Medications   Medication     acetaminophen (TYLENOL) 500 MG tablet     amLODIPine (NORVASC) 10 MG tablet     amoxicillin (AMOXIL) 500 MG capsule     blood glucose (ACCU-CHEK GUIDE) test strip     blood glucose monitoring (ACCU-CHEK MULTICLIX) lancets     Blood Glucose Monitoring Suppl (ACCU-CHEK GUIDE ME) w/Device KIT     Cyanocobalamin (B-12) 1000 MCG TBCR     doxycycline hyclate (VIBRA-TABS) 100 MG tablet     famotidine (PEPCID) 20 MG tablet     " levothyroxine (SYNTHROID/LEVOTHROID) 88 MCG tablet     loperamide (IMODIUM A-D) 2 MG tablet     metFORMIN (GLUCOPHAGE XR) 500 MG 24 hr tablet     Multiple Vitamins-Minerals (THERAPEUTIC-M) TABS     omeprazole (PRILOSEC) 20 MG DR capsule     pioglitazone (ACTOS) 30 MG tablet     pravastatin (PRAVACHOL) 20 MG tablet     psyllium (METAMUCIL/KONSYL) capsule     sitagliptin (JANUVIA) 100 MG tablet     No current facility-administered medications for this visit.          Allergies   Allergen Reactions     Sulfonylureas Other (See Comments)     amaryl caused aplastic anemia verified by bone marrow biopsy 3/30/18     Crestor [Rosuvastatin]      Myalgias     Diovan [Valsartan]      increase k+     Glimepiride      Pancytopenia       Invokana [Canagliflozin]      Joint pain     Lipitor [Atorvastatin Calcium]      myalgias     Mold Other (See Comments)     Eyes itchy,red and dry     Penicillins      Seasonal Allergies      Eyes itch         PHYSICAL EXAM:  BP (!) 142/70   Pulse 87   Temp 98.4  F (36.9  C) (Oral)   Resp 16   Wt 75.3 kg (166 lb)   SpO2 97%   BMI 26.00 kg/m    GEN: appears well, in no acute distress, walks with a walker which is his baseline  HEENT: normocephalic and atraumatic, EOMI, anicteric sclerae  CV: 1+LE edema bilaterally, no JVD  RESP: normal respiration on room air, no stridor  LYMPH: No supraclavicular, infraclavicular lymphadenopathy appreciated bilaterally  SKIN: normal color and turgor  MSK: moving all extremities well  NEURO: no focal neurologic deficit  PSYCH: appropriate mood, affect, and judgment    All pertinent laboratory, imaging, and pathology findings have been reviewed.     PSA   Date Value Ref Range Status   12/12/2019 16.20 (H) 0 - 4 ug/L Final     Comment:     Assay Method:  Chemiluminescence using Siemens Vista analyzer   09/02/2008 1.78 0 - 4 ug/L Final   01/02/2008 2.56 0 - 4 ug/L Final   11/13/2002 1.3 0 - 4 ug/L Final     PSA Tumor Marker   Date Value Ref Range Status    10/18/2022 0.02 0.00 - 4.00 ug/L Final   05/02/2022 0.03 0.00 - 4.00 ug/L Final   10/11/2021 0.02 0.00 - 4.00 ug/L Final       IMPRESSION/RECOMMENDATION:  82 year old gentleman with intermediate risk prostate cancer (GS 4+3, PSA 16.2) s/p definitive radiation therapy completed 6/23/21. He is doing well with improvement in radiation toxicity. He had an episode of minor rectal bleeding ~6.5 months ago and completed a course of medical enema. He has not had any GI symptoms since this time.  He is doing well now without any new complaints.  He will return for follow up in 6 months with repeat PSA. He worrell in [place].  He was instructed to call our clinic with any questions or concerns in the interim.    Additional problem list to be addressed in the following manner:    1. Urinary frequency and AUA score of 14.  Back to baseline. Does not feel that Flomax helped him in the past and reluctant to try anything else.    2. He had eligard on 10/26/2020 45mg (6 months) and lupron 30mg (4 months) 5/19/21.  My recommendation was for short-term ADT.  There was a delay in starting his radiation treatment because [place].  He has now completed short-term ADT.    3. Follow up in 6 months with repeat PSA with in person follow up.    Thank you for allowing me to participate in the care of this pleasant patient. If you have any questions, please do not hesitate to contact my office.    Huyen Shoemaker MD  Attending Physician  Radiation Oncology

## 2022-10-24 NOTE — NURSING NOTE
FOLLOW-UP VISIT    Patient Name: Prakash Cramer      : 1940     Age: 82 year old        ______________________________________________________________________________     Chief Complaint   Patient presents with     Radiation Therapy     Return appointment with Dr. Shoemaker     BP (!) 142/70   Pulse 87   Temp 98.4  F (36.9  C) (Oral)   Resp 16   Wt 75.3 kg (166 lb)   SpO2 97%   BMI 26.00 kg/m       Date Radiation Completed: 2021    Pain  Denies pain related to visit. Patient reports he has back pain that leads to some leg weakness with walking.    Meds  Current Med List Reviewed: Yes  Medication Note:     AUA: AUA Score: 14 (10/24/22 1200)  ELIAS: ELIAS Score: 1 (10/24/22 1200)    PSA   Date Value Ref Range Status   2019 16.20 (H) 0 - 4 ug/L Final     Comment:     Assay Method:  Chemiluminescence using Siemens Vista analyzer   2008 1.78 0 - 4 ug/L Final   2008 2.56 0 - 4 ug/L Final   2002 1.3 0 - 4 ug/L Final     PSA Tumor Marker   Date Value Ref Range Status   10/18/2022 0.02 0.00 - 4.00 ug/L Final   2022 0.03 0.00 - 4.00 ug/L Final   10/11/2021 0.02 0.00 - 4.00 ug/L Final       Bowel: Patient reports he was having diarrhea from his Metformin, he has since cut back on his dosage and has noticed this has helped. He also takes 3 fiber capsules 2 x day and Imodium PRN diarrhea to manage stools.    Bladder: nocturia, frequency, and occasional incontinence   Nocturia: 3 - Once every 3 hours    Energy Level: normal    Appointments:   Urologist:       Other Notes: Follow up PSA and return appointment per Dr. Shoemaker.    Brittany Hahn RN

## 2022-12-12 DIAGNOSIS — E11.21 TYPE 2 DIABETES MELLITUS WITH DIABETIC NEPHROPATHY, WITHOUT LONG-TERM CURRENT USE OF INSULIN (H): ICD-10-CM

## 2022-12-12 NOTE — TELEPHONE ENCOUNTER
Patient calling clinic requesting refills for pioglitazone.     Refill pended and routed to refill pool.

## 2022-12-12 NOTE — TELEPHONE ENCOUNTER
Routing refill request to provider for review/approval because:  Labs out of range:    Creatinine   Date Value Ref Range Status   09/19/2022 1.41 (H) 0.66 - 1.25 mg/dL Final   05/12/2021 1.37 (H) 0.66 - 1.25 mg/dL Final         Justice L. Phoenix, RN

## 2022-12-13 RX ORDER — PIOGLITAZONEHYDROCHLORIDE 30 MG/1
30 TABLET ORAL DAILY
Qty: 90 TABLET | Refills: 3 | Status: SHIPPED | OUTPATIENT
Start: 2022-12-13 | End: 2023-08-28

## 2023-01-01 NOTE — TELEPHONE ENCOUNTER
Patient Contact    Attempt # 1    Was call answered?  No.  Left message on voicemail with information to triage back. More recently seen at [facility], does he want this refilled by Dr Meraz?         
Patient states Dr. Meraz is still PCP so he would like it routed to Dr. Kt Rashid RN   
Spontaneous

## 2023-01-09 ENCOUNTER — TELEPHONE ENCOUNTER (OUTPATIENT)
Dept: URBAN - METROPOLITAN AREA CLINIC 23 | Facility: CLINIC | Age: 83
End: 2023-01-09

## 2023-03-31 NOTE — PATIENT INSTRUCTIONS
Stop Levothroxine 75mcg tabs and restart levothyroxine 88 mcg tablet, 1 tablet daily in the morning for thyroid   Zetia 10 mg tablet, 1 tablet daily for cholesterol management    Actos 15 mg tablet, 1 tablet daily for diabetes.  Continue Januvia    In 2 weeks, check blood sugars before breakfast and bedtime for 4 days and send results in a [employer] message.  Also weigh her self at home today and in 2 weeks and include those 2 weights in the [employer] message.  If tolerating medication and blood sugars improving, will adjust Actos dose as necessary.  If side effects of ankle swelling/other fluid retention or blood sugars not improving at all, will then refer to diabetes education for teaching regarding insulin use  Call  [phone] or use [employer] to schedule a future lab appointment  fasting in 6 weeks. For fasting labs, please refrain from eating for 8 hours or more.  Be sure to  drink water and take your  medications the day of the test.    Stable

## 2023-04-25 ENCOUNTER — LAB (OUTPATIENT)
Dept: LAB | Facility: CLINIC | Age: 83
End: 2023-04-25
Payer: MEDICARE

## 2023-04-25 DIAGNOSIS — C61 PROSTATE CANCER (H): ICD-10-CM

## 2023-04-25 DIAGNOSIS — D69.6 THROMBOCYTOPENIA (H): ICD-10-CM

## 2023-04-25 DIAGNOSIS — E11.21 TYPE 2 DIABETES MELLITUS WITH DIABETIC NEPHROPATHY, WITHOUT LONG-TERM CURRENT USE OF INSULIN (H): ICD-10-CM

## 2023-04-25 LAB
ANION GAP SERPL CALCULATED.3IONS-SCNC: 9 MMOL/L (ref 3–14)
BUN SERPL-MCNC: 28 MG/DL (ref 7–30)
CALCIUM SERPL-MCNC: 9 MG/DL (ref 8.5–10.1)
CHLORIDE BLD-SCNC: 100 MMOL/L (ref 94–109)
CO2 SERPL-SCNC: 28 MMOL/L (ref 20–32)
CREAT SERPL-MCNC: 1.37 MG/DL (ref 0.66–1.25)
ERYTHROCYTE [DISTWIDTH] IN BLOOD BY AUTOMATED COUNT: 13.7 % (ref 10–15)
GFR SERPL CREATININE-BSD FRML MDRD: 52 ML/MIN/1.73M2
GLUCOSE BLD-MCNC: 257 MG/DL (ref 70–99)
HBA1C MFR BLD: 7 % (ref 0–5.6)
HCT VFR BLD AUTO: 42.9 % (ref 40–53)
HGB BLD-MCNC: 14.2 G/DL (ref 13.3–17.7)
MCH RBC QN AUTO: 36.4 PG (ref 26.5–33)
MCHC RBC AUTO-ENTMCNC: 33.1 G/DL (ref 31.5–36.5)
MCV RBC AUTO: 110 FL (ref 78–100)
PLATELET # BLD AUTO: 142 10E3/UL (ref 150–450)
POTASSIUM BLD-SCNC: 4.4 MMOL/L (ref 3.4–5.3)
PSA SERPL-MCNC: 0.01 UG/L (ref 0–4)
RBC # BLD AUTO: 3.9 10E6/UL (ref 4.4–5.9)
SODIUM SERPL-SCNC: 137 MMOL/L (ref 133–144)
WBC # BLD AUTO: 4.7 10E3/UL (ref 4–11)

## 2023-04-25 PROCEDURE — 36415 COLL VENOUS BLD VENIPUNCTURE: CPT

## 2023-04-25 PROCEDURE — 80048 BASIC METABOLIC PNL TOTAL CA: CPT

## 2023-04-25 PROCEDURE — 83036 HEMOGLOBIN GLYCOSYLATED A1C: CPT

## 2023-04-25 PROCEDURE — 84153 ASSAY OF PSA TOTAL: CPT

## 2023-04-25 PROCEDURE — 85027 COMPLETE CBC AUTOMATED: CPT

## 2023-05-02 ENCOUNTER — OFFICE VISIT (OUTPATIENT)
Dept: RADIATION ONCOLOGY | Facility: CLINIC | Age: 83
End: 2023-05-02
Payer: MEDICARE

## 2023-05-02 VITALS
DIASTOLIC BLOOD PRESSURE: 73 MMHG | BODY MASS INDEX: 27.14 KG/M2 | SYSTOLIC BLOOD PRESSURE: 115 MMHG | OXYGEN SATURATION: 97 % | RESPIRATION RATE: 16 BRPM | TEMPERATURE: 97 F | WEIGHT: 173.3 LBS | HEART RATE: 84 BPM

## 2023-05-02 DIAGNOSIS — C61 PROSTATE CANCER (H): Primary | ICD-10-CM

## 2023-05-02 PROCEDURE — 99214 OFFICE O/P EST MOD 30 MIN: CPT | Performed by: RADIOLOGY

## 2023-05-02 ASSESSMENT — PAIN SCALES - GENERAL: PAINLEVEL: MODERATE PAIN (5)

## 2023-05-02 NOTE — LETTER
5/2/2023         RE: Prakash Cramer  [address]  [facility] [address]        Dear Colleague,    Thank you for referring your patient, Prakash Cramer, to the [facility] [place]. Please see a copy of my visit note below.    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    INTERVAL HISTORY:  Prakash Cramer is well known to our clinic. While on treatment he had complaints of mild fatigue which was relieved by rest (grade 1), increase in urinary frequency, tract pain and urgency. Post treatment he is now here in follow up. Energy level and urinary symptoms are back to their baseline. He has baseline diarrhea and it is currently well controlled.  He takes occasional imodium which is normal for him and 6 fiber pills per day. He stopped Flomax once a day and feels his urination is erratic but back to his baseline. He did not think it was beneficial and is reluctant to try any other medications.  There is lower extremity edema only in the evenings (baseline).  His AUA score is 16: Incomplete emptying x1, frequency x4, intermittency x1, urgency x3, weak stream x2, straining x1, nocturia x4. Specifically denies current n/v/ha/sob/cp/new GI or  symptoms.   ELIAS score is 1.      REVIEW OF SYSTEMS: As per HPI, a 14-point review of systems is otherwise negative.    Past Medical History:   Diagnosis Date     Androgen deprivation therapy     Eligard 45 mg injection received 10/26/2020     Aplastic anemia (H) 03/26/2018    thought secondary to reaction to Septra     BPH (benign prostatic hypertrophy)     failed  Flomax     C. difficile diarrhea 03/26/2018    treated with Flagyl     Cellulitis and abscess of leg, except foot 2004     Contact dermatitis and other eczema, due to unspecified cause      Diaphragmatic  "hernia without mention of obstruction or gangrene     hiatal hernia     Esophageal reflux      History of colonic polyps 01/2020    5 polyps removed and 2 were precancerous so he will have another one in Jan 2021.     Hyperlipidemia LDL goal <100 03/11/2005     Hypothyroidism      Impotence of organic origin      Meningococcal encephalitis      Mumps      Prostate cancer (H) 08/26/2020     Proteinuria      RBBB      S/P radiation therapy     7,000 cGy to prostate + SV completed on 6/23/2021 [facility]     Type 2 diabetes mellitus with diabetic nephropathy  (goal A1C<7) 10/24/2015     Unspecified essential hypertension        Past Surgical History:   Procedure Laterality Date     ARTHROPLASTY KNEE Left 03/18/2015    Procedure: ARTHROPLASTY KNEE;  Surgeon: Abebe Schroeder MD;  Location: [facility] OR     ARTHROPLASTY KNEE Right 03/14/2016    Procedure: ARTHROPLASTY KNEE;  Surgeon: Abebe Schroeder MD;  Location: [facility] OR     BIOPSY PROSTATE TRANSRECTAL  08/26/2020     HC LAPAROSCOPY, SURGICAL; CHOLECYSTECTOMY  1998    Cholecystectomy, Laparoscopic     HC REMOVE TONSILS/ADENOIDS,12+ Y/O  1963    T & A 13+y.o.     ZZC NONSPECIFIC PROCEDURE  10/2002    left knee meniscus tear repair       Family History   Problem Relation Age of Onset     Family History Negative Mother         d:age 89 of \"old age\"     Gastrointestinal Disease Father         d:age 79 liver failure after taking excessive amounts of Tylenol over 5-6 yrs     Neurologic Disorder Brother         b:1932  hx cerebral aneurysm age 59     Cancer No family hx of        Social History     Tobacco Use     Smoking status: Never     Smokeless tobacco: Never   Vaping Use     Vaping status: Not on file   Substance Use Topics     Alcohol use: Not Currently       Current Outpatient Medications   Medication     acetaminophen (TYLENOL) 500 MG tablet     amLODIPine (NORVASC) 10 MG tablet     blood glucose (ACCU-CHEK GUIDE) test strip     blood glucose monitoring " (ACCU-CHEK MULTICLIX) lancets     Blood Glucose Monitoring Suppl (ACCU-CHEK GUIDE ME) w/Device KIT     levothyroxine (SYNTHROID/LEVOTHROID) 88 MCG tablet     loperamide (IMODIUM A-D) 2 MG tablet     metFORMIN (GLUCOPHAGE XR) 500 MG 24 hr tablet     omeprazole (PRILOSEC) 20 MG DR capsule     pioglitazone (ACTOS) 30 MG tablet     pravastatin (PRAVACHOL) 20 MG tablet     sitagliptin (JANUVIA) 100 MG tablet     Cyanocobalamin (B-12) 1000 MCG TBCR     Multiple Vitamins-Minerals (THERAPEUTIC-M) TABS     No current facility-administered medications for this visit.          Allergies   Allergen Reactions     Sulfonylureas Other (See Comments)     amaryl caused aplastic anemia verified by bone marrow biopsy 3/30/18     Crestor [Rosuvastatin]      Myalgias     Diovan [Valsartan]      increase k+     Glimepiride      Pancytopenia       Invokana [Canagliflozin]      Joint pain     Lipitor [Atorvastatin Calcium]      myalgias     Mold Other (See Comments)     Eyes itchy,red and dry     Penicillins      Seasonal Allergies      Eyes itch         PHYSICAL EXAM:  /73   Pulse 84   Temp 97  F (36.1  C) (Oral)   Resp 16   Wt 78.6 kg (173 lb 4.8 oz)   SpO2 97%   BMI 27.14 kg/m    GEN: appears well, in no acute distress, walks with a walker which is his baseline  HEENT: normocephalic and atraumatic, EOMI, anicteric sclerae  CV: 1+LE edema bilaterally that is stable, no JVD  RESP: normal respiration on room air, no stridor  LYMPH: No supraclavicular, infraclavicular lymphadenopathy appreciated bilaterally  SKIN: normal color and turgor  MSK: moving all extremities well  NEURO: no focal neurologic deficit  PSYCH: appropriate mood, affect, and judgment    All pertinent laboratory, imaging, and pathology findings have been reviewed.     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 He is doing well with improvement in radiation toxicity. He had an episode of minor rectal bleeding ~9.5 months ago and completed a course of medical enema. He has not had any GI symptoms since this time.  He is doing well now without any new complaints.  He will return for follow up in 6 months with repeat PSA. He worrell in [place].  He was instructed to call our clinic with any questions or concerns in the interim.    Additional problem list to be addressed in the following manner:    1. Urinary frequency and AUA score of 16.  Back to baseline. Does not feel that Flomax helped him in the past and reluctant to try anything else.    2. He had eligard on 10/26/2020 45mg (6 months) and lupron 30mg (4 months) 5/19/21.  My recommendation was for short-term ADT.  There was a delay in starting his radiation treatment because [place].  He has now completed short-term ADT.    3. Follow up in 6 months with repeat PSA with in person follow up.    Thank you for allowing me to participate in the care of this pleasant patient. If you have any questions, please do not hesitate to contact my office.    Huyen Shoemaker MD  Attending Physician  Radiation Oncology              Again, thank you for allowing me to participate in the care of your patient.        Sincerely,        GRACIA Shoemaker MD

## 2023-05-02 NOTE — NURSING NOTE
FOLLOW-UP VISIT    Patient Name: Prakash Cramer      : 1940     Age: 83 year old        ______________________________________________________________________________     Chief Complaint   Patient presents with     Radiation Therapy     Return appointment with Dr. Shoemaker     /73   Pulse 84   Temp 97  F (36.1  C) (Oral)   Resp 16   Wt 78.6 kg (173 lb 4.8 oz)   SpO2 97%   BMI 27.14 kg/m       Date Radiation Completed: 2021    Pain  Denies pain related to visit, patient reports low back pain.    Meds  Current Med List Reviewed: Yes  Medication Note:     AUA: AUA Score: 16 (23 1200)  ELIAS: ELIAS Score: 1 (23 1200)    PSA   Date Value Ref Range Status   2019 16.20 (H) 0 - 4 ug/L Final     Comment:     Assay Method:  Chemiluminescence using Siemens Vista analyzer   2008 1.78 0 - 4 ug/L Final   2008 2.56 0 - 4 ug/L Final   2002 1.3 0 - 4 ug/L Final     PSA Tumor Marker   Date Value Ref Range Status   2023 0.01 0.00 - 4.00 ug/L Final   10/18/2022 0.02 0.00 - 4.00 ug/L Final   2022 0.03 0.00 - 4.00 ug/L Final   10/11/2021 0.02 0.00 - 4.00 ug/L Final       Bowel: Patient reports occasional diarrhea, taking Imodium PRN and avoiding, spicy, greasy foods, he also reports his PCP has decreased his Metformin which has helped his diarrhea.    Bladder: frequency, urgency, and weak stream  Nocturia: 4 - Once every 2 hours    Energy Level: low- baseline    Appointments:   Urologist:       Other Notes: Follow up PSA and return appointment with Dr. Shoemaker in 6 months.    Brittany Hahn RN

## 2023-05-03 NOTE — PROGRESS NOTES
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    INTERVAL HISTORY:  Parkash Cramer is well known to our clinic. While on treatment he had complaints of mild fatigue which was relieved by rest (grade 1), increase in urinary frequency, tract pain and urgency. Post treatment he is now here in follow up. Energy level and urinary symptoms are back to their baseline. He has baseline diarrhea and it is currently well controlled.  He takes occasional imodium which is normal for him and 6 fiber pills per day. He stopped Flomax once a day and feels his urination is erratic but back to his baseline. He did not think it was beneficial and is reluctant to try any other medications.  There is lower extremity edema only in the evenings (baseline).  His AUA score is 16: Incomplete emptying x1, frequency x4, intermittency x1, urgency x3, weak stream x2, straining x1, nocturia x4. Specifically denies current n/v/ha/sob/cp/new GI or  symptoms.   ELIAS score is 1.      REVIEW OF SYSTEMS: As per HPI, a 14-point review of systems is otherwise negative.    Past Medical History:   Diagnosis Date     Androgen deprivation therapy     Eligard 45 mg injection received 10/26/2020     Aplastic anemia (H) 03/26/2018    thought secondary to reaction to Septra     BPH (benign prostatic hypertrophy)     failed  Flomax     C. difficile diarrhea 03/26/2018    treated with Flagyl     Cellulitis and abscess of leg, except foot 2004     Contact dermatitis and other eczema, due to unspecified cause      Diaphragmatic hernia without mention of obstruction or gangrene     hiatal hernia     Esophageal reflux      History of colonic polyps 01/2020    5 polyps removed and 2 were precancerous so he will have another one in Jan 2021.     Hyperlipidemia LDL goal <100 03/11/2005      "Hypothyroidism      Impotence of organic origin      Meningococcal encephalitis      Mumps      Prostate cancer (H) 08/26/2020     Proteinuria      RBBB      S/P radiation therapy     7,000 cGy to prostate + SV completed on 6/23/2021 [facility]     Type 2 diabetes mellitus with diabetic nephropathy  (goal A1C<7) 10/24/2015     Unspecified essential hypertension        Past Surgical History:   Procedure Laterality Date     ARTHROPLASTY KNEE Left 03/18/2015    Procedure: ARTHROPLASTY KNEE;  Surgeon: Abebe Schroeder MD;  Location: [facility] OR     ARTHROPLASTY KNEE Right 03/14/2016    Procedure: ARTHROPLASTY KNEE;  Surgeon: Abebe Schroeder MD;  Location: [facility] OR     BIOPSY PROSTATE TRANSRECTAL  08/26/2020     HC LAPAROSCOPY, SURGICAL; CHOLECYSTECTOMY  1998    Cholecystectomy, Laparoscopic     HC REMOVE TONSILS/ADENOIDS,12+ Y/O  1963    T & A 13+y.o.     ZZC NONSPECIFIC PROCEDURE  10/2002    left knee meniscus tear repair       Family History   Problem Relation Age of Onset     Family History Negative Mother         d:age 89 of \"old age\"     Gastrointestinal Disease Father         d:age 79 liver failure after taking excessive amounts of Tylenol over 5-6 yrs     Neurologic Disorder Brother         b:1932  hx cerebral aneurysm age 59     Cancer No family hx of        Social History     Tobacco Use     Smoking status: Never     Smokeless tobacco: Never   Vaping Use     Vaping status: Not on file   Substance Use Topics     Alcohol use: Not Currently       Current Outpatient Medications   Medication     acetaminophen (TYLENOL) 500 MG tablet     amLODIPine (NORVASC) 10 MG tablet     blood glucose (ACCU-CHEK GUIDE) test strip     blood glucose monitoring (ACCU-CHEK MULTICLIX) lancets     Blood Glucose Monitoring Suppl (ACCU-CHEK GUIDE ME) w/Device KIT     levothyroxine (SYNTHROID/LEVOTHROID) 88 MCG tablet     loperamide (IMODIUM A-D) 2 MG tablet     metFORMIN (GLUCOPHAGE XR) 500 MG 24 hr tablet     omeprazole " (PRILOSEC) 20 MG DR capsule     pioglitazone (ACTOS) 30 MG tablet     pravastatin (PRAVACHOL) 20 MG tablet     sitagliptin (JANUVIA) 100 MG tablet     Cyanocobalamin (B-12) 1000 MCG TBCR     Multiple Vitamins-Minerals (THERAPEUTIC-M) TABS     No current facility-administered medications for this visit.          Allergies   Allergen Reactions     Sulfonylureas Other (See Comments)     amaryl caused aplastic anemia verified by bone marrow biopsy 3/30/18     Crestor [Rosuvastatin]      Myalgias     Diovan [Valsartan]      increase k+     Glimepiride      Pancytopenia       Invokana [Canagliflozin]      Joint pain     Lipitor [Atorvastatin Calcium]      myalgias     Mold Other (See Comments)     Eyes itchy,red and dry     Penicillins      Seasonal Allergies      Eyes itch         PHYSICAL EXAM:  /73   Pulse 84   Temp 97  F (36.1  C) (Oral)   Resp 16   Wt 78.6 kg (173 lb 4.8 oz)   SpO2 97%   BMI 27.14 kg/m    GEN: appears well, in no acute distress, walks with a walker which is his baseline  HEENT: normocephalic and atraumatic, EOMI, anicteric sclerae  CV: 1+LE edema bilaterally that is stable, no JVD  RESP: normal respiration on room air, no stridor  LYMPH: No supraclavicular, infraclavicular lymphadenopathy appreciated bilaterally  SKIN: normal color and turgor  MSK: moving all extremities well  NEURO: no focal neurologic deficit  PSYCH: appropriate mood, affect, and judgment    All pertinent laboratory, imaging, and pathology findings have been reviewed.     PSA   Date Value Ref Range Status   12/12/2019 16.20 (H) 0 - 4 ug/L Final     Comment:     Assay Method:  Chemiluminescence using Siemens Vista analyzer   09/02/2008 1.78 0 - 4 ug/L Final   01/02/2008 2.56 0 - 4 ug/L Final   11/13/2002 1.3 0 - 4 ug/L Final     PSA Tumor Marker   Date Value Ref Range Status   04/25/2023 0.01 0.00 - 4.00 ug/L Final   10/18/2022 0.02 0.00 - 4.00 ug/L Final   05/02/2022 0.03 0.00 - 4.00 ug/L Final   10/11/2021 0.02 0.00 -  4.00 ug/L Final       IMPRESSION/RECOMMENDATION:  82 year old gentleman with intermediate risk prostate cancer (GS 4+3, PSA 16.2) s/p definitive radiation therapy completed 6/23/21. He is doing well with improvement in radiation toxicity. He had an episode of minor rectal bleeding ~9.5 months ago and completed a course of medical enema. He has not had any GI symptoms since this time.  He is doing well now without any new complaints.  He will return for follow up in 6 months with repeat PSA. He worrell in [place].  He was instructed to call our clinic with any questions or concerns in the interim.    Additional problem list to be addressed in the following manner:    1. Urinary frequency and AUA score of 16.  Back to baseline. Does not feel that Flomax helped him in the past and reluctant to try anything else.    2. He had eligard on 10/26/2020 45mg (6 months) and lupron 30mg (4 months) 5/19/21.  My recommendation was for short-term ADT.  There was a delay in starting his radiation treatment because [place] was [place].  He has now completed short-term ADT.    3. Follow up in 6 months with repeat PSA with in person follow up.    Thank you for allowing me to participate in the care of this pleasant patient. If you have any questions, please do not hesitate to contact my office.    Huyen Shoemaker MD  Attending Physician  Radiation Oncology

## 2023-05-04 ENCOUNTER — TELEPHONE (OUTPATIENT)
Dept: INTERNAL MEDICINE | Facility: CLINIC | Age: 83
End: 2023-05-04

## 2023-05-04 NOTE — TELEPHONE ENCOUNTER
Test Results        Who ordered the test:  Kt    Type of test: Lab    Date of test:  4/25/2023    Where was the test performed:  [facility]    What are your questions/concerns?:  Patient would like a call from provider on his lab    Okay to leave a detailed message?: Yes at Cell number on file:    Telephone Information:   Mobile [phone]

## 2023-08-21 ENCOUNTER — TELEPHONE (OUTPATIENT)
Dept: INTERNAL MEDICINE | Facility: CLINIC | Age: 83
End: 2023-08-21
Payer: MEDICARE

## 2023-08-21 DIAGNOSIS — N39.0 URINARY TRACT INFECTION WITHOUT HEMATURIA, SITE UNSPECIFIED: Primary | ICD-10-CM

## 2023-08-21 DIAGNOSIS — E11.21 TYPE 2 DIABETES MELLITUS WITH DIABETIC NEPHROPATHY, WITHOUT LONG-TERM CURRENT USE OF INSULIN (H): ICD-10-CM

## 2023-08-21 NOTE — TELEPHONE ENCOUNTER
If pt is feeling fine, OK to wait until 8/28/23 to see me and will do ER follow-up and Medicare Wellness visit at same appt so that doesn't have to make a future long drive in October also. Add medicare Wellness  to reason for appt 8/28 along with ER follow-up in chart

## 2023-08-22 NOTE — TELEPHONE ENCOUNTER
Pt has been taking antibiotics as prescribed, and feels ok. Pt is planning on attending appointment on 8/28. Relayed provider message that request was approved to combine AWV and hosp follow up. Pt needing reinforcement for understanding, stating he is confused about appointments. Will route to triage, please follow up with pt regarding schedule.     Pt requesting lab draw before appointment. UA, 6 month labs, hemeglobin A1c.     Routing to Dr. Meraz; please review pending labs and sign if approved. Add on 6 month labs if appropriate.

## 2023-08-23 RX ORDER — METFORMIN HCL 500 MG
500 TABLET, EXTENDED RELEASE 24 HR ORAL DAILY
Start: 2023-08-23 | End: 2023-08-28

## 2023-08-23 NOTE — TELEPHONE ENCOUNTER
Pt to see me 8/28/23 as scheduled and have a fasting lab appt 1-3 days prior to that  to do fasting blood and urine labs. Pt to have water and take meds AM of lab appt. Labs ordered. Assist pt with scheduling lab appt

## 2023-08-23 NOTE — TELEPHONE ENCOUNTER
Spoke to patient to relay providers message. Lab appt scheduled.     Future Appointments 8/23/2023 - 2/19/2024        Date Visit Type Length Department Provider     8/24/2023 10:15 AM LAB 15 [facility]    Location Instructions:     The clinic is located at [address] in the [facility] in [place].&nbsp; We offer free parking in our on-site lot.              8/28/2023 10:00 AM OFFICE VISIT 30 min  INTERNAL MEDICINE Zhou Meraz MD    Location Instructions:     [facility] is in the [facility] at [address] in [place]. This just east of the [address] exit off of [address] Free parking is available; access the lot from [address] [place] [address] or [facility].               9/28/2023 10:30 AM LAB 15 [facility]    Location Instructions:     The clinic is located at [address] in the [facility] in [place].&nbsp; We offer free parking in our on-site lot.              10/5/2023  2:30 PM ANNUAL WELLNESS 30 min  INTERNAL MEDICINE Hawk Emerson MD    Location Instructions:     [facility] is in the [facility] at [address] in [place]. This just east of the [facility] [address] exit off of [address]. Free parking is available; access the lot from [address] [place] [address] or [facility].

## 2023-08-28 ENCOUNTER — OFFICE VISIT (OUTPATIENT)
Dept: INTERNAL MEDICINE | Facility: CLINIC | Age: 83
End: 2023-08-28
Payer: MEDICARE

## 2023-08-28 VITALS
OXYGEN SATURATION: 97 % | TEMPERATURE: 98.7 F | HEIGHT: 67 IN | SYSTOLIC BLOOD PRESSURE: 126 MMHG | HEART RATE: 79 BPM | DIASTOLIC BLOOD PRESSURE: 66 MMHG | BODY MASS INDEX: 25.9 KG/M2 | WEIGHT: 165 LBS

## 2023-08-28 DIAGNOSIS — E03.9 HYPOTHYROIDISM, UNSPECIFIED TYPE: ICD-10-CM

## 2023-08-28 DIAGNOSIS — I10 ESSENTIAL HYPERTENSION: ICD-10-CM

## 2023-08-28 DIAGNOSIS — N18.31 STAGE 3A CHRONIC KIDNEY DISEASE (H): ICD-10-CM

## 2023-08-28 DIAGNOSIS — D69.6 THROMBOCYTOPENIA (H): ICD-10-CM

## 2023-08-28 DIAGNOSIS — R31.9 URINARY TRACT INFECTION WITH HEMATURIA, SITE UNSPECIFIED: ICD-10-CM

## 2023-08-28 DIAGNOSIS — Z00.00 MEDICARE ANNUAL WELLNESS VISIT, SUBSEQUENT: Primary | ICD-10-CM

## 2023-08-28 DIAGNOSIS — E78.5 HYPERLIPIDEMIA LDL GOAL <100: ICD-10-CM

## 2023-08-28 DIAGNOSIS — E11.21 TYPE 2 DIABETES MELLITUS WITH DIABETIC NEPHROPATHY, WITHOUT LONG-TERM CURRENT USE OF INSULIN (H): ICD-10-CM

## 2023-08-28 DIAGNOSIS — N39.0 URINARY TRACT INFECTION WITH HEMATURIA, SITE UNSPECIFIED: ICD-10-CM

## 2023-08-28 DIAGNOSIS — C61 PROSTATE CANCER (H): ICD-10-CM

## 2023-08-28 PROCEDURE — G0439 PPPS, SUBSEQ VISIT: HCPCS | Performed by: INTERNAL MEDICINE

## 2023-08-28 PROCEDURE — 99214 OFFICE O/P EST MOD 30 MIN: CPT | Mod: 25 | Performed by: INTERNAL MEDICINE

## 2023-08-28 RX ORDER — AMLODIPINE BESYLATE 10 MG/1
10 TABLET ORAL DAILY
Qty: 90 TABLET | Refills: 3 | Status: SHIPPED | OUTPATIENT
Start: 2023-08-28 | End: 2024-09-17

## 2023-08-28 RX ORDER — PRAVASTATIN SODIUM 40 MG
40 TABLET ORAL DAILY
Qty: 90 TABLET | Refills: 3 | Status: SHIPPED | OUTPATIENT
Start: 2023-08-28 | End: 2024-09-17

## 2023-08-28 RX ORDER — METFORMIN HCL 500 MG
500 TABLET, EXTENDED RELEASE 24 HR ORAL DAILY
Qty: 90 TABLET | Refills: 3 | Status: SHIPPED | OUTPATIENT
Start: 2023-08-28 | End: 2024-09-17

## 2023-08-28 RX ORDER — FAMOTIDINE 20 MG/1
1 TABLET, FILM COATED ORAL
COMMUNITY
Start: 2023-06-27 | End: 2023-10-13

## 2023-08-28 RX ORDER — PIOGLITAZONEHYDROCHLORIDE 30 MG/1
30 TABLET ORAL DAILY
Qty: 90 TABLET | Refills: 3 | Status: SHIPPED | OUTPATIENT
Start: 2023-08-28 | End: 2024-09-17

## 2023-08-28 RX ORDER — LEVOTHYROXINE SODIUM 88 UG/1
88 TABLET ORAL DAILY
Qty: 90 TABLET | Refills: 3 | Status: SHIPPED | OUTPATIENT
Start: 2023-08-28 | End: 2024-09-17

## 2023-08-28 NOTE — PROGRESS NOTES
ASSESSMENT:   1. Medicare annual wellness visit, subsequent  See plan discussion below for healthcare maintenance recommendations.  Otherwise up-to-date for age    2. Type 2 diabetes mellitus with diabetic nephropathy, without long-term current use of insulin (H)  Controlled.  Recent A1c 7.4.  Goal A1c less than 8 with age.  Continue current medical management.  Repeat labs 6 months  - metFORMIN (GLUCOPHAGE XR) 500 MG 24 hr tablet; Take 1 tablet (500 mg) by mouth daily  Dispense: 90 tablet; Refill: 3  - pioglitazone (ACTOS) 30 MG tablet; Take 1 tablet (30 mg) by mouth daily  Dispense: 90 tablet; Refill: 3  - sitagliptin (JANUVIA) 100 MG tablet; Take 1 tablet (100 mg) by mouth daily  Dispense: 90 tablet; Refill: 3  - Basic metabolic panel; Future  - Hemoglobin A1c; Future  - Albumin Random Urine Quantitative with Creat Ratio; Future    3. Stage 3a chronic kidney disease (H)  Stable.  Repeat lab 6 months  - Basic metabolic panel; Future  - Albumin Random Urine Quantitative with Creat Ratio; Future    4. Thrombocytopenia (H)  Stable.  Repeat lab 6 months  - CBC with platelets; Future    5. Essential hypertension  Controlled. Continue current medication  - amLODIPine (NORVASC) 10 MG tablet; Take 1 tablet (10 mg) by mouth daily  Dispense: 90 tablet; Refill: 3  - Basic metabolic panel; Future    6. Hypothyroidism, unspecified type  Recent  outside lab from August showed normal TSH 2.1.  Continue current medication.  Repeat labs 6 months  - levothyroxine (SYNTHROID/LEVOTHROID) 88 MCG tablet; Take 1 tablet (88 mcg) by mouth daily  Dispense: 90 tablet; Refill: 3  - TSH with free T4 reflex; Future    7. Hyperlipidemia LDL goal <100  Uncontrolled.  Patient has been intolerant to multiple statin trials but tolerates pravastatin.  Has failed Zetia.  Patient not interested in Repatha or Nexletol.  Increase pravastatin and repeat lab 1 month  - pravastatin (PRAVACHOL) 40 MG tablet; Take 1 tablet (40 mg) by mouth daily  Dispense:  90 tablet; Refill: 3  - Lipid panel reflex to direct LDL Fasting; Future  - Hepatic function panel; Future  - CK total; Future    8. Urinary tract infection with hematuria, site unspecified  Recently treated to ER with cephalexin.  Denies dysuria, visible hematuria.  Slight microhematuria on recent urinalysis likely related to resolving UTI.  Recheck urinalysis when comes to the lab in a month for lipid evaluation  - UA with Microscopic reflex to Culture - lab collect; Future    9. Prostate cancer (H)  Most recent PSA undetectable.  Continue management per radiation oncology        PLAN:   Increase Pravastatin  to 20mg tab, 2 tabs (40mg) daily until run out. Then change to 40mg tab., 1 tab daily for cholesterol management  Continue other medications  Prescriptions refilled.    Call  [phone] or use [facility] to schedule a future lab appointment  fasting in 5 weeks (early October)    For fasting labs, please refrain from eating for 8 hours or more.   Drink 2 glasses of water before your lab appointment. It is fine to take your  oral medications on the morning of the lab test as usual  Vaccinations: Tetanus (Td) vaccine. Get at pharmacy in September  Covid  booster  and influenza/flu vaccinations in the  Fall (mid/late October).  May get through a pharmacy or at clinic  Follow-up with Rad Oncology re: prostate cancer per their instructions   Eye appt  later this year   Repeat nonfasting  labs in Spring 2024 when return from [place]  Pt was informed regarding extra E&M billing for management of new or established medical issues not related to today's wellness/screening visit      (Chart documentation was completed, in part, with [employer] voice-recognition software. Even though reviewed, some grammatical, spelling, and word errors may remain.)    Zhou Meraz MD  Internal Medicine Department  [facility] REC REQUIRED  Post Medication Reconciliation Status:  Discharge medications  "reconciled and changed, see notes/orders       Subjective   Prakash is a 83 year old, presenting for the following health issues:  ER F/U      HPI     ED/UC Followup:    Facility:  [facility] ER  Date of visit: 2023  Reason for visit: Lightheadedness  Current Status: Resolved    Additional concerns: wants to review lab results,     Annual Wellness Visit    Patient has been advised of split billing requirements and indicates understanding: Yes     Are you in the first 12 months of your Medicare Part B coverage?  No    Physical Health:  In general, how would you rate your overall physical health? good  Outside of work, how many days during the week do you exercise?none  Outside of work, approximately how many minutes a day do you exercise?not applicable  If you drink alcohol do you typically have >3 drinks per day or >7 drinks per week? No  Do you usually eat at least 4 servings of fruit and vegetables a day, include whole grains & fiber and avoid regularly eating high fat or \"junk\" foods? No  Do you have any problems taking medications regularly? No  Do you have any side effects from medications?  Metformin causing loose stool  Needs assistance for the following daily activities: no assistance needed  Which of the following safety concerns are present in your home?  none identified   Hearing impairment: No  In the past 6 months, have you been bothered by leaking of urine? yes    Mental Health:  In general, how would you rate your overall mental or emotional health? excellent  PHQ-2 Score:      Do you feel safe in your environment? Yes    Have you ever done Advance Care Planning? (For example, a Health Directive, POLST, or a discussion with a medical provider or your loved ones about your wishes)? Yes, advance care planning is on file.    Fall risk:  Fallen 2 or more times in the past year?: Yes  Any fall with injury in the past year?: No    Cognitive Screenin) Repeat 3 items (Leader, Season, Table) "    2) Clock draw: ABNORMAL hands in wrong place  3) 3 item recall: Recalls 3 objects  Results: 3 items recalled: COGNITIVE IMPAIRMENT LESS LIKELY    Mini-CogTM Copyright GUILLERMO Hernández. Licensed by the author for use in [facility]; reprinted with permission (leonides@[facility]). All rights reserved.      Do you have sleep apnea, excessive snoring or daytime drowsiness? : no    Social History     Tobacco Use    Smoking status: Never    Smokeless tobacco: Never   Substance Use Topics    Alcohol use: Not Currently         12/20/2019    10:57 AM   Alcohol Use   Prescreen: >3 drinks/day or >7 drinks/week? No     Do you have a current opioid prescription? No  Do you use any other controlled substances or medications that are not prescribed by a provider? None    Current providers sharing in care for this patient include:   Patient Care Team:  Zhou Meraz MD as PCP - General (Internal Medicine)  Terra Salazar MD as MD (Hematology & Oncology)  Zhou Meraz MD as Assigned PCP  Tanvi Shoemaker MD as MD (Radiation Oncology)  Tanvi Shoemaker MD as Assigned Cancer Care Provider    The following health maintenance items are reviewed in Epic and correct as of today:  Health Maintenance   Topic Date Due    ANNUAL REVIEW OF HM ORDERS  Never done    COVID-19 Vaccine (3 - Moderna risk series) 02/08/2022    EYE EXAM  09/01/2022    MEDICARE ANNUAL WELLNESS VISIT  12/29/2022    DIABETIC FOOT EXAM  12/29/2022    PHQ-2 (once per calendar year)  01/01/2023    DTAP/TDAP/TD IMMUNIZATION (2 - Td or Tdap) 02/06/2023    INFLUENZA VACCINE (1) 09/01/2023    TSH W/FREE T4 REFLEX  09/27/2023    FALL RISK ASSESSMENT  09/27/2023    A1C  02/24/2024    HEMOGLOBIN  04/25/2024    ALT  08/24/2024    BMP  08/24/2024    LIPID  08/24/2024    MICROALBUMIN  08/24/2024    CREATININE  08/24/2024    COLORECTAL CANCER SCREENING  01/23/2025    ADVANCE CARE PLANNING  12/29/2026    Pneumococcal Vaccine: 65+ Years  Completed    URINALYSIS  Completed     ZOSTER IMMUNIZATION  Completed    IPV IMMUNIZATION  Aged Out    MENINGITIS IMMUNIZATION  Aged Out       Patient has been advised of split billing requirements and indicates understanding: Yes    Appropriate preventive services were discussed with this patient, including applicable screening as appropriate for fall prevention, nutrition, physical activity, Tobacco-use cessation, weight loss and cognition.  Checklist reviewing preventive services available has been given to the patient.    Component      Latest Ref Rng 4/25/2023  9:46 AM 8/24/2023  9:38 AM   Sodium      136 - 145 mmol/L 137  137    Potassium      3.4 - 5.3 mmol/L  4.6    Chloride      98 - 107 mmol/L  102    Carbon Dioxide (CO2)      22 - 29 mmol/L  21 (L)    Anion Gap      7 - 15 mmol/L 9  14    Urea Nitrogen      8.0 - 23.0 mg/dL  28.0 (H)    Creatinine      0.67 - 1.17 mg/dL 1.37 (H)  1.45 (H)    Calcium      8.8 - 10.2 mg/dL 9.0  9.2    Glucose      70 - 99 mg/dL  187 (H)    Alkaline Phosphatase      40 - 129 U/L  101    AST      0 - 45 U/L  21    ALT      0 - 70 U/L  14    Protein Total      6.4 - 8.3 g/dL  7.1    Albumin      3.5 - 5.2 g/dL  4.3    Bilirubin Total      <=1.2 mg/dL  0.5    GFR Estimate      >60 mL/min/1.73m2 52 (L)  48 (L)    Potassium      3.4 - 5.3 mmol/L 4.4     Chloride      94 - 109 mmol/L 100     Carbon Dioxide      20 - 32 mmol/L 28     Urea Nitrogen      7 - 30 mg/dL 28     Glucose      70 - 99 mg/dL 257 (H)     Cholesterol      <200 mg/dL  275 (H)    Triglycerides      <150 mg/dL  338 (H)    HDL Cholesterol      >=40 mg/dL  64    LDL Cholesterol Calculated      <=100 mg/dL  143 (H)    Non HDL Cholesterol      <130 mg/dL  211 (H)    PSA      0.00 - 4.00 ug/L 0.01     Hemoglobin A1C      0.0 - 5.6 % 7.0 (H)  7.4 (H)       Recent emergency room note reviewed.  Part of note is below:    CHIEF COMPLAINT:  Lightheadedness     HPI:  Initial history obtained at 8:03 AM 08/19/23.     Prakash Cramer is a 83 y.o. male who presents  to the emergency department for evaluation of lightheadedness. The patient has past medical history of non-insulin dependent diabetes. He began to have lightheadedness yesterday evening. He did not attempt any medications or therapies for these symptoms. He was still not feeling improvement by today, which prompted him to call 911 to come to the ED this morning. He was noted to have elevated blood sugar 270s by EMS responders, but otherwise stable VS en route. Upon arrival here, the patient endorses feeling lightheaded and like he would faint, although he did not actually have loss of consciousness with any of the 2 episodes yesterday or 1 episode today. He noticed that symptoms were provoked when he transitioned from seated to standing. He denies additional worsening or alleviating factors. He also endorses associated intermittent headache this morning. The patient reports he has never experienced symptoms like this previously. He reports that he typically monitors his blood sugars once each day in the morning before breakfast and they are typically 140-200s. He has not checked his blood sugar in several days now, however. He did already eat breakfast today. The patient denies further associated symptoms, such as chest pain, shortness of breath, nausea or vomiting.     MDM:   Prakash Cramer is a 83 y.o. male with history and exam suggestive of possible electrolyte abnormality, dehydration, acute coronary syndrome, orthostatic hypotension, urinary tract infection also considered.  Diagnostic eval as outlined as above showing no evidence of acute coronary syndrome or other significant abnormality of CBC or the blood test. Urinalysis does suggest presence of UTI with positive leuk esterase and bacteria.  I reviewed the results of the diagnostic studies with the patient. He is reexamined and continues to have a normal neuro exam. He is steady on his feet. I feel that he is safe to be discharged home with prescription  "for Keflex for treatment of UTI. Return precautions are discussed and he is encouraged to follow-up with his primary care provider within the next week if symptoms not resolving. He indicates agreement and understanding and he is discharged in stable condition to prognosis       Since the ER visit, lightheadedness has resolved.  Patient denies current dysuria.  Has an eye exam scheduled for October.  Will be going back in for [facility] from November 2023 until April 2024  Has undergone radiation therapy for prostate cancer.  Most recent PSA undetectable and patient has future PSA ordered through radiation oncology        Review of Systems   CONSTITUTIONAL: NEGATIVE for fever, chills. Weight down 8 pounds with recent illness  INTEGUMENTARY/SKIN: NEGATIVE for worrisome rashes, moles or lesions  EYES: NEGATIVE for vision changes or irritation  ENT/MOUTH: NEGATIVE for ear, mouth and throat problems  RESP: NEGATIVE for significant cough or SOB  BREAST: NEGATIVE for masses, tenderness or discharge  CV: NEGATIVE for chest pain, palpitations or peripheral edema  GI: NEGATIVE for nausea, abdominal pain, heartburn, or change in bowel habits  : NEGATIVE for frequency, dysuria.   MUSCULOSKELETAL: NEGATIVE for significant arthralgias or myalgia  NEURO: NEGATIVE for weakness, dizziness or paresthesias  ENDOCRINE: NEGATIVE for temperature intolerance.  Checking blood sugars 3 times a week.  Morning generally 85-90 and late evening generally 150-170  HEME: NEGATIVE for bleeding problems  PSYCHIATRIC: NEGATIVE for changes in mood or affect      Objective    /66   Pulse 79   Temp 98.7  F (37.1  C) (Oral)   Ht 1.702 m (5' 7\")   Wt 74.8 kg (165 lb)   SpO2 97%   BMI 25.84 kg/m    Body mass index is 25.84 kg/m .  Physical Exam   General appearance -  alert, no distress  Skin - No rashes or lesions.  Head - normocephalic, atraumatic  Eyes - BATSHEVA, EOMI, fundi exam with nondilated pupils negative.  Ears - External ears " normal. Canals clear. TM's normal.  Nose/Sinuses - Nares normal. Septum midline. Mucosa normal. No drainage or sinus tenderness.  Oropharynx - No erythema, no adenopathy, no exudates.  Neck - Supple without adenopathy or thyromegaly. No bruits.  Lungs - Clear to auscultation without wheezes/rhonchi.  Heart - Regular rate and rhythm without murmurs, clicks, or gallops.  Nodes - No supraclavicular, axillary, or inguinal adenopathy palpable.  Abdomen - Abdomen soft, non-tender. BS normal. No masses or hepatosplenomegaly palpable. No bruits.  Extremities -No cyanosis, clubbing. Minimal BLE edema.    Musculoskeletal -mild tenderness palpation bilateral paralumbar musculature.  Negative straight leg raise test bilaterally.  Muscular strength intact.  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  No increased warmth erythema  Peripheral pulses - radial=4/4, femoral=4/4, posterior tibial=4/4, dorsalis pedis=4/4,  Neuro - Gait slower but stable with use of a cane. Reflexes normal and symmetric. Sensation grossly WNL.  Genital - Normal-appearing male external genitalia. No scrotal masses or inguinal hernia palpable.   Rectal -  deferred

## 2023-08-28 NOTE — PATIENT INSTRUCTIONS
Increase Pravastatin  to 20mg tab, 2 tabs (40mg) daily until run out. Then change to 40mg tab., 1 tab daily for cholesterol management  Continue other medications  Prescriptions refilled.    Call  [phone] or use [facility] to schedule a future lab appointment  fasting in 5 weeks (early October)    For fasting labs, please refrain from eating for 8 hours or more.   Drink 2 glasses of water before your lab appointment. It is fine to take your  oral medications on the morning of the lab test as usual  Vaccinations: Tetanus (Td) vaccine. Get at pharmacy in September  Covid  booster  and influenza/flu vaccinations in the  Fall (mid/late October).  May get through a pharmacy or at clinic  Follow-up with Rad Oncology re: prostate cancer per their instructions   Eye appt  later this year   Repeat nonfasting  labs in Spring 2024 when return from [place]  Pt was informed regarding extra E&M billing for management of new or established medical issues not related to today's wellness/screening visit

## 2023-10-13 DIAGNOSIS — K44.9 DIAPHRAGMATIC HERNIA WITHOUT OBSTRUCTION AND WITHOUT GANGRENE: ICD-10-CM

## 2023-10-13 DIAGNOSIS — K21.9 GASTROESOPHAGEAL REFLUX DISEASE WITHOUT ESOPHAGITIS: Primary | ICD-10-CM

## 2023-10-13 RX ORDER — FAMOTIDINE 20 MG/1
20 TABLET, FILM COATED ORAL 2 TIMES DAILY
Qty: 180 TABLET | Refills: 3 | Status: SHIPPED | OUTPATIENT
Start: 2023-10-13 | End: 2024-09-27

## 2023-10-25 ENCOUNTER — LAB (OUTPATIENT)
Dept: LAB | Facility: CLINIC | Age: 83
End: 2023-10-25
Payer: MEDICARE

## 2023-10-25 DIAGNOSIS — C61 PROSTATE CANCER (H): ICD-10-CM

## 2023-10-25 LAB — PSA SERPL DL<=0.01 NG/ML-MCNC: 0.02 NG/ML

## 2023-10-25 PROCEDURE — 84153 ASSAY OF PSA TOTAL: CPT

## 2023-10-25 PROCEDURE — 36415 COLL VENOUS BLD VENIPUNCTURE: CPT

## 2023-10-30 ENCOUNTER — OFFICE VISIT (OUTPATIENT)
Dept: RADIATION ONCOLOGY | Facility: CLINIC | Age: 83
End: 2023-10-30
Payer: MEDICARE

## 2023-10-30 VITALS
TEMPERATURE: 98.3 F | BODY MASS INDEX: 26.27 KG/M2 | OXYGEN SATURATION: 98 % | SYSTOLIC BLOOD PRESSURE: 142 MMHG | WEIGHT: 167.7 LBS | HEART RATE: 85 BPM | RESPIRATION RATE: 16 BRPM | DIASTOLIC BLOOD PRESSURE: 85 MMHG

## 2023-10-30 DIAGNOSIS — C61 PROSTATE CANCER (H): Primary | ICD-10-CM

## 2023-10-30 PROCEDURE — 99213 OFFICE O/P EST LOW 20 MIN: CPT | Performed by: RADIOLOGY

## 2023-10-30 RX ORDER — TOBRAMYCIN AND DEXAMETHASONE 3; 1 MG/ML; MG/ML
SUSPENSION/ DROPS OPHTHALMIC PRN
COMMUNITY
End: 2024-09-15

## 2023-10-30 ASSESSMENT — PAIN SCALES - GENERAL: PAINLEVEL: SEVERE PAIN (7)

## 2023-10-30 NOTE — NURSING NOTE
FOLLOW-UP VISIT    Patient Name: Prakash Cramer      : 1940     Age: 83 year old        ______________________________________________________________________________     Chief Complaint   Patient presents with    Radiation Therapy     Return appointment with Dr. Shoemaker     BP (!) 142/85   Pulse 85   Temp 98.3  F (36.8  C) (Oral)   Resp 16   Wt 76.1 kg (167 lb 11.2 oz)   SpO2 98%   BMI 26.27 kg/m       Date Radiation Completed: 21    Pain  Denies    Meds  Current Med List Reviewed: Yes  Medication Note:     AUA: AUA Score: 21 (10/30/23 1200)  ELIAS: ELIAS Score: 1 (10/30/23 1200)    PSA   Date Value Ref Range Status   2019 16.20 (H) 0 - 4 ug/L Final     Comment:     Assay Method:  Chemiluminescence using Siemens Vista analyzer   2008 1.78 0 - 4 ug/L Final   2008 2.56 0 - 4 ug/L Final   2002 1.3 0 - 4 ug/L Final     PSA Tumor Marker   Date Value Ref Range Status   10/25/2023 0.02 ng/mL Final     Comment:     No reference ranges have been established for patients over 80 years.   2023 0.01 0.00 - 4.00 ug/L Final   10/18/2022 0.02 0.00 - 4.00 ug/L Final   2022 0.03 0.00 - 4.00 ug/L Final   10/11/2021 0.02 0.00 - 4.00 ug/L Final       Bowel:  loose stools- patient reports from metformin    Bladder: nocturia, frequency, incomplete emptying,and weak stream  Nocturia: 4 - Once every 2 hours    Energy Level: low- baseline    Appointments:   Urologist: Dr. Walsh   Pt. To call to re-establish care. Provided contact number to patient.      Other Notes: Follow up with Dr. Shoemaker in 1 year with PSA check.     Brittany Hahn RN

## 2023-10-30 NOTE — PROGRESS NOTES
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    INTERVAL HISTORY:  Prakash Cramer is well known to our clinic. While on treatment he had complaints of mild fatigue which was relieved by rest (grade 1), increase in urinary frequency, tract pain and urgency. Post treatment he is now here in follow up. Energy level and urinary symptoms are back to their baseline. He has baseline diarrhea and it is currently well controlled.  He takes occasional imodium which is normal for him and 6 fiber pills per day. He stopped Flomax once a day and feels his urination is erratic but back to his baseline. He did not think it was beneficial and is reluctant to try any other medications.  There is lower extremity edema only in the evenings (baseline).  His AUA score is 21: Incomplete emptying x4, frequency x4, intermittency x2, urgency x1, weak stream x4, straining x2, nocturia x4. Specifically denies current n/v/ha/sob/cp/new GI or  symptoms.   ELIAS score is 1.      REVIEW OF SYSTEMS: As per HPI, a 14-point review of systems is otherwise negative.    Past Medical History:   Diagnosis Date    Androgen deprivation therapy     Eligard 45 mg injection received 10/26/2020    Aplastic anemia (H24) 03/26/2018    thought secondary to reaction to Septra    BPH (benign prostatic hypertrophy)     failed  Flomax    C. difficile diarrhea 03/26/2018    treated with Flagyl    Cellulitis and abscess of leg, except foot 2004    Contact dermatitis and other eczema, due to unspecified cause     Diaphragmatic hernia without mention of obstruction or gangrene     hiatal hernia    Esophageal reflux     History of colonic polyps 01/2020    5 polyps removed and 2 were precancerous so he will have another one in Jan 2021.    Hyperlipidemia LDL goal <100 03/11/2005     "Hypothyroidism     Impotence of organic origin     Meningococcal encephalitis     Mumps     Prostate cancer (H) 08/26/2020    Proteinuria     RBBB     S/P radiation therapy     7,000 cGy to prostate + SV completed on 6/23/2021 [facility]    Type 2 diabetes mellitus with diabetic nephropathy  (goal A1C<7) 10/24/2015    Unspecified essential hypertension        Past Surgical History:   Procedure Laterality Date    ARTHROPLASTY KNEE Left 03/18/2015    Procedure: ARTHROPLASTY KNEE;  Surgeon: Abebe Schroeder MD;  Location: [facility]    ARTHROPLASTY KNEE Right 03/14/2016    Procedure: ARTHROPLASTY KNEE;  Surgeon: Abebe Schroeder MD;  Location: [facility] OR    BIOPSY PROSTATE TRANSRECTAL  08/26/2020    HC LAPAROSCOPY, SURGICAL; CHOLECYSTECTOMY  1998    Cholecystectomy, Laparoscopic    HC REMOVE TONSILS/ADENOIDS,12+ Y/O  1963    T & A 13+y.o.    ZZC NONSPECIFIC PROCEDURE  10/2002    left knee meniscus tear repair       Family History   Problem Relation Age of Onset    Family History Negative Mother         d:age 89 of \"old age\"    Gastrointestinal Disease Father         d:age 79 liver failure after taking excessive amounts of Tylenol over 5-6 yrs    Neurologic Disorder Brother         b:1932  hx cerebral aneurysm age 59    Cancer No family hx of        Social History     Tobacco Use    Smoking status: Never    Smokeless tobacco: Never   Substance Use Topics    Alcohol use: Not Currently       Current Outpatient Medications   Medication    acetaminophen (TYLENOL) 500 MG tablet    amLODIPine (NORVASC) 10 MG tablet    blood glucose (ACCU-CHEK GUIDE) test strip    blood glucose monitoring (ACCU-CHEK MULTICLIX) lancets    Blood Glucose Monitoring Suppl (ACCU-CHEK GUIDE ME) w/Device KIT    famotidine (PEPCID) 20 MG tablet    levothyroxine (SYNTHROID/LEVOTHROID) 88 MCG tablet    loperamide (IMODIUM A-D) 2 MG tablet    metFORMIN (GLUCOPHAGE XR) 500 MG 24 hr tablet    pioglitazone (ACTOS) 30 MG tablet    pravastatin " (PRAVACHOL) 40 MG tablet    sitagliptin (JANUVIA) 100 MG tablet    tobramycin-dexAMETHasone (TOBRADEX) 0.3-0.1 % ophthalmic suspension     No current facility-administered medications for this visit.          Allergies   Allergen Reactions    Sulfonylureas Other (See Comments)     amaryl caused aplastic anemia verified by bone marrow biopsy 3/30/18    Pancytopenia   Pancytopenia   amaryl caused aplastic anemia verified by bone marrow biopsy 3/30/18    Canagliflozin Other (See Comments)     Joint pain    Joint pain   Joint pain    Crestor [Rosuvastatin]      Myalgias    Diovan [Valsartan]      increase k+    Glimepiride      Pancytopenia      Lipitor [Atorvastatin Calcium]      myalgias    Mold Other (See Comments)     Eyes itchy,red and dry    Penicillins     Seasonal Allergies      Eyes itch         PHYSICAL EXAM:  BP (!) 142/85   Pulse 85   Temp 98.3  F (36.8  C) (Oral)   Resp 16   Wt 76.1 kg (167 lb 11.2 oz)   SpO2 98%   BMI 26.27 kg/m    GEN: appears well, in no acute distress, walks with a walker which is his baseline  HEENT: normocephalic and atraumatic, EOMI, anicteric sclerae  CV: 1+LE edema bilaterally that is stable, no JVD  RESP: normal respiration on room air, no stridor  LYMPH: No supraclavicular, infraclavicular lymphadenopathy appreciated bilaterally  SKIN: normal color and turgor  MSK: moving all extremities well  NEURO: no focal neurologic deficit  PSYCH: appropriate mood, affect, and judgment    All pertinent laboratory, imaging, and pathology findings have been reviewed.     PSA   Date Value Ref Range Status   12/12/2019 16.20 (H) 0 - 4 ug/L Final     Comment:     Assay Method:  Chemiluminescence using Siemens Vista analyzer   09/02/2008 1.78 0 - 4 ug/L Final   01/02/2008 2.56 0 - 4 ug/L Final   11/13/2002 1.3 0 - 4 ug/L Final     PSA Tumor Marker   Date Value Ref Range Status   10/25/2023 0.02 ng/mL Final     Comment:     No reference ranges have been established for patients over 80 years.    04/25/2023 0.01 0.00 - 4.00 ug/L Final   10/18/2022 0.02 0.00 - 4.00 ug/L Final   05/02/2022 0.03 0.00 - 4.00 ug/L Final   10/11/2021 0.02 0.00 - 4.00 ug/L Final       IMPRESSION/RECOMMENDATION:  82 year old gentleman with intermediate risk prostate cancer (GS 4+3, PSA 16.2) s/p definitive radiation therapy completed 6/23/21. He is doing well with resolved acute and subacute radiation toxicity.  He had an episode of minor rectal bleeding 12 months ago and completed a course of medical enema. He has not had any GI symptoms since this time.  He is doing well now without any new complaints. PSA is stable.  He will return for follow up in 12 months with repeat PSA. Follow up with Urology/PCP in the interim for 6 month PSA check.  He worrell in [place].  He was instructed to call our clinic with any questions or concerns in the interim.    Additional problem list to be addressed in the following manner:      Urinary frequency and AUA score of 21 (previously 16).  Does not feel that Flomax helped him in the past and reluctant to try anything else.  I've asked him to follow up with Urology if these symptoms become bothersome.    He had eligard on 10/26/2020 45mg (6 months) and lupron 30mg (4 months) 5/19/21.  My recommendation was for short-term ADT.  There was a delay in starting his radiation treatment because [place].  He has now completed short-term ADT.    Follow up in 12 months with repeat PSA with in person follow up.  Follow up with Urology/PCP in the interim for 6 month PSA check.    Thank you for allowing me to participate in the care of this pleasant patient. If you have any questions, please do not hesitate to contact my office.    Huyen Shoemaker MD  Attending Physician  Radiation Oncology

## 2023-10-30 NOTE — LETTER
10/30/2023         RE: Prakash Cramer  [address]  [facility] [address]        Dear Colleague,    Thank you for referring your patient, Prakash Cramer, to the [facility] [place]. Please see a copy of my visit note below.    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    INTERVAL HISTORY:  Prakash Cramer is well known to our clinic. While on treatment he had complaints of mild fatigue which was relieved by rest (grade 1), increase in urinary frequency, tract pain and urgency. Post treatment he is now here in follow up. Energy level and urinary symptoms are back to their baseline. He has baseline diarrhea and it is currently well controlled.  He takes occasional imodium which is normal for him and 6 fiber pills per day. He stopped Flomax once a day and feels his urination is erratic but back to his baseline. He did not think it was beneficial and is reluctant to try any other medications.  There is lower extremity edema only in the evenings (baseline).  His AUA score is 21: Incomplete emptying x4, frequency x4, intermittency x2, urgency x1, weak stream x4, straining x2, nocturia x4. Specifically denies current n/v/ha/sob/cp/new GI or  symptoms.   ELIAS score is 1.      REVIEW OF SYSTEMS: As per HPI, a 14-point review of systems is otherwise negative.    Past Medical History:   Diagnosis Date     Androgen deprivation therapy     Eligard 45 mg injection received 10/26/2020     Aplastic anemia (H24) 03/26/2018    thought secondary to reaction to Septra     BPH (benign prostatic hypertrophy)     failed  Flomax     C. difficile diarrhea 03/26/2018    treated with Flagyl     Cellulitis and abscess of leg, except foot 2004     Contact dermatitis and other eczema, due to unspecified cause      Diaphragmatic  "hernia without mention of obstruction or gangrene     hiatal hernia     Esophageal reflux      History of colonic polyps 01/2020    5 polyps removed and 2 were precancerous so he will have another one in Jan 2021.     Hyperlipidemia LDL goal <100 03/11/2005     Hypothyroidism      Impotence of organic origin      Meningococcal encephalitis      Mumps      Prostate cancer (H) 08/26/2020     Proteinuria      RBBB      S/P radiation therapy     7,000 cGy to prostate + SV completed on 6/23/2021 [facility]     Type 2 diabetes mellitus with diabetic nephropathy  (goal A1C<7) 10/24/2015     Unspecified essential hypertension        Past Surgical History:   Procedure Laterality Date     ARTHROPLASTY KNEE Left 03/18/2015    Procedure: ARTHROPLASTY KNEE;  Surgeon: Abebe Schroeder MD;  Location: [facility]     ARTHROPLASTY KNEE Right 03/14/2016    Procedure: ARTHROPLASTY KNEE;  Surgeon: Abebe Schroeder MD;  Location: [facility] OR     BIOPSY PROSTATE TRANSRECTAL  08/26/2020     HC LAPAROSCOPY, SURGICAL; CHOLECYSTECTOMY  1998    Cholecystectomy, Laparoscopic     HC REMOVE TONSILS/ADENOIDS,12+ Y/O  1963    T & A 13+y.o.     ZZC NONSPECIFIC PROCEDURE  10/2002    left knee meniscus tear repair       Family History   Problem Relation Age of Onset     Family History Negative Mother         d:age 89 of \"old age\"     Gastrointestinal Disease Father         d:age 79 liver failure after taking excessive amounts of Tylenol over 5-6 yrs     Neurologic Disorder Brother         b:1932  hx cerebral aneurysm age 59     Cancer No family hx of        Social History     Tobacco Use     Smoking status: Never     Smokeless tobacco: Never   Substance Use Topics     Alcohol use: Not Currently       Current Outpatient Medications   Medication     acetaminophen (TYLENOL) 500 MG tablet     amLODIPine (NORVASC) 10 MG tablet     blood glucose (ACCU-CHEK GUIDE) test strip     blood glucose monitoring (ACCU-CHEK MULTICLIX) lancets     Blood " Glucose Monitoring Suppl (ACCU-CHEK GUIDE ME) w/Device KIT     famotidine (PEPCID) 20 MG tablet     levothyroxine (SYNTHROID/LEVOTHROID) 88 MCG tablet     loperamide (IMODIUM A-D) 2 MG tablet     metFORMIN (GLUCOPHAGE XR) 500 MG 24 hr tablet     pioglitazone (ACTOS) 30 MG tablet     pravastatin (PRAVACHOL) 40 MG tablet     sitagliptin (JANUVIA) 100 MG tablet     tobramycin-dexAMETHasone (TOBRADEX) 0.3-0.1 % ophthalmic suspension     No current facility-administered medications for this visit.          Allergies   Allergen Reactions     Sulfonylureas Other (See Comments)     amaryl caused aplastic anemia verified by bone marrow biopsy 3/30/18    Pancytopenia   Pancytopenia   amaryl caused aplastic anemia verified by bone marrow biopsy 3/30/18     Canagliflozin Other (See Comments)     Joint pain    Joint pain   Joint pain     Crestor [Rosuvastatin]      Myalgias     Diovan [Valsartan]      increase k+     Glimepiride      Pancytopenia       Lipitor [Atorvastatin Calcium]      myalgias     Mold Other (See Comments)     Eyes itchy,red and dry     Penicillins      Seasonal Allergies      Eyes itch         PHYSICAL EXAM:  BP (!) 142/85   Pulse 85   Temp 98.3  F (36.8  C) (Oral)   Resp 16   Wt 76.1 kg (167 lb 11.2 oz)   SpO2 98%   BMI 26.27 kg/m    GEN: appears well, in no acute distress, walks with a walker which is his baseline  HEENT: normocephalic and atraumatic, EOMI, anicteric sclerae  CV: 1+LE edema bilaterally that is stable, no JVD  RESP: normal respiration on room air, no stridor  LYMPH: No supraclavicular, infraclavicular lymphadenopathy appreciated bilaterally  SKIN: normal color and turgor  MSK: moving all extremities well  NEURO: no focal neurologic deficit  PSYCH: appropriate mood, affect, and judgment    All pertinent laboratory, imaging, and pathology findings have been reviewed.     PSA   Date Value Ref Range Status   12/12/2019 16.20 (H) 0 - 4 ug/L Final     Comment:     Assay Method:   Chemiluminescence using Siemens Vista analyzer   09/02/2008 1.78 0 - 4 ug/L Final   01/02/2008 2.56 0 - 4 ug/L Final   11/13/2002 1.3 0 - 4 ug/L Final     PSA Tumor Marker   Date Value Ref Range Status   10/25/2023 0.02 ng/mL Final     Comment:     No reference ranges have been established for patients over 80 years.   04/25/2023 0.01 0.00 - 4.00 ug/L Final   10/18/2022 0.02 0.00 - 4.00 ug/L Final   05/02/2022 0.03 0.00 - 4.00 ug/L Final   10/11/2021 0.02 0.00 - 4.00 ug/L Final       IMPRESSION/RECOMMENDATION:  82 year old gentleman with intermediate risk prostate cancer (GS 4+3, PSA 16.2) s/p definitive radiation therapy completed 6/23/21. He is doing well with improvement in radiation toxicity. He had an episode of minor rectal bleeding 12 months ago and completed a course of medical enema. He has not had any GI symptoms since this time.  He is doing well now without any new complaints.  He will return for follow up in 12 months with repeat PSA. Follow up with Urology/PCP in the interim for 6 month PSA check.  He worrell in [place].  He was instructed to call our clinic with any questions or concerns in the interim.    Additional problem list to be addressed in the following manner:      Urinary frequency and AUA score of 21 (previously 16).  Does not feel that Flomax helped him in the past and reluctant to try anything else.  I've asked him to follow up with Urology if these symptoms become bothersome.    He had eligard on 10/26/2020 45mg (6 months) and lupron 30mg (4 months) 5/19/21.  My recommendation was for short-term ADT.  There was a delay in starting his radiation treatment because [place].  He has now completed short-term ADT.    Follow up in 12 months with repeat PSA with in person follow up.  Follow up with Urology/PCP in the interim for 6 month PSA check.    Thank you for allowing me to participate in the care of this pleasant patient. If you have any questions, please do  not hesitate to contact my office.    Huyen Shoemaker MD  Attending Physician  Radiation Oncology            Again, thank you for allowing me to participate in the care of your patient.        Sincerely,        GRACIA Shoemaker MD

## 2024-05-23 ENCOUNTER — TELEPHONE (OUTPATIENT)
Dept: INTERNAL MEDICINE | Facility: CLINIC | Age: 84
End: 2024-05-23
Payer: MEDICARE

## 2024-05-23 NOTE — TELEPHONE ENCOUNTER
Called and spoke with pt to relay provider note below.  He verbalized understanding.  Discussed BRAT diet with pt and the importance of hydration.   He verbalized understanding.     Thank you,  Ailyn Flaherty RN     [phone]

## 2024-05-23 NOTE — TELEPHONE ENCOUNTER
Pt called the clinic to follow up with PCP.     Pt was dx with covid 19 on 5/18/24 and given medication. Pt doesn't have the cold anymore, no sore throat or fever. Denies chest pain, SOB, difficulty breathing.     Pt stated he still isnt very strong.     Routing to PCP to review and advise next course of action.     Please call pt back with PCPs recommendations.

## 2024-05-23 NOTE — TELEPHONE ENCOUNTER
May time to fully recover from covid infection and can sometimes have a more prolonged covid fatigue. Per note, URI sx resolved. Presumed pt eating OK since no mention of GI sx. Maintain rest, nutrition, hydration. If not better in 3 weeks or worsens before then, then inform clinic

## 2024-07-29 ENCOUNTER — PATIENT OUTREACH (OUTPATIENT)
Dept: CARE COORDINATION | Facility: CLINIC | Age: 84
End: 2024-07-29
Payer: MEDICARE

## 2024-08-12 ENCOUNTER — PATIENT OUTREACH (OUTPATIENT)
Dept: CARE COORDINATION | Facility: CLINIC | Age: 84
End: 2024-08-12
Payer: MEDICARE

## 2024-08-21 ENCOUNTER — TELEPHONE (OUTPATIENT)
Dept: RADIATION THERAPY | Facility: OUTPATIENT CENTER | Age: 84
End: 2024-08-21

## 2024-08-21 NOTE — TELEPHONE ENCOUNTER
Spoke with patient today and advised him that I was calling to schedule a follow-up visit with Dr. Shoemaker and he said that he is not ready to schedule because he has a lot of things he is currently dealing with right now and asked me to reach out again in about a month.

## 2024-08-26 ENCOUNTER — TRANSFERRED RECORDS (OUTPATIENT)
Dept: HEALTH INFORMATION MANAGEMENT | Facility: CLINIC | Age: 84
End: 2024-08-26
Payer: MEDICARE

## 2024-09-11 ENCOUNTER — TRANSFERRED RECORDS (OUTPATIENT)
Dept: HEALTH INFORMATION MANAGEMENT | Facility: CLINIC | Age: 84
End: 2024-09-11
Payer: MEDICARE

## 2024-09-13 DIAGNOSIS — E11.21 TYPE 2 DIABETES MELLITUS WITH DIABETIC NEPHROPATHY, WITHOUT LONG-TERM CURRENT USE OF INSULIN (H): ICD-10-CM

## 2024-09-13 DIAGNOSIS — E78.5 HYPERLIPIDEMIA LDL GOAL <100: ICD-10-CM

## 2024-09-13 DIAGNOSIS — I10 ESSENTIAL HYPERTENSION: ICD-10-CM

## 2024-09-13 DIAGNOSIS — E03.9 HYPOTHYROIDISM, UNSPECIFIED TYPE: ICD-10-CM

## 2024-09-13 NOTE — PROGRESS NOTES
Prakash is a 84 year old who is being evaluated via a billable telephone visit.    What phone number would you like to be contacted at? [phone]   How would you like to obtain your AVS? Mail a copy  Originating Location (pt. Location): Home    Distant Location (provider location):  On-site    ASSESSMENT:    1. Type 2 diabetes mellitus with diabetic nephropathy, without long-term current use of insulin (H)  Previous A1c at goal for age.  Morning blood sugars mildly above goal.  Will repeat diabetic labs in the next week or so.  Has follow-up appointment scheduled with me 9/27/2024 and will have patient check blood sugars before meals and bedtime for 4 days prior to appointment and will bring those results with him to review.  Will make further adjustments based on updated labs and blood sugars  - Hemoglobin A1c; Future  - Comprehensive metabolic panel; Future  - Albumin Random Urine Quantitative with Creat Ratio; Future    2. Thrombocytopenia (H)  Prior mild thrombocytopenia as below.  Denies bruising issues.  Follow-up lab ordered  - CBC with platelets; Future    3. Proteinuria, unspecified type  Mild proteinuria as below.  Intolerant of previous ARB trial with hyperkalemia.  Will await updated lab next 1 to 2 weeks and blood pressure check with upcoming appointment  - Comprehensive metabolic panel; Future  - Albumin Random Urine Quantitative with Creat Ratio; Future    4. Essential hypertension  Blood pressure mildly elevated October 2023 with last reported reading.  On amlodipine.  Intolerant of ARB's.  Blood pressure recheck in a couple weeks  - Comprehensive metabolic panel; Future  - Albumin Random Urine Quantitative with Creat Ratio; Future  - CBC with platelets; Future    5. Stage 3a chronic kidney disease (H)  Last GFR 48.  Await updated labs  - Comprehensive metabolic panel; Future  - Albumin Random Urine Quantitative with Creat Ratio; Future    6. Hyperlipidemia LDL goal <100  History intolerance to 2  prior statins.  Has failed to improve with previous Zetia trial.  Await lab result follow-up.  If still quite elevated, possible Nexletol trial  - Comprehensive metabolic panel; Future  - Lipid panel reflex to direct LDL Fasting; Future    7. Hypothyroidism, unspecified type  TSH previously normal at 2.1 with outside clinic lab August 2023.  Continue levothyroxine dose for now.  Repeat lab next 1 to 2 weeks  - TSH with free T4 reflex; Future      PLAN:  Continue current medications for now   Contact your pharmacy when refills are needed  Call  [phone] or use [employer] to schedule a future lab appointment  fasting in next 1-2  weeks.   For fasting labs, please refrain from eating for 8 hours or more.   Drink 2 glasses of water before your lab appointment. It is fine to take your  oral medications on the morning of the lab test as usual  Follow up with me 9/27/24 as scheduled. Earlier as needed  I would recommend an updated covid vaccination and an influenza/flu vaccination in the Fall (October) 2024 at the clinic or any pharmacy    Phone call contact time  Call Started at 5:25pm  Call Ended at 5:38 PM  Total minutes: 13 minutes    (Chart documentation was completed, in part, with [employer] voice-recognition software. Even though reviewed, some grammatical, spelling, and word errors may remain.)    Zhou Meraz MD  Internal Medicine Department  [facility]   Prakash is a 84 year old, presenting for the following health issues:  Recheck Medication  Pt had a steroid shot for their back problems but it spiked his blood sugar       History of Present Illness       Reason for visit:  Medication follow up He is missing 1 dose(s) of medications per week.  He is not taking prescribed medications regularly due to side effects.      Most recent lab results reviewed with pt.    Component      Latest Ref Rng 4/25/2023  9:46 AM 8/24/2023  9:38 AM 8/24/2023  9:47 AM  10/25/2023  9:44 AM   Sodium      136 - 145 mmol/L 137  137      Potassium      3.4 - 5.3 mmol/L  4.6      Chloride      98 - 107 mmol/L  102      Carbon Dioxide (CO2)      22 - 29 mmol/L  21 (L)      Anion Gap      7 - 15 mmol/L 9  14      Urea Nitrogen      8.0 - 23.0 mg/dL  28.0 (H)      Creatinine      0.67 - 1.17 mg/dL 1.37 (H)  1.45 (H)      Calcium      8.8 - 10.2 mg/dL 9.0  9.2      Glucose      70 - 99 mg/dL  187 (H)      Alkaline Phosphatase      40 - 129 U/L  101      AST      0 - 45 U/L  21      ALT      0 - 70 U/L  14      Protein Total      6.4 - 8.3 g/dL  7.1      Albumin      3.5 - 5.2 g/dL  4.3      Bilirubin Total      <=1.2 mg/dL  0.5      GFR Estimate      >60 mL/min/1.73m2 52 (L)  48 (L)      Potassium      3.4 - 5.3 mmol/L 4.4       Chloride      94 - 109 mmol/L 100       Carbon Dioxide      20 - 32 mmol/L 28       Urea Nitrogen      7 - 30 mg/dL 28       Glucose      70 - 99 mg/dL 257 (H)       WBC      4.0 - 11.0 10e3/uL 4.7       RBC Count      4.40 - 5.90 10e6/uL 3.90 (L)       Hemoglobin      13.3 - 17.7 g/dL 14.2       Hematocrit      40.0 - 53.0 % 42.9       MCV      78 - 100 fL 110 (H)       MCH      26.5 - 33.0 pg 36.4 (H)       MCHC      31.5 - 36.5 g/dL 33.1       RDW      10.0 - 15.0 % 13.7       Platelet Count      150 - 450 10e3/uL 142 (L)       Cholesterol      <200 mg/dL  275 (H)      Triglycerides      <150 mg/dL  338 (H)      HDL Cholesterol      >=40 mg/dL  64      LDL Cholesterol Calculated      <=100 mg/dL  143 (H)      Non HDL Cholesterol      <130 mg/dL  211 (H)      Creatinine Urine      mg/dL   121.0     Albumin Urine mg/L      mg/L   41.0     Albumin Urine mg/g Cr      0.00 - 17.00 mg/g Cr   33.88 (H)     Hemoglobin A1C      0.0 - 5.6 % 7.0 (H)  7.4 (H)      PSA Tumor Marker      ng/mL    0.02       Legend:  (H) High  (L) Low     Currently living with his son.  Will be leaving back to [place] on 11/1/2024 to stay for the winter.  Had COVID infection in May.  Has some  mild fatigue but otherwise denies upper respiratory issues.  Chronic low back pain over the last 5 to 6 years.  Managed by [facility].  Denies radiation of pain into his extremities.  Also being followed by [facility] pain clinic and underwent LESI 8/24/2024 with plan for repeat injection 10/15/2024.  Blood sugars more recently 139-1 40 9 in the morning and approximately 160 in the evening.  Denies chest pain, abdominal pain, current shortness of breath.  Has mild chronic numbness in feet bilaterally.  History of prostate cancer and followed by urology.  Most recent PSA undetectable as above         Additional ROS:   Constitutional, HEENT, Cardiovascular, Pulmonary, GI and , Neuro, MSK and Psych review of systems/symptoms are otherwise negative or unchanged from previous, except as noted above.           Objective:  Any reported vitals from today were reviewed in chart. See nursing documentation for details    RESP: No cough, no audible wheezing, able to talk in full sentences  GEN: Normal affect. Normal though content/speech  Remainder of exam unable to be completed due to telephone visits

## 2024-09-16 ENCOUNTER — TELEPHONE (OUTPATIENT)
Dept: INTERNAL MEDICINE | Facility: CLINIC | Age: 84
End: 2024-09-16

## 2024-09-16 ENCOUNTER — VIRTUAL VISIT (OUTPATIENT)
Dept: INTERNAL MEDICINE | Facility: CLINIC | Age: 84
End: 2024-09-16
Payer: MEDICARE

## 2024-09-16 DIAGNOSIS — N18.31 STAGE 3A CHRONIC KIDNEY DISEASE (H): ICD-10-CM

## 2024-09-16 DIAGNOSIS — E78.5 HYPERLIPIDEMIA LDL GOAL <100: ICD-10-CM

## 2024-09-16 DIAGNOSIS — R80.9 PROTEINURIA, UNSPECIFIED TYPE: ICD-10-CM

## 2024-09-16 DIAGNOSIS — E03.9 HYPOTHYROIDISM, UNSPECIFIED TYPE: ICD-10-CM

## 2024-09-16 DIAGNOSIS — I10 ESSENTIAL HYPERTENSION: ICD-10-CM

## 2024-09-16 DIAGNOSIS — E11.21 TYPE 2 DIABETES MELLITUS WITH DIABETIC NEPHROPATHY, WITHOUT LONG-TERM CURRENT USE OF INSULIN (H): Primary | ICD-10-CM

## 2024-09-16 DIAGNOSIS — D69.6 THROMBOCYTOPENIA (H): ICD-10-CM

## 2024-09-16 PROCEDURE — 99442 PR PHYSICIAN TELEPHONE EVALUATION 11-20 MIN: CPT | Mod: 93 | Performed by: INTERNAL MEDICINE

## 2024-09-16 NOTE — TELEPHONE ENCOUNTER
Medication Question or Refill        What medication are you calling about (include dose and sig)?:     acetaminophen (TYLENOL) 500 MG tablet Take 500-1,000 mg by mouth every 6 hours as needed for mild pain     amLODIPine (NORVASC) 10 MG tablet Take 1 tablet (10 mg) by mouth daily    blood glucose (ACCU-CHEK GUIDE) test strip Use to test blood sugar one times daily or as directed.    blood glucose monitoring (ACCU-CHEK MULTICLIX) lancets Use to test blood sugar 1 time daily or as directed.    Blood Glucose Monitoring Suppl (ACCU-CHEK GUIDE ME) w/Device KIT 1 each daily    famotidine (PEPCID) 20 MG tablet Take 1 tablet by mouth twice daily    levothyroxine (SYNTHROID/LEVOTHROID) 88 MCG tablet Take 1 tablet (88 mcg) by mouth daily    loperamide (IMODIUM A-D) 2 MG tablet Take 2 mg by mouth 4 times daily as needed for diarrhea    metFORMIN (GLUCOPHAGE XR) 500 MG 24 hr tablet Take 1 tablet (500 mg) by mouth daily    pioglitazone (ACTOS) 30 MG tablet Take 1 tablet (30 mg) by mouth daily    pravastatin (PRAVACHOL) 40 MG tablet Take 1 tablet (40 mg) by mouth daily    sitagliptin (JANUVIA) 100 MG tablet Take 1 tablet (100 mg) by mouth daily        Preferred Pharmacy:   [address] [facility] [address]  [address] [facility].  [facility] [address]  Phone: [phone] Fax: [phone]          Controlled Substance Agreement on file:   CSA -- Patient Level:    CSA: None found at the patient level.       Who prescribed the medication?: PCP    Do you need a refill? Yes    When did you use the medication last? 9/16/2024    Patient offered an appointment? No    Do you have any questions or concerns?  No      Okay to leave a detailed message?: Yes at Cell number on file:    Telephone Information:   Mobile [phone]

## 2024-09-17 RX ORDER — PIOGLITAZONEHYDROCHLORIDE 30 MG/1
30 TABLET ORAL DAILY
Qty: 90 TABLET | Refills: 0 | Status: SHIPPED | OUTPATIENT
Start: 2024-09-17 | End: 2024-09-27

## 2024-09-17 RX ORDER — METFORMIN HCL 500 MG
500 TABLET, EXTENDED RELEASE 24 HR ORAL DAILY
Qty: 30 TABLET | Refills: 0 | Status: SHIPPED | OUTPATIENT
Start: 2024-09-17 | End: 2024-09-27

## 2024-09-17 RX ORDER — AMLODIPINE BESYLATE 10 MG/1
10 TABLET ORAL DAILY
Qty: 90 TABLET | Refills: 0 | Status: SHIPPED | OUTPATIENT
Start: 2024-09-17 | End: 2024-09-27

## 2024-09-17 RX ORDER — LEVOTHYROXINE SODIUM 88 UG/1
88 TABLET ORAL DAILY
Qty: 90 TABLET | Refills: 0 | Status: SHIPPED | OUTPATIENT
Start: 2024-09-17 | End: 2024-09-27

## 2024-09-17 RX ORDER — PRAVASTATIN SODIUM 40 MG
40 TABLET ORAL DAILY
Qty: 90 TABLET | Refills: 0 | Status: SHIPPED | OUTPATIENT
Start: 2024-09-17

## 2024-09-17 NOTE — TELEPHONE ENCOUNTER
Has upcoming labs ordered   [facility] [place] [facility]     PROGRESS NOTE    Patient: Mikayla Chan Date: 4/14/2024   female, 68 year old  Admit Date: 3/29/2024   Attending: Soren Salazar MD Primary Care Physician: Rogelio Rod DO     ICU admission Date: 04/06    Operation:  Status post transcutaneous mitral valve replacement    Post-operative Day: 3                                                                                    SUMMARY OF PLAN    Discussed weaning of inotropics support with heart failure  Wean oxygen as possible    -----------------------------    Best Practice:  - VTE:  SCDs  - SUP:  PPI  - Glycemic control:  Subcutaneous insulin  - LDA assessment and removal:  Central line  - Nutrition:  Oral intake  - Last BM:1 (04/13/24 2000)  - Therapy/mobilization:  Physical therapy      ACTIVE PROBLEM LIST    Status post transcutaneous mitral valve replacement  Severe hypoxemic and hypercapnic respiratory failure post procedure now improved  Cardiogenic shock/ pulmonary hypertension    Disposition:  To remain the ICU today  Code Status:  Full code patient       DISCUSSION   Per CICU:68 year old pmhx as below presents to the CICU for management of multiple comorbid conditions, acute on chronic hypoxic respiratory failure, severe thrombocytopenia and concern for TTP needing steroids and plasma exchange. She was evaluated for bleeding and had CT abdomen negative for hemorrhage. GI was consulted and an EGD was performed showing bleeding AVMs that were ablated      04/12:  Patient was having significant decompensation prior to procedure.  Had significant hypoxemia and hypercapnia after arrival in the ICU.  Improved, kept sedated overnight.  Hemodynamic status stable despite increases in pulmonary pressure levels.  This morning I am attempting a pressure support trial and weaning of nitric oxide prior to extubation     04/13:  Patient relatively stable last 24 hours.  Extubated yesterday, requiring relatively high  amounts of oxygen through [employer].  The patient does not tolerate the high-flow very well so currently she is at minimal 40 liters/minute.  Will add midodrine to attempt to wean vasopressin    04/14:  No major new events.  Patient is off vasopressin now.  Weaning oxygen.      Subjective:  Feeling better    I/O last 3 completed shifts:  In: 1072.7 [P.O.:240; I.V.:832.7]  Out: 1945 [Urine:1945]  I/O this shift:  In: -   Out: 150 [Urine:150]    Vital Last Value 24 Hour Range   Temperature 98.8 °F (37.1 °C) (04/14/24 0745) Temp  Min: 97.9 °F (36.6 °C)  Max: 99.1 °F (37.3 °C)   Pulse 81 (04/14/24 0745) Pulse  Min: 69  Max: 87   Respiratory (!) 22 (04/14/24 0745) Resp  Min: 11  Max: 40   Non-Invasive  Blood Pressure 98/57 (04/14/24 0745) BP  Min: 90/52  Max: 112/57   Pulse Oximetry 90 % (04/14/24 0745) SpO2  Min: 86 %  Max: 97 %   Arterial   Blood Pressure 110/105 (04/12/24 1215) No data recorded        Physical Exam:  Neurologic:  GCS 15  Neck:  Supple  Chest:  CTA bilateral no adventitious sounds  Heart:  Regular rhythm and rate  Abdomen:  Soft  Extremities:  No edema  Skin:  No rash    Pertinent Reviewed: Allergies, Medications, Labs, Imaging, and Physician and Nursing Notes      ACCS Attestation    This patient is critically ill as documented above. I evaluated the patient and reviewed imaging and laboratory data. Critical care services I provided [phone] minutes, not including time allocated for procedures.  Roberto Persaud MD  4/14/2024

## 2024-09-19 ENCOUNTER — TRANSFERRED RECORDS (OUTPATIENT)
Dept: MULTI SPECIALTY CLINIC | Facility: CLINIC | Age: 84
End: 2024-09-19

## 2024-09-19 LAB — RETINOPATHY: NORMAL

## 2024-09-23 ENCOUNTER — LAB (OUTPATIENT)
Dept: LAB | Facility: CLINIC | Age: 84
End: 2024-09-23
Payer: MEDICARE

## 2024-09-23 DIAGNOSIS — D69.6 THROMBOCYTOPENIA (H): ICD-10-CM

## 2024-09-23 DIAGNOSIS — R80.9 PROTEINURIA, UNSPECIFIED TYPE: ICD-10-CM

## 2024-09-23 DIAGNOSIS — E03.9 HYPOTHYROIDISM, UNSPECIFIED TYPE: ICD-10-CM

## 2024-09-23 DIAGNOSIS — E11.21 TYPE 2 DIABETES MELLITUS WITH DIABETIC NEPHROPATHY, WITHOUT LONG-TERM CURRENT USE OF INSULIN (H): ICD-10-CM

## 2024-09-23 DIAGNOSIS — N18.31 STAGE 3A CHRONIC KIDNEY DISEASE (H): ICD-10-CM

## 2024-09-23 DIAGNOSIS — E78.5 HYPERLIPIDEMIA LDL GOAL <100: ICD-10-CM

## 2024-09-23 DIAGNOSIS — I10 ESSENTIAL HYPERTENSION: ICD-10-CM

## 2024-09-23 LAB
ALBUMIN SERPL BCG-MCNC: 4.1 G/DL (ref 3.5–5.2)
ALP SERPL-CCNC: 84 U/L (ref 40–150)
ALT SERPL W P-5'-P-CCNC: 14 U/L (ref 0–70)
ANION GAP SERPL CALCULATED.3IONS-SCNC: 13 MMOL/L (ref 7–15)
AST SERPL W P-5'-P-CCNC: 19 U/L (ref 0–45)
BILIRUB SERPL-MCNC: 0.6 MG/DL
BUN SERPL-MCNC: 29.8 MG/DL (ref 8–23)
CALCIUM SERPL-MCNC: 9.2 MG/DL (ref 8.8–10.4)
CHLORIDE SERPL-SCNC: 100 MMOL/L (ref 98–107)
CHOLEST SERPL-MCNC: 265 MG/DL
CREAT SERPL-MCNC: 1.42 MG/DL (ref 0.67–1.17)
EGFRCR SERPLBLD CKD-EPI 2021: 49 ML/MIN/1.73M2
ERYTHROCYTE [DISTWIDTH] IN BLOOD BY AUTOMATED COUNT: 13.7 % (ref 10–15)
EST. AVERAGE GLUCOSE BLD GHB EST-MCNC: 169 MG/DL
FASTING STATUS PATIENT QL REPORTED: YES
FASTING STATUS PATIENT QL REPORTED: YES
GLUCOSE SERPL-MCNC: 190 MG/DL (ref 70–99)
HBA1C MFR BLD: 7.5 % (ref 0–5.6)
HCO3 SERPL-SCNC: 25 MMOL/L (ref 22–29)
HCT VFR BLD AUTO: 40.6 % (ref 40–53)
HDLC SERPL-MCNC: 74 MG/DL
HGB BLD-MCNC: 13.6 G/DL (ref 13.3–17.7)
LDLC SERPL CALC-MCNC: 125 MG/DL
MCH RBC QN AUTO: 35.5 PG (ref 26.5–33)
MCHC RBC AUTO-ENTMCNC: 33.5 G/DL (ref 31.5–36.5)
MCV RBC AUTO: 106 FL (ref 78–100)
NONHDLC SERPL-MCNC: 191 MG/DL
PLATELET # BLD AUTO: 135 10E3/UL (ref 150–450)
POTASSIUM SERPL-SCNC: 4.3 MMOL/L (ref 3.4–5.3)
PROT SERPL-MCNC: 6.8 G/DL (ref 6.4–8.3)
RBC # BLD AUTO: 3.83 10E6/UL (ref 4.4–5.9)
SODIUM SERPL-SCNC: 138 MMOL/L (ref 135–145)
TRIGL SERPL-MCNC: 328 MG/DL
TSH SERPL DL<=0.005 MIU/L-ACNC: 2.52 UIU/ML (ref 0.3–4.2)
WBC # BLD AUTO: 4.7 10E3/UL (ref 4–11)

## 2024-09-23 PROCEDURE — 80053 COMPREHEN METABOLIC PANEL: CPT

## 2024-09-23 PROCEDURE — 80061 LIPID PANEL: CPT

## 2024-09-23 PROCEDURE — 84443 ASSAY THYROID STIM HORMONE: CPT

## 2024-09-23 PROCEDURE — 83036 HEMOGLOBIN GLYCOSYLATED A1C: CPT

## 2024-09-23 PROCEDURE — 85027 COMPLETE CBC AUTOMATED: CPT

## 2024-09-23 PROCEDURE — 36415 COLL VENOUS BLD VENIPUNCTURE: CPT

## 2024-09-24 NOTE — RESULT ENCOUNTER NOTE
Results reviewed. Will discuss them soon with pt at upcoming scheduled appt 9/27/24. See clinic note from that visit for future plan

## 2024-09-26 NOTE — PROGRESS NOTES
"  {PROVIDER CHARTING PREFERENCE:791015}    Brandt Randall is a 84 year old, presenting for the following health issues:  Recheck Medication  Pt also needs lanclets  Did ask pt if they would like to receive flu and covid shots and pt would like to wait until October to get the vaccinations      History of Present Illness       Reason for visit:  Medication follow up He is missing 1 dose(s) of medications per week.  He is not taking prescribed medications regularly due to side effects.       {MA/LPN/RN Pre-Provider Visit Orders- hCG/UA/Strep (Optional):331935}  {SUPERLIST (Optional):850371}  {additonal problems for provider to add (Optional):998441}    {ROS Picklists (Optional):692879}      Objective    BP (!) 146/77   Pulse 83   Temp 97.9  F (36.6  C) (Temporal)   Ht 1.702 m (5' 7\")   Wt 73.3 kg (161 lb 9.6 oz)   SpO2 97%   BMI 25.31 kg/m    Body mass index is 25.31 kg/m .  Physical Exam   {Exam List (Optional):202109}    {Diagnostic Test Results (Optional):097120}        Signed Electronically by: Zhou Meraz MD  {Email feedback regarding this note to [email or web address]   :541223}  " hypertension  Controlled.  Continue current medication  - amLODIPine (NORVASC) 10 MG tablet; Take 1 tablet (10 mg) by mouth daily.  Dispense: 90 tablet; Refill: 3     7. Prostate cancer (H)  Pt s/p previous radiation therapy.  Most recent PSA from October 2023 undetectable.  Recheck in 1 month per urology orders      PLAN:   Increase Pravastatin to 40mg tab, 2 tabs (total 80mg) daily in PM until ruin out. Then change to 80mg tab, 1 tab daily for cholesterol management  Continue other medications  Prescriptions refilled at your pharmacy.    Call  [phone] or use [employer] to schedule a future lab appointment  fasting in 4 weeks before going to [place] for cholesterol, PSA and repeat nonfasting A1C lab in Spring 2025 when return to [place].   For fasting labs, please refrain from eating for 8 hours or more.   Drink 2 glasses of water before your lab appointment. It is fine to take your  oral medications on the morning of the lab test as usual   Eye exam appointment in October as scheduled.   I would recommend  a  Flu vaccine (influenza risk reduction) and Updated covid booster vaccine in early October. Would recommend RSV vaccine in late October. Get at a pharmacy  Get a tetanus (Td) vaccine in November or December in [place] for booster at a pharmacy           Brandt Randall is a 84 year old, presenting for the following health issues:  Recheck Medication     Did ask pt if they would like to receive flu and covid shots and pt would like to wait until October to get the vaccinations      History of Present Illness       Reason for visit:  Medication follow up He is missing 1 dose(s) of medications per week.  He is not taking prescribed medications regularly due to side effects.     Most recent lab results reviewed with pt.      Component      Latest Ref Rng 8/24/2023  9:38 AM 10/25/2023  9:44 AM 9/23/2024  10:00 AM   Sodium      135 - 145 mmol/L 137   138    Potassium      3.4 - 5.3 mmol/L 4.6   4.3    Carbon Dioxide  "(CO2)      22 - 29 mmol/L 21 (L)   25    Anion Gap      7 - 15 mmol/L 14   13    Urea Nitrogen      8.0 - 23.0 mg/dL 28.0 (H)   29.8 (H)    Creatinine      0.67 - 1.17 mg/dL 1.45 (H)   1.42 (H)    GFR Estimate      >60 mL/min/1.73m2 48 (L)   49 (L)    Calcium      8.8 - 10.4 mg/dL 9.2   9.2    Chloride      98 - 107 mmol/L 102   100    Glucose      70 - 99 mg/dL 187 (H)   190 (H)    Alkaline Phosphatase      40 - 150 U/L 101   84    AST      0 - 45 U/L 21   19    ALT      0 - 70 U/L 14   14    Protein Total      6.4 - 8.3 g/dL 7.1   6.8    Albumin      3.5 - 5.2 g/dL 4.3   4.1    Bilirubin Total      <=1.2 mg/dL 0.5   0.6    Patient Fasting?   Yes    Patient Fasting?   Yes    WBC      4.0 - 11.0 10e3/uL   4.7    RBC Count      4.40 - 5.90 10e6/uL   3.83 (L)    Hemoglobin      13.3 - 17.7 g/dL   13.6    Hematocrit      40.0 - 53.0 %   40.6    MCV      78 - 100 fL   106 (H)    MCH      26.5 - 33.0 pg   35.5 (H)    MCHC      31.5 - 36.5 g/dL   33.5    RDW      10.0 - 15.0 %   13.7    Platelet Count      150 - 450 10e3/uL   135 (L)    Cholesterol      <200 mg/dL 275 (H)   265 (H)    Triglycerides      <150 mg/dL 338 (H)   328 (H)    HDL Cholesterol      >=40 mg/dL 64   74    LDL Cholesterol Calculated      <100 mg/dL 143 (H)   125 (H)    Non HDL Cholesterol      <130 mg/dL 211 (H)   191 (H)    Estimated Average Glucose      <117 mg/dL   169 (H)    Hemoglobin A1C      0.0 - 5.6 % 7.4 (H)   7.5 (H)    PSA Tumor Marker      ng/mL  0.02     TSH      0.30 - 4.20 uIU/mL   2.52       Patient will be leaving to [place] on 11/3/2024 and gone for the winter.  Has an eye appointment scheduled for 10/28/2024  Patient did not bring in blood sugars today.  Just had virtual visit 9/16/2024.  Subjective part of that visit as below:    \"Currently living with his son.  Will be leaving back to [place] on 11/1/2024 to stay for the winter.  Had COVID infection in May.  Has some mild fatigue but otherwise denies upper respiratory " "issues.  Chronic low back pain over the last 5 to 6 years.  Managed by [facility].  Denies radiation of pain into his extremities.  Also being followed by [facility] pain clinic and underwent LESI 8/24/2024 with plan for repeat injection 10/15/2024.  Blood sugars more recently 130-149 in the morning and approximately 160 in the evening.  Denies chest pain, abdominal pain, current shortness of breath.  Has mild chronic numbness in feet bilaterally.  History of prostate cancer and followed by urology.  Most recent PSA undetectable as above\"         Additional ROS:   Constitutional, HEENT, Cardiovascular, Pulmonary, GI and , Neuro, MSK and Psych review of systems/symptoms are otherwise negative or unchanged from previous, except as noted above.      OBJECTIVE:  /60   Pulse 83   Temp 97.9  F (36.6  C) (Temporal)   Ht 1.702 m (5' 7\")   Wt 73.3 kg (161 lb 9.6 oz)   SpO2 97%   BMI 25.31 kg/m     Estimated body mass index is 25.31 kg/m  as calculated from the following:    Height as of this encounter: 1.702 m (5' 7\").    Weight as of this encounter: 73.3 kg (161 lb 9.6 oz).     Neck: no adenopathy. Thyroid normal to palpation. No bruits  Pulm: Lungs clear to auscultation   CV: Regular rates and rhythm  GI: Soft, nontender, Normal active bowel sounds, No hepatosplenomegaly or masses palpable  Ext: Peripheral pulses intact. No edema.  Neuro: Normal strength and tone, sensory exam mild reduced light touch sensation distal bilateral lower extremity  Back: Tenderness to palpation bilateral paralumbar musculature.  Negative straight leg raise test bilaterally       (Chart documentation was completed, in part, with [employer] voice-recognition software. Even though reviewed, some grammatical, spelling, and word errors may remain.)    Zhou Meraz MD  Internal Medicine Department  [facility]            "

## 2024-09-27 ENCOUNTER — OFFICE VISIT (OUTPATIENT)
Dept: INTERNAL MEDICINE | Facility: CLINIC | Age: 84
End: 2024-09-27
Payer: MEDICARE

## 2024-09-27 VITALS
SYSTOLIC BLOOD PRESSURE: 128 MMHG | HEART RATE: 83 BPM | WEIGHT: 161.6 LBS | HEIGHT: 67 IN | OXYGEN SATURATION: 97 % | DIASTOLIC BLOOD PRESSURE: 60 MMHG | BODY MASS INDEX: 25.36 KG/M2 | TEMPERATURE: 97.9 F

## 2024-09-27 DIAGNOSIS — E03.9 HYPOTHYROIDISM, UNSPECIFIED TYPE: ICD-10-CM

## 2024-09-27 DIAGNOSIS — E11.21 TYPE 2 DIABETES MELLITUS WITH DIABETIC NEPHROPATHY, WITHOUT LONG-TERM CURRENT USE OF INSULIN (H): Primary | ICD-10-CM

## 2024-09-27 DIAGNOSIS — K44.9 DIAPHRAGMATIC HERNIA WITHOUT OBSTRUCTION AND WITHOUT GANGRENE: ICD-10-CM

## 2024-09-27 DIAGNOSIS — E78.5 HYPERLIPIDEMIA LDL GOAL <100: ICD-10-CM

## 2024-09-27 DIAGNOSIS — I10 ESSENTIAL HYPERTENSION: ICD-10-CM

## 2024-09-27 DIAGNOSIS — K21.9 GASTROESOPHAGEAL REFLUX DISEASE WITHOUT ESOPHAGITIS: ICD-10-CM

## 2024-09-27 DIAGNOSIS — C61 PROSTATE CANCER (H): ICD-10-CM

## 2024-09-27 PROCEDURE — 99214 OFFICE O/P EST MOD 30 MIN: CPT | Performed by: INTERNAL MEDICINE

## 2024-09-27 RX ORDER — FAMOTIDINE 20 MG/1
20 TABLET, FILM COATED ORAL 2 TIMES DAILY
Qty: 180 TABLET | Refills: 3 | Status: SHIPPED | OUTPATIENT
Start: 2024-09-27

## 2024-09-27 RX ORDER — PIOGLITAZONEHYDROCHLORIDE 30 MG/1
30 TABLET ORAL DAILY
Qty: 90 TABLET | Refills: 3 | Status: SHIPPED | OUTPATIENT
Start: 2024-09-27

## 2024-09-27 RX ORDER — LANCETS
EACH MISCELLANEOUS
Qty: 100 EACH | Refills: 3 | Status: SHIPPED | OUTPATIENT
Start: 2024-09-27 | End: 2024-10-01

## 2024-09-27 RX ORDER — PRAVASTATIN SODIUM 80 MG/1
80 TABLET ORAL DAILY
Qty: 90 TABLET | Refills: 3 | Status: SHIPPED | OUTPATIENT
Start: 2024-09-27

## 2024-09-27 RX ORDER — BLOOD SUGAR DIAGNOSTIC
STRIP MISCELLANEOUS
Qty: 100 STRIP | Refills: 3 | Status: SHIPPED | OUTPATIENT
Start: 2024-09-27 | End: 2024-10-01

## 2024-09-27 RX ORDER — LEVOTHYROXINE SODIUM 88 UG/1
88 TABLET ORAL DAILY
Qty: 90 TABLET | Refills: 3 | Status: SHIPPED | OUTPATIENT
Start: 2024-09-27

## 2024-09-27 RX ORDER — METFORMIN HCL 500 MG
500 TABLET, EXTENDED RELEASE 24 HR ORAL DAILY
Qty: 90 TABLET | Refills: 3 | Status: SHIPPED | OUTPATIENT
Start: 2024-09-27

## 2024-09-27 RX ORDER — AMLODIPINE BESYLATE 10 MG/1
10 TABLET ORAL DAILY
Qty: 90 TABLET | Refills: 3 | Status: SHIPPED | OUTPATIENT
Start: 2024-09-27

## 2024-09-27 RX ORDER — PRAVASTATIN SODIUM 40 MG
40 TABLET ORAL DAILY
Qty: 90 TABLET | Refills: 0 | Status: CANCELLED | OUTPATIENT
Start: 2024-09-27

## 2024-09-29 NOTE — PATIENT INSTRUCTIONS
Continue current medications   Contact your pharmacy when refills are needed  Call  [phone] or use [employer] to schedule a future lab appointment  fasting in next 1-2  weeks.   For fasting labs, please refrain from eating for 8 hours or more.   Drink 2 glasses of water before your lab appointment. It is fine to take your  oral medications on the morning of the lab test as usual  Follow up with me 9/27/24 as scheduled. Earlier as needed  I would recommend an updated covid vaccination and an influenza/flu vaccination in the Fall (October) 2024 at the clinic or any pharmacy

## 2024-10-01 ENCOUNTER — TELEPHONE (OUTPATIENT)
Dept: INTERNAL MEDICINE | Facility: CLINIC | Age: 84
End: 2024-10-01
Payer: MEDICARE

## 2024-10-01 DIAGNOSIS — E11.21 TYPE 2 DIABETES MELLITUS WITH DIABETIC NEPHROPATHY, WITHOUT LONG-TERM CURRENT USE OF INSULIN (H): ICD-10-CM

## 2024-10-01 RX ORDER — LANCETS
EACH MISCELLANEOUS
Qty: 100 EACH | Refills: 3 | Status: SHIPPED | OUTPATIENT
Start: 2024-10-01

## 2024-10-01 RX ORDER — BLOOD SUGAR DIAGNOSTIC
STRIP MISCELLANEOUS
Qty: 100 STRIP | Refills: 3 | Status: SHIPPED | OUTPATIENT
Start: 2024-10-01

## 2024-10-01 NOTE — TELEPHONE ENCOUNTER
"Received call from pharmacy. Medicare has new guidelines and they do not accept sig for test strips and lancets stating \"or as directed\".     PCP-Pended medications with adjustment removing \"or as directed\" to sig. Please review and sign, if approved.    Thank you~    Otilia Irizarry RN        "

## 2024-10-03 ENCOUNTER — TELEPHONE (OUTPATIENT)
Dept: RADIATION THERAPY | Facility: OUTPATIENT CENTER | Age: 84
End: 2024-10-03

## 2024-10-03 NOTE — TELEPHONE ENCOUNTER
Attempted to reach the patient several times and left messages. I haven't received a call back, so I have mailed out a letter to the patient.

## 2024-10-18 ENCOUNTER — TELEPHONE (OUTPATIENT)
Dept: INTERNAL MEDICINE | Facility: CLINIC | Age: 84
End: 2024-10-18
Payer: MEDICARE

## 2024-10-18 NOTE — TELEPHONE ENCOUNTER
Patient Returning Call    Reason for call:  patient is calling to discuss medication change after lab draw.    Information relayed to patient:  a team member will call back    Patient has additional questions:  Yes    What are your questions/concerns:  patient has lab appointment for Monday, would like pcp to put in orders for that day. And would like a follow up call after results are in to discuss medication change.    Okay to leave a detailed message?: Yes at Home number on file [phone] (home)

## 2024-10-18 NOTE — TELEPHONE ENCOUNTER
Informed pt that lab orders were placed and that once pcp gets results then there will be further instructions in regards to medication changes.

## 2024-10-21 ENCOUNTER — LAB (OUTPATIENT)
Dept: LAB | Facility: CLINIC | Age: 84
End: 2024-10-21
Payer: MEDICARE

## 2024-10-21 DIAGNOSIS — E11.21 TYPE 2 DIABETES MELLITUS WITH DIABETIC NEPHROPATHY, WITHOUT LONG-TERM CURRENT USE OF INSULIN (H): ICD-10-CM

## 2024-10-21 DIAGNOSIS — R80.9 PROTEINURIA, UNSPECIFIED TYPE: ICD-10-CM

## 2024-10-21 DIAGNOSIS — I10 ESSENTIAL HYPERTENSION: ICD-10-CM

## 2024-10-21 DIAGNOSIS — N18.31 STAGE 3A CHRONIC KIDNEY DISEASE (H): ICD-10-CM

## 2024-10-21 DIAGNOSIS — E78.5 HYPERLIPIDEMIA LDL GOAL <100: ICD-10-CM

## 2024-10-21 LAB
ALT SERPL W P-5'-P-CCNC: 14 U/L (ref 0–70)
CHOLEST SERPL-MCNC: 222 MG/DL
CK SERPL-CCNC: 36 U/L (ref 39–308)
FASTING STATUS PATIENT QL REPORTED: ABNORMAL
HDLC SERPL-MCNC: 71 MG/DL
LDLC SERPL CALC-MCNC: 90 MG/DL
NONHDLC SERPL-MCNC: 151 MG/DL
TRIGL SERPL-MCNC: 305 MG/DL

## 2024-10-21 PROCEDURE — 36415 COLL VENOUS BLD VENIPUNCTURE: CPT

## 2024-10-21 PROCEDURE — 82550 ASSAY OF CK (CPK): CPT

## 2024-10-21 PROCEDURE — 80061 LIPID PANEL: CPT

## 2024-10-21 PROCEDURE — 84460 ALANINE AMINO (ALT) (SGPT): CPT

## 2024-10-28 ENCOUNTER — TELEPHONE (OUTPATIENT)
Dept: INTERNAL MEDICINE | Facility: CLINIC | Age: 84
End: 2024-10-28
Payer: MEDICARE

## 2024-10-28 NOTE — TELEPHONE ENCOUNTER
SULAIMANI - Status Update    Who is Calling: patient    Update: No side effects from increase of pravastatin, send new script to:    [id number] - [place], [place] - [address]  [address]  [place] [address]  Phone: [phone] Fax: [phone]    Also needs a refill on Metformin - Would like a 90 day fill    Does caller want a call/response back: Yes     Okay to leave a detailed message?: Yes at Cell number on file:    Telephone Information:   Mobile [phone]

## 2024-10-29 ENCOUNTER — TELEPHONE (OUTPATIENT)
Dept: INTERNAL MEDICINE | Facility: CLINIC | Age: 84
End: 2024-10-29
Payer: MEDICARE

## 2024-10-29 DIAGNOSIS — E78.5 HYPERLIPIDEMIA LDL GOAL <100: ICD-10-CM

## 2024-10-29 RX ORDER — PRAVASTATIN SODIUM 40 MG
40 TABLET ORAL DAILY
Qty: 90 TABLET | Refills: 3 | Status: SHIPPED | OUTPATIENT
Start: 2024-10-29

## 2024-10-29 NOTE — TELEPHONE ENCOUNTER
Medication Question or Refill    Contacts       Contact Date/Time Type Contact Phone/Fax    10/29/2024 09:13 AM CDT Phone (Incoming) Prakash Cramer (Self) [phone] (H)            What medication are you calling about (include dose and sig)?: pervasetine(sp) 80mg currently    Preferred Pharmacy:   [facility] [address] [place] [address] [address] [facility] [address]  [address] [facility] [address]  [facility] [address]  Phone: [phone] Fax: [phone]    [facility] [address] [place] [address] [facility]  [address] [facility] [address]  Phone: [phone] Fax: [phone]    WRITTEN PRESCRIPTION REQUESTED  No address on file        Controlled Substance Agreement on file:   CSA -- Patient Level:    CSA: None found at the patient level.       Who prescribed the medication?: pcp    Do you need a refill? No    When did you use the medication last? Last night. Takes in evenings    Patient offered an appointment? No    Do you have any questions or concerns?  Yes: started taking the new does (doubled from 40 to 80 mg) and now joints ache.      Okay to leave a detailed message?: Yes at Home number on file [phone] (home)

## 2024-10-29 NOTE — TELEPHONE ENCOUNTER
Patient has previously tolerated pravastatin 40 mg daily.  Note below states patient has developed some muscle achiness since increasing to 80 mg daily.  Therefore, patient to go back to taking pravastatin 40 mg daily instead.  LDL not quite at goal with this Will milligram dosage but close and better to be taking some form of  pravastatin and tolerating medication well at 40 mg rather than causing muscle side effects with higher 80mg dosage.  Prescription sent to patient's [facility] pharmacy for pravastatin 40 mg tablets.  Inform patient

## 2024-12-31 NOTE — TELEPHONE ENCOUNTER
Medication is being filled for 1 time refill only due to:  Patient needs labs tsh.     (3) no apparent problem

## 2025-02-24 ENCOUNTER — PATIENT OUTREACH (OUTPATIENT)
Dept: CARE COORDINATION | Facility: CLINIC | Age: 85
End: 2025-02-24
Payer: MEDICARE

## 2025-05-16 DIAGNOSIS — K21.9 GASTROESOPHAGEAL REFLUX DISEASE WITHOUT ESOPHAGITIS: ICD-10-CM

## 2025-05-16 DIAGNOSIS — K44.9 DIAPHRAGMATIC HERNIA WITHOUT OBSTRUCTION AND WITHOUT GANGRENE: ICD-10-CM

## 2025-05-19 RX ORDER — FAMOTIDINE 20 MG/1
20 TABLET, FILM COATED ORAL 2 TIMES DAILY
Qty: 180 TABLET | Refills: 0 | Status: SHIPPED | OUTPATIENT
Start: 2025-05-19

## 2025-05-21 ENCOUNTER — ANCILLARY PROCEDURE (OUTPATIENT)
Dept: GENERAL RADIOLOGY | Facility: CLINIC | Age: 85
End: 2025-05-21
Attending: PHYSICIAN ASSISTANT
Payer: MEDICARE

## 2025-05-21 ENCOUNTER — OFFICE VISIT (OUTPATIENT)
Dept: URGENT CARE | Facility: URGENT CARE | Age: 85
End: 2025-05-21
Payer: MEDICARE

## 2025-05-21 VITALS
OXYGEN SATURATION: 96 % | DIASTOLIC BLOOD PRESSURE: 60 MMHG | WEIGHT: 167 LBS | BODY MASS INDEX: 26.16 KG/M2 | TEMPERATURE: 97 F | SYSTOLIC BLOOD PRESSURE: 136 MMHG | HEART RATE: 77 BPM | RESPIRATION RATE: 18 BRPM

## 2025-05-21 DIAGNOSIS — R05.2 SUBACUTE COUGH: ICD-10-CM

## 2025-05-21 DIAGNOSIS — J18.9 PNEUMONIA OF RIGHT MIDDLE LOBE DUE TO INFECTIOUS ORGANISM: Primary | ICD-10-CM

## 2025-05-21 PROCEDURE — 3075F SYST BP GE 130 - 139MM HG: CPT | Performed by: PHYSICIAN ASSISTANT

## 2025-05-21 PROCEDURE — 3078F DIAST BP <80 MM HG: CPT | Performed by: PHYSICIAN ASSISTANT

## 2025-05-21 PROCEDURE — 71046 X-RAY EXAM CHEST 2 VIEWS: CPT | Mod: TC | Performed by: RADIOLOGY

## 2025-05-21 PROCEDURE — 99213 OFFICE O/P EST LOW 20 MIN: CPT | Performed by: PHYSICIAN ASSISTANT

## 2025-05-21 RX ORDER — CEFDINIR 300 MG/1
300 CAPSULE ORAL 2 TIMES DAILY
Qty: 14 CAPSULE | Refills: 0 | Status: SHIPPED | OUTPATIENT
Start: 2025-05-21 | End: 2025-05-28

## 2025-05-21 RX ORDER — AZITHROMYCIN 250 MG/1
TABLET, FILM COATED ORAL
Qty: 6 TABLET | Refills: 0 | Status: SHIPPED | OUTPATIENT
Start: 2025-05-21 | End: 2025-05-26

## 2025-05-21 NOTE — PROGRESS NOTES
SUBJECTIVE:   Prakash Cramer is a 85 year old male presenting with a chief complaint of cough - non-productive and cough - productive.  Onset of symptoms was 2 week(s) ago.  Course of illness is same.    Severity moderate  Current and Associated symptoms: cough - non-productive and cough - productive  Treatment measures tried include Fluids and Rest.  Predisposing factors include None.    Past Medical History:   Diagnosis Date    Androgen deprivation therapy     Eligard 45 mg injection received 10/26/2020    Aplastic anemia 03/26/2018    thought secondary to reaction to Septra    BPH (benign prostatic hypertrophy)     failed  Flomax    C. difficile diarrhea 03/26/2018    treated with Flagyl    Cellulitis and abscess of leg, except foot 2004    Contact dermatitis and other eczema, due to unspecified cause     Diaphragmatic hernia without mention of obstruction or gangrene     hiatal hernia    Esophageal reflux     History of colonic polyps 01/2020    5 polyps removed and 2 were precancerous so he will have another one in Jan 2021.    Hyperlipidemia LDL goal <100 03/11/2005    Hypothyroidism     Impotence of organic origin     Meningococcal encephalitis     Mumps     Prostate cancer (H) 08/26/2020    Proteinuria     RBBB     S/P radiation therapy     7,000 cGy to prostate + SV completed on 6/23/2021 - [facility]    Type 2 diabetes mellitus with diabetic nephropathy  (goal A1C<7) 10/24/2015    Unspecified essential hypertension      Current Outpatient Medications   Medication Sig Dispense Refill    acetaminophen (TYLENOL) 500 MG tablet Take 500-1,000 mg by mouth every 6 hours as needed for mild pain       amLODIPine (NORVASC) 10 MG tablet Take 1 tablet (10 mg) by mouth daily. 90 tablet 3    azithromycin (ZITHROMAX) 250 MG tablet Take 2 tablets (500 mg) by mouth daily for 1 day, THEN 1 tablet (250 mg) daily for 4 days. 6 tablet 0    blood glucose (ACCU-CHEK GUIDE) test strip Use to test blood sugar  one times daily 100 strip 3    Blood Glucose Monitoring Suppl (ACCU-CHEK GUIDE ME) w/Device KIT 1 each daily 1 kit 0    cefdinir (OMNICEF) 300 MG capsule Take 1 capsule (300 mg) by mouth 2 times daily for 7 days. 14 capsule 0    famotidine (PEPCID) 20 MG tablet Take 1 tablet by mouth twice daily 180 tablet 0    levothyroxine (SYNTHROID/LEVOTHROID) 88 MCG tablet Take 1 tablet (88 mcg) by mouth daily. 90 tablet 3    loperamide (IMODIUM A-D) 2 MG tablet Take 2 mg by mouth 4 times daily as needed for diarrhea      metFORMIN (GLUCOPHAGE XR) 500 MG 24 hr tablet Take 1 tablet (500 mg) by mouth daily. 90 tablet 3    pioglitazone (ACTOS) 30 MG tablet Take 1 tablet (30 mg) by mouth daily. 90 tablet 3    pravastatin (PRAVACHOL) 40 MG tablet Take 1 tablet (40 mg) by mouth daily. 90 tablet 3    sitagliptin (JANUVIA) 100 MG tablet Take 1 tablet (100 mg) by mouth daily. 90 tablet 3    thin (NO BRAND SPECIFIED) lancets Use to test blood sugar 1 times daily 100 each 3     Social History     Tobacco Use    Smoking status: Never    Smokeless tobacco: Never   Substance Use Topics    Alcohol use: Not Currently       ROS:  Review of systems negative except as stated above.    OBJECTIVE:  /60   Pulse 77   Temp 97  F (36.1  C) (Tympanic)   Resp 18   Wt 75.8 kg (167 lb)   SpO2 96%   BMI 26.16 kg/m    GENERAL APPEARANCE: healthy, alert and no distress  HENT: ear canals and TM's normal.  Nose and mouth without ulcers, erythema or lesions  NECK: supple, nontender, no lymphadenopathy  RESP: lungs clear to auscultation - no rales, rhonchi or wheezes  CV: regular rates and rhythm, normal S1 S2, no murmur noted  NEURO: Normal strength and tone, sensory exam grossly normal,  normal speech and mentation  SKIN: no suspicious lesions or rashes    X-ray image initially interpreted indicating RML  pneumonia.       ASSESSMENT:  (J18.9) Pneumonia of right middle lobe due to infectious organism  (primary encounter diagnosis)  Plan: azithromycin  (ZITHROMAX) 250 MG tablet,         cefdinir (OMNICEF) 300 MG capsule      (R05.2) Subacute cough  Plan: XR Chest 2 Views          Patient Instructions   Home rest    Emergency Department if worsening     Follow up with PCP before the weekend

## 2025-05-21 NOTE — PROGRESS NOTES
Urgent Care Clinic Visit    Chief Complaint   Patient presents with    URI     Pt states he is having chest congestion and cough for around 2 weeks-pt states he would like an RSV test                5/21/2025    10:55 AM   Additional Questions   Roomed by Ky   Accompanied by Self

## 2025-05-22 ENCOUNTER — RESULTS FOLLOW-UP (OUTPATIENT)
Dept: URGENT CARE | Facility: URGENT CARE | Age: 85
End: 2025-05-22

## 2025-05-23 ENCOUNTER — VIRTUAL VISIT (OUTPATIENT)
Dept: INTERNAL MEDICINE | Facility: CLINIC | Age: 85
End: 2025-05-23
Payer: MEDICARE

## 2025-05-23 DIAGNOSIS — E78.5 HYPERLIPIDEMIA LDL GOAL <100: ICD-10-CM

## 2025-05-23 DIAGNOSIS — E03.9 HYPOTHYROIDISM, UNSPECIFIED TYPE: ICD-10-CM

## 2025-05-23 DIAGNOSIS — R91.8 OPACITIES OF BOTH LUNGS PRESENT ON CHEST X-RAY: ICD-10-CM

## 2025-05-23 DIAGNOSIS — E11.21 TYPE 2 DIABETES MELLITUS WITH DIABETIC NEPHROPATHY, WITHOUT LONG-TERM CURRENT USE OF INSULIN (H): ICD-10-CM

## 2025-05-23 DIAGNOSIS — N18.31 STAGE 3A CHRONIC KIDNEY DISEASE (H): ICD-10-CM

## 2025-05-23 DIAGNOSIS — C61 PROSTATE CANCER (H): Primary | ICD-10-CM

## 2025-05-23 PROCEDURE — 98014 SYNCH AUDIO-ONLY EST MOD 30: CPT | Performed by: INTERNAL MEDICINE

## 2025-05-23 NOTE — PROGRESS NOTES
Prakash is a 85 year old who is being evaluated via a billable telephone visit.    What phone number would you like to be contacted at? [phone]   How would you like to obtain your AVS? Mail a copy  Originating Location (pt. Location): Home     Distant Location (provider location):  On-site  Telephone visit completed due to the patient did not consent to a video visit.    ASSESSMENT:    1. Opacities of both lungs present on chest x-ray  Patient to complete antibiotic therapy as prescribed.  Given radiology interpretation of chest x-ray, will get CT chest in 2 weeks to ensure resolution and rule out other underlying pathology  - CT Chest w/o Contrast; Future    2. Type 2 diabetes mellitus with diabetic nephropathy, without long-term current use of insulin (H)  Previously controlled for age with A1c 7.5.  Due for lab follow-up.  Continue current management pending lab results  - Hemoglobin A1c; Future  - Comprehensive metabolic panel; Future    3. Stage 3a chronic kidney disease (H)  Last GFR stable at 49.  Needs lab follow-up  - Comprehensive metabolic panel; Future  - Albumin Random Urine Quantitative with Creat Ratio; Future  - CBC with platelets; Future  - Parathyroid Hormone Intact; Future  - Vitamin D Deficiency; Future    4. Hyperlipidemia LDL goal <100  On pravastatin.  Has not tolerated higher doses or other statin trials.  Failed to improve with Zetia.  Patient declines PCSK9 and Nexletol.  Trying to reduce saturated fats in diet.  Repeat labs as ordered.  - Comprehensive metabolic panel; Future  - Lipid panel reflex to direct LDL Fasting; Future    5. Hypothyroidism, unspecified type  On levothyroxine.  Previous TSH normal.  Some fatigue with antibiotic therapy.  Had stable energy prior to URI.  Continue current levothyroxine dose for now pending lab result  - TSH with free T4 reflex; Future     6. Prostate cancer (H) (Primary)  Has been getting care through  Urology in [place]. No recent PSA results to  review. Check tumor marker with next lab   - PSA, tumor marker; Future      PLAN:  Call  [phone] or use [employer] to schedule a future lab appointment  fasting in 1-2 weeks.   For fasting labs, please refrain from eating for 8 hours or more.   Drink 2 glasses of water before your lab appointment. It is fine to take your  oral medications on the morning of the lab test as usual  CT Chest in 2 weeks. [place]   will call you to schedule. If you have not heard from their schedulers within 2 business days, then call [phone] (radiology scheduling)  Finish antibiotic therapy  Continue other medications pending lab results      Phone call contact time  Call Started at 11:40 AM  Call Ended at 11:56 AM  Total minutes: 16 minutes    (Chart documentation was completed, in part, with [employer] voice-recognition software. Even though reviewed, some grammatical, spelling, and word errors may remain.)    Zhou Meraz MD  Internal Medicine Department  [facility]           Brandt Randall is a 85 year old, presenting for the following health issues:  UC Follow-Up      History of Present Illness       Reason for visit:  Urgent care follow up   He is taking medications regularly.        ED/UC Followup:    Facility:  [facility]  Date of visit: 5/21  Reason for visit: URI, Cough  Current Status: Pt states they are feeling a little better. Pt states antibiotic makes them sleepy, but no other side effects reported      Most recent lab results reviewed with pt.      Urgent care note reviewed.  Will diagnosed by the provider with a right middle lobe pneumonia and treated with cefdinir and azithromycin.  Patient denies current fevers or chills.  Cough is significantly improved and minimal now.  Has been on antibiotics for 2 days so far.  Slight fatigue.  No shortness of breath.  Denies any wheezing.  Eating okay.  Use of a walker from previous back issues.  Symptoms stable per patient.  No falls  recently.  No issues with doing usual ADLs.  Radiology read of x-ray reviewed as below. Nonsmoker.  History of diabetes.  Not checking blood sugars recently    EXAM: XR CHEST 2 VIEWS  LOCATION: [facility]  DATE: 5/21/2025     INDICATION: Subacute cough.  COMPARISON: None.                                                                      IMPRESSION: There are scattered nodular opacities in both lungs, which are indeterminate. Metastatic disease cannot be excluded, and chest CT is recommended for further evaluation. No pleural effusions. Tortuous thoracic aorta. Heart size and pulmonary   vascularity are within normal limits. Cholecystectomy.         Additional ROS:   Constitutional, HEENT, Cardiovascular, Pulmonary, GI and , Neuro, MSK and Psych review of systems/symptoms are otherwise negative or unchanged from previous, except as noted above.           Objective:  Any reported vitals from today were reviewed in chart. See nursing documentation for details    RESP: No cough, no audible wheezing, able to talk in full sentences  GEN: Normal affect. Normal though content/speech  Remainder of exam unable to be completed due to telephone visits

## 2025-05-26 ENCOUNTER — PATIENT OUTREACH (OUTPATIENT)
Dept: CARE COORDINATION | Facility: CLINIC | Age: 85
End: 2025-05-26
Payer: MEDICARE

## 2025-05-26 ENCOUNTER — RESULTS FOLLOW-UP (OUTPATIENT)
Dept: INTERNAL MEDICINE | Facility: CLINIC | Age: 85
End: 2025-05-26

## 2025-05-27 NOTE — PATIENT INSTRUCTIONS
Call  [phone] or use [employer] to schedule a future lab appointment  fasting in 1-2 weeks.   For fasting labs, please refrain from eating for 8 hours or more.   Drink 2 glasses of water before your lab appointment. It is fine to take your  oral medications on the morning of the lab test as usual  CT Chest in 2 weeks. [place]   will call you to schedule. If you have not heard from their schedulers within 2 business days, then call [phone] (radiology scheduling)  Finish antibiotic therapy  Continue other medications pending lab results

## 2025-06-11 ENCOUNTER — TELEPHONE (OUTPATIENT)
Dept: INTERNAL MEDICINE | Facility: CLINIC | Age: 85
End: 2025-06-11
Payer: MEDICARE

## 2025-06-11 NOTE — TELEPHONE ENCOUNTER
Test Results    Contacts       Contact Date/Time Type Contact Phone/Fax    06/11/2025 08:32 AM CDT Phone (Incoming) Prakash Cramer (Self) [phone] (H)            Who ordered the test:  PCP    Type of test: Lab and CT    Date of test:  06/06/2025    Where was the test performed:  [place]    What are your questions/concerns?:  Would like to know the results. Was told that I should hear back and if not by today to call.    Okay to leave a detailed message?: Yes at Cell number on file:    Telephone Information:   Mobile [phone]

## 2025-06-11 NOTE — TELEPHONE ENCOUNTER
Spoke with patient and informed will forward message to provider to review the CT scan and wants to know results. Patient is still having symptoms. He has not gotten the blood work done yet as he has been to weak. Do you want him triaged?

## 2025-06-12 ENCOUNTER — LAB (OUTPATIENT)
Dept: LAB | Facility: CLINIC | Age: 85
End: 2025-06-12
Payer: MEDICARE

## 2025-06-12 ENCOUNTER — RESULTS FOLLOW-UP (OUTPATIENT)
Dept: INTERNAL MEDICINE | Facility: CLINIC | Age: 85
End: 2025-06-12

## 2025-06-12 DIAGNOSIS — E78.5 HYPERLIPIDEMIA LDL GOAL <100: ICD-10-CM

## 2025-06-12 DIAGNOSIS — E03.9 HYPOTHYROIDISM, UNSPECIFIED TYPE: ICD-10-CM

## 2025-06-12 DIAGNOSIS — C61 PROSTATE CANCER (H): ICD-10-CM

## 2025-06-12 DIAGNOSIS — N18.31 STAGE 3A CHRONIC KIDNEY DISEASE (H): ICD-10-CM

## 2025-06-12 DIAGNOSIS — E11.21 TYPE 2 DIABETES MELLITUS WITH DIABETIC NEPHROPATHY, WITHOUT LONG-TERM CURRENT USE OF INSULIN (H): ICD-10-CM

## 2025-06-12 LAB
ALBUMIN SERPL BCG-MCNC: 3.7 G/DL (ref 3.5–5.2)
ALP SERPL-CCNC: 128 U/L (ref 40–150)
ALT SERPL W P-5'-P-CCNC: 13 U/L (ref 0–70)
ANION GAP SERPL CALCULATED.3IONS-SCNC: 10 MMOL/L (ref 7–15)
AST SERPL W P-5'-P-CCNC: 21 U/L (ref 0–45)
BILIRUB SERPL-MCNC: 0.5 MG/DL
BUN SERPL-MCNC: 26.8 MG/DL (ref 8–23)
CALCIUM SERPL-MCNC: 8.9 MG/DL (ref 8.8–10.4)
CHLORIDE SERPL-SCNC: 102 MMOL/L (ref 98–107)
CHOLEST SERPL-MCNC: 186 MG/DL
CREAT SERPL-MCNC: 1.42 MG/DL (ref 0.67–1.17)
EGFRCR SERPLBLD CKD-EPI 2021: 49 ML/MIN/1.73M2
ERYTHROCYTE [DISTWIDTH] IN BLOOD BY AUTOMATED COUNT: 14.8 % (ref 10–15)
EST. AVERAGE GLUCOSE BLD GHB EST-MCNC: 154 MG/DL
FASTING STATUS PATIENT QL REPORTED: YES
FASTING STATUS PATIENT QL REPORTED: YES
GLUCOSE SERPL-MCNC: 194 MG/DL (ref 70–99)
HBA1C MFR BLD: 7 % (ref 0–5.6)
HCO3 SERPL-SCNC: 24 MMOL/L (ref 22–29)
HCT VFR BLD AUTO: 35.6 % (ref 40–53)
HDLC SERPL-MCNC: 55 MG/DL
HGB BLD-MCNC: 11.4 G/DL (ref 13.3–17.7)
LDLC SERPL CALC-MCNC: 105 MG/DL
MCH RBC QN AUTO: 33.6 PG (ref 26.5–33)
MCHC RBC AUTO-ENTMCNC: 32 G/DL (ref 31.5–36.5)
MCV RBC AUTO: 105 FL (ref 78–100)
NONHDLC SERPL-MCNC: 131 MG/DL
PLATELET # BLD AUTO: 176 10E3/UL (ref 150–450)
POTASSIUM SERPL-SCNC: 4.7 MMOL/L (ref 3.4–5.3)
PROT SERPL-MCNC: 7.5 G/DL (ref 6.4–8.3)
PSA SERPL DL<=0.01 NG/ML-MCNC: <0.01 NG/ML
PTH-INTACT SERPL-MCNC: 46 PG/ML (ref 15–65)
RBC # BLD AUTO: 3.39 10E6/UL (ref 4.4–5.9)
SODIUM SERPL-SCNC: 136 MMOL/L (ref 135–145)
T4 FREE SERPL-MCNC: 1.43 NG/DL (ref 0.9–1.7)
TRIGL SERPL-MCNC: 130 MG/DL
TSH SERPL DL<=0.005 MIU/L-ACNC: 5.46 UIU/ML (ref 0.3–4.2)
VIT D+METAB SERPL-MCNC: 27 NG/ML (ref 20–50)
WBC # BLD AUTO: 4.9 10E3/UL (ref 4–11)

## 2025-06-13 ENCOUNTER — VIRTUAL VISIT (OUTPATIENT)
Dept: INTERNAL MEDICINE | Facility: CLINIC | Age: 85
End: 2025-06-13
Payer: MEDICARE

## 2025-06-13 DIAGNOSIS — E78.5 HYPERLIPIDEMIA LDL GOAL <100: ICD-10-CM

## 2025-06-13 DIAGNOSIS — E11.21 TYPE 2 DIABETES MELLITUS WITH DIABETIC NEPHROPATHY, WITHOUT LONG-TERM CURRENT USE OF INSULIN (H): ICD-10-CM

## 2025-06-13 DIAGNOSIS — E03.9 HYPOTHYROIDISM, UNSPECIFIED TYPE: ICD-10-CM

## 2025-06-13 DIAGNOSIS — N18.31 STAGE 3A CHRONIC KIDNEY DISEASE (H): ICD-10-CM

## 2025-06-13 DIAGNOSIS — D53.9 MACROCYTIC ANEMIA: ICD-10-CM

## 2025-06-13 DIAGNOSIS — R91.8 PULMONARY NODULES: Primary | ICD-10-CM

## 2025-06-13 DIAGNOSIS — Z85.46 HISTORY OF PROSTATE CANCER: ICD-10-CM

## 2025-06-13 PROCEDURE — 3051F HG A1C>EQUAL 7.0%<8.0%: CPT | Mod: 93 | Performed by: INTERNAL MEDICINE

## 2025-06-13 PROCEDURE — 98014 SYNCH AUDIO-ONLY EST MOD 30: CPT | Performed by: INTERNAL MEDICINE
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
## 2025-06-14 PROBLEM — Z85.46 HISTORY OF PROSTATE CANCER: Status: ACTIVE | Noted: 2025-06-14

## 2025-06-14 PROBLEM — D53.9 MACROCYTIC ANEMIA: Status: ACTIVE | Noted: 2025-06-14

## 2025-06-14 PROBLEM — C61 PROSTATE CANCER (H): Status: RESOLVED | Noted: 2020-09-07 | Resolved: 2025-06-14

## 2025-06-15 NOTE — PATIENT INSTRUCTIONS
PET scan to assess lung nodule activity. Pt  prefers this to be done at [facility]. Pt to call [phone] to schedule  Referral  to [place] pulmonary service for consultation and possible future bronchoscopy issue biopsy.  Central scheduling will call you to set up the consultation appointment or you may call [phone]; option 5.   Call  [phone] or use [employer] to schedule a future lab appointment  non-fasting in 1-2 weeks re: anemia and 3 months re: kidneys, thyroid, diabetes, anemia    Continue current medications, diet and activity as able

## 2025-06-16 ENCOUNTER — PATIENT OUTREACH (OUTPATIENT)
Dept: ONCOLOGY | Facility: CLINIC | Age: 85
End: 2025-06-16
Payer: MEDICARE

## 2025-06-16 NOTE — TELEPHONE ENCOUNTER
RECORDS STATUS - ALL OTHER DIAGNOSIS      RECORDS RECEIVED FROM: [facility]   NOTES STATUS DETAILS   OFFICE NOTE from referring provider [facility] Dr. Zhou Meraz   OFFICE NOTE from medical oncologist [facility] 11/5/2019 - Dr. Terra Salazar   OFFICE NOTE from rad onc [facility] 10/30/2023 - Dr. Tanvi Shoemaker  6/9/2021 - Dr. Pascual Carmichael   MEDICATION LIST [facility]    LABS     PATHOLOGY REPORTS     ANYTHING RELATED TO DIAGNOSIS Epic 6/12/2025   IMAGING (NEED IMAGES & REPORT)     CT SCANS PACS CT Chest: 6/6/2025   MRI     XRAYS PACS Xray Chest: 5/21/2025   ULTRASOUND     PET     IMAGE DISC [employer] TRACKING   TRACKING #:

## 2025-06-16 NOTE — PROGRESS NOTES
New IP (Interventional Pulmonology) referral rec'd.  Chart reviewed.        New Patient: Interventional Pulmonary (Lung nodule) Nurse Navigator Note    [facility]    Referred to (specialty): Interventional Pulmonary (Lung nodule)    Requested provider (if applicable): n/a    Date Referral Received: 6/16/2025    Evaluation for:  multiple pulmonary nodules on recent CT chest. Needs consult and bronchoscopy/tissue biopsy. PET scan also being ordered    Clinical History (per Nurse review of records provided):    **BOOK MARKED**    EXAMINATION: CT CHEST W/O CONTRAST, 6/6/2025 1:02 PM     TECHNIQUE:  Helical CT images from the thoracic inlet through the lung  bases were obtained without IV contrast.      COMPARISON: Chest     /21/25     HISTORY: follow-up  scattered nodular opacities in both lungs seen on  CXR 5/21/25. Now on abx therapy. Rule out underlying masses; Opacities  of both lungs present on chest x-ray     FINDINGS:     Chest:  Normal heart size. Trace pericardial effusion. Normal caliber main  pulmonary artery and ascending aorta. Mild calcifications of the  aortic arch and branching great vessels. Numerous mediastinal lymph  nodes throughout the paratracheal, subcarinal, and prevascular  regions. Right hilar lymph node.     Numerous consolidative nodules throughout the lungs, representative  examples include: 12 x 6 mm right upper lobe (series 4 image 68), 6 mm  nodule anterior central right upper lobe (series 4 image 63),  pleural-based right upper lobe 20 x 10 mm opacity (series 4 image  110), posterior left lower lobe 12 x 10 mm nodular opacity (series 4  image 174). No pneumothorax. No pleural effusion. No large focal  consolidative opacity. Central tracheal bronchial tree appears patent.     Upper abdomen: Cholecystectomy. Pancreatic parenchymal atrophy.  Colonic diverticulosis without evidence of acute  diverticulitis.  Hyperdense left renal cystic foci partly imaged left kidney.     Bones/soft tissues: Degenerative changes in the visualized spine. No  acute osseous abnormalities or suspicious bony lesions.                                                                      IMPRESSION:  1. Innumerable lower lung and peripheral predominant nodular opacities  scattered throughout the lungs, indeterminate, cannot exclude  metastatic etiology. Inflammatory process and infection are also  considerations. Consider bronchoscopy, PETCT and tissue sampling .  2. No large focal consolidative opacity, pneumothorax, or pleural  effusion.  3. Numerous lymph nodes, favor reactive.     I have personally reviewed the examination and initial interpretation  and I agree with the findings.     FAISAL KNAPP MD     Records Location: [facility]     Records Needed: none    Additional testing needed prior to consult: none

## 2025-06-19 ENCOUNTER — ONCOLOGY VISIT (OUTPATIENT)
Dept: PULMONOLOGY | Facility: CLINIC | Age: 85
End: 2025-06-19
Attending: INTERNAL MEDICINE
Payer: MEDICARE

## 2025-06-19 ENCOUNTER — PRE VISIT (OUTPATIENT)
Dept: PULMONOLOGY | Facility: CLINIC | Age: 85
End: 2025-06-19

## 2025-06-19 VITALS
SYSTOLIC BLOOD PRESSURE: 135 MMHG | BODY MASS INDEX: 25.94 KG/M2 | HEART RATE: 78 BPM | DIASTOLIC BLOOD PRESSURE: 75 MMHG | WEIGHT: 165.6 LBS | OXYGEN SATURATION: 94 %

## 2025-06-19 DIAGNOSIS — J18.9 PNEUMONIA DUE TO INFECTIOUS ORGANISM, UNSPECIFIED LATERALITY, UNSPECIFIED PART OF LUNG: Primary | ICD-10-CM

## 2025-06-19 DIAGNOSIS — R06.2 WHEEZING: ICD-10-CM

## 2025-06-19 DIAGNOSIS — R91.8 PULMONARY NODULES: ICD-10-CM

## 2025-06-19 RX ORDER — DOXYCYCLINE HYCLATE 100 MG
100 TABLET ORAL 2 TIMES DAILY
Qty: 7 TABLET | Refills: 0 | Status: SHIPPED | OUTPATIENT
Start: 2025-06-19

## 2025-06-19 RX ORDER — PREDNISONE 20 MG/1
40 TABLET ORAL
Qty: 10 TABLET | Refills: 0 | Status: SHIPPED | OUTPATIENT
Start: 2025-06-19 | End: 2025-06-24

## 2025-06-19 RX ORDER — ALBUTEROL SULFATE 90 UG/1
2 INHALANT RESPIRATORY (INHALATION) EVERY 6 HOURS PRN
Qty: 18 G | Refills: 0 | Status: SHIPPED | OUTPATIENT
Start: 2025-06-19

## 2025-06-19 NOTE — PATIENT INSTRUCTIONS
CT chest and follow up 1 month  Prednisone 40mg daily x 5 days and Doxycycline x 7 days  Albuterol 1-2 puffs for cough or wheezing

## 2025-06-19 NOTE — NURSING NOTE
Prakash Cramer's goals for this visit include:   Chief Complaint   Patient presents with    Consult     Referred by Dr. Meraz  Pulmonary nodules       He requests these members of his care team be copied on today's visit information: no     PCP: Zhou Meraz    Referring Provider:  Zhou Meraz MD  [address] [place] [address]    /75 (BP Location: Left arm, Patient Position: Chair, Cuff Size: Adult Regular)   Pulse 78   Wt 75.1 kg (165 lb 9.6 oz)   SpO2 94%   BMI 25.94 kg/m      Do you need any medication refills at today's visit? No     WING Albright   Neph/Pulm [facility]

## 2025-06-19 NOTE — PROGRESS NOTES
LUNG NODULE & INTERVENTIONAL PULMONARY CLINIC  [facility], [facility]     Prakash Cramer MRN# [id number]   Age: 85 year old YOB: 1940     Reason for Consultation: lung nodule(s)    Requesting Physician: Zhou Meraz MD  [address] [place] [address]       Assessment and Plan:    Numerous scattered lung nodules, largest of which measures 2cm RUL  Medastinal lymphadenopathy, favor reactive  Noted on CT chest 6/6/2025, initially seen on CXR 5/21/2025. New from CT A/P 9/2020. He recently had a cough and sick exposure, cough improving after completion of empiric antibiotics. Patient continues to note intermittent wheeze. I suspect these could be infectious/inflammatory, ordered Prednisone 40mg daily x 5 days + Doxycycline and albuterol inhaler PRN. I do not suspect this to be metastatic prostate cancer given undetectable PSA, and does not look typical of lung metastasis from prostate cancer. Recommend close surveillance CT chest 4-6 weeks. If not improving, or increasing in size will consider bronchoscopic biopsy. If improving then cancel PET as ordered by PCP and scheduled 7/25/2025.       Follow up 1 month with repeat CT chest w/o    Nicole Juárez PA-C  Interventional and General Pulmonology  Department of Pulmonary, Allergy, Critical Care and Sleep Medicine   [facility]     45 minutes spent reviewing chart, reviewing test results, talking with and examining patient, formulating plan, and documentation on the day of the encounter.          History:     Prakash Cramer is a 85 year old male with who is here for lung nodule(s).    UC 5/21/2025 with cough. His son was also sick with a URI. No congestion, fevers or chills. CXR with multiple nodules. Received zpack and cefdinir. The cough is improved. Not really short of breath. Wheezes sometimes, in the morning, kind of getting better. No known history of asthma. Not on any inhalers.       - Personal hx of cancer:  prostate cancer s/p radiation 2021, PSA undetectable 6/12/2025. 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 No TB exposures  - Tobacco hx: never smoker  - Images reviewed this visit: CT chest 6/6/2025, CXR 5/21/2025, CT A/P 9/2020   - My interpretation of the PFT's relevant for this visit includes: None   - On blood thinners?: No    Other pertinent active medical problems include:   - CKD 3, DM2, HTN, HLD          Past Medical History:      Past Medical History:   Diagnosis Date    Androgen deprivation therapy     Eligard 45 mg injection received 10/26/2020    Aplastic anemia 03/26/2018    thought secondary to reaction to Septra    BPH (benign prostatic hypertrophy)     failed  Flomax    C. difficile diarrhea 03/26/2018    treated with Flagyl    Cellulitis and abscess of leg, except foot 2004    Contact dermatitis and other eczema, due to unspecified cause     Diaphragmatic hernia without mention of obstruction or gangrene     hiatal hernia    Esophageal reflux     History of colonic polyps 01/2020    5 polyps removed and 2 were precancerous so he will have another one in Jan 2021.    Hyperlipidemia LDL goal <100 03/11/2005    Hypothyroidism     Impotence of organic origin     Meningococcal encephalitis     Mumps     Prostate cancer (H) 08/26/2020    Proteinuria     RBBB     S/P radiation therapy     7,000 cGy to prostate + SV completed on 6/23/2021 - [facility]    Type 2 diabetes mellitus with diabetic nephropathy  (goal A1C<7) 10/24/2015    Unspecified essential hypertension            Past Surgical History:      Past Surgical History:   Procedure Laterality Date    ARTHROPLASTY KNEE Left 03/18/2015    Procedure: ARTHROPLASTY KNEE;  Surgeon: Abebe Schroeder MD;  Location:  OR    ARTHROPLASTY KNEE Right 03/14/2016    Procedure: ARTHROPLASTY KNEE;  Surgeon: Abebe Schroeder MD;  Location:  OR    BIOPSY PROSTATE  "TRANSRECTAL  08/26/2020    HC LAPAROSCOPY, SURGICAL; CHOLECYSTECTOMY  1998    Cholecystectomy, Laparoscopic    HC REMOVE TONSILS/ADENOIDS,13+ Y/O  1963    T & A 13+y.o.    ZZC NONSPECIFIC PROCEDURE  10/2002    left knee meniscus tear repair          Social History:     Social History     Tobacco Use    Smoking status: Never    Smokeless tobacco: Never   Substance Use Topics    Alcohol use: Not Currently          Family History:     Family History   Problem Relation Age of Onset    Family History Negative Mother         d:age 89 of \"old age\"    Gastrointestinal Disease Father         d:age 79 liver failure after taking excessive amounts of Tylenol over 5-6 yrs    Neurologic Disorder Brother         b:1932  hx cerebral aneurysm age 59    Cancer No family hx of            Allergies:      Allergies   Allergen Reactions    Sulfonylureas Other (See Comments)     amaryl caused aplastic anemia verified by bone marrow biopsy 3/30/18    Pancytopenia   Pancytopenia   amaryl caused aplastic anemia verified by bone marrow biopsy 3/30/18    Canagliflozin Other (See Comments)     Joint pain    Joint pain   Joint pain    Crestor [Rosuvastatin]      Myalgias    Diovan [Valsartan]      increase k+    Glimepiride      Pancytopenia      Lipitor [Atorvastatin Calcium]      myalgias    Mold Other (See Comments)     Eyes itchy,red and dry    Penicillins     Seasonal Allergies      Eyes itch          Medications:     Current Outpatient Medications   Medication Sig Dispense Refill    acetaminophen (TYLENOL) 500 MG tablet Take 500-1,000 mg by mouth every 6 hours as needed for mild pain       amLODIPine (NORVASC) 10 MG tablet Take 1 tablet (10 mg) by mouth daily. 90 tablet 3    blood glucose (ACCU-CHEK GUIDE) test strip Use to test blood sugar one times daily 100 strip 3    Blood Glucose Monitoring Suppl (ACCU-CHEK GUIDE ME) w/Device KIT 1 each daily 1 kit 0    famotidine (PEPCID) 20 MG tablet Take 1 tablet by mouth twice daily 180 tablet 0 "    levothyroxine (SYNTHROID/LEVOTHROID) 88 MCG tablet Take 1 tablet (88 mcg) by mouth daily. 90 tablet 3    loperamide (IMODIUM A-D) 2 MG tablet Take 2 mg by mouth 4 times daily as needed for diarrhea      metFORMIN (GLUCOPHAGE XR) 500 MG 24 hr tablet Take 1 tablet (500 mg) by mouth daily. 90 tablet 3    pioglitazone (ACTOS) 30 MG tablet Take 1 tablet (30 mg) by mouth daily. 90 tablet 3    pravastatin (PRAVACHOL) 40 MG tablet Take 1 tablet (40 mg) by mouth daily. 90 tablet 3    sitagliptin (JANUVIA) 100 MG tablet Take 1 tablet (100 mg) by mouth daily. 90 tablet 3    thin (NO BRAND SPECIFIED) lancets Use to test blood sugar 1 times daily 100 each 3     No current facility-administered medications for this visit.            Physical Exam:   /75 (BP Location: Left arm, Patient Position: Chair, Cuff Size: Adult Regular)   Pulse 78   Wt 75.1 kg (165 lb 9.6 oz)   SpO2 94%   BMI 25.94 kg/m    Wt Readings from Last 4 Encounters:   06/19/25 75.1 kg (165 lb 9.6 oz)   05/21/25 75.8 kg (167 lb)   09/27/24 73.3 kg (161 lb 9.6 oz)   10/30/23 76.1 kg (167 lb 11.2 oz)     General: Well appearing  Lungs: Nonlabored breathing  Neuro: Answering questions appropriately  Psych: Normal affect       Imaging/Lab Data   All laboratory and imaging data reviewed.    No results found for this or any previous visit (from the past 24 hours).

## 2025-07-23 ENCOUNTER — ANCILLARY PROCEDURE (OUTPATIENT)
Dept: CT IMAGING | Facility: CLINIC | Age: 85
End: 2025-07-23
Attending: PHYSICIAN ASSISTANT
Payer: MEDICARE

## 2025-07-23 DIAGNOSIS — R91.8 PULMONARY NODULES: ICD-10-CM

## 2025-07-23 PROCEDURE — 71250 CT THORAX DX C-: CPT | Mod: GC | Performed by: RADIOLOGY

## 2025-07-29 ENCOUNTER — TELEPHONE (OUTPATIENT)
Dept: PULMONOLOGY | Facility: CLINIC | Age: 85
End: 2025-07-29
Payer: MEDICARE

## 2025-07-29 NOTE — TELEPHONE ENCOUNTER
7/30/2025 10AM  Left Voicemail (2nd Attempt) and Left Voicemail with Family Member (2nd Attempt) MAX ATTEMPTS REACHED- LVM for pt and pt's C2C contacts, No MYC for the patient to call back and reschedule the following:    Appointment type: Return CCSL  Provider: Nicole Juárez PA-C   Return date: 8/1/2025 at 1PM or 1:40PM  Specialty phone number: [phone]  Additional appointment(s) needed: NA  Additonal Notes: 7/30 MAX ATTEMPTS REACHED- LVM for pt and all C2C pt contacts for pt to move 1:20PM appt to 1PM or 1:40PM d/t template change. BR[facility]ANY [facility] Complex   Rheumatology, Gastroenterology, Nephrology, Infectious Disease, Pulmonology  [facility]         7/29/2025 3:59PM  Left Voicemail (1st Attempt) for the patient to call back and reschedule the following:    Appointment type: Return CCSL  Provider: Nicole Juárez PA-C  Return date: 8/1/2025 at 1PM or 1:40PM  Specialty phone number: [phone]  Additional appointment(s) needed: NA  Additonal Notes: 7/29 LVM (no MYC) for pt to move 1:20PM appt to 1PM 8/1 d/t template change. BRITTANY barraza [facility]   Rheumatology, Gastroenterology, Nephrology, Infectious Disease, Pulmonology  [facility]

## (undated) RX ORDER — GENTAMICIN 40 MG/ML
INJECTION, SOLUTION INTRAMUSCULAR; INTRAVENOUS
Status: DISPENSED
Start: 2020-08-26

## (undated) RX ORDER — LIDOCAINE HYDROCHLORIDE 10 MG/ML
INJECTION, SOLUTION EPIDURAL; INFILTRATION; INTRACAUDAL; PERINEURAL
Status: DISPENSED
Start: 2020-08-26